# Patient Record
Sex: MALE | Race: BLACK OR AFRICAN AMERICAN | Employment: UNEMPLOYED | URBAN - METROPOLITAN AREA
[De-identification: names, ages, dates, MRNs, and addresses within clinical notes are randomized per-mention and may not be internally consistent; named-entity substitution may affect disease eponyms.]

---

## 2017-03-09 ENCOUNTER — APPOINTMENT (OUTPATIENT)
Dept: CT IMAGING | Age: 47
End: 2017-03-09
Attending: EMERGENCY MEDICINE
Payer: COMMERCIAL

## 2017-03-09 ENCOUNTER — APPOINTMENT (OUTPATIENT)
Dept: GENERAL RADIOLOGY | Age: 47
End: 2017-03-09
Attending: EMERGENCY MEDICINE
Payer: COMMERCIAL

## 2017-03-09 ENCOUNTER — HOSPITAL ENCOUNTER (EMERGENCY)
Age: 47
Discharge: HOME | End: 2017-03-10
Attending: EMERGENCY MEDICINE | Admitting: EMERGENCY MEDICINE
Payer: COMMERCIAL

## 2017-03-09 VITALS
HEART RATE: 101 BPM | BODY MASS INDEX: 25.16 KG/M2 | WEIGHT: 151.01 LBS | SYSTOLIC BLOOD PRESSURE: 113 MMHG | TEMPERATURE: 97.8 F | HEIGHT: 65 IN | OXYGEN SATURATION: 87 % | RESPIRATION RATE: 20 BRPM | DIASTOLIC BLOOD PRESSURE: 72 MMHG

## 2017-03-09 DIAGNOSIS — R07.9 CHEST PAIN, UNSPECIFIED TYPE: Primary | ICD-10-CM

## 2017-03-09 LAB
ALBUMIN SERPL BCP-MCNC: 3.6 G/DL (ref 3.5–5)
ALBUMIN/GLOB SERPL: 0.9 {RATIO} (ref 1.1–2.2)
ALP SERPL-CCNC: 106 U/L (ref 45–117)
ALT SERPL-CCNC: 35 U/L (ref 12–78)
ANION GAP BLD CALC-SCNC: 8 MMOL/L (ref 5–15)
AST SERPL W P-5'-P-CCNC: 13 U/L (ref 15–37)
BASOPHILS # BLD AUTO: 0.1 K/UL (ref 0–0.1)
BASOPHILS # BLD: 1 % (ref 0–1)
BILIRUB SERPL-MCNC: 1 MG/DL (ref 0.2–1)
BNP SERPL-MCNC: 2243 PG/ML (ref 0–125)
BUN SERPL-MCNC: 16 MG/DL (ref 6–20)
BUN/CREAT SERPL: 13 (ref 12–20)
CALCIUM SERPL-MCNC: 9.4 MG/DL (ref 8.5–10.1)
CHLORIDE SERPL-SCNC: 110 MMOL/L (ref 97–108)
CK MB CFR SERPL CALC: 1.1 % (ref 0–2.5)
CK MB CFR SERPL CALC: 1.1 % (ref 0–2.5)
CK MB SERPL-MCNC: 1.2 NG/ML (ref 5–25)
CK MB SERPL-MCNC: 1.5 NG/ML (ref 5–25)
CK SERPL-CCNC: 105 U/L (ref 39–308)
CK SERPL-CCNC: 131 U/L (ref 39–308)
CK SERPL-CCNC: 139 U/L (ref 39–308)
CO2 SERPL-SCNC: 24 MMOL/L (ref 21–32)
CREAT SERPL-MCNC: 1.28 MG/DL (ref 0.7–1.3)
EOSINOPHIL # BLD: 0.2 K/UL (ref 0–0.4)
EOSINOPHIL NFR BLD: 3 % (ref 0–7)
ERYTHROCYTE [DISTWIDTH] IN BLOOD BY AUTOMATED COUNT: 16.2 % (ref 11.5–14.5)
GLOBULIN SER CALC-MCNC: 4.1 G/DL (ref 2–4)
GLUCOSE SERPL-MCNC: 143 MG/DL (ref 65–100)
HCT VFR BLD AUTO: 42.5 % (ref 36.6–50.3)
HGB BLD-MCNC: 14.3 G/DL (ref 12.1–17)
LYMPHOCYTES # BLD AUTO: 20 % (ref 12–49)
LYMPHOCYTES # BLD: 1.3 K/UL (ref 0.8–3.5)
MCH RBC QN AUTO: 30 PG (ref 26–34)
MCHC RBC AUTO-ENTMCNC: 33.6 G/DL (ref 30–36.5)
MCV RBC AUTO: 89.3 FL (ref 80–99)
MONOCYTES # BLD: 0.6 K/UL (ref 0–1)
MONOCYTES NFR BLD AUTO: 9 % (ref 5–13)
NEUTS SEG # BLD: 4.6 K/UL (ref 1.8–8)
NEUTS SEG NFR BLD AUTO: 67 % (ref 32–75)
PLATELET # BLD AUTO: 337 K/UL (ref 150–400)
POTASSIUM SERPL-SCNC: 4.2 MMOL/L (ref 3.5–5.1)
PROT SERPL-MCNC: 7.7 G/DL (ref 6.4–8.2)
RBC # BLD AUTO: 4.76 M/UL (ref 4.1–5.7)
SODIUM SERPL-SCNC: 142 MMOL/L (ref 136–145)
TROPONIN I SERPL-MCNC: 0.23 NG/ML
TROPONIN I SERPL-MCNC: 0.24 NG/ML
TROPONIN I SERPL-MCNC: 0.25 NG/ML
WBC # BLD AUTO: 6.7 K/UL (ref 4.1–11.1)

## 2017-03-09 PROCEDURE — 74011636320 HC RX REV CODE- 636/320: Performed by: EMERGENCY MEDICINE

## 2017-03-09 PROCEDURE — 96374 THER/PROPH/DIAG INJ IV PUSH: CPT

## 2017-03-09 PROCEDURE — 83880 ASSAY OF NATRIURETIC PEPTIDE: CPT | Performed by: EMERGENCY MEDICINE

## 2017-03-09 PROCEDURE — 96375 TX/PRO/DX INJ NEW DRUG ADDON: CPT

## 2017-03-09 PROCEDURE — 93005 ELECTROCARDIOGRAM TRACING: CPT

## 2017-03-09 PROCEDURE — 80053 COMPREHEN METABOLIC PANEL: CPT | Performed by: EMERGENCY MEDICINE

## 2017-03-09 PROCEDURE — 36415 COLL VENOUS BLD VENIPUNCTURE: CPT | Performed by: EMERGENCY MEDICINE

## 2017-03-09 PROCEDURE — 71275 CT ANGIOGRAPHY CHEST: CPT

## 2017-03-09 PROCEDURE — 82553 CREATINE MB FRACTION: CPT | Performed by: EMERGENCY MEDICINE

## 2017-03-09 PROCEDURE — 82550 ASSAY OF CK (CPK): CPT | Performed by: EMERGENCY MEDICINE

## 2017-03-09 PROCEDURE — 99285 EMERGENCY DEPT VISIT HI MDM: CPT

## 2017-03-09 PROCEDURE — 74011250636 HC RX REV CODE- 250/636: Performed by: EMERGENCY MEDICINE

## 2017-03-09 PROCEDURE — 71020 XR CHEST PA LAT: CPT

## 2017-03-09 PROCEDURE — 84484 ASSAY OF TROPONIN QUANT: CPT | Performed by: EMERGENCY MEDICINE

## 2017-03-09 PROCEDURE — 85025 COMPLETE CBC W/AUTO DIFF WBC: CPT | Performed by: EMERGENCY MEDICINE

## 2017-03-09 RX ORDER — QUETIAPINE FUMARATE 50 MG/1
50 TABLET, FILM COATED ORAL 2 TIMES DAILY
COMMUNITY
End: 2020-01-31

## 2017-03-09 RX ORDER — HYDROMORPHONE HYDROCHLORIDE 1 MG/ML
1 INJECTION, SOLUTION INTRAMUSCULAR; INTRAVENOUS; SUBCUTANEOUS
Status: COMPLETED | OUTPATIENT
Start: 2017-03-09 | End: 2017-03-09

## 2017-03-09 RX ORDER — LISINOPRIL 10 MG/1
10 TABLET ORAL DAILY
COMMUNITY
End: 2018-09-11 | Stop reason: SDUPTHER

## 2017-03-09 RX ORDER — SODIUM CHLORIDE 0.9 % (FLUSH) 0.9 %
10 SYRINGE (ML) INJECTION
Status: COMPLETED | OUTPATIENT
Start: 2017-03-09 | End: 2017-03-09

## 2017-03-09 RX ORDER — MORPHINE SULFATE 2 MG/ML
4 INJECTION, SOLUTION INTRAMUSCULAR; INTRAVENOUS
Status: COMPLETED | OUTPATIENT
Start: 2017-03-09 | End: 2017-03-09

## 2017-03-09 RX ORDER — SODIUM CHLORIDE 9 MG/ML
50 INJECTION, SOLUTION INTRAVENOUS
Status: COMPLETED | OUTPATIENT
Start: 2017-03-09 | End: 2017-03-10

## 2017-03-09 RX ADMIN — Medication 4 MG: at 18:38

## 2017-03-09 RX ADMIN — HYDROMORPHONE HYDROCHLORIDE 1 MG: 1 INJECTION, SOLUTION INTRAMUSCULAR; INTRAVENOUS; SUBCUTANEOUS at 22:18

## 2017-03-09 RX ADMIN — SODIUM CHLORIDE 50 ML/HR: 900 INJECTION, SOLUTION INTRAVENOUS at 19:42

## 2017-03-09 RX ADMIN — IOPAMIDOL 100 ML: 755 INJECTION, SOLUTION INTRAVENOUS at 19:42

## 2017-03-09 RX ADMIN — Medication 10 ML: at 19:42

## 2017-03-09 NOTE — ED NOTES
Patient not present in department or waiting area. Called his cell phone and patient said he walked to the store to get something but was coming back to the ER right away.

## 2017-03-09 NOTE — ED NOTES
Patient has returned to ER and was immediately placed in room. Patient in no apparent distress, steady gait. No change to triage assessment.

## 2017-03-09 NOTE — ED TRIAGE NOTES
Patient was walking today when he began with right side chest pain that then migrated to the left. Brought to triage by EMS. Patient was tachycardic en route. + heart failure.

## 2017-03-09 NOTE — ED PROVIDER NOTES
HPI Comments: Sukumar Vasquez is a 42 y.o. male with PMhx significant for HTN, CAD (MI x2, 2 stents), bipolar disorder, schizoaffective disorder, asthma, depression, and anxiety who presents via EMS to the ED c/o an acute onset of intermittent chest pain radiating to the left and right sided chest x2 hours PTA. He reports associated sx of a dry cough, SOB, nausea, mild abdominal pain, constipation, decreased appetite secondary to pain, and congestion. The pt states while walking around he began to feel the onset of sx noting that he had taken a NTG for his sx WNR leading him to the ED. The pt endorses that his last cardiac catheterization was last year. He specifically denies any vomiting, diarrhea, leg swelling, or dysuria at this time. PCP: Nikole Zambrano MD      There are no other complaints, changes or physical findings at this time. Written by Eric Cook, ED Scribe, as dictated by Ramone Flaherty, DO     The history is provided by the patient. No  was used. Past Medical History:   Diagnosis Date    Aggressive outburst     Anxiety disorder     Asthma     Bipolar 1 disorder ([address]     CAD (coronary artery disease)     MI x2 with 2 cardiac stent    Depression     Diabetes ([address]     Hypertension     Mood disorder ([address]     Psychiatric disorder     Psychotic disorder     Schizoaffective disorder, bipolar type ([address]     Sleep disorder     Suicidal thoughts     Trauma        Past Surgical History:   Procedure Laterality Date    HX CORONARY STENT PLACEMENT      HX OTHER SURGICAL           Family History:   Problem Relation Age of Onset    Depression Neg Hx     Suicide Neg Hx     Psychotic Disorder Neg Hx     Dementia Neg Hx     Substance Abuse Neg Hx        Social History     Social History    Marital status: LEGALLY      Spouse name: N/A    Number of children: N/A    Years of education: N/A     Occupational History    Not on file.      Social History Main Topics    Smoking status: Current Every Day Smoker     Packs/day: 1.00    Smokeless tobacco: Never Used    Alcohol use Yes      Comment: occasionally    Drug use: No    Sexual activity: Not Currently     Other Topics Concern    Not on file     Social History Narrative         ALLERGIES: Aspirin; Spinach leaf; and Tylenol [acetaminophen]    Review of Systems   Constitutional: Positive for appetite change (secondary to pain). Negative for chills, fatigue and fever. HENT: Positive for congestion. Negative for rhinorrhea. Eyes: Negative for visual disturbance. Respiratory: Positive for cough (dry) and shortness of breath. Negative for wheezing. Cardiovascular: Positive for chest pain (BL sides). Negative for palpitations and leg swelling. Gastrointestinal: Positive for abdominal pain (mild), constipation and nausea. Negative for abdominal distention, diarrhea and vomiting. Endocrine: Negative. Genitourinary: Negative for difficulty urinating and dysuria. Musculoskeletal: Negative. Skin: Negative for rash. Neurological: Negative for dizziness, weakness and light-headedness. Psychiatric/Behavioral: Negative for suicidal ideas. Patient Vitals for the past 12 hrs:   Temp Pulse Resp BP SpO2   03/09/17 2230 - (!) 101 20 113/72 (!) 87 %   03/09/17 2221 - (!) 104 27 131/89 99 %   03/09/17 2145 - (!) 104 (!) 33 (!) 149/98 -   03/09/17 2130 - (!) 103 (!) 32 145/74 97 %   03/09/17 1916 - (!) 109 21 (!) 153/108 -   03/09/17 1900 - (!) 107 (!) 33 (!) 144/98 -   03/09/17 1837 - (!) 108 (!) 34 (!) 138/105 -   03/09/17 1815 - (!) 106 21 (!) 132/101 -   03/09/17 1755 - (!) 114 22 (!) 139/97 -   03/09/17 1613 97.8 °F (36.6 °C) (!) 107 20 (!) 137/96 100 %        Physical Exam   Constitutional: He is oriented to person, place, and time. He appears well-developed and well-nourished. No distress. HENT:   Head: Normocephalic and atraumatic.    Mouth/Throat: Oropharynx is clear and moist. Eyes: Conjunctivae and EOM are normal.   Neck: Neck supple. No JVD present. No tracheal deviation present. Cardiovascular: Regular rhythm and intact distal pulses. Tachycardia present. Exam reveals no gallop and no friction rub. No murmur heard. Pulmonary/Chest: Effort normal and breath sounds normal. No stridor. No respiratory distress. He has no wheezes. Pacemaker defibrillator in the left lateral chest    Abdominal: Soft. Bowel sounds are normal. He exhibits no distension and no mass. There is no tenderness. There is no guarding. Musculoskeletal: Normal range of motion. He exhibits no edema (peripheral ) or tenderness. No deformity   Neurological: He is alert and oriented to person, place, and time. He has normal strength. No focal deficits   Skin: Skin is warm, dry and intact. No rash noted. Psychiatric: His behavior is normal. Judgment and thought content normal.   Flat affect    Nursing note and vitals reviewed. MDM  Number of Diagnoses or Management Options  Chest pain, unspecified type:   Diagnosis management comments: Pt with a hx of CAD and heart failure, presenting with chest pain associated with SOB and nausea. Pt was seen in December and was transferred to [facility] for further cardiology evaluation at that time. Pt is tachycardic today. DDx includes acs, arrhythmia, atypical chest pain, anxiety, PE, pneumonia, pulmonary edema, CHF exacerbation. Amount and/or Complexity of Data Reviewed  Clinical lab tests: ordered and reviewed  Tests in the radiology section of CPT®: ordered and reviewed  Tests in the medicine section of CPT®: ordered and reviewed  Review and summarize past medical records: yes  Independent visualization of images, tracings, or specimens: yes    Patient Progress  Patient progress: stable    ED Course       Procedures    EKG interpretation: (Preliminary)  1620  Rhythm: sinus tachycardia; and regular . Rate (approx.): 105;  Axis: normal; NJ interval: normal; QRS interval: normal ; ST/T wave: non-specific changes; Other findings: borderline ekg. Written by Padmaja Cook ED Scribe as dictated by Yane Alanis DO     PROGRESS NOTE:  9:58 PM  Pt has been re-evaluated. Pt states that he is still experiencing pain but is asking for food. The pt was updated on all available results and the plan of care at this time. Written by Padmaja Cook ED Scribe, as dictated by Yane Alanis DO. Progress Note:  10:26 PM  [place] [facility] Pt was transferred there on 12/27 after stating his defibrillator fired and had an elevated troponin. Further work up was negative and patient was discharged home with medical therapy. Per [facility] records, patient was admitted in November 2016 for a syncopal episode and had a dobutamine stress test which showed global hypokinesis and an EF of 25-30%. RHC/LHC on 11/29/16 showed moderate CAD with 30% mid LAD lesion, 50% mid RCA lesion with bridging of the distal LAD. Given recent cath and similar symptoms of chest pain in the past, will get 3rd set of troponin. If no significant change in troponin, will dc home with follow up with his cardiologist at [facility]. SIGN OUT:  10:32 PM  Patient's presentation, labs/imaging and plan of care was reviewed with Beatriz Dodson MD as part of sign out. They will wait for the cardiac enzymes to come back, if unremarkable discharge the pt home as part of the plan discussed with the patient. Beatriz Dodson MD's assistance in completion of this plan is greatly appreciated but it should be noted that I will be the provider of record for this patient. Written by Padmaja oCok, DEBBIE Scribe as dictated by Yane Alanis DO          LABORATORY TESTS:  Recent Results (from the past 12 hour(s))   EKG, 12 LEAD, INITIAL    Collection Time: 03/09/17  4:20 PM   Result Value Ref Range    Ventricular Rate 105 BPM    Atrial Rate 105 BPM    P-R Interval 152 ms    QRS Duration 80 ms Q-T Interval 372 ms    QTC Calculation (Bezet) 491 ms    Calculated P Axis 51 degrees    Calculated R Axis 27 degrees    Calculated T Axis 77 degrees    Diagnosis       Sinus tachycardia  Possible Left atrial enlargement  Left ventricular hypertrophy  Nonspecific T wave abnormality  Abnormal ECG  When compared with ECG of 28-DEC-2016 01:38,  No significant change was found     CBC WITH AUTOMATED DIFF    Collection Time: 03/09/17  4:29 PM   Result Value Ref Range    WBC 6.7 4.1 - 11.1 K/uL    RBC 4.76 4.10 - 5.70 M/uL    HGB 14.3 12.1 - 17.0 g/dL    HCT 42.5 36.6 - 50.3 %    MCV 89.3 80.0 - 99.0 FL    MCH 30.0 26.0 - 34.0 PG    MCHC 33.6 30.0 - 36.5 g/dL    RDW 16.2 (H) 11.5 - 14.5 %    PLATELET 321 697 - 071 K/uL    NEUTROPHILS 67 32 - 75 %    LYMPHOCYTES 20 12 - 49 %    MONOCYTES 9 5 - 13 %    EOSINOPHILS 3 0 - 7 %    BASOPHILS 1 0 - 1 %    ABS. NEUTROPHILS 4.6 1.8 - 8.0 K/UL    ABS. LYMPHOCYTES 1.3 0.8 - 3.5 K/UL    ABS. MONOCYTES 0.6 0.0 - 1.0 K/UL    ABS. EOSINOPHILS 0.2 0.0 - 0.4 K/UL    ABS. BASOPHILS 0.1 0.0 - 0.1 K/UL   METABOLIC PANEL, COMPREHENSIVE    Collection Time: 03/09/17  4:29 PM   Result Value Ref Range    Sodium 142 136 - 145 mmol/L    Potassium 4.2 3.5 - 5.1 mmol/L    Chloride 110 (H) 97 - 108 mmol/L    CO2 24 21 - 32 mmol/L    Anion gap 8 5 - 15 mmol/L    Glucose 143 (H) 65 - 100 mg/dL    BUN 16 6 - 20 MG/DL    Creatinine 1.28 0.70 - 1.30 MG/DL    BUN/Creatinine ratio 13 12 - 20      GFR est AA >60 >60 ml/min/1.73m2    GFR est non-AA >60 >60 ml/min/1.73m2    Calcium 9.4 8.5 - 10.1 MG/DL    Bilirubin, total 1.0 0.2 - 1.0 MG/DL    ALT (SGPT) 35 12 - 78 U/L    AST (SGOT) 13 (L) 15 - 37 U/L    Alk.  phosphatase 106 45 - 117 U/L    Protein, total 7.7 6.4 - 8.2 g/dL    Albumin 3.6 3.5 - 5.0 g/dL    Globulin 4.1 (H) 2.0 - 4.0 g/dL    A-G Ratio 0.9 (L) 1.1 - 2.2     CK W/ REFLX CKMB    Collection Time: 03/09/17  4:29 PM   Result Value Ref Range     39 - 308 U/L   TROPONIN I    Collection Time: 03/09/17  4:29 PM   Result Value Ref Range    Troponin-I, Qt. 0.23 (H) <0.05 ng/mL   PRO-BNP    Collection Time: 03/09/17  4:29 PM   Result Value Ref Range    NT pro-BNP 2243 (H) 0 - 125 PG/ML   CK W/ CKMB & INDEX    Collection Time: 03/09/17  7:49 PM   Result Value Ref Range     39 - 308 U/L    CK - MB 1.5 <3.6 NG/ML    CK-MB Index 1.1 0 - 2.5     TROPONIN I    Collection Time: 03/09/17  7:49 PM   Result Value Ref Range    Troponin-I, Qt. 0.25 (H) <0.05 ng/mL   CK W/ CKMB & INDEX    Collection Time: 03/09/17 10:17 PM   Result Value Ref Range     39 - 308 U/L    CK - MB 1.2 <3.6 NG/ML    CK-MB Index 1.1 0 - 2.5     TROPONIN I    Collection Time: 03/09/17 10:17 PM   Result Value Ref Range    Troponin-I, Qt. 0.24 (H) <0.05 ng/mL       IMAGING RESULTS:  CTA CHEST W WO CONT   Final Result   CLINICAL HISTORY: Chest pain, history of CHF     INDICATION: Chest pain and history of CHF     COMPARISON: None     TECHNIQUE: CT of the chest with IV contrast , Isovue-370 is performed. Axial  images from the thoracic inlet to the level of the upper abdomen are obtained. Manual post-processing of the images and coronal reformatting is also performed.     CT dose reduction was achieved through use of a standardized protocol tailored  for this examination and automatic exposure control for dose modulation. Multiplanar reformatted imaging was performed.     Sagittal and coronal reformatting.      3-D Postprocessing of imaging was performed. 3-D MIP reconstructed images were obtained in the coronal plane.      FINDINGS:      There is no pulmonary embolism.     There is no aortic aneurysm or dissection.     .        There is no mediastinal, axillary or hilar lymphadenopathy. There is no pleural  or pericardial effusion. The aorta is normal in course and caliber. The proximal  pulmonary arteries are without evidence of filling defects, the caliber of the  pulmonary arteries is also normal. There is cardiomegaly.  There is mild  increased groundglass opacity and increased interstitial lung markings as well. Hepatic venous congestion. ICD. The remainder of the upper abdomen visualized is  unremarkable. Small hiatal hernia.     IMPRESSION  IMPRESSION:   No pulmonary embolism.     No aortic aneurysm or dissection.     Cardiomegaly and interstitial pulmonary edema which is mild. No effusions. XR CHEST PA LAT   Final Result   CLINICAL HISTORY: S pain, history of CHF   INDICATION: Chest pain, history of CHF     COMPARISON: 12/27/2016     FINDINGS:   PA and lateral views of the chest are obtained. The cardiopericardial silhouette is prominent but stable. ICD in place. . There  is no pleural effusion, pneumothorax or focal consolidation present.     IMPRESSION  IMPRESSION: No acute intrathoracic disease. MEDICATIONS GIVEN:  Medications   morphine injection 4 mg (4 mg IntraVENous Given 3/9/17 1838)   sodium chloride (NS) flush 10 mL (10 mL IntraVENous Given 3/9/17 1942)   iopamidol (ISOVUE-370) 76 % injection 100 mL (100 mL IntraVENous Given 3/9/17 1942)   0.9% sodium chloride infusion (50 mL/hr IntraVENous New Bag 3/9/17 1942)   HYDROmorphone (PF) (DILAUDID) injection 1 mg (1 mg IntraVENous Given 3/9/17 2218)       IMPRESSION:  1. Chest pain, unspecified type        PLAN:  1. Current Discharge Medication List      CONTINUE these medications which have NOT CHANGED    Details   QUEtiapine (SEROQUEL) 50 mg tablet Take 50 mg by mouth two (2) times a day. lisinopril (PRINIVIL, ZESTRIL) 10 mg tablet Take 10 mg by mouth daily. albuterol (PROVENTIL HFA, VENTOLIN HFA) 90 mcg/actuation inhaler Take 1-2 Puffs by inhalation every four (4) hours as needed for Wheezing. Qty: 1 Inhaler, Refills: 1      insulin glargine (LANTUS) 100 unit/mL injection 70 units SQ at bedtime  Qty: 1 Vial, Refills: 0      insulin lispro (HUMALOG) 100 unit/mL injection 4 times daily and at night  Qty: 1 Vial, Refills: 0           2.    Follow-up Information     Follow up With Details Comments Contact Info    Your cardiologist Schedule an appointment as soon as possible for a visit in 1 day      Zeina Alexander MD Schedule an appointment as soon as possible for a visit in 1 day  [address] [facility]  [address]  [phone]          Return to ED if worse       DISCHARGE NOTE:  11:15 PM  The patient has been re-evaluated and is ready for discharge. Reviewed available results with patient. Counseled patient on diagnosis and care plan. Patient has expressed understanding, and all questions have been answered. Patient agrees with plan and agrees to follow up as recommended, or return to the ED if their symptoms worsen. Discharge instructions have been provided and explained to the patient, along with reasons to return to the ED. This note is prepared by Alan Reyna, acting as Scribe for Alicia Trinidad MD.    Alicia Trinidad MD: The scribe's documentation has been prepared under my direction and personally reviewed by me in its entirety. I confirm that the note above accurately reflects all work, treatment, procedures, and medical decision making performed by me.

## 2017-03-09 NOTE — ED NOTES
Case discussed with Denise HYATT; pt has elevated troponin. Pt called from waiting room; did not answer. Triage nurses updated.

## 2017-03-10 LAB
ATRIAL RATE: 105 BPM
CALCULATED P AXIS, ECG09: 51 DEGREES
CALCULATED R AXIS, ECG10: 27 DEGREES
CALCULATED T AXIS, ECG11: 77 DEGREES
DIAGNOSIS, 93000: NORMAL
P-R INTERVAL, ECG05: 152 MS
Q-T INTERVAL, ECG07: 372 MS
QRS DURATION, ECG06: 80 MS
QTC CALCULATION (BEZET), ECG08: 491 MS
VENTRICULAR RATE, ECG03: 105 BPM

## 2017-03-10 PROCEDURE — 74011250637 HC RX REV CODE- 250/637: Performed by: EMERGENCY MEDICINE

## 2017-03-10 RX ORDER — ONDANSETRON 4 MG/1
8 TABLET, ORALLY DISINTEGRATING ORAL
Status: COMPLETED | OUTPATIENT
Start: 2017-03-10 | End: 2017-03-10

## 2017-03-10 RX ADMIN — ONDANSETRON 8 MG: 4 TABLET, ORALLY DISINTEGRATING ORAL at 00:32

## 2017-03-10 NOTE — DISCHARGE INSTRUCTIONS
Chest Pain: Care Instructions  Your Care Instructions  There are many things that can cause chest pain. Some are not serious and will get better on their own in a few days. But some kinds of chest pain need more testing and treatment. Your doctor may have recommended a follow-up visit in the next 8 to 12 hours. If you are not getting better, you may need more tests or treatment. Even though your doctor has released you, you still need to watch for any problems. The doctor carefully checked you, but sometimes problems can develop later. If you have new symptoms or if your symptoms do not get better, get medical care right away. If you have worse or different chest pain or pressure that lasts more than 5 minutes or you passed out (lost consciousness), call 911 or seek other emergency help right away. A medical visit is only one step in your treatment. Even if you feel better, you still need to do what your doctor recommends, such as going to all suggested follow-up appointments and taking medicines exactly as directed. This will help you recover and help prevent future problems. How can you care for yourself at home? · Rest until you feel better. · Take your medicine exactly as prescribed. Call your doctor if you think you are having a problem with your medicine. · Do not drive after taking a prescription pain medicine. When should you call for help? Call 911 if:  · You passed out (lost consciousness). · You have severe difficulty breathing. · You have symptoms of a heart attack. These may include:  ¨ Chest pain or pressure, or a strange feeling in your chest.  ¨ Sweating. ¨ Shortness of breath. ¨ Nausea or vomiting. ¨ Pain, pressure, or a strange feeling in your back, neck, jaw, or upper belly or in one or both shoulders or arms. ¨ Lightheadedness or sudden weakness. ¨ A fast or irregular heartbeat.   After you call 911, the  may tell you to chew 1 adult-strength or 2 to 4 low-dose aspirin. Wait for an ambulance. Do not try to drive yourself. Call your doctor today if:  · You have any trouble breathing. · Your chest pain gets worse. · You are dizzy or lightheaded, or you feel like you may faint. · You are not getting better as expected. · You are having new or different chest pain. Where can you learn more? Go to http://merrick-christofer.info/. Enter A120 in the search box to learn more about \"Chest Pain: Care Instructions. \"  Current as of: May 27, 2016  Content Version: 11.1  © 6508-3700 [employer]. Care instructions adapted under license by [employer] (which disclaims liability or warranty for this information). If you have questions about a medical condition or this instruction, always ask your healthcare professional. [address] any warranty or liability for your use of this information.

## 2017-03-10 NOTE — ED NOTES
Pt resting comfortably on the stretcher in a position of comfort.  Pt in no acute distress at this time.  Call bell within reach.  Side rails x 2.  Cardiac monitor x 3.  Stretcher locked in the lowest position. Pt aware of plan to await for MD/PA-C/NP assessment, and pt/family verbalizes understanding.  Will continue to monitor.

## 2017-03-10 NOTE — ED NOTES
Patient is here with c/o an acute onset of intermittent chest pain radiating to the left and right sided chest x2 hours PTA.

## 2017-03-10 NOTE — ED NOTES
Patient states that he does not have anyone to come get him. He wanted to walk but I informed him that it was dark outside and it was not safe.  Cab ticket provided to patient

## 2017-08-29 ENCOUNTER — IP HISTORICAL/CONVERTED ENCOUNTER (OUTPATIENT)
Dept: OTHER | Age: 47
End: 2017-08-29

## 2017-10-21 ENCOUNTER — OP HISTORICAL/CONVERTED ENCOUNTER (OUTPATIENT)
Dept: OTHER | Age: 47
End: 2017-10-21

## 2018-08-03 ENCOUNTER — IP HISTORICAL/CONVERTED ENCOUNTER (OUTPATIENT)
Dept: OTHER | Age: 48
End: 2018-08-03

## 2018-09-11 RX ORDER — CLOPIDOGREL BISULFATE 75 MG/1
75 TABLET ORAL DAILY
Qty: 30 TAB | Refills: 0 | Status: SHIPPED | OUTPATIENT
Start: 2018-09-11 | End: 2020-01-31 | Stop reason: SDUPTHER

## 2018-09-11 RX ORDER — CARVEDILOL 3.12 MG/1
3.12 TABLET ORAL 2 TIMES DAILY
Qty: 60 TAB | Refills: 0 | Status: SHIPPED | OUTPATIENT
Start: 2018-09-11 | End: 2019-10-23 | Stop reason: SDUPTHER

## 2018-09-11 RX ORDER — LISINOPRIL 10 MG/1
10 TABLET ORAL DAILY
Qty: 90 TAB | Refills: 1 | Status: SHIPPED | OUTPATIENT
Start: 2018-09-11 | End: 2020-01-31

## 2018-09-11 RX ORDER — ATORVASTATIN CALCIUM 40 MG/1
40 TABLET, FILM COATED ORAL DAILY
Qty: 30 TAB | Refills: 0 | Status: SHIPPED | OUTPATIENT
Start: 2018-09-11 | End: 2020-01-31 | Stop reason: SDUPTHER

## 2018-09-23 ENCOUNTER — OP HISTORICAL/CONVERTED ENCOUNTER (OUTPATIENT)
Dept: OTHER | Age: 48
End: 2018-09-23

## 2018-09-25 ENCOUNTER — OP HISTORICAL/CONVERTED ENCOUNTER (OUTPATIENT)
Dept: OTHER | Age: 48
End: 2018-09-25

## 2018-09-29 ENCOUNTER — ED HISTORICAL/CONVERTED ENCOUNTER (OUTPATIENT)
Dept: OTHER | Age: 48
End: 2018-09-29

## 2018-10-25 ENCOUNTER — OP HISTORICAL/CONVERTED ENCOUNTER (OUTPATIENT)
Dept: OTHER | Age: 48
End: 2018-10-25

## 2019-02-06 ENCOUNTER — IP HISTORICAL/CONVERTED ENCOUNTER (OUTPATIENT)
Dept: OTHER | Age: 49
End: 2019-02-06

## 2019-03-14 ENCOUNTER — ED HISTORICAL/CONVERTED ENCOUNTER (OUTPATIENT)
Dept: OTHER | Age: 49
End: 2019-03-14

## 2019-03-25 ENCOUNTER — ED HISTORICAL/CONVERTED ENCOUNTER (OUTPATIENT)
Dept: OTHER | Age: 49
End: 2019-03-25

## 2019-08-14 ENCOUNTER — OFFICE VISIT (OUTPATIENT)
Dept: CARDIOLOGY CLINIC | Age: 49
End: 2019-08-14

## 2019-08-14 VITALS
DIASTOLIC BLOOD PRESSURE: 79 MMHG | SYSTOLIC BLOOD PRESSURE: 115 MMHG | BODY MASS INDEX: 27.12 KG/M2 | WEIGHT: 162.8 LBS | OXYGEN SATURATION: 99 % | HEART RATE: 84 BPM | HEIGHT: 65 IN

## 2019-08-14 DIAGNOSIS — R00.1 BRADYCARDIA: ICD-10-CM

## 2019-08-14 DIAGNOSIS — I10 ESSENTIAL HYPERTENSION: ICD-10-CM

## 2019-08-14 DIAGNOSIS — R07.9 CHEST PAIN AT REST: Primary | ICD-10-CM

## 2019-08-14 DIAGNOSIS — Z95.0 PRESENCE OF PERMANENT CARDIAC PACEMAKER: ICD-10-CM

## 2019-08-14 DIAGNOSIS — Z79.4 CONTROLLED TYPE 2 DIABETES MELLITUS WITH DIABETIC NEPHROPATHY, WITH LONG-TERM CURRENT USE OF INSULIN (HCC): ICD-10-CM

## 2019-08-14 DIAGNOSIS — Z72.0 TOBACCO ABUSE: ICD-10-CM

## 2019-08-14 DIAGNOSIS — E11.21 CONTROLLED TYPE 2 DIABETES MELLITUS WITH DIABETIC NEPHROPATHY, WITH LONG-TERM CURRENT USE OF INSULIN (HCC): ICD-10-CM

## 2019-08-14 RX ORDER — LOSARTAN POTASSIUM 25 MG/1
25 TABLET ORAL DAILY
Qty: 30 TAB | Refills: 6 | Status: SHIPPED | OUTPATIENT
Start: 2019-08-14 | End: 2020-01-31 | Stop reason: SDUPTHER

## 2019-08-14 RX ORDER — INSULIN GLARGINE 100 [IU]/ML
INJECTION, SOLUTION SUBCUTANEOUS
COMMUNITY
End: 2020-01-31 | Stop reason: SDUPTHER

## 2019-08-14 RX ORDER — AMLODIPINE BESYLATE 5 MG/1
5 TABLET ORAL DAILY
Qty: 30 TAB | Refills: 3 | Status: CANCELLED | OUTPATIENT
Start: 2019-08-14

## 2019-08-14 RX ORDER — CARVEDILOL 25 MG/1
25 TABLET ORAL 2 TIMES DAILY WITH MEALS
COMMUNITY
End: 2019-08-14 | Stop reason: DRUGHIGH

## 2019-08-14 RX ORDER — LOSARTAN POTASSIUM AND HYDROCHLOROTHIAZIDE 12.5; 1 MG/1; MG/1
1 TABLET ORAL DAILY
Qty: 30 TAB | Refills: 3 | Status: CANCELLED | OUTPATIENT
Start: 2019-08-14

## 2019-08-14 RX ORDER — AMLODIPINE BESYLATE 5 MG/1
5 TABLET ORAL DAILY
COMMUNITY
End: 2020-01-31 | Stop reason: SDUPTHER

## 2019-08-14 RX ORDER — LOSARTAN POTASSIUM AND HYDROCHLOROTHIAZIDE 12.5; 1 MG/1; MG/1
1 TABLET ORAL DAILY
COMMUNITY
End: 2019-08-14 | Stop reason: DRUGHIGH

## 2019-08-14 RX ORDER — CARVEDILOL 25 MG/1
25 TABLET ORAL 2 TIMES DAILY WITH MEALS
Qty: 60 TAB | Refills: 3 | Status: CANCELLED | OUTPATIENT
Start: 2019-08-14

## 2019-08-14 RX ORDER — CARVEDILOL 6.25 MG/1
6.25 TABLET ORAL 2 TIMES DAILY WITH MEALS
Qty: 30 TAB | Refills: 6 | Status: SHIPPED | OUTPATIENT
Start: 2019-08-14 | End: 2020-01-31 | Stop reason: SDUPTHER

## 2019-08-14 NOTE — PATIENT INSTRUCTIONS
Chest Pain: Care Instructions Your Care Instructions There are many things that can cause chest pain. Some are not serious and will get better on their own in a few days. But some kinds of chest pain need more testing and treatment. Your doctor may have recommended a follow-up visit in the next 8 to 12 hours. If you are not getting better, you may need more tests or treatment. Even though your doctor has released you, you still need to watch for any problems. The doctor carefully checked you, but sometimes problems can develop later. If you have new symptoms or if your symptoms do not get better, get medical care right away. If you have worse or different chest pain or pressure that lasts more than 5 minutes or you passed out (lost consciousness), call 911 or seek other emergency help right away. A medical visit is only one step in your treatment. Even if you feel better, you still need to do what your doctor recommends, such as going to all suggested follow-up appointments and taking medicines exactly as directed. This will help you recover and help prevent future problems. How can you care for yourself at home? · Rest until you feel better. · Take your medicine exactly as prescribed. Call your doctor if you think you are having a problem with your medicine. · Do not drive after taking a prescription pain medicine. When should you call for help? Call 911 if: 
  · You passed out (lost consciousness).  
  · You have severe difficulty breathing.  
  · You have symptoms of a heart attack. These may include: 
? Chest pain or pressure, or a strange feeling in your chest. 
? Sweating. ? Shortness of breath. ? Nausea or vomiting. ? Pain, pressure, or a strange feeling in your back, neck, jaw, or upper belly or in one or both shoulders or arms. ? Lightheadedness or sudden weakness. ? A fast or irregular heartbeat.  
After you call 911, the  may tell you to chew 1 adult-strength or 2 to 4 low-dose aspirin. Wait for an ambulance. Do not try to drive yourself.  
 Call your doctor today if: 
  · You have any trouble breathing.  
  · Your chest pain gets worse.  
  · You are dizzy or lightheaded, or you feel like you may faint.  
  · You are not getting better as expected.  
  · You are having new or different chest pain. Where can you learn more? Go to http://merrick-christofer.info/. Enter A120 in the search box to learn more about \"Chest Pain: Care Instructions. \" Current as of: 2018 Content Version: 12.1 © 6847-1026 [employer]. Care instructions adapted under license by [employer] (which disclaims liability or warranty for this information). If you have questions about a medical condition or this instruction, always ask your healthcare professional. [address] any warranty or liability for your use of this information. Host Committee Activation Thank you for requesting access to Host Committee. Please follow the instructions below to securely access and download your online medical record. Host Committee allows you to send messages to your doctor, view your test results, renew your prescriptions, schedule appointments, and more. How Do I Sign Up? 1. In your internet browser, go to [email or web address]. 2. Click on the First Time User? Click Here link in the Sign In box. You will see the New Member Sign Up page. 3. Enter your Host Committee Access Code exactly as it appears below. You will not need to use this code after youve completed the sign-up process. If you do not sign up before the expiration date, you must request a new code. Host Committee Access Code: [email or web address] Expires: 2019  1:24 PM (This is the date your Host Committee access code will ) 4. Enter the last four digits of your Social Security Number (xxxx) and Date of Birth (mm/dd/yyyy) as indicated and click Submit.  You will be taken to the next sign-up page. 5. Create a Nuzzel ID. This will be your Nuzzel login ID and cannot be changed, so think of one that is secure and easy to remember. 6. Create a Nuzzel password. You can change your password at any time. 7. Enter your Password Reset Question and Answer. This can be used at a later time if you forget your password. 8. Enter your e-mail address. You will receive e-mail notification when new information is available in [address]. 9. Click Sign Up. You can now view and download portions of your medical record. 10. Click the Download Summary menu link to download a portable copy of your medical information. Additional Information If you have questions, please visit the Frequently Asked Questions section of the Nuzzel website at [email or web address] Remember, Nuzzel is NOT to be used for urgent needs. For medical emergencies, dial 911.

## 2019-08-14 NOTE — PROGRESS NOTES
HISTORY OF PRESENT ILLNESS  Alon Richards is a 54 y.o. male. New Patient   The history is provided by the patient and medical records. This is a chronic problem. The current episode started more than 1 week ago. The problem occurs every several days. Associated symptoms include chest pain and shortness of breath. Chest Pain    The history is provided by the patient and medical records. This is a chronic problem. The current episode started more than 1 week ago. The problem has not changed since onset. The problem occurs every several days. Associated symptoms include shortness of breath. Pertinent negatives include no claudication, no cough, no dizziness, no malaise/fatigue, no nausea, no orthopnea, no palpitations, no PND and no vomiting.      Family History   Problem Relation Age of Onset    Heart Attack Mother     Hypertension Mother     Diabetes Mother     Heart Attack Father     Hypertension Father     Diabetes Father     Depression Neg Hx     Suicide Neg Hx     Psychotic Disorder Neg Hx     Dementia Neg Hx     Substance Abuse Neg Hx        Past Medical History:   Diagnosis Date    Aggressive outburst     Anxiety disorder     Asthma     Bipolar 1 disorder ([address]     CAD (coronary artery disease)     MI x2 with 2 cardiac stent    Depression     Diabetes ([address]     Hypertension     Mood disorder ([address]     Psychiatric disorder     Psychotic disorder ([address]     Schizoaffective disorder, bipolar type ([address]     Sleep disorder     Suicidal thoughts     Trauma        Past Surgical History:   Procedure Laterality Date    HX CORONARY STENT PLACEMENT      HX OTHER SURGICAL         Social History     Tobacco Use    Smoking status: Current Every Day Smoker     Packs/day: 1.00     Types: Cigarettes    Smokeless tobacco: Never Used   Substance Use Topics    Alcohol use: Not Currently     Comment: occasionally       Allergies   Allergen Reactions    Aspirin Hives    Aspirin Shortness of Breath    Spinach Leaf Unknown (comments)     Green leaves    Tylenol [Acetaminophen] Unknown (comments)    Tylenol [Acetaminophen] Shortness of Breath       Prior to Admission medications    Medication Sig Start Date End Date Taking? Authorizing Provider   insulin glargine (LANTUS SOLOSTAR U-100 INSULIN) 100 unit/mL (3 mL) inpn by SubCUTAneous route. Yes Provider, Historical   amLODIPine (NORVASC) 5 mg tablet Take 5 mg by mouth daily. Yes Provider, Historical   carvedilol (COREG) 6.25 mg tablet Take 1 Tab by mouth two (2) times daily (with meals). 8/14/19  Yes Rosendo Pina NP   losartan (COZAAR) 25 mg tablet Take 1 Tab by mouth daily. 8/14/19  Yes Rosendo Pina, ROEVRTO   lisinopril (PRINIVIL, ZESTRIL) 10 mg tablet Take 1 Tab by mouth daily. 9/11/18   Jolene Espinosa MD   atorvastatin (LIPITOR) 40 mg tablet Take 1 Tab by mouth daily. 9/11/18   Jolene Espinosa MD   clopidogrel (PLAVIX) 75 mg tab Take 1 Tab by mouth daily. 9/11/18   Jolene Espinosa MD   carvedilol (COREG) 3.125 mg tablet Take 1 Tab by mouth two (2) times a day. 9/11/18   Jolene Espinosa MD   QUEtiapine (SEROQUEL) 50 mg tablet Take 50 mg by mouth two (2) times a day. Other, MD Chilo   albuterol (PROVENTIL HFA, VENTOLIN HFA) 90 mcg/actuation inhaler Take 1-2 Puffs by inhalation every four (4) hours as needed for Wheezing. 6/27/14   Claudia De Guzman MD   insulin glargine (LANTUS) 100 unit/mL injection 70 units SQ at bedtime 5/23/14   Dakota Mazariegos MD   insulin lispro (HUMALOG) 100 unit/mL injection 4 times daily and at night 5/23/14   Dakota Mazariegos MD         Visit Vitals  /79   Pulse 84   Ht 5' 5\" (1.651 m)   Wt 73.8 kg (162 lb 12.8 oz)   SpO2 99%   BMI 27.09 kg/m²       Review of Systems   Constitutional: Negative for malaise/fatigue. Respiratory: Positive for shortness of breath. Negative for cough and wheezing. Cardiovascular: Positive for chest pain.  Negative for palpitations, orthopnea, claudication, leg swelling and PND. Gastrointestinal: Negative for nausea and vomiting. Musculoskeletal: Negative for falls. Neurological: Negative for dizziness. Endo/Heme/Allergies: Does not bruise/bleed easily. Physical Exam   Constitutional: He is oriented to person, place, and time. He appears well-developed and well-nourished. Neck: No JVD present. Cardiovascular: Normal rate, regular rhythm, normal heart sounds and intact distal pulses. Exam reveals no gallop and no friction rub. No murmur heard. Pulmonary/Chest: Effort normal and breath sounds normal. No respiratory distress. He has no wheezes. He has no rales. He exhibits no tenderness. Neurological: He is alert and oriented to person, place, and time. Skin: Skin is warm and dry. Nursing note and vitals reviewed. ASSESSMENT and PLAN    ICD-10-CM ICD-9-CM    1. Chest pain at rest R07.9 786.50 ECHO ADULT COMPLETE      NUCLEAR CARDIAC STRESS TEST   2. Essential hypertension I10 401.9    3. Controlled type 2 diabetes mellitus with diabetic nephropathy, with long-term current use of insulin (HCC) E11.21 250.40     Z79.4 583.81      V58.67    4. Bradycardia R00.1 427.89    5. Presence of permanent cardiac pacemaker Z95.0 V45.01    6. Tobacco abuse Z72.0 305.1       Mr. James has a reminder for a \"due or due soon\" health maintenance. I have asked that he contact his primary care provider for follow-up on this health maintenance. No flowsheet data found. Assessment         ICD-10-CM ICD-9-CM    1. Chest pain at rest R07.9 786.50 ECHO ADULT COMPLETE      NUCLEAR CARDIAC STRESS TEST   2. Essential hypertension I10 401.9    3. Controlled type 2 diabetes mellitus with diabetic nephropathy, with long-term current use of insulin (HCC) E11.21 250.40     Z79.4 583.81      V58.67    4. Bradycardia R00.1 427.89    5. Presence of permanent cardiac pacemaker Z95.0 V45.01    6.  Tobacco abuse Z72.0 305.1        Medications Discontinued During This Encounter   Medication Reason    carvedilol (COREG) 25 mg tablet Dose Adjustment    losartan-hydroCHLOROthiazide (HYZAAR) 100-12.5 mg per tablet Dose Adjustment       Orders Placed This Encounter    carvedilol (COREG) 6.25 mg tablet     Sig: Take 1 Tab by mouth two (2) times daily (with meals). Dispense:  30 Tab     Refill:  6    losartan (COZAAR) 25 mg tablet     Sig: Take 1 Tab by mouth daily. Dispense:  30 Tab     Refill:  6     8/2019 -  Presents to establish care. He is new to the area having relocated from [place]. He reports PMH of DMII, HTN, and ICD placement. He is unsure of name of prior cardiologist.  He c/o left sided chest pain which occurs with rest with associated SOB. He denies palpitations, dizziness or edema. He is unsure of the  of device. But reports has been \"quite some time\" since interrogation. He is also out of all medications and has been for about 1 month. Will obtain NST to r/o ischemia and baseline echocardiogram. Will attempt to locate past medical records. To have ICD interrogated at time of stress test.    Resume low dose coreg and losartan, and monitor. Follow-up and Dispositions    · Return in about 1 month (around 9/11/2019), or if symptoms worsen or fail to improve. I have independently evaluated and examined the patient. All relevant labs and testing data's are reviewed. Care plan discussed and updated after review.     Jacquelin Salazar MD

## 2019-10-10 ENCOUNTER — TELEPHONE (OUTPATIENT)
Dept: CARDIOLOGY CLINIC | Age: 49
End: 2019-10-10

## 2019-10-10 NOTE — TELEPHONE ENCOUNTER
Delisa Whyte NP  [place], [place] November      Please schedule f/u appt    Previous Messages      ----- Message -----   From: Kalen Bowden MD   Sent: 10/4/2019   5:34 PM EDT   To: Nain Smith NP

## 2019-10-23 ENCOUNTER — OFFICE VISIT (OUTPATIENT)
Dept: CARDIOLOGY CLINIC | Age: 49
End: 2019-10-23

## 2019-10-23 VITALS
WEIGHT: 166 LBS | SYSTOLIC BLOOD PRESSURE: 136 MMHG | BODY MASS INDEX: 27.66 KG/M2 | DIASTOLIC BLOOD PRESSURE: 97 MMHG | HEIGHT: 65 IN | HEART RATE: 94 BPM

## 2019-10-23 DIAGNOSIS — I10 ESSENTIAL HYPERTENSION: ICD-10-CM

## 2019-10-23 DIAGNOSIS — I42.0 DILATED CARDIOMYOPATHY (HCC): ICD-10-CM

## 2019-10-23 DIAGNOSIS — Z79.4 CONTROLLED TYPE 2 DIABETES MELLITUS WITH DIABETIC NEPHROPATHY, WITH LONG-TERM CURRENT USE OF INSULIN (HCC): ICD-10-CM

## 2019-10-23 DIAGNOSIS — E11.21 CONTROLLED TYPE 2 DIABETES MELLITUS WITH DIABETIC NEPHROPATHY, WITH LONG-TERM CURRENT USE OF INSULIN (HCC): ICD-10-CM

## 2019-10-23 DIAGNOSIS — I50.22 SYSTOLIC CHF, CHRONIC (HCC): Primary | ICD-10-CM

## 2019-10-23 NOTE — PROGRESS NOTES
HISTORY OF PRESENT ILLNESS  Castillo Robles is a 54 y.o. male. Shortness of Breath   The history is provided by the patient. This is a chronic problem. The problem occurs intermittently. The problem has not changed since onset. Pertinent negatives include no wheezing, no PND, no orthopnea and no leg swelling. Hypertension   The history is provided by the patient. This is a chronic problem. The problem occurs daily. Associated symptoms include shortness of breath. Family History   Problem Relation Age of Onset    Heart Attack Mother     Hypertension Mother     Diabetes Mother     Heart Attack Father     Hypertension Father     Diabetes Father     Depression Neg Hx     Suicide Neg Hx     Psychotic Disorder Neg Hx     Dementia Neg Hx     Substance Abuse Neg Hx        Past Medical History:   Diagnosis Date    Aggressive outburst     Anxiety disorder     Asthma     Bipolar 1 disorder ([address]     CAD (coronary artery disease)     MI x2 with 2 cardiac stent    Depression     Diabetes [facility]     Hypertension     Mood disorder ([address]     Psychiatric disorder     Psychotic disorder ([facility]     Schizoaffective disorder, bipolar type ([facility]     Sleep disorder     Suicidal thoughts     Trauma        Past Surgical History:   Procedure Laterality Date    HX CORONARY STENT PLACEMENT      HX OTHER SURGICAL         Social History     Tobacco Use    Smoking status: Current Every Day Smoker     Packs/day: 1.00     Types: Cigarettes    Smokeless tobacco: Never Used   Substance Use Topics    Alcohol use: Not Currently     Comment: occasionally       Allergies   Allergen Reactions    Aspirin Hives    Aspirin Shortness of Breath    Spinach Leaf Unknown (comments)     Green leaves    Tylenol [Acetaminophen] Unknown (comments)    Tylenol [Acetaminophen] Shortness of Breath       Prior to Admission medications    Medication Sig Start Date End Date Taking?  Authorizing Provider   insulin glargine (LANTUS SOLOSTAR U-100 INSULIN) 100 unit/mL (3 mL) inpn by SubCUTAneous route. Yes Provider, Historical   amLODIPine (NORVASC) 5 mg tablet Take 5 mg by mouth daily. Yes Provider, Historical   carvedilol (COREG) 6.25 mg tablet Take 1 Tab by mouth two (2) times daily (with meals). 8/14/19  Yes Rosendo Pina, NP   losartan (COZAAR) 25 mg tablet Take 1 Tab by mouth daily. 8/14/19  Yes Rosendo Pina, NP   lisinopril (PRINIVIL, ZESTRIL) 10 mg tablet Take 1 Tab by mouth daily. 9/11/18  Yes Rose Marie Mistry MD   atorvastatin (LIPITOR) 40 mg tablet Take 1 Tab by mouth daily. 9/11/18  Yes Rose Marie Mistry MD   clopidogrel (PLAVIX) 75 mg tab Take 1 Tab by mouth daily. 9/11/18  Yes Rose Marie Mistry MD   QUEtiapine (SEROQUEL) 50 mg tablet Take 50 mg by mouth two (2) times a day. Yes Other, MD Chilo   albuterol (PROVENTIL HFA, VENTOLIN HFA) 90 mcg/actuation inhaler Take 1-2 Puffs by inhalation every four (4) hours as needed for Wheezing. 6/27/14  Yes Jacqueline Nash MD   insulin glargine (LANTUS) 100 unit/mL injection 70 units SQ at bedtime 5/23/14  Yes Rachel Sims MD   insulin lispro (HUMALOG) 100 unit/mL injection 4 times daily and at night 5/23/14  Yes Rachel Sims MD         Visit Vitals  BP (!) 136/97   Pulse 94   Ht 5' 5\" (1.651 m)   Wt 75.3 kg (166 lb)   BMI 27.62 kg/m²       Review of Systems   Respiratory: Positive for shortness of breath. Negative for wheezing. Cardiovascular: Negative for orthopnea, leg swelling and PND. Musculoskeletal: Negative for falls. Endo/Heme/Allergies: Does not bruise/bleed easily. Physical Exam   Constitutional: He is oriented to person, place, and time. He appears well-developed and well-nourished. Neck: No JVD present. Cardiovascular: Normal rate, regular rhythm, normal heart sounds and intact distal pulses. Exam reveals no gallop and no friction rub. No murmur heard.   Pulmonary/Chest: Effort normal and breath sounds normal. No respiratory distress. He has no wheezes. He has no rales. He exhibits no tenderness. Neurological: He is alert and oriented to person, place, and time. Skin: Skin is warm and dry. Nursing note and vitals reviewed. 10/03/19   ECHO ADULT COMPLETE 10/04/2019 10/4/2019    Narrative · Left Ventricle: Normal cavity size and wall thickness. Moderate-to-severe systolic dysfunction. Estimated left ventricular   ejection fraction is 26 - 30%. Left ventricular global hypokinesis. Moderate (grade 2) left ventricular diastolic dysfunction. · Right Ventricle: Mildly dilated right ventricle. · Right Ventricle: Mildly dilated right ventricle. · Left Atrium: Severely dilated left atrium. · Mitral Valve: Mild mitral valve regurgitation is present. · Pulmonic Valve: Mild pulmonic valve regurgitation is present. Signed by: Carlton Pardo MD       ASSESSMENT and PLAN    ICD-10-CM ICD-9-CM    1. Systolic CHF, chronic (HCC) I50.22 428.22      428.0    2. Essential hypertension I10 401.9    3. Controlled type 2 diabetes mellitus with diabetic nephropathy, with long-term current use of insulin ([facility]) E11.21 250.40     Z79.4 583.81      V58.67    4. Dilated cardiomyopathy ([facility] I42.0 425.4       Mr. James has a reminder for a \"due or due soon\" health maintenance. I have asked that he contact his primary care provider for follow-up on this health maintenance. No flowsheet data found. Assessment         ICD-10-CM ICD-9-CM    1. Systolic CHF, chronic (HCC) I50.22 428.22      428.0    2. Essential hypertension I10 401.9    3. Controlled type 2 diabetes mellitus with diabetic nephropathy, with long-term current use of insulin (HCC) E11.21 250.40     Z79.4 583.81      V58.67    4.  Dilated cardiomyopathy ([facility] I42.0 425.4        Medications Discontinued During This Encounter   Medication Reason    carvedilol (COREG) 6.616 mg tablet Duplicate Order       No orders of the defined types were placed in this encounter. Patient is a 53-year-old male with likely nonischemic cardiomyopathy, chronic systolic CHF seen for follow-up. His echo revealed an EF of 25%. Patient not sure about medications. He lives in [facility] and we suggested that he see a cardiologist there for follow-up. However he wants to come here. Advised him to bring medications with him in the next visit. Continue salt restriction and follow-up in 3 months.

## 2019-10-23 NOTE — PROGRESS NOTES
1. Have you been to the ER, urgent care clinic since your last visit? Hospitalized since your last visit? No     2. Have you seen or consulted any other health care providers outside of the [address] [place] [address] since your last visit? Include any pap smears or colon screening. No     3. Since your last visit, have you had any of the following symptoms? chest pains, shortness of breath and dizziness. 4.  Have you had any blood work, X-rays or cardiac testing? No        5. Where do you normally have your labs drawn? PCP    6. Do you need any refills today?    Yes

## 2020-01-31 ENCOUNTER — OFFICE VISIT (OUTPATIENT)
Dept: FAMILY MEDICINE CLINIC | Age: 50
End: 2020-01-31

## 2020-01-31 ENCOUNTER — DOCUMENTATION ONLY (OUTPATIENT)
Dept: FAMILY MEDICINE CLINIC | Age: 50
End: 2020-01-31

## 2020-01-31 ENCOUNTER — HOSPITAL ENCOUNTER (OUTPATIENT)
Dept: LAB | Age: 50
Discharge: HOME | End: 2020-01-31

## 2020-01-31 VITALS
TEMPERATURE: 97.7 F | OXYGEN SATURATION: 99 % | RESPIRATION RATE: 18 BRPM | DIASTOLIC BLOOD PRESSURE: 86 MMHG | HEIGHT: 65 IN | SYSTOLIC BLOOD PRESSURE: 121 MMHG | HEART RATE: 85 BPM | BODY MASS INDEX: 29.02 KG/M2 | WEIGHT: 174.2 LBS

## 2020-01-31 DIAGNOSIS — E11.65 TYPE 2 DIABETES MELLITUS WITH HYPERGLYCEMIA, WITH LONG-TERM CURRENT USE OF INSULIN (HCC): ICD-10-CM

## 2020-01-31 DIAGNOSIS — M25.511 CHRONIC RIGHT SHOULDER PAIN: ICD-10-CM

## 2020-01-31 DIAGNOSIS — Z79.4 TYPE 2 DIABETES MELLITUS WITH HYPERGLYCEMIA, WITH LONG-TERM CURRENT USE OF INSULIN (HCC): ICD-10-CM

## 2020-01-31 DIAGNOSIS — E11.65 TYPE 2 DIABETES MELLITUS WITH HYPERGLYCEMIA, WITH LONG-TERM CURRENT USE OF INSULIN (HCC): Primary | ICD-10-CM

## 2020-01-31 DIAGNOSIS — G89.29 CHRONIC RIGHT SHOULDER PAIN: ICD-10-CM

## 2020-01-31 DIAGNOSIS — Z79.4 TYPE 2 DIABETES MELLITUS WITH HYPERGLYCEMIA, WITH LONG-TERM CURRENT USE OF INSULIN (HCC): Primary | ICD-10-CM

## 2020-01-31 DIAGNOSIS — I10 ESSENTIAL HYPERTENSION: ICD-10-CM

## 2020-01-31 DIAGNOSIS — E78.00 HYPERCHOLESTEREMIA: ICD-10-CM

## 2020-01-31 DIAGNOSIS — I25.10 CORONARY ARTERY DISEASE INVOLVING NATIVE CORONARY ARTERY OF NATIVE HEART WITHOUT ANGINA PECTORIS: ICD-10-CM

## 2020-01-31 LAB
ANION GAP SERPL CALC-SCNC: 5 MMOL/L (ref 5–15)
BASOPHILS # BLD: 0.1 K/UL (ref 0–0.1)
BASOPHILS NFR BLD: 2 % (ref 0–1)
BUN SERPL-MCNC: 17 MG/DL (ref 6–20)
BUN/CREAT SERPL: 14 (ref 12–20)
CALCIUM SERPL-MCNC: 9.4 MG/DL (ref 8.5–10.1)
CHLORIDE SERPL-SCNC: 112 MMOL/L (ref 97–108)
CO2 SERPL-SCNC: 22 MMOL/L (ref 21–32)
CREAT SERPL-MCNC: 1.21 MG/DL (ref 0.7–1.3)
CREAT UR-MCNC: 190 MG/DL
DIFFERENTIAL METHOD BLD: ABNORMAL
EOSINOPHIL # BLD: 0.3 K/UL (ref 0–0.4)
EOSINOPHIL NFR BLD: 6 % (ref 0–7)
ERYTHROCYTE [DISTWIDTH] IN BLOOD BY AUTOMATED COUNT: 16.1 % (ref 11.5–14.5)
EST. AVERAGE GLUCOSE BLD GHB EST-MCNC: 160 MG/DL
GLUCOSE SERPL-MCNC: 57 MG/DL (ref 65–100)
HBA1C MFR BLD: 7.2 % (ref 4–5.6)
HCT VFR BLD AUTO: 50.4 % (ref 36.6–50.3)
HGB BLD-MCNC: 15.6 G/DL (ref 12.1–17)
IMM GRANULOCYTES # BLD AUTO: 0 K/UL (ref 0–0.04)
IMM GRANULOCYTES NFR BLD AUTO: 1 % (ref 0–0.5)
LYMPHOCYTES # BLD: 1.2 K/UL (ref 0.8–3.5)
LYMPHOCYTES NFR BLD: 19 % (ref 12–49)
MCH RBC QN AUTO: 29.3 PG (ref 26–34)
MCHC RBC AUTO-ENTMCNC: 31 G/DL (ref 30–36.5)
MCV RBC AUTO: 94.7 FL (ref 80–99)
MICROALBUMIN UR-MCNC: 95.5 MG/DL
MICROALBUMIN/CREAT UR-RTO: 503 MG/G (ref 0–30)
MONOCYTES # BLD: 0.6 K/UL (ref 0–1)
MONOCYTES NFR BLD: 10 % (ref 5–13)
NEUTS SEG # BLD: 3.8 K/UL (ref 1.8–8)
NEUTS SEG NFR BLD: 62 % (ref 32–75)
NRBC # BLD: 0 K/UL (ref 0–0.01)
NRBC BLD-RTO: 0 PER 100 WBC
PLATELET # BLD AUTO: 258 K/UL (ref 150–400)
PMV BLD AUTO: 10.8 FL (ref 8.9–12.9)
POTASSIUM SERPL-SCNC: 5.1 MMOL/L (ref 3.5–5.1)
RBC # BLD AUTO: 5.32 M/UL (ref 4.1–5.7)
SODIUM SERPL-SCNC: 139 MMOL/L (ref 136–145)
WBC # BLD AUTO: 6 K/UL (ref 4.1–11.1)

## 2020-01-31 RX ORDER — LOSARTAN POTASSIUM 25 MG/1
25 TABLET ORAL DAILY
Qty: 30 TAB | Refills: 5 | Status: SHIPPED | OUTPATIENT
Start: 2020-01-31 | End: 2020-08-11 | Stop reason: SDUPTHER

## 2020-01-31 RX ORDER — ATORVASTATIN CALCIUM 40 MG/1
40 TABLET, FILM COATED ORAL DAILY
Qty: 30 TAB | Refills: 5 | Status: SHIPPED | OUTPATIENT
Start: 2020-01-31 | End: 2020-07-27

## 2020-01-31 RX ORDER — CLOPIDOGREL BISULFATE 75 MG/1
75 TABLET ORAL DAILY
Qty: 30 TAB | Refills: 5 | Status: SHIPPED | OUTPATIENT
Start: 2020-01-31 | End: 2020-07-27

## 2020-01-31 RX ORDER — PEN NEEDLE, DIABETIC 30 GX3/16"
NEEDLE, DISPOSABLE MISCELLANEOUS
Qty: 1 PACKAGE | Refills: 5 | Status: ON HOLD | OUTPATIENT
Start: 2020-01-31 | End: 2021-05-06

## 2020-01-31 RX ORDER — FUROSEMIDE 20 MG/1
TABLET ORAL
COMMUNITY
Start: 2020-01-26 | End: 2020-01-31 | Stop reason: SDUPTHER

## 2020-01-31 RX ORDER — AMLODIPINE BESYLATE 5 MG/1
5 TABLET ORAL DAILY
Qty: 30 TAB | Refills: 5 | Status: SHIPPED | OUTPATIENT
Start: 2020-01-31 | End: 2020-07-27

## 2020-01-31 RX ORDER — CARVEDILOL 6.25 MG/1
6.25 TABLET ORAL 2 TIMES DAILY WITH MEALS
Qty: 60 TAB | Refills: 5 | Status: SHIPPED | OUTPATIENT
Start: 2020-01-31 | End: 2020-08-11 | Stop reason: SDUPTHER

## 2020-01-31 RX ORDER — FUROSEMIDE 20 MG/1
20 TABLET ORAL DAILY
Qty: 30 TAB | Refills: 5 | Status: SHIPPED | OUTPATIENT
Start: 2020-01-31 | End: 2020-05-06 | Stop reason: DRUGHIGH

## 2020-01-31 RX ORDER — INSULIN GLARGINE 100 [IU]/ML
50 INJECTION, SOLUTION SUBCUTANEOUS DAILY
Qty: 5 ADJUSTABLE DOSE PRE-FILLED PEN SYRINGE | Refills: 1 | Status: SHIPPED | OUTPATIENT
Start: 2020-01-31 | End: 2020-02-28

## 2020-01-31 NOTE — PATIENT INSTRUCTIONS
Diabetes: 
Blood sugar goals: 
Hemoglobin A1c under 7 Fasting blood sugar  Blood sugar 2 hours after a meal under 180, 4 hours after a meal under 120 No hypoglycemia (sugars under 70 and symptomatic low sugars) Blood sugar control with diet and exercise: 
Exercise 45 minutes per day. This makes your insulin work better. It also allows insulin levels to fall helping with weight loss. Every night, try to fast from your evening meal to breakfast (at least 12 hours) without eating anything. This uses stored energy in your liver and makes insulin work better. Avoid simples sugars such as table sugar in drinks (sodas, lemonade, sweet tea, wine), desserts, candy. Also avoid fruit juices and high fructose corn syrup. Avoid frequent consumption of fruit especially grapes and bananas. A single serving of fruit daily is all you should have. Finally, drink less milk which has milk sugar in it. Control your starch intake. Men should have 3-4 carb portions per meal.  Women should have 2-3 carb portions per meal.  A carb serving is the equivalent of a slice of bread. Control your blood pressure and cholesterol. These problems are common in diabetes. AND, don't smoke. All of these problems contribute to heart disease and stroke risk. Get a yearly eye exam and flu shot. Make sure your vaccines are up to date particularly the pneumonia vaccines. Inspect your feet daily. Wear comfortable protective shoes and clean socks. Seek medical care if there are sore, calluses or numbness and burning of your feet. See your doctor at least every 6 months if you are on oral medicines or more often if the diabetic control is not at goal or if you are on insulin. Take your medicines religiously. The goal blood pressure for most patients is 140/90. The whole point of treating blood pressure is to prevent strokes, heart attacks and kidney damage.   Persistently elevated blood pressure damages blood vessels and can lead to catastrophic heart problems and strokes. Recommendations for Hypertension (elevated blood pressure): · Purchase a blood pressure cuff that goes around your upper arm and check blood pressure at least 3 times per week. Write down your numbers for review. Check blood pressure in the morning and evening. Rest for 5 minutes with feet elevated and arm resting on a table (at mid-chest level) when checking blood pressure · Exercise 30-60 minutes most days of the week, preferably with a mix of cardiovascular and strength training. Exercise can improve blood pressure, even if you do not lose weight! · Limit alcohol intake to less than 3-4 drinks per week. · Avoid tobacco products · Avoid illegal drugs especially amphetamines · DASH diet - includes fruits, vegetables, fiber, and less than 2000 mg sodium (salt) daily. · Try to eat a low sugar diet. Sugar worsens diabetes and activates kidney hormones that raise blood pressure. · Avoid non-steroidal inflammatory medications (NSAIDS) such as ibuprofen, advil, motrin, aleve, and naproxyn as these may increase blood pressure if used long-term · Avoid OTC decongestants such as pseudopherine, phenylephrine, Afrin · Take your blood pressure medicine (and aspirin if prescribed) religiously STOP the SUGAR The first step in dietary efforts at weight loss is removing as much sugar from the diet as possible. Most dietary sugar is in the forms of table sugar (sucrose), fruit sugar (fructose) and milk sugar (lactose). But, as the picture above demonstrates, you need to watch labels to see if processed foods have added sugars under other names. To eliminate sucrose, eliminate sweet drinks entirely including soft drinks, sports drinks, lemonade, sweet tea and wine. Also, eliminate candy and make desserts a \"special occasion only treat\". Don't eat desserts with every meal or every night. Most fructose is found in fruit and this should be markedly limited. Fruit juice should be avoided. One piece of fruit daily is the limit. Berries are a good choice to eat as a garnish for other foods. Bananas and grapes should be avoided. Avoid high fructose corn syrup (an artificial sweetener). Milk sugar, or lactose, should be avoided as well. Milk is a good source of calcium but not for people struggling with weight issues. Put a little milk in your coffee or tea but otherwise avoid milk. How can you avoid sugar? For starters, don't buy it and don't bring it in the house. It won't tempt you that way! For more information: 
 
Try the internet for videos about sugar addiction or try diet doctor.[employer]. Hypoglycemia: Care Instructions Your Care Instructions Hypoglycemia means that your blood sugar is low and your body is not getting enough fuel. Some people get low blood sugar from not eating often enough. Some medicines to treat diabetes can cause low blood sugar. People who have had surgery on their stomachs or intestines may get hypoglycemia. Problems with the pancreas, kidneys, or liver also can cause low blood sugar. A snack or drink with sugar in it will raise your blood sugar and should ease your symptoms right away. Your doctor may recommend that you change or stop your medicines until you can get your blood sugar levels under control. In the long run, you may need to change your diet and eating habits so that you get enough fuel for your body throughout the day. Follow-up care is a key part of your treatment and safety. Be sure to make and go to all appointments, and call your doctor if you are having problems. It's also a good idea to know your test results and keep a list of the medicines you take. How can you care for yourself at home? · Learn to recognize the early signs of low blood sugar. Signs include: 
? Nausea. ? Hunger. ? Feeling nervous, irritable, or shaky. ? Cold, clammy, wet skin. ? Sweating (when you are not exercising). ? A fast heartbeat. 
? Numbness or tingling of the fingertips or lips. · If you feel an episode of low blood sugar coming on, drink fruit juice or sugared (not diet) soda, or eat sugar in the form of candy, cubes, or tablets. [employer] are another [employer]. · Eat small, frequent meals so that you do not get too hungry between meals. · Balance extra exercise with eating more. · Keep a written record of your low blood sugar episodes, including when you last ate and what you ate, so that you can learn what causes your blood sugar to drop. · Make sure your family, friends, and coworkers know the symptoms of low blood sugar and know what to do to get your sugar level up. · Wear medical alert jewelry that lists your condition. You can buy this at most drugstores. When should you call for help? Call 911 anytime you think you may need emergency care. For example, call if: 
  · You passed out (lost consciousness).  
  · You are confused or cannot think clearly.  
  · Your blood sugar is very high or very low.  
 Watch closely for changes in your health, and be sure to contact your doctor if: 
  · Your blood sugar stays outside the level your doctor set for you.  
  · You have any problems. Where can you learn more? Go to http://merrick-christofer.info/. Enter W282 in the search box to learn more about \"Hypoglycemia: Care Instructions. \" Current as of: April 16, 2019 Content Version: 12.2 © 6819-0210 [employer]. Care instructions adapted under license by [employer] (which disclaims liability or warranty for this information). If you have questions about a medical condition or this instruction, always ask your healthcare professional. [address] any warranty or liability for your use of this information.

## 2020-01-31 NOTE — PROGRESS NOTES
Identified pt with two pt identifiers(name and ). Reviewed record in preparation for visit and have obtained necessary documentation. Chief Complaint   Patient presents with    [address] Follow Up     Chest Pain [place]  2020        Health Maintenance Due   Topic    Pneumococcal 0-64 years (1 of 1 - PPSV23)    FOOT EXAM Q1     MICROALBUMIN Q1     EYE EXAM RETINAL OR DILATED     DTaP/Tdap/Td series (1 - Tdap)    Influenza Age 5 to Adult     HEMOGLOBIN A1C Q6M        Coordination of Care Questionnaire:  :   1) Have you been to an emergency room, urgent care, or hospitalized since your last visit? If yes, where when, and reason for visit? Yes Chest Pain 2020       2. Have seen or consulted any other health care provider since your last visit? If yes, where when, and reason for visit?   No
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
## 2020-02-06 ENCOUNTER — TELEPHONE (OUTPATIENT)
Dept: FAMILY MEDICINE CLINIC | Age: 50
End: 2020-02-06

## 2020-02-13 ENCOUNTER — HOSPITAL ENCOUNTER (OUTPATIENT)
Dept: GENERAL RADIOLOGY | Age: 50
Discharge: HOME | End: 2020-02-13
Attending: FAMILY MEDICINE
Payer: COMMERCIAL

## 2020-02-13 DIAGNOSIS — M25.511 CHRONIC RIGHT SHOULDER PAIN: ICD-10-CM

## 2020-02-13 DIAGNOSIS — G89.29 CHRONIC RIGHT SHOULDER PAIN: ICD-10-CM

## 2020-02-13 PROCEDURE — 73030 X-RAY EXAM OF SHOULDER: CPT

## 2020-02-14 NOTE — PROGRESS NOTES
Your xray confirms that you have shoulder arthritis. See me as scheduled and we'll talk about what the next steps for you to take are.   [facility]

## 2020-02-28 ENCOUNTER — OFFICE VISIT (OUTPATIENT)
Dept: FAMILY MEDICINE CLINIC | Age: 50
End: 2020-02-28

## 2020-02-28 VITALS
BODY MASS INDEX: 29.16 KG/M2 | WEIGHT: 175 LBS | HEART RATE: 57 BPM | SYSTOLIC BLOOD PRESSURE: 110 MMHG | DIASTOLIC BLOOD PRESSURE: 84 MMHG | OXYGEN SATURATION: 97 % | HEIGHT: 65 IN | TEMPERATURE: 98.1 F | RESPIRATION RATE: 16 BRPM

## 2020-02-28 DIAGNOSIS — E11.21 TYPE 2 DIABETES WITH NEPHROPATHY (HCC): ICD-10-CM

## 2020-02-28 DIAGNOSIS — G89.29 CHRONIC RIGHT SHOULDER PAIN: ICD-10-CM

## 2020-02-28 DIAGNOSIS — E11.65 TYPE 2 DIABETES MELLITUS WITH HYPERGLYCEMIA, WITH LONG-TERM CURRENT USE OF INSULIN (HCC): Primary | ICD-10-CM

## 2020-02-28 DIAGNOSIS — M25.511 CHRONIC RIGHT SHOULDER PAIN: ICD-10-CM

## 2020-02-28 DIAGNOSIS — Z79.4 TYPE 2 DIABETES MELLITUS WITH HYPERGLYCEMIA, WITH LONG-TERM CURRENT USE OF INSULIN (HCC): Primary | ICD-10-CM

## 2020-02-28 RX ORDER — INSULIN GLARGINE 100 [IU]/ML
50 INJECTION, SOLUTION SUBCUTANEOUS 2 TIMES DAILY
Qty: 10 ADJUSTABLE DOSE PRE-FILLED PEN SYRINGE | Refills: 3 | Status: SHIPPED | OUTPATIENT
Start: 2020-02-28 | End: 2020-06-29

## 2020-02-28 NOTE — PATIENT INSTRUCTIONS
Diabetes: 
Blood sugar goals: 
Hemoglobin A1c under 7 Fasting blood sugar  Blood sugar 2 hours after a meal under 180, 4 hours after a meal under 120 No hypoglycemia (sugars under 70 and symptomatic low sugars) Blood sugar control with diet and exercise: 
Exercise 45 minutes per day. This makes your insulin work better. It also allows insulin levels to fall helping with weight loss. Every night, try to fast from your evening meal to breakfast (at least 12 hours) without eating anything. This uses stored energy in your liver and makes insulin work better. Avoid simples sugars such as table sugar in drinks (sodas, lemonade, sweet tea, wine), desserts, candy. Also avoid fruit juices and high fructose corn syrup. Avoid frequent consumption of fruit especially grapes and bananas. A single serving of fruit daily is all you should have. Finally, drink less milk which has milk sugar in it. Control your starch intake. Men should have 3-4 carb portions per meal.  Women should have 2-3 carb portions per meal.  A carb serving is the equivalent of a slice of bread. Control your blood pressure and cholesterol. These problems are common in diabetes. AND, don't smoke. All of these problems contribute to heart disease and stroke risk. Get a yearly eye exam and flu shot. Make sure your vaccines are up to date particularly the pneumonia vaccines. Inspect your feet daily. Wear comfortable protective shoes and clean socks. Seek medical care if there are sore, calluses or numbness and burning of your feet. See your doctor at least every 6 months if you are on oral medicines or more often if the diabetic control is not at goal or if you are on insulin. Take your medicines religiously. The goal blood pressure for most patients is 140/90. The whole point of treating blood pressure is to prevent strokes, heart attacks and kidney damage.   Persistently elevated blood pressure damages blood vessels and can lead to catastrophic heart problems and strokes. Recommendations for Hypertension (elevated blood pressure): · Purchase a blood pressure cuff that goes around your upper arm and check blood pressure at least 3 times per week. Write down your numbers for review. Check blood pressure in the morning and evening. Rest for 5 minutes with feet elevated and arm resting on a table (at mid-chest level) when checking blood pressure · Exercise 30-60 minutes most days of the week, preferably with a mix of cardiovascular and strength training. Exercise can improve blood pressure, even if you do not lose weight! · Limit alcohol intake to less than 3-4 drinks per week. · Avoid tobacco products · Avoid illegal drugs especially amphetamines · DASH diet - includes fruits, vegetables, fiber, and less than 2000 mg sodium (salt) daily. · Try to eat a low sugar diet. Sugar worsens diabetes and activates kidney hormones that raise blood pressure. · Avoid non-steroidal inflammatory medications (NSAIDS) such as ibuprofen, advil, motrin, aleve, and naproxyn as these may increase blood pressure if used long-term · Avoid OTC decongestants such as pseudopherine, phenylephrine, Afrin · Take your blood pressure medicine (and aspirin if prescribed) religiously Hypoglycemia: Care Instructions Your Care Instructions Hypoglycemia means that your blood sugar is low and your body is not getting enough fuel. Some people get low blood sugar from not eating often enough. Some medicines to treat diabetes can cause low blood sugar. People who have had surgery on their stomachs or intestines may get hypoglycemia. Problems with the pancreas, kidneys, or liver also can cause low blood sugar. A snack or drink with sugar in it will raise your blood sugar and should ease your symptoms right away.  
Your doctor may recommend that you change or stop your medicines until you can get your blood sugar levels under control. In the long run, you may need to change your diet and eating habits so that you get enough fuel for your body throughout the day. Follow-up care is a key part of your treatment and safety. Be sure to make and go to all appointments, and call your doctor if you are having problems. It's also a good idea to know your test results and keep a list of the medicines you take. How can you care for yourself at home? · Learn to recognize the early signs of low blood sugar. Signs include: 
? Nausea. ? Hunger. ? Feeling nervous, irritable, or shaky. ? Cold, clammy, wet skin. ? Sweating (when you are not exercising). ? A fast heartbeat. 
? Numbness or tingling of the fingertips or lips. · If you feel an episode of low blood sugar coming on, drink fruit juice or sugared (not diet) soda, or eat sugar in the form of candy, cubes, or tablets. Te[employer] are another [employer]. · Eat small, frequent meals so that you do not get too hungry between meals. · Balance extra exercise with eating more. · Keep a written record of your low blood sugar episodes, including when you last ate and what you ate, so that you can learn what causes your blood sugar to drop. · Make sure your family, friends, and coworkers know the symptoms of low blood sugar and know what to do to get your sugar level up. · Wear medical alert jewelry that lists your condition. You can buy this at most drugstores. When should you call for help? Call 911 anytime you think you may need emergency care. For example, call if: 
  · You passed out (lost consciousness).  
  · You are confused or cannot think clearly.  
  · Your blood sugar is very high or very low.  
 Watch closely for changes in your health, and be sure to contact your doctor if: 
  · Your blood sugar stays outside the level your doctor set for you.  
  · You have any problems. Where can you learn more? Go to http://merrick-christofer.info/. Enter C720 in the search box to learn more about \"Hypoglycemia: Care Instructions. \" Current as of: April 16, 2019 Content Version: 12.2 © 4733-1147 [employer]. Care instructions adapted under license by [employer] (which disclaims liability or warranty for this information). If you have questions about a medical condition or this instruction, always ask your healthcare professional. [place] [address] any warranty or liability for your use of this information.

## 2020-02-28 NOTE — PROGRESS NOTES
Assessment and Plan    1. Type 2 diabetes mellitus with hyperglycemia, with long-term current use of insulin (HCC)  Labs discussed  Has AC ratio >500  Already on losartan. Avoid hypoglycemia, discussed and handout given  FU 3-4 months. Stick to diet  - insulin glargine (LANTUS SOLOSTAR U-100 INSULIN) 100 unit/mL (3 mL) inpn; 50 Units by SubCUTAneous route two (2) times a day. Dispense: 10 Adjustable Dose Pre-filled Pen Syringe; Refill: 3    2. Chronic right shoulder pain  Decreased ROM and DJD by xray  To Dr. Kamar Hraley. - REFERRAL TO ORTHOPEDICS      Follow-up and Dispositions    · Return in about 3 months (around 5/28/2020) for Diabetes follow up, Blood pressure follow up, Medication follow up. Diagnosis and plan discussed with patient who verbillized understanding. History of present Payton Fitzgerald is a 54 y.o. male presenting for Diabetes; Back Pain; and Weight Management    DM2  Checking sugars BID  Fasting sugars around 200. Later in the day around 150  No hypoglycemia at all  Last night 89  A1c was 7.2 and FBS was 57    Shoulder xray showed DJD    Review of Systems   Respiratory: Negative for shortness of breath. Cardiovascular: Negative for chest pain and palpitations. Gastrointestinal: Negative. Genitourinary: Negative. Musculoskeletal: Positive for joint pain. Negative for falls. Psychiatric/Behavioral: Negative.           Past Medical History:   Diagnosis Date    Aggressive outburst     Anxiety disorder     Asthma     Bipolar 1 disorder ([address]     CAD (coronary artery disease)     MI x2 with 2 cardiac stent    Depression     Diabetes ([address]     Hypertension     Mood disorder ([address]     Psychiatric disorder     Psychotic disorder ([address]     Schizoaffective disorder, bipolar type ([address]     Sleep disorder     Suicidal thoughts     Trauma      Past Surgical History:   Procedure Laterality Date    HX CORONARY STENT PLACEMENT      HX OTHER SURGICAL       Family History   Problem Relation Age of Onset    Heart Attack Mother     Hypertension Mother     Diabetes Mother     Heart Attack Father     Hypertension Father     Diabetes Father     Depression Neg Hx     Suicide Neg Hx     Psychotic Disorder Neg Hx     Dementia Neg Hx     Substance Abuse Neg Hx      Social History     Socioeconomic History    Marital status: LEGALLY      Spouse name: Not on file    Number of children: Not on file    Years of education: Not on file    Highest education level: Not on file   Occupational History    Not on file   Social Needs    Financial resource strain: Not on file    Food insecurity:     Worry: Not on file     Inability: Not on file    Transportation needs:     Medical: Not on file     Non-medical: Not on file   Tobacco Use    Smoking status: Current Every Day Smoker     Packs/day: 1.00     Types: Cigarettes    Smokeless tobacco: Never Used   Substance and Sexual Activity    Alcohol use: Not Currently     Comment: occasionally    Drug use: Never    Sexual activity: Not Currently   Lifestyle    Physical activity:     Days per week: Not on file     Minutes per session: Not on file    Stress: Not on file   Relationships    Social connections:     Talks on phone: Not on file     Gets together: Not on file     Attends Restorationism service: Not on file     Active member of club or organization: Not on file     Attends meetings of clubs or organizations: Not on file     Relationship status: Not on file    Intimate partner violence:     Fear of current or ex partner: Not on file     Emotionally abused: Not on file     Physically abused: Not on file     Forced sexual activity: Not on file   Other Topics Concern    Not on file   Social History Narrative    ** Merged History Encounter **              Prior to Admission medications    Medication Sig Start Date End Date Taking?  Authorizing Provider   insulin glargine (LANTUS SOLOSTAR U-100 INSULIN) 100 unit/mL (3 mL) inpn 50 Units by SubCUTAneous route two (2) times a day. 2/28/20  Yes Darryle Pou, MD   amLODIPine (NORVASC) 5 mg tablet Take 1 Tab by mouth daily. 1/31/20  Yes Darryle Pou, MD   atorvastatin (LIPITOR) 40 mg tablet Take 1 Tab by mouth daily. 1/31/20  Yes Darryle Pou, MD   carvediloL (COREG) 6.25 mg tablet Take 1 Tab by mouth two (2) times daily (with meals). 1/31/20  Yes Darryle Pou, MD   clopidogreL (PLAVIX) 75 mg tab Take 1 Tab by mouth daily. 1/31/20  Yes Darryle Pou, MD   furosemide (LASIX) 20 mg tablet Take 1 Tab by mouth daily. 1/31/20  Yes Darryle Pou, MD   losartan (COZAAR) 25 mg tablet Take 1 Tab by mouth daily. 1/31/20  Yes Darryle Pou, MD   Insulin Needles, Disposable, 31 gauge x 5/16\" ndle Use daily with lantus insulin. E11.9 DM2 on insulin. 1/31/20  Yes Darryle Pou, MD   albuterol (PROVENTIL HFA, VENTOLIN HFA) 90 mcg/actuation inhaler Take 1-2 Puffs by inhalation every four (4) hours as needed for Wheezing. 6/27/14  Yes Ramya Donohue MD   insulin glargine (LANTUS SOLOSTAR U-100 INSULIN) 100 unit/mL (3 mL) inpn 50 Units by SubCUTAneous route daily. 1/31/20 2/28/20  Darryle Pou, MD        Allergies   Allergen Reactions    Acetaminophen Unknown (comments) and Anaphylaxis     Other reaction(s): anaphylaxis/angioedema    Aspirin Hives and Anaphylaxis     Other reaction(s): anaphylaxis/angioedema    Aspirin Shortness of Breath    Spinach Leaf Unknown (comments)     Green leaves    Tylenol [Acetaminophen] Shortness of Breath       Vitals:    02/28/20 1137   BP: 110/84   Pulse: (!) 57   Resp: 16   Temp: 98.1 °F (36.7 °C)   TempSrc: Oral   SpO2: 97%   Weight: 175 lb (79.4 kg)   Height: 5' 5\" (1.651 m)     Body mass index is 29.12 kg/m². Objective  Physical Exam  Vitals signs and nursing note reviewed. Constitutional:       Appearance: Normal appearance. He is not toxic-appearing.    HENT:      Head: Normocephalic and atraumatic. Neck:      Musculoskeletal: No muscular tenderness. Cardiovascular:      Rate and Rhythm: Normal rate and regular rhythm. Heart sounds: Normal heart sounds. No murmur. No gallop. Pulmonary:      Effort: Pulmonary effort is normal. No respiratory distress. Breath sounds: Normal breath sounds. No wheezing, rhonchi or rales. Lymphadenopathy:      Cervical: No cervical adenopathy. Neurological:      Mental Status: He is alert. Psychiatric:         Mood and Affect: Mood normal.         Behavior: Behavior normal.         Thought Content:  Thought content normal.         Judgment: Judgment normal.

## 2020-02-28 NOTE — PROGRESS NOTES
Identified pt with two pt identifiers(name and ). Reviewed record in preparation for visit and have obtained necessary documentation. Chief Complaint   Patient presents with    Diabetes    Back Pain        Health Maintenance Due   Topic    Pneumococcal 0-64 years (1 of 1 - PPSV23)    Foot Exam Q1     Eye Exam Retinal or Dilated     DTaP/Tdap/Td series (1 - Tdap)    Influenza Age 5 to Adult        Coordination of Care Questionnaire:  :   1) Have you been to an emergency room, urgent care, or hospitalized since your last visit? If yes, where when, and reason for visit? No       2. Have seen or consulted any other health care provider since your last visit? If yes, where when, and reason for visit?   No

## 2020-04-29 ENCOUNTER — VIRTUAL VISIT (OUTPATIENT)
Dept: FAMILY MEDICINE CLINIC | Age: 50
End: 2020-04-29

## 2020-04-29 ENCOUNTER — TELEPHONE (OUTPATIENT)
Dept: FAMILY MEDICINE CLINIC | Age: 50
End: 2020-04-29

## 2020-04-29 VITALS — HEIGHT: 65 IN | WEIGHT: 185 LBS | BODY MASS INDEX: 30.82 KG/M2

## 2020-04-29 DIAGNOSIS — R55 SYNCOPE, UNSPECIFIED SYNCOPE TYPE: Primary | ICD-10-CM

## 2020-04-29 DIAGNOSIS — R47.9 DIFFICULTY WITH SPEECH: ICD-10-CM

## 2020-04-29 DIAGNOSIS — Z79.4 TYPE 2 DIABETES MELLITUS WITH HYPERGLYCEMIA, WITH LONG-TERM CURRENT USE OF INSULIN (HCC): ICD-10-CM

## 2020-04-29 DIAGNOSIS — N17.9 AKI (ACUTE KIDNEY INJURY) (HCC): ICD-10-CM

## 2020-04-29 DIAGNOSIS — R00.2 PALPITATIONS: ICD-10-CM

## 2020-04-29 DIAGNOSIS — E11.65 TYPE 2 DIABETES MELLITUS WITH HYPERGLYCEMIA, WITH LONG-TERM CURRENT USE OF INSULIN (HCC): ICD-10-CM

## 2020-04-29 RX ORDER — PANTOPRAZOLE SODIUM 40 MG/1
40 TABLET, DELAYED RELEASE ORAL DAILY
COMMUNITY
End: 2020-08-11 | Stop reason: SDUPTHER

## 2020-04-29 NOTE — PROGRESS NOTES
Ira Danielson is a 54 y.o. male who was seen by synchronous (real-time) audio-video technology on 4/29/2020. Consent: Ira Danielson, who was seen by synchronous (real-time) audio-video technology, and/or his healthcare decision maker, is aware that this patient-initiated, Telehealth encounter on 4/29/2020 is a billable service, with coverage as determined by his insurance carrier. He is aware that he may receive a bill and has provided verbal consent to proceed: Yes. Assessment & Plan:   Diagnoses and all orders for this visit:    1. Syncope, unspecified syncope type  S/p hospitalization at [facility].  No etiology found. Incomplete records. DC summary reviewed but no mention of CV evaluation or of CT of head. Patient states seen by cardiology and had CT head that was normal.  Now having difficulty walking and speaking. Also has history in [facility] discharge summary that indicates seizure disorder in past.  Currently on no seizure meds. Need to clarify that issue and history. 2. MEERA (acute kidney injury) ([facility]  Now on losartan per med list but off of loop diuretic. Patient is not entirely clear on what meds he is taking and this does not match up wellmwith what discharge paperwork shows. Does not have gabapentin or trazodone which are both listed as discharge meds. Also has hx of CHFrEF with EF 20%. Need to make sure remains stable off of loop diuretic. 3. Palpitations  ICD in place    4. Type 2 diabetes mellitus with hyperglycemia, with long-term current use of insulin ([facility])  Had episodes of hypoglycemia as low as 52. Unclear if this was cause of syncope  Lantus reduced to 30 units at bedtime. This reviewed with patient.     5. Difficulty with speech  Patient has difficulty with slurred speech and walking  Unable to do adequate NEURO evaluation over the phone  States he had a head CT that was normal in [facility].    Feel he needs in person evaluation to review medications, evaluation to see if need further neuro eval (is syncope due to seizure and has he had CVA) and to further review tests he had as inpatient. Will see if we can gather more information about inpatient evaluation. Arranged follow up visit at [address] to further evaluate patient. I spent at least 40 minutes on this visit with this established patient. ([id number])    Subjective:   Cam Mendiola is a 54 y.o. male who was seen for Hospital Follow Up ([facility] now still has some [facility]zy ) )    Follow up of inpatient stay at [address] [place] [address]  4/13-4/17  Admitted for syncope. Unclear what that was atributed to but Discharge summary says hypoglycemia and psych meds  Apparently has some history of seizure disorder and ICD in place for low EF  Not clear what cardiac evaluation was done and what the results were    Meds changed in hospital  Patient states had vomiting of blood prior to hospital stay and that he had evaluation. Placed on PPI at discharge  Discharge paperwork shows that he had evaluation for gastroparesis that was negative. Unable to completely reconcile meds. See in assessment and plan. States he still feels dizzy and has trouble walking and speaking. He thinks he had a head CT but is unsure the results. Doubt he had MRI with ICD in place      Prior to Admission medications    Medication Sig Start Date End Date Taking? Authorizing Provider   pantoprazole (PROTONIX) 40 mg tablet Take 40 mg by mouth daily. Yes Provider, Historical   insulin glargine (LANTUS SOLOSTAR U-100 INSULIN) 100 unit/mL (3 mL) inpn 50 Units by SubCUTAneous route two (2) times a day. 2/28/20  Yes Abril Mancera MD   amLODIPine (NORVASC) 5 mg tablet Take 1 Tab by mouth daily. 1/31/20  Yes Abril Mancera MD   atorvastatin (LIPITOR) 40 mg tablet Take 1 Tab by mouth daily. 1/31/20  Yes Abril Mancera MD   carvediloL (COREG) 6.25 mg tablet Take 1 Tab by mouth two (2) times daily (with meals).  1/31/20  Yes Lissett Qureshi MD   clopidogreL (PLAVIX) 75 mg tab Take 1 Tab by mouth daily. 1/31/20  Yes Lissett Qureshi MD   furosemide (LASIX) 20 mg tablet Take 1 Tab by mouth daily. 1/31/20  Yes Lissett Qureshi MD   losartan (COZAAR) 25 mg tablet Take 1 Tab by mouth daily. 1/31/20  Yes Lissett Qureshi MD   Insulin Needles, Disposable, 31 gauge x 5/16\" ndle Use daily with lantus insulin. E11.9 DM2 on insulin. 1/31/20  Yes Lissett Qureshi MD   albuterol (PROVENTIL HFA, VENTOLIN HFA) 90 mcg/actuation inhaler Take 1-2 Puffs by inhalation every four (4) hours as needed for Wheezing. 6/27/14  Yes Sherry Germain MD     Allergies   Allergen Reactions    Acetaminophen Unknown (comments) and Anaphylaxis     Other reaction(s): anaphylaxis/angioedema    Aspirin Hives and Anaphylaxis     Other reaction(s): anaphylaxis/angioedema    Aspirin Shortness of Breath    Spinach Leaf Unknown (comments)     Green leaves    Tylenol [Acetaminophen] Shortness of Breath           Review of Systems   Constitutional: Negative for chills and fever. HENT: Negative for congestion. Respiratory: Negative for cough and shortness of breath. Cardiovascular: Negative for chest pain. Neurological: Positive for dizziness, speech change, loss of consciousness and weakness.        Objective:   Vital Signs: (As obtained by patient/caregiver at home)  Visit Vitals  Ht 5' 5\" (1.651 m)   Wt 185 lb (83.9 kg)   BMI 30.79 kg/m²        [INSTRUCTIONS:  \"[x]\" Indicates a positive item  \"[]\" Indicates a negative item  -- DELETE ALL ITEMS NOT EXAMINED]    Constitutional: [x] Appears well-developed and well-nourished [x] No apparent distress      [] Abnormal -     Mental status: [x] Alert and awake  [x] Oriented to person/place/time [x] Able to follow commands    [] Abnormal -     Eyes:   EOM    [x]  Normal    [] Abnormal -   Sclera  [x]  Normal    [] Abnormal -          Discharge [x]  None visible   [] Abnormal -     HENT: [x] Normocephalic, atraumatic  [] Abnormal -   [x] Mouth/Throat: Mucous membranes are moist    External Ears [x] Normal  [] Abnormal -    Neck: [x] No visualized mass [] Abnormal -     Pulmonary/Chest: [x] Respiratory effort normal   [x] No visualized signs of difficulty breathing or respiratory distress        [] Abnormal -      Musculoskeletal:   [x] Normal gait with no signs of ataxia         [x] Normal range of motion of neck        [] Abnormal -     Neurological:        [x] No Facial Asymmetry (Cranial nerve 7 motor function) (limited exam due to video visit)          [x] No gaze palsy        [] Abnormal -          Skin:        [x] No significant exanthematous lesions or discoloration noted on facial skin         [] Abnormal -            Psychiatric:       [x] Normal Affect [] Abnormal -        [x] No Hallucinations    Other pertinent observable physical exam findings:-        We discussed the expected course, resolution and complications of the diagnosis(es) in detail. Medication risks, benefits, costs, interactions, and alternatives were discussed as indicated. I advised him to contact the office if his condition worsens, changes or fails to improve as anticipated. He expressed understanding with the diagnosis(es) and plan. Ivette Chambers is a 54 y.o. male who was evaluated by a video visit encounter for concerns as above. Patient identification was verified prior to start of the visit. A caregiver was present when appropriate. Due to this being a TeleHealth encounter (During [place] [address] public [facility] emergency), evaluation of the following organ systems was limited: Vitals/Constitutional/EENT/Resp/CV/GI//MS/Neuro/Skin/Heme-Lymph-Imm.   Pursuant to the emergency declaration under the [facility], [facility] waiver authority and the [email or web address] and [employer] Act, this Virtual  Visit was conducted, with patient's (and/or legal guardian's) consent, to reduce the patient's risk of exposure to COVID-19 and provide necessary medical care. Services were provided through a video synchronous discussion virtually to substitute for in-person clinic visit. Patient at home and I was in clinic for this visit.       Nito Prieto MD

## 2020-04-29 NOTE — TELEPHONE ENCOUNTER
Rosa Lombardi called stating that pt needs hospital follow up visit, had possible stroke, has diabetes and needs medications to be corrected, he was seen at [facility] .

## 2020-04-29 NOTE — PROGRESS NOTES
Maria De Jesus Brizuela is a 54 y.o. male      Chief Complaint   Patient presents with   [facility] Follow Up     Last week [facility]- passed out ( now still has some dizzy )          1. Have you been to the ER, urgent care clinic since your last visit? Hospitalized since your last visit? YES      2. Have you seen or consulted any other health care providers outside of the [address] [place] [address] since your last visit? Include any pap smears or colon screening.   NO

## 2020-04-30 ENCOUNTER — OFFICE VISIT (OUTPATIENT)
Dept: FAMILY MEDICINE CLINIC | Age: 50
End: 2020-04-30

## 2020-04-30 ENCOUNTER — HOSPITAL ENCOUNTER (OUTPATIENT)
Dept: LAB | Age: 50
Discharge: HOME | End: 2020-04-30

## 2020-04-30 VITALS
TEMPERATURE: 98.1 F | BODY MASS INDEX: 31.02 KG/M2 | WEIGHT: 186.2 LBS | HEART RATE: 100 BPM | SYSTOLIC BLOOD PRESSURE: 141 MMHG | HEIGHT: 65 IN | RESPIRATION RATE: 16 BRPM | DIASTOLIC BLOOD PRESSURE: 101 MMHG | OXYGEN SATURATION: 97 %

## 2020-04-30 DIAGNOSIS — N17.9 AKI (ACUTE KIDNEY INJURY) (HCC): ICD-10-CM

## 2020-04-30 DIAGNOSIS — R06.09 DOE (DYSPNEA ON EXERTION): ICD-10-CM

## 2020-04-30 DIAGNOSIS — Z09 HOSPITAL DISCHARGE FOLLOW-UP: ICD-10-CM

## 2020-04-30 DIAGNOSIS — K92.0 HEMATEMESIS WITH NAUSEA: Primary | ICD-10-CM

## 2020-04-30 DIAGNOSIS — I50.32 CHRONIC DIASTOLIC CONGESTIVE HEART FAILURE (HCC): ICD-10-CM

## 2020-04-30 DIAGNOSIS — K92.0 HEMATEMESIS WITH NAUSEA: ICD-10-CM

## 2020-04-30 DIAGNOSIS — R42 LIGHTHEADED: ICD-10-CM

## 2020-04-30 LAB
ANION GAP SERPL CALC-SCNC: 6 MMOL/L (ref 5–15)
BUN SERPL-MCNC: 16 MG/DL (ref 6–20)
BUN/CREAT SERPL: 12 (ref 12–20)
CALCIUM SERPL-MCNC: 9.2 MG/DL (ref 8.5–10.1)
CHLORIDE SERPL-SCNC: 107 MMOL/L (ref 97–108)
CO2 SERPL-SCNC: 25 MMOL/L (ref 21–32)
CREAT SERPL-MCNC: 1.34 MG/DL (ref 0.7–1.3)
ERYTHROCYTE [DISTWIDTH] IN BLOOD BY AUTOMATED COUNT: 16.3 % (ref 11.5–14.5)
GLUCOSE SERPL-MCNC: 242 MG/DL (ref 65–100)
HCT VFR BLD AUTO: 42.5 % (ref 36.6–50.3)
HGB BLD-MCNC: 13.6 G/DL (ref 12.1–17)
HGB BLD-MCNC: NORMAL G/DL
MCH RBC QN AUTO: 29 PG (ref 26–34)
MCHC RBC AUTO-ENTMCNC: 32 G/DL (ref 30–36.5)
MCV RBC AUTO: 90.6 FL (ref 80–99)
NRBC # BLD: 0 K/UL (ref 0–0.01)
NRBC BLD-RTO: 0 PER 100 WBC
PLATELET # BLD AUTO: 233 K/UL (ref 150–400)
PMV BLD AUTO: 11.3 FL (ref 8.9–12.9)
POTASSIUM SERPL-SCNC: 4.3 MMOL/L (ref 3.5–5.1)
RBC # BLD AUTO: 4.69 M/UL (ref 4.1–5.7)
SODIUM SERPL-SCNC: 138 MMOL/L (ref 136–145)
WBC # BLD AUTO: 7.7 K/UL (ref 4.1–11.1)

## 2020-04-30 NOTE — PATIENT INSTRUCTIONS
Gastrointestinal Bleeding: Care Instructions Your Care Instructions The digestive or gastrointestinal tract goes from the mouth to the anus. It is often called the GI tract. Bleeding can happen anywhere in the GI tract. It may be caused by an ulcer, an infection, or cancer. It may also be caused by medicines such as aspirin or ibuprofen. Light bleeding may not cause any symptoms at first. But if you continue to bleed for a while, you may feel very weak or tired. Sudden, heavy bleeding means you need to see a doctor right away. This kind of bleeding can be very dangerous. But it can usually be cured or controlled. The doctor may do some tests to find the cause of your bleeding. Follow-up care is a key part of your treatment and safety. Be sure to make and go to all appointments, and call your doctor if you are having problems. It's also a good idea to know your test results and keep a list of the medicines you take. How can you care for yourself at home? · Be safe with medicines. Take your medicines exactly as prescribed. Call your doctor if you think you are having a problem with your medicine. You will get more details on the specific medicines your doctor prescribes. · Do not take aspirin or other anti-inflammatory medicines, such as naproxen (Aleve) or ibuprofen (Advil, Motrin), without talking to your doctor first. Ask your doctor if it is okay to use acetaminophen (Tylenol). · Do not drink alcohol. · The bleeding may make you lose iron. So it's important to eat foods that have a lot of iron. These include red meat, shellfish, poultry, and eggs. They also include beans, raisins, whole-grain breads, and leafy green vegetables. If you want help planning meals, you can make an appointment with a dietitian. When should you call for help? Call 911 anytime you think you may need emergency care. For example, call if: 
  · You have sudden, severe belly pain.   · You vomit blood or what looks like coffee grounds.  
  · You passed out (lost consciousness).  
  · Your stools are maroon or very bloody.  
 Call your doctor now or seek immediate medical care if: 
  · You are dizzy or lightheaded, or you feel like you may faint.  
  · Your stools are black and look like tar, or they have streaks of blood.  
  · You have belly pain.  
  · You vomit or have nausea.  
  · You have trouble swallowing, or it hurts when you swallow.  
 Watch closely for changes in your health, and be sure to contact your doctor if: 
  · You do not get better as expected. Where can you learn more? Go to http://merrick-christofer.info/ Enter K942 in the search box to learn more about \"Gastrointestinal Bleeding: Care Instructions. \" Current as of: June 26, 2019Content Version: 12.4 © 7579-5713 [employer]orporated. Care instructions adapted under license by [employer] (which disclaims liability or warranty for this information). If you have questions about a medical condition or this instruction, always ask your healthcare professional. [address] any warranty or liability for your use of this information.

## 2020-04-30 NOTE — Clinical Note
Hi Dr. Eddie Daniel,  I saw this gentleman today. No neurologic deficits though he is still lightheaded with standing. I am most concerned he may have had a GIB and this is the result of anemia. I set up a GI eval for tomorrow, but despite my best efforts, he wanted to wait until next week. Gave him strict ED precautions and will call him with stat labs. Also set him up for Cardiology follow up since they changed his meds. Thanks!  Krayna Oneal

## 2020-04-30 NOTE — PROGRESS NOTES
Tracy Saenz is a 54 y.o. male    Chief Complaint   Patient presents with    Follow-up     Patient is coming for a follow up from [facility]. Patient went last week because he could barely walk and weakness. They did blood work and other tests and everything came back normal. He also states that he gets sharp pain in both legs fo rweek. 1. Hav you been to the ER, urgent care clinic since your last visit? Hospitalized since your last visit? Yes, Hospitalized for 1 week. 2. Have you seen or consulted any other health care providers outside of the [address] [place] [address] since your last visit? Include any pap smears or colon screening. Yes, he went to [facility]      Visit Vitals  /77 (BP 1 Location: Left arm, BP Patient Position: Sitting)   Pulse 100   Temp 98.1 °F (36.7 °C) (Oral)   Resp 16   Ht 5' 5\" (1.651 m)   Wt 186 lb 3.2 oz (84.5 kg)   SpO2 97%   BMI 30.99 kg/m²           Health Maintenance Due   Topic Date Due    Pneumococcal 0-64 years (1 of 1 - PPSV23) 05/04/1978    Foot Exam Q1  05/04/1982    Eye Exam Retinal or Dilated  05/04/1982    DTaP/Tdap/Td series (1 - Tdap) 05/04/1993     Cardiologist is [facility] [phone]. Medication Reconciliation completed, changes noted.   Please  Update medication list.

## 2020-04-30 NOTE — PROGRESS NOTES
History of Present Illness:     Chief Complaint   Patient presents with    Follow-up     Patient is coming for a follow up from [facility]. Patient went last week because he could barely walk and weakness. They did blood work and other tests and everything came back normal. He also states that he gets sharp pain in both legs for a week. Ofelia Gerard is a 54 y.o. male     Presents with weakness and difficulty walking. Referred to [facility] after a virtual visit by Dr. Leland Santana. Hospitalized at [address] [place] [address] 4/13 - 4/17 for syncope work up. Prior to going to the hospital, he reports he woke up was was falling all day. Normally, he walks fine and walks 1 mile daily each morning. Reports he was feeling lightheaded and dizzy. Also notes that 2 days prior to feeling lightheaded and falling, he was throwing up bright red blood and couldn't eat. Reviewed DC summary; presentation suspected to be a result of uncontrolled DM, MEERA and multiple psych medications. His Lasix and Lisinopril were held upon discharge. His ICD was interrogated and was WNL. Hgb stable, Cr 1.3 at time of discharge. GI was consulted, NM study was negative and he was recommended to start PPI and have outpatient follow up. Today, he reports that he is having a hard time walking and having issues with talking. Reports that his daughter was concerned he was having a heart attack bc his speech was slurred. He still feels lightheaded when he stands quickly. Now, he says he feels a little bit better but slower. Still feeling light headed and dizzy, like he might fall, worse with standing. Denies palpitations, chest pain or SOB. Reports he has MURRAY at baseline, unchanged from his normal.  Some nausea, but denies vomiting, vomiting up blood, blood in stool, dark/tarry stools. Denies any use of NSAIDs. C/o pain in both thighs, worse with standing/walking, no known injury.     PMH (REVIEWED):  Past Medical History:   Diagnosis Date    Anxiety disorder     Asthma     [facility]     CAD (coronary artery disease)     MI x2 with 2 cardiac stent    CH[facility]estive [facility] failure) [facility]     with reduced EF, EF 20%    Depression     Diabetes mellitus, type 2 (HCC)     on insulin. moderate proteinuria    Hypertension     Mood disorder (HCC)     Psychotic disorder ([facility])     Schizoaffective disorder, bipolar type [facility]     Sleep disorder     Suicidal thoughts        Current Medications/Allergies (REVIEWED):     Current Outpatient Medications on File Prior to Visit   Medication Sig Dispense Refill    pantoprazole (PROTONIX) 40 mg tablet Take 40 mg by mouth daily.  insulin glargine (LANTUS SOLOSTAR U-100 INSULIN) 100 unit/mL (3 mL) inpn 50 Units by SubCUTAneous route two (2) times a day. 10 Adjustable Dose Pre-filled Pen Syringe 3    amLODIPine (NORVASC) 5 mg tablet Take 1 Tab by mouth daily. 30 Tab 5    atorvastatin (LIPITOR) 40 mg tablet Take 1 Tab by mouth daily. 30 Tab 5    carvediloL (COREG) 6.25 mg tablet Take 1 Tab by mouth two (2) times daily (with meals). 60 Tab 5    clopidogreL (PLAVIX) 75 mg tab Take 1 Tab by mouth daily. 30 Tab 5    furosemide (LASIX) 20 mg tablet Take 1 Tab by mouth daily. 30 Tab 5    losartan (COZAAR) 25 mg tablet Take 1 Tab by mouth daily. 30 Tab 5    Insulin Needles, Disposable, 31 gauge x 5/16\" ndle Use daily with lantus insulin. E11.9 DM2 on insulin. 1 Package 5    albuterol (PROVENTIL HFA, VENTOLIN HFA) 90 mcg/actuation inhaler Take 1-2 Puffs by inhalation every four (4) hours as needed for Wheezing. 1 Inhaler 1     No current facility-administered medications on file prior to visit.         Allergies   Allergen Reactions    Acetaminophen Unknown (comments) and Anaphylaxis     Other reaction(s): anaphylaxis/angioedema    Aspirin Hives and Anaphylaxis     Other reaction(s): anaphylaxis/angioedema    Aspirin Shortness of Breath    Spinach Leaf Unknown (comments)     Green leaves    Tylenol [Acetaminophen] Shortness of Breath         Review of Systems:     Review of systems:  Items bolded if positive. Constitutional: Fever, chills, night sweats, weight loss, lymphadenopathy, fatigue  HEENT: Vision change, eye pain, rhinorrhea, sinus pain, epistaxis, dysphagia, change in hearing, tinnitus, vertigo. Endocrine: Weight change, heat/ cold intolerance, tremor, insomnia, polyuria, polydipsia, polyphagia, abnl hair growth, nail changes  Cardiovascular: Chest pain, palpitations, syncope, lower extremity edema, orthopnea, paroxysmal nocturnal dyspnea  Pulmonary: Shortness of breath, dyspnea on exertion, cough, hemoptysis, wheezing  GI: Nausea, vomiting, diarrhea, melena, hematochezia, change in appetite, abdominal pain, change in bowel habits or stools  : Dysuria, frequency, urgency, incontinence, hematuria, nocturia  Musculoskeletal: joint swelling or pain, muscle pain, back pain  Skin:  Rash, New/growing/changing skin lesions  Neurologic: Headache, muscle weakness, paresthesias, anesthesia, ataxia, change in speech, change in gait,  dizziness  Psychiatric: depression, anxiety, hallucinations, roseanna, SI/HI        Objective:     Vitals:    04/30/20 1333 04/30/20 1422 04/30/20 1424 04/30/20 1427   BP: 116/77 (!) 135/95 (!) 137/99 (!) 141/101   Pulse: 100 99 100 100   Resp: 16      Temp: 98.1 °F (36.7 °C)      TempSrc: Oral      SpO2: 97%      Weight: 186 lb 3.2 oz (84.5 kg)      Height: 5' 5\" (1.651 m)          Physical Exam:  General appearance - alert, well appearing, and in no distress  Chest - Poor inspiratory effort. No increased WOB. Bilateral basilar crackles. No wheezing or rhonchi noted. Heart - normal rate, regular rhythm, normal S1, S2, no murmurs, rubs, clicks or gallops  Abdomen - Soft, obese. +Epigastric TTP without guarding or rebound tenderness.    Neurological - alert, oriented, normal speech, no focal findings or movement disorder noted, cranial nerves II through XII intact, DTR's normal and symmetric, motor and sensory grossly normal bilaterally, normal muscle tone, no tremors, strength 5/5, Romberg sign negative, normal gait and station  Extremities - no pedal edema noted    Recent Labs:  No results found for this or any previous visit (from the past 12 hour(s)). Assessment and Plan:       ICD-10-CM ICD-9-CM    1. Hematemesis with nausea K92.0 578.0 CBC W/O DIFF     787.02 REFERRAL TO GASTROENTEROLOGY      AMB POC HEMOGLOBIN (HGB)   2. MEERA (acute kidney injury) ([facility] B79.6 426.6 METABOLIC PANEL, BASIC   3. MURRAY (dyspnea on exertion) R06.09 786.09    4. Chronic diastolic congestive heart failure (HCC) I50.32 428.32      428.0    5. Hospital discharge follow-up Z09 V67.59    6. Lightheaded R42 780.4      No s/sx of stroke, no neurologic deficits on exam  More concerned for anemia/ GIB based on history    Hematemesis prior to hospital admission  No additional bleeding since  Hgb stable at hospital discharge, limited study was normal  Will re-check CBC today, POC hgb 12.0 (13.6 at hospital discharge)  Continue PPI    Scheduled GI follow up - tomorrow, 5/1 @ 10AM in office. However, pt declined that evaluation and wants to wait until next week. I strongly advised he keep the appointment tomorrow but pt would like to re-schedule the visit. MEERA  Cr improved at hospital discharge (1.3)  Baseline Cr in our chart is 1.21-1.28  Repeat BMP today  If Cr stable, will resume Lasix  Encouraged PO hydration    MURRAY/ CHF  MURRAY is at baseline per patient  +Crackles on lung exam, no LE edema  Will resume Lasix if Cr stable  Arranging for pt to follow up with Cardiology - scheduled for 5/6 @ 11:15 AM    Given strict ED precautions: chest pain, difficulty breathing, reviewed stroke symptoms, vomiting blood, blood in stool/ dark tarry stools    Follow up: PCP in 2 weeks after specialist follow up    Best callback number for pt: [phone]. Will call back with his labs.  Discussed that he may be referred back to the ED if abnormal.    Ezequiel Chavez MD    We discussed the expected course, resolution and complications of the diagnosis(es) in detail. Medication risks, benefits, costs, interactions, and alternatives were discussed as indicated. I advised him to contact the office if his condition worsens, changes or fails to improve as anticipated. He expressed understanding with the diagnosis(es) and plan.

## 2020-05-01 ENCOUNTER — TELEPHONE (OUTPATIENT)
Dept: FAMILY MEDICINE CLINIC | Age: 50
End: 2020-05-01

## 2020-05-01 NOTE — TELEPHONE ENCOUNTER
Message already addressed. Dr. Maryjane Mercado   Received: Today      Call patient   Message Contents   [place], [address] Office             Env General Message/Vendor Calls     Caller's first and last name:     Reason for call: Returning a missed call from the practice.      Call back required yes/no and why: Yes     Best contact number(s): [phone]     Details to clarify the request:     Micah Greene

## 2020-05-06 ENCOUNTER — VIRTUAL VISIT (OUTPATIENT)
Dept: CARDIOLOGY CLINIC | Age: 50
End: 2020-05-06

## 2020-05-06 VITALS — WEIGHT: 186 LBS | HEIGHT: 65 IN | BODY MASS INDEX: 30.99 KG/M2

## 2020-05-06 DIAGNOSIS — I42.0 DILATED CARDIOMYOPATHY (HCC): ICD-10-CM

## 2020-05-06 DIAGNOSIS — Z79.4 CONTROLLED TYPE 2 DIABETES MELLITUS WITH DIABETIC NEPHROPATHY, WITH LONG-TERM CURRENT USE OF INSULIN (HCC): ICD-10-CM

## 2020-05-06 DIAGNOSIS — I50.22 SYSTOLIC CHF, CHRONIC (HCC): Primary | ICD-10-CM

## 2020-05-06 DIAGNOSIS — E11.21 CONTROLLED TYPE 2 DIABETES MELLITUS WITH DIABETIC NEPHROPATHY, WITH LONG-TERM CURRENT USE OF INSULIN (HCC): ICD-10-CM

## 2020-05-06 DIAGNOSIS — Z95.810 ICD (IMPLANTABLE CARDIOVERTER-DEFIBRILLATOR) IN PLACE: ICD-10-CM

## 2020-05-06 DIAGNOSIS — I10 ESSENTIAL HYPERTENSION: ICD-10-CM

## 2020-05-06 RX ORDER — FUROSEMIDE 40 MG/1
40 TABLET ORAL DAILY
Qty: 30 TAB | Refills: 3 | Status: SHIPPED | OUTPATIENT
Start: 2020-05-06 | End: 2020-08-11 | Stop reason: SDUPTHER

## 2020-05-06 NOTE — PROGRESS NOTES
1. Have you been to the ER, urgent care clinic since your last visit? Hospitalized since your last visit? Yes [facility]   2. Have you seen or consulted any other health care providers outside of the [address] [place] [address] since your last visit? Include any pap smears or colon screening. No     3. Since your last visit, have you had any of the following symptoms? chest pains.

## 2020-05-06 NOTE — LETTER
Mateo Kana 1970 5/6/2020 Dear Kajal Redding MD 
 
I had the pleasure of evaluating  Mr. James in office today. Below are the relevant portions of my assessment and plan of care. ICD-10-CM ICD-9-CM 1. Systolic CHF, chronic (HCC) I50.22 428.22 MAGNESIUM  
  968.4 METABOLIC PANEL, BASIC 2. Essential hypertension I10 401.9 3. Controlled type 2 diabetes mellitus with diabetic nephropathy, with long-term current use of insulin (HCC) E11.21 250.40   
 Z79.4 583.81 V58.67   
4. Dilated cardiomyopathy (HCC) I42.0 425.4 5. ICD (implantable cardioverter-defibrillator) in place Z95.810 V45.02   
 
 
Current Outpatient Medications Medication Sig Dispense Refill  furosemide (LASIX) 40 mg tablet Take 1 Tab by mouth daily. 30 Tab 3  pantoprazole (PROTONIX) 40 mg tablet Take 40 mg by mouth daily.  insulin glargine (LANTUS SOLOSTAR U-100 INSULIN) 100 unit/mL (3 mL) inpn 50 Units by SubCUTAneous route two (2) times a day. 10 Adjustable Dose Pre-filled Pen Syringe 3  
 amLODIPine (NORVASC) 5 mg tablet Take 1 Tab by mouth daily. 30 Tab 5  
 atorvastatin (LIPITOR) 40 mg tablet Take 1 Tab by mouth daily. 30 Tab 5  carvediloL (COREG) 6.25 mg tablet Take 1 Tab by mouth two (2) times daily (with meals). 60 Tab 5  clopidogreL (PLAVIX) 75 mg tab Take 1 Tab by mouth daily. 30 Tab 5  
 losartan (COZAAR) 25 mg tablet Take 1 Tab by mouth daily. 30 Tab 5  
 Insulin Needles, Disposable, 31 gauge x 5/16\" ndle Use daily with lantus insulin. E11.9 DM2 on insulin. 1 Package 5  
 albuterol (PROVENTIL HFA, VENTOLIN HFA) 90 mcg/actuation inhaler Take 1-2 Puffs by inhalation every four (4) hours as needed for Wheezing. 1 Inhaler 1 Orders Placed This Encounter  MAGNESIUM Standing Status:   Future Standing Expiration Date:   5/7/2021  METABOLIC PANEL, BASIC Standing Status:   Future Standing Expiration Date:   5/7/2021  furosemide (LASIX) 40 mg tablet Sig: Take 1 Tab by mouth daily. Dispense:  30 Tab Refill:  3 If you have questions, please do not hesitate to call me. I look forward to following Mr. James along with you.  
 
Sincerely, 
Gregory Falk MD

## 2020-05-06 NOTE — PROGRESS NOTES
Zulay Juárez is a 52 y.o. male who was seen by synchronous (real-time) audio-video technology on 5/6/2020. Consent:  He and/or his healthcare decision maker is aware that this patient-initiated Telehealth encounter is a billable service, with coverage as determined by his insurance carrier. He is aware that he may receive a bill and has provided verbal consent to proceed: Yes    712  Subjective:   Zulay Juárez was seen for CHF (hospital follow up)      Patient is a 52 yr old male with chronic systolic CHF stage C NYHA class III seen for f/u after hospital discharge. Reports mildly worsening sob associated with mild leg edema and orthopnea. Reports 5 lbs weight gain. Family History   Problem Relation Age of Onset    Heart Attack Mother     Hypertension Mother     Diabetes Mother     Heart Attack Father     Hypertension Father     Diabetes Father     Depression Neg Hx     Suicide Neg Hx     Psychotic Disorder Neg Hx     Dementia Neg Hx     Substance Abuse Neg Hx      Past Surgical History:   Procedure Laterality Date    HX CORONARY STENT PLACEMENT      HX IMPLANTABLE CARDIOVERTER DEFIBRILLATOR       Allergies   Allergen Reactions    Acetaminophen Unknown (comments) and Anaphylaxis     Other reaction(s): anaphylaxis/angioedema    Aspirin Hives and Anaphylaxis     Other reaction(s): anaphylaxis/angioedema    Aspirin Shortness of Breath    Spinach Leaf Unknown (comments)     Green leaves    Tylenol [Acetaminophen] Shortness of Breath       Current Outpatient Medications:     furosemide (LASIX) 40 mg tablet, Take 1 Tab by mouth daily. , Disp: 30 Tab, Rfl: 3    pantoprazole (PROTONIX) 40 mg tablet, Take 40 mg by mouth daily. , Disp: , Rfl:     insulin glargine (LANTUS SOLOSTAR U-100 INSULIN) 100 unit/mL (3 mL) inpn, 50 Units by SubCUTAneous route two (2) times a day., Disp: 10 Adjustable Dose Pre-filled Pen Syringe, Rfl: 3    amLODIPine (NORVASC) 5 mg tablet, Take 1 Tab by mouth daily., Disp: 30 Tab, Rfl: 5    atorvastatin (LIPITOR) 40 mg tablet, Take 1 Tab by mouth daily. , Disp: 30 Tab, Rfl: 5    carvediloL (COREG) 6.25 mg tablet, Take 1 Tab by mouth two (2) times daily (with meals). , Disp: 60 Tab, Rfl: 5    clopidogreL (PLAVIX) 75 mg tab, Take 1 Tab by mouth daily. , Disp: 30 Tab, Rfl: 5    losartan (COZAAR) 25 mg tablet, Take 1 Tab by mouth daily. , Disp: 30 Tab, Rfl: 5    Insulin Needles, Disposable, 31 gauge x 5/16\" ndle, Use daily with lantus insulin. E11.9 DM2 on insulin., Disp: 1 Package, Rfl: 5    albuterol (PROVENTIL HFA, VENTOLIN HFA) 90 mcg/actuation inhaler, Take 1-2 Puffs by inhalation every four (4) hours as needed for Wheezing., Disp: 1 Inhaler, Rfl: 1  Allergies   Allergen Reactions    Acetaminophen Unknown (comments) and Anaphylaxis     Other reaction(s): anaphylaxis/angioedema    Aspirin Hives and Anaphylaxis     Other reaction(s): anaphylaxis/angioedema    Aspirin Shortness of Breath    Spinach Leaf Unknown (comments)     Green leaves    Tylenol [Acetaminophen] Shortness of Breath         Review of Systems   Review of Systems   Constitutional: Negative for chills and fever. HENT: Negative for nosebleeds. Eyes: Negative for blurred vision and double vision. Respiratory: Negative for cough, hemoptysis, sputum production, shortness of breath and wheezing. Cardiovascular: Negative for chest pain, palpitations, orthopnea, claudication, leg swelling and PND. Gastrointestinal: Negative for abdominal pain, heartburn, nausea and vomiting. Musculoskeletal: Negative for myalgias. Skin: Negative for rash. Neurological: Negative for dizziness, weakness and headaches. Endo/Heme/Allergies: Does not bruise/bleed easily.    PHYSICAL EXAMINATION:    Vital Signs: (As obtained by patient/caregiver at home)  Visit Vitals  Ht 5' 5\" (1.651 m)   Wt 84.4 kg (186 lb)   BMI 30.95 kg/m²        Constitutional: [x] Appears well-developed and well-nourished [x] No apparent distress      Mental status: [x] Alert and awake  [x] Oriented to person/place/time [x] Able to follow commands        Eyes:   EOM    [x]  Normal    [] Abnormal -   Sclera  [x]  Normal    [] Abnormal -          Discharge [x]  None visible      HENT: [x] Normocephalic, atraumatic  [] Abnormal -  [x] Mouth/Throat: Mucous membranes are moist    External Ears [x] Normal  [] Abnormal -    Neck: [x] No visualized mass,NO JVD [] Abnormal -     Pulmonary/Chest: [x] Respiratory effort normal   [x] No visualized signs of difficulty breathing or respiratory distress        [] Abnormal -     Musculoskeletal:   [x] Normal gait with no signs of ataxia         [x] Normal range of motion of neck        [] Abnormal -    Neurological:        [x] No Facial Asymmetry (Cranial nerve 7 motor function) (limited exam due to video visit)          [x] No gaze palsy        [] Abnormal -        Skin:        [x] No significant exanthematous lesions ,no bruising       [] Abnormal-           Psychiatric:       [x] Normal Affect [] Abnormal -       [x] No Hallucinations  Extremities:           No Edema    Other pertinent observable physical exam findings:- mild pedal edema    Pertinent LAB and reports  I have reviewed available  pertinent notes, labs and reports and included in current evaluation of this patient. Assessment & Plan:   Diagnoses and all orders for this visit:    1. Systolic CHF, chronic ([address]    2. Essential hypertension    3. Controlled type 2 diabetes mellitus with diabetic nephropathy, with long-term current use of insulin ([address]    4. Dilated cardiomyopathy ([facility]    5. ICD (implantable cardioverter-defibrillator) in place    Other orders  -     furosemide (LASIX) 40 mg tablet; Take 1 Tab by mouth daily. Will resume lasix 40mg daily-- michela increase dose and add metolazone as needed. Meanwhile advised salt restriction. Bmp/mg in 2 weeks      No orders of the defined types were placed in this encounter.         We discussed the expected course, resolution and complications of the diagnosis(es) in detail. Medication risks, benefits, costs, interactions, and alternatives were discussed as indicated. I advised him to contact the office if his condition worsens, changes or fails to improve as anticipated. He expressed understanding with the diagnosis(es) and plan. I was at home while conducting this encounter. Pursuant to the emergency declaration under the [address] [place] [address] waiver authority and the [employer] Act, this Virtual  Visit was conducted, with patient's consent, to reduce the patient's risk of exposure to COVID-19 and provide continuity of care for an established patient. Services were provided through a video synchronous discussion virtually to substitute for in-person clinic visit.     Ena Moore MD

## 2020-05-06 NOTE — PATIENT INSTRUCTIONS
Heart Failure: Care Instructions Your Care Instructions Heart failure occurs when your heart does not pump as much blood as the body needs. Failure does not mean that the heart has stopped pumping but rather that it is not pumping as well as it should. Over time, this causes fluid buildup in your lungs and other parts of your body. Fluid buildup can cause shortness of breath, fatigue, swollen ankles, and other problems. By taking medicines regularly, reducing sodium (salt) in your diet, checking your weight every day, and making lifestyle changes, you can feel better and live longer. Follow-up care is a key part of your treatment and safety. Be sure to make and go to all appointments, and call your doctor if you are having problems. It's also a good idea to know your test results and keep a list of the medicines you take. How can you care for yourself at home? Medicines 
  · Be safe with medicines. Take your medicines exactly as prescribed. Call your doctor if you think you are having a problem with your medicine.  
  · Do not take any vitamins, over-the-counter medicine, or herbal products without talking to your doctor first. [place] not take ibuprofen (Advil or Motrin) and naproxen (Aleve) without talking to your doctor first. They could make your heart failure worse.  
  · You may take some of the following medicine. ? Angiotensin-converting enzyme inhibitors (ACEIs) or angiotensin II receptor blockers (ARBs) reduce the heart's workload, lower blood pressure, and reduce swelling. Taking an ACEI or ARB may lower your chance of needing to be hospitalized. ? Beta-blockers can slow heart rate, decrease blood pressure, and improve your condition. Taking a beta-blocker may lower your chance of needing to be hospitalized. ? Diuretics, also called water pills, reduce swelling.  
 You will get more details on the specific medicines your doctor prescribes. Diet   · Your doctor may suggest that you limit sodium. Your doctor can tell you how much sodium is right for you. An example is less than 3,000 mg a day. This includes all the salt you eat in cooking or in packaged foods. People get most of their sodium from processed foods. Fast food and restaurant meals also tend to be very high in sodium.  
  · Ask your doctor how much liquid you can drink each day. You may have to limit liquids.  
 Weight 
  · Weigh yourself without clothing at the same time each day. Record your weight. Call your doctor if you have a sudden weight gain, such as more than 2 to 3 pounds in a day or 5 pounds in a week. (Your doctor may suggest a different range of weight gain.) A sudden weight gain may mean that your heart failure is getting worse.  
 Activity level 
  · Start light exercise (if your doctor says it is okay). Even if you can only do a small amount, exercise will help you get stronger, have more energy, and manage your weight and your stress. Walking is an easy way to get exercise. Start out by walking a little more than you did before. Bit by bit, increase the amount you walk.  
  · When you exercise, watch for signs that your heart is working too hard. You are pushing yourself too hard if you cannot talk while you are exercising. If you become short of breath or dizzy or have chest pain, stop, sit down, and rest.  
  · If you feel \"wiped out\" the day after you exercise, walk slower or for a shorter distance until you can work up to a better pace.  
  · Get enough rest at night. Sleeping with 1 or 2 pillows under your upper body and head may help you breathe easier.  
 Lifestyle changes 
  · Do not smoke. Smoking can make a heart condition worse. If you need help quitting, talk to your doctor about stop-smoking programs and medicines. These can increase your chances of quitting for good. Quitting smoking may be the most important step you can take to protect your heart.   · Limit alcohol to 2 drinks a day for men and 1 drink a day for women. Too much alcohol can cause health problems.  
  · Avoid getting sick from colds and the flu. Get a pneumococcal vaccine shot. If you have had one before, ask your doctor whether you need another dose. Get a flu shot each year. If you must be around people with colds or the flu, wash your hands often. When should you call for help? Call 911 if you have symptoms of sudden heart failure such as: 
  · You have severe trouble breathing.  
  · You cough up pink, foamy mucus.  
  · You have a new irregular or rapid heartbeat.  
 Call your doctor now or seek immediate medical care if: 
  · You have new or increased shortness of breath.  
  · You are dizzy or lightheaded, or you feel like you may faint.  
  · You have sudden weight gain, such as more than 2 to 3 pounds in a day or 5 pounds in a week. (Your doctor may suggest a different range of weight gain.)  
  · You have increased swelling in your legs, ankles, or feet.  
  · You are suddenly so tired or weak that you cannot do your usual activities.  
 Watch closely for changes in your health, and be sure to contact your doctor if you develop new symptoms. Where can you learn more? Go to http://merrick-christofer.info/ Enter D002 in the search box to learn more about \"Heart Failure: Care Instructions. \" Current as of: December 15, 2019Content Version: 12.4 © 5885-6752 Healthwise, Incorporated. Care instructions adapted under license by [employer] (which disclaims liability or warranty for this information). If you have questions about a medical condition or this instruction, always ask your healthcare professional. [place] [address] any warranty or liability for your use of this information.

## 2020-05-20 ENCOUNTER — VIRTUAL VISIT (OUTPATIENT)
Dept: CARDIOLOGY CLINIC | Age: 50
End: 2020-05-20

## 2020-05-20 VITALS — BODY MASS INDEX: 30.99 KG/M2 | HEIGHT: 65 IN | WEIGHT: 186 LBS

## 2020-05-20 DIAGNOSIS — I10 ESSENTIAL HYPERTENSION: ICD-10-CM

## 2020-05-20 DIAGNOSIS — I42.0 DILATED CARDIOMYOPATHY (HCC): ICD-10-CM

## 2020-05-20 DIAGNOSIS — I50.22 SYSTOLIC CHF, CHRONIC (HCC): Primary | ICD-10-CM

## 2020-05-20 DIAGNOSIS — E11.21 CONTROLLED TYPE 2 DIABETES MELLITUS WITH DIABETIC NEPHROPATHY, WITH LONG-TERM CURRENT USE OF INSULIN (HCC): ICD-10-CM

## 2020-05-20 DIAGNOSIS — Z95.810 ICD (IMPLANTABLE CARDIOVERTER-DEFIBRILLATOR) IN PLACE: ICD-10-CM

## 2020-05-20 DIAGNOSIS — Z79.4 CONTROLLED TYPE 2 DIABETES MELLITUS WITH DIABETIC NEPHROPATHY, WITH LONG-TERM CURRENT USE OF INSULIN (HCC): ICD-10-CM

## 2020-05-20 NOTE — LETTER
Luis Alberto Remy 1970 5/20/2020 Dear Yumiko Fonseca MD 
 
I had the pleasure of evaluating  Mr. James in office today. Below are the relevant portions of my assessment and plan of care. ICD-10-CM ICD-9-CM 1. Systolic CHF, chronic (HCC) I50.22 428.22 MAGNESIUM  
  415.3 METABOLIC PANEL, BASIC 2. Essential hypertension I10 401.9 3. Controlled type 2 diabetes mellitus with diabetic nephropathy, with long-term current use of insulin (HCC) E11.21 250.40   
 Z79.4 583.81 V58.67   
4. Dilated cardiomyopathy (HCC) I42.0 425.4 5. ICD (implantable cardioverter-defibrillator) in place Z95.810 V45.02   
 
 
Current Outpatient Medications Medication Sig Dispense Refill  furosemide (LASIX) 40 mg tablet Take 1 Tab by mouth daily. 30 Tab 3  pantoprazole (PROTONIX) 40 mg tablet Take 40 mg by mouth daily.  insulin glargine (LANTUS SOLOSTAR U-100 INSULIN) 100 unit/mL (3 mL) inpn 50 Units by SubCUTAneous route two (2) times a day. 10 Adjustable Dose Pre-filled Pen Syringe 3  
 amLODIPine (NORVASC) 5 mg tablet Take 1 Tab by mouth daily. 30 Tab 5  
 atorvastatin (LIPITOR) 40 mg tablet Take 1 Tab by mouth daily. 30 Tab 5  carvediloL (COREG) 6.25 mg tablet Take 1 Tab by mouth two (2) times daily (with meals). 60 Tab 5  clopidogreL (PLAVIX) 75 mg tab Take 1 Tab by mouth daily. 30 Tab 5  
 losartan (COZAAR) 25 mg tablet Take 1 Tab by mouth daily. 30 Tab 5  
 Insulin Needles, Disposable, 31 gauge x 5/16\" ndle Use daily with lantus insulin. E11.9 DM2 on insulin. 1 Package 5  
 albuterol (PROVENTIL HFA, VENTOLIN HFA) 90 mcg/actuation inhaler Take 1-2 Puffs by inhalation every four (4) hours as needed for Wheezing. 1 Inhaler 1 Orders Placed This Encounter  MAGNESIUM Standing Status:   Future Standing Expiration Date:   5/21/2021  METABOLIC PANEL, BASIC Standing Status:   Future Standing Expiration Date:   5/21/2021 If you have questions, please do not hesitate to call me. I look forward to following Mr. James along with you.  
 
Sincerely, 
Katty Ospina MD

## 2020-05-20 NOTE — PROGRESS NOTES
1. Have you been to the ER, urgent care clinic since your last visit? Hospitalized since your last visit?  no    2. Have you seen or consulted any other health care providers outside of the [address] [place] [address] since your last visit? Include any pap smears or colon screening. No     3. Since your last visit, have you had any of the following symptoms?    no

## 2020-05-20 NOTE — PATIENT INSTRUCTIONS
High Blood Pressure: Care Instructions Overview It's normal for blood pressure to go up and down throughout the day. But if it stays up, you have high blood pressure. Another name for high blood pressure is hypertension. Despite what a lot of people think, high blood pressure usually doesn't cause headaches or make you feel dizzy or lightheaded. It usually has no symptoms. But it does increase your risk of stroke, heart attack, and other problems. You and your doctor will talk about your risks of these problems based on your blood pressure. Your doctor will give you a goal for your blood pressure. Your goal will be based on your health and your age. Lifestyle changes, such as eating healthy and being active, are always important to help lower blood pressure. You might also take medicine to reach your blood pressure goal. 
Follow-up care is a key part of your treatment and safety. Be sure to make and go to all appointments, and call your doctor if you are having problems. It's also a good idea to know your test results and keep a list of the medicines you take. How can you care for yourself at home? Medical treatment · If you stop taking your medicine, your blood pressure will go back up. You may take one or more types of medicine to lower your blood pressure. Be safe with medicines. Take your medicine exactly as prescribed. Call your doctor if you think you are having a problem with your medicine. · Talk to your doctor before you start taking aspirin every day. Aspirin can help certain people lower their risk of a heart attack or stroke. But taking aspirin isn't right for everyone, because it can cause serious bleeding. · See your doctor regularly. You may need to see the doctor more often at first or until your blood pressure comes down. · If you are taking blood pressure medicine, talk to your doctor before you take decongestants or anti-inflammatory medicine, such as ibuprofen. Some of these medicines can raise blood pressure. · Learn how to check your blood pressure at home. Lifestyle changes · Stay at a healthy weight. This is especially important if you put on weight around the waist. Losing even 10 pounds can help you lower your blood pressure. · If your doctor recommends it, get more exercise. Walking is a good choice. Bit by bit, increase the amount you walk every day. Try for at least 30 minutes on most days of the week. You also may want to swim, bike, or do other activities. · Avoid or limit alcohol. Talk to your doctor about whether you can drink any alcohol. · Try to limit how much sodium you eat to less than 2,300 milligrams (mg) a day. Your doctor may ask you to try to eat less than 1,500 mg a day. · Eat plenty of fruits (such as bananas and oranges), vegetables, legumes, whole grains, and low-fat dairy products. · Lower the amount of saturated fat in your diet. Saturated fat is found in animal products such as milk, cheese, and meat. Limiting these foods may help you lose weight and also lower your risk for heart disease. · Do not smoke. Smoking increases your risk for heart attack and stroke. If you need help quitting, talk to your doctor about stop-smoking programs and medicines. These can increase your chances of quitting for good. When should you call for help? Call  911 anytime you think you may need emergency care. This may mean having symptoms that suggest that your blood pressure is causing a serious heart or blood vessel problem. Your blood pressure may be over 180/120. 
 For example, call  911 if: 
  · You have symptoms of a heart attack. These may include: 
? Chest pain or pressure, or a strange feeling in the chest. 
? Sweating. ? Shortness of breath. ? Nausea or vomiting. ? Pain, pressure, or a strange feeling in the back, neck, jaw, or upper belly or in one or both shoulders or arms. ? Lightheadedness or sudden weakness. ? A fast or irregular heartbeat.  
  · You have symptoms of a stroke. These may include: 
? Sudden numbness, tingling, weakness, or loss of movement in your face, arm, or leg, especially on only one side of your body. ? Sudden vision changes. ? Sudden trouble speaking. ? Sudden confusion or trouble understanding simple statements. ? Sudden problems with walking or balance. ? A sudden, severe headache that is different from past headaches.  
  · You have severe back or belly pain.  
 Do not wait until your blood pressure comes down on its own. Get help right away. 
 Call your doctor now or seek immediate care if: 
  · Your blood pressure is much higher than normal (such as 180/120 or higher), but you don't have symptoms.  
  · You think high blood pressure is causing symptoms, such as: 
? Severe headache. 
? Blurry vision.  
 Watch closely for changes in your health, and be sure to contact your doctor if: 
  · Your blood pressure measures higher than your doctor recommends at least 2 times. That means the top number is higher or the bottom number is higher, or both.  
  · You think you may be having side effects from your blood pressure medicine. Where can you learn more? Go to http://merrick-christofer.info/ Enter S958 in the search box to learn more about \"High Blood Pressure: Care Instructions. \" Current as of: December 15, 2019Content Version: 12.4 © 4383-4869 Healthwise, Incorporated. Care instructions adapted under license by [employer] (which disclaims liability or warranty for this information). If you have questions about a medical condition or this instruction, always ask your healthcare professional. [place] [address] any warranty or liability for your use of this information.

## 2020-05-20 NOTE — PROGRESS NOTES
Aundrea Adair is a 52 y.o. male who was seen by synchronous (real-time) audio-video technology on 5/20/2020. Consent:  He and/or his healthcare decision maker is aware that this patient-initiated Telehealth encounter is a billable service, with coverage as determined by his insurance carrier. He is aware that he may receive a bill and has provided verbal consent to proceed: Yes    712  Subjective:   Aundrea Adair was seen for CHF (2 wks post bmp/mag(labs was not done)) and Hypertension      Patient is a 52 yr old male with chronic systolic CHF stage C NYHA class III seen for f/u  Shortness of breath is progressively improving. However has residual lower extremity edema. Family History   Problem Relation Age of Onset    Heart Attack Mother     Hypertension Mother     Diabetes Mother     Heart Attack Father     Hypertension Father     Diabetes Father     Depression Neg Hx     Suicide Neg Hx     Psychotic Disorder Neg Hx     Dementia Neg Hx     Substance Abuse Neg Hx      Past Surgical History:   Procedure Laterality Date    HX CORONARY STENT PLACEMENT      HX IMPLANTABLE CARDIOVERTER DEFIBRILLATOR       Allergies   Allergen Reactions    Acetaminophen Unknown (comments) and Anaphylaxis     Other reaction(s): anaphylaxis/angioedema    Aspirin Hives and Anaphylaxis     Other reaction(s): anaphylaxis/angioedema    Aspirin Shortness of Breath    Spinach Leaf Unknown (comments)     Green leaves    Tylenol [Acetaminophen] Shortness of Breath       Current Outpatient Medications:     furosemide (LASIX) 40 mg tablet, Take 1 Tab by mouth daily. , Disp: 30 Tab, Rfl: 3    pantoprazole (PROTONIX) 40 mg tablet, Take 40 mg by mouth daily. , Disp: , Rfl:     insulin glargine (LANTUS SOLOSTAR U-100 INSULIN) 100 unit/mL (3 mL) inpn, 50 Units by SubCUTAneous route two (2) times a day., Disp: 10 Adjustable Dose Pre-filled Pen Syringe, Rfl: 3    amLODIPine (NORVASC) 5 mg tablet, Take 1 Tab by mouth daily., Disp: 30 Tab, Rfl: 5    atorvastatin (LIPITOR) 40 mg tablet, Take 1 Tab by mouth daily. , Disp: 30 Tab, Rfl: 5    carvediloL (COREG) 6.25 mg tablet, Take 1 Tab by mouth two (2) times daily (with meals). , Disp: 60 Tab, Rfl: 5    clopidogreL (PLAVIX) 75 mg tab, Take 1 Tab by mouth daily. , Disp: 30 Tab, Rfl: 5    losartan (COZAAR) 25 mg tablet, Take 1 Tab by mouth daily. , Disp: 30 Tab, Rfl: 5    Insulin Needles, Disposable, 31 gauge x 5/16\" ndle, Use daily with lantus insulin. E11.9 DM2 on insulin., Disp: 1 Package, Rfl: 5    albuterol (PROVENTIL HFA, VENTOLIN HFA) 90 mcg/actuation inhaler, Take 1-2 Puffs by inhalation every four (4) hours as needed for Wheezing., Disp: 1 Inhaler, Rfl: 1  Allergies   Allergen Reactions    Acetaminophen Unknown (comments) and Anaphylaxis     Other reaction(s): anaphylaxis/angioedema    Aspirin Hives and Anaphylaxis     Other reaction(s): anaphylaxis/angioedema    Aspirin Shortness of Breath    Spinach Leaf Unknown (comments)     Green leaves    Tylenol [Acetaminophen] Shortness of Breath         Review of Systems   Review of Systems   Constitutional: Negative for chills and fever. HENT: Negative for nosebleeds. Eyes: Negative for blurred vision and double vision. Respiratory: Negative for cough, hemoptysis, sputum production, shortness of breath and wheezing. Cardiovascular: Negative for chest pain, palpitations, orthopnea, claudication, leg swelling and PND. Gastrointestinal: Negative for abdominal pain, heartburn, nausea and vomiting. Musculoskeletal: Negative for myalgias. Skin: Negative for rash. Neurological: Negative for dizziness, weakness and headaches. Endo/Heme/Allergies: Does not bruise/bleed easily.    PHYSICAL EXAMINATION:    Vital Signs: (As obtained by patient/caregiver at home)  Visit Vitals  Ht 5' 5\" (1.651 m)   Wt 84.4 kg (186 lb)   BMI 30.95 kg/m²        Constitutional: [x] Appears well-developed and well-nourished [x] No apparent distress      Mental status: [x] Alert and awake  [x] Oriented to person/place/time [x] Able to follow commands        Eyes:   EOM    [x]  Normal    [] Abnormal -   Sclera  [x]  Normal    [] Abnormal -          Discharge [x]  None visible      HENT: [x] Normocephalic, atraumatic  [] Abnormal -  [x] Mouth/Throat: Mucous membranes are moist    External Ears [x] Normal  [] Abnormal -    Neck: [x] No visualized mass,NO JVD [] Abnormal -     Pulmonary/Chest: [x] Respiratory effort normal   [x] No visualized signs of difficulty breathing or respiratory distress        [] Abnormal -     Musculoskeletal:   [x] Normal gait with no signs of ataxia         [x] Normal range of motion of neck        [] Abnormal -    Neurological:        [x] No Facial Asymmetry (Cranial nerve 7 motor function) (limited exam due to video visit)          [x] No gaze palsy        [] Abnormal -        Skin:        [x] No significant exanthematous lesions ,no bruising       [] Abnormal-           Psychiatric:       [x] Normal Affect [] Abnormal -       [x] No Hallucinations  Extremities:           No Edema    Other pertinent observable physical exam findings:- mild pedal edema    Pertinent LAB and reports  I have reviewed available  pertinent notes, labs and reports and included in current evaluation of this patient. Assessment & Plan:   Diagnoses and all orders for this visit:    1. Systolic CHF, chronic ([address]    2. Essential hypertension    3. Controlled type 2 diabetes mellitus with diabetic nephropathy, with long-term current use of insulin ([address]    4. Dilated cardiomyopathy ([facility]    5. ICD (implantable cardioverter-defibrillator) in place        Reports good diuresis with Lasix 40 mg daily. We will continue to optimize CHF medications. Advised to take 40 mg of Lasix in the morning and 20 mg at 3 PM for next 5 days. Starting Monday she was advised to return to 40 mg daily. BMP/mag in 2 weeks.   Follow-up in 1 month    Follow-up and Dispositions    · Return in about 1 month (around 6/20/2020). No orders of the defined types were placed in this encounter. Follow-up and Dispositions    · Return in about 1 month (around 6/20/2020). We discussed the expected course, resolution and complications of the diagnosis(es) in detail. Medication risks, benefits, costs, interactions, and alternatives were discussed as indicated. I advised him to contact the office if his condition worsens, changes or fails to improve as anticipated. He expressed understanding with the diagnosis(es) and plan. I was at home while conducting this encounter. Pursuant to the emergency declaration under the [facility], [facility] waiver authority and the [employer] and [employer] General Act, this Virtual  Visit was conducted, with patient's consent, to reduce the patient's risk of exposure to COVID-19 and provide continuity of care for an established patient. Services were provided through a video synchronous discussion virtually to substitute for in-person clinic visit.     Ena Moore MD

## 2020-05-28 ENCOUNTER — VIRTUAL VISIT (OUTPATIENT)
Dept: FAMILY MEDICINE CLINIC | Age: 50
End: 2020-05-28

## 2020-05-28 DIAGNOSIS — I42.0 DILATED CARDIOMYOPATHY (HCC): ICD-10-CM

## 2020-05-28 DIAGNOSIS — I50.22 CHRONIC SYSTOLIC CONGESTIVE HEART FAILURE (HCC): ICD-10-CM

## 2020-05-28 DIAGNOSIS — E11.65 TYPE 2 DIABETES MELLITUS WITH HYPERGLYCEMIA, WITH LONG-TERM CURRENT USE OF INSULIN (HCC): Primary | ICD-10-CM

## 2020-05-28 DIAGNOSIS — Z79.4 TYPE 2 DIABETES MELLITUS WITH HYPERGLYCEMIA, WITH LONG-TERM CURRENT USE OF INSULIN (HCC): Primary | ICD-10-CM

## 2020-05-28 RX ORDER — BLOOD-GLUCOSE METER
EACH MISCELLANEOUS
COMMUNITY
Start: 2020-04-17 | End: 2021-03-22 | Stop reason: SDUPTHER

## 2020-05-28 RX ORDER — LANCETS 33 GAUGE
EACH MISCELLANEOUS
Status: ON HOLD | COMMUNITY
Start: 2020-04-17 | End: 2021-05-06

## 2020-05-28 RX ORDER — BLOOD SUGAR DIAGNOSTIC
STRIP MISCELLANEOUS
COMMUNITY
Start: 2020-04-17 | End: 2020-10-21 | Stop reason: SDUPTHER

## 2020-05-28 NOTE — PROGRESS NOTES
Patient stated name &   Chief Complaint   Patient presents with    Diabetes     follow up        Health Maintenance Due   Topic    Pneumococcal 0-64 years (1 of 1 - PPSV23)    Foot Exam Q1     Eye Exam Retinal or Dilated     DTaP/Tdap/Td series (1 - Tdap)       Wt Readings from Last 3 Encounters:   20 186 lb (84.4 kg)   20 186 lb (84.4 kg)   20 186 lb 3.2 oz (84.5 kg)     Temp Readings from Last 3 Encounters:   20 98.1 °F (36.7 °C) (Oral)   20 98.1 °F (36.7 °C) (Oral)   20 97.7 °F (36.5 °C) (Oral)     BP Readings from Last 3 Encounters:   20 (!) 141/101   20 110/84   20 121/86     Pulse Readings from Last 3 Encounters:   20 100   20 (!) 57   20 85         Learning Assessment:  :     Learning Assessment 2019   PRIMARY LEARNER Patient   PRIMARY LANGUAGE ENGLISH   LEARNER PREFERENCE PRIMARY LISTENING   ANSWERED BY Patient   RELATIONSHIP SELF       Depression Screening:  :     3 most recent PHQ Screens 2019   Little interest or pleasure in doing things Not at all   Feeling down, depressed, irritable, or hopeless Not at all   Total Score PHQ 2 0       Fall Risk Assessment:  :     No flowsheet data found. Abuse Screening:  :     No flowsheet data found. Coordination of Care Questionnaire:  :     1) Have you been to an emergency room, urgent care clinic since your last visit? No  Hospitalized since your last visit? No             2) Have you seen or consulted any other health care providers outside of [address] [place] [address] since your last visit? No  (Include any pap smears or colon screenings in this section.)    Patient is accompanied by VV I have received verbal consent from Robe Rosado to discuss any/all medical information while they are present in the room.

## 2020-05-28 NOTE — PROGRESS NOTES
Renetta Do is a 52 y.o. male who was seen by synchronous (real-time) audio-video technology on 5/28/2020. Consent: Renetta Do, who was seen by synchronous (real-time) audio-video technology, and/or his healthcare decision maker, is aware that this patient-initiated, Telehealth encounter on 5/28/2020 is a billable service, with coverage as determined by his insurance carrier. He is aware that he may receive a bill and has provided verbal consent to proceed: Yes. Assessment & Plan:   Diagnoses and all orders for this visit:    1. Type 2 diabetes mellitus with hyperglycemia, with long-term current use of insulin ([facility] [address]  FU when clinic reopens for repeat labs 1-2 months. Warned of hypoglycemia. Check sugar if low sugar sx. Rx of low sugars discussed. 2. Chronic systolic congestive heart failure ([facility]  Discussed rx of CHF and need to take meds consistently. Especially lasix    3. Dilated cardiomyopathy ([facility]        Follow-up and Dispositions    · Return in about 2 months (around 7/28/2020) for Medication follow up, Diabetes follow up, Blood pressure follow up. I spent at least 23 minutes on this visit with this established patient. ([id number])    Subjective:   Renetta Do is a 52 y.o. male who was seen for Diabetes (follow up)    See notes from Dr. Giovani Burns and Cardiology  Doing ok  Diabetic control remains ok  Most of his sugars in the low 100's  No hypoglycemia. Lowest sugar is in 90's but he could feel that    CHF  Feels OK  Some orthopnea and wheezing but blames this on his asthma  Doesn't like lasix because he urinates a lot! Discussed this is the way it works and emphasized it is helping his lungs work better. Discussed his need to FU with Cardiology in June as planned. Prior to Admission medications    Medication Sig Start Date End Date Taking? Authorizing Provider   furosemide (LASIX) 40 mg tablet Take 1 Tab by mouth daily.  5/6/20  Yes Belgica Madden MD pantoprazole (PROTONIX) 40 mg tablet Take 40 mg by mouth daily. Yes Provider, Historical   insulin glargine (LANTUS SOLOSTAR U-100 INSULIN) 100 unit/mL (3 mL) inpn 50 Units by SubCUTAneous route two (2) times a day. 2/28/20  Yes Christian Jon MD   amLODIPine (NORVASC) 5 mg tablet Take 1 Tab by mouth daily. 1/31/20  Yes Christian Jon MD   atorvastatin (LIPITOR) 40 mg tablet Take 1 Tab by mouth daily. 1/31/20  Yes Christian Jon MD   carvediloL (COREG) 6.25 mg tablet Take 1 Tab by mouth two (2) times daily (with meals). 1/31/20  Yes Christian Jon MD   clopidogreL (PLAVIX) 75 mg tab Take 1 Tab by mouth daily. 1/31/20  Yes Christian Jon MD   losartan (COZAAR) 25 mg tablet Take 1 Tab by mouth daily. 1/31/20  Yes Christian Jon MD   albuterol (PROVENTIL HFA, VENTOLIN HFA) 90 mcg/actuation inhaler Take 1-2 Puffs by inhalation every four (4) hours as needed for Wheezing. 6/27/14  Yes Janice Oropeza MD   OneTouch Verio test strips strip USE AS DIRECTED 4/17/20   Provider, Historical   OneTouch Verio Meter misc MISC AS DIRECTED 4/17/20   Provider, Historical   One Touch Delica 33 gauge misc USE AS DIRECTED 4/17/20   Provider, Historical   Insulin Needles, Disposable, 31 gauge x 5/16\" ndle Use daily with lantus insulin. E11.9 DM2 on insulin. 1/31/20   Christian Jon MD     Allergies   Allergen Reactions    Acetaminophen Unknown (comments) and Anaphylaxis     Other reaction(s): anaphylaxis/angioedema    Aspirin Hives and Anaphylaxis     Other reaction(s): anaphylaxis/angioedema    Aspirin Shortness of Breath    Spinach Leaf Unknown (comments)     Green leaves    Tylenol [Acetaminophen] Shortness of Breath           Review of Systems   Constitutional: Negative for chills and fever. Respiratory: Positive for cough and wheezing. Negative for shortness of breath. Cardiovascular: Positive for orthopnea. Negative for chest pain and palpitations.    Psychiatric/Behavioral: Negative. Objective:   Vital Signs: (As obtained by patient/caregiver at home)  There were no vitals taken for this visit. [INSTRUCTIONS:  \"[x]\" Indicates a positive item  \"[]\" Indicates a negative item  -- DELETE ALL ITEMS NOT EXAMINED]    Constitutional: [x] Appears well-developed and well-nourished [x] No apparent distress      [] Abnormal -     Mental status: [x] Alert and awake  [x] Oriented to person/place/time [x] Able to follow commands    [] Abnormal -     Eyes:   EOM    [x]  Normal    [] Abnormal -   Sclera  [x]  Normal    [] Abnormal -          Discharge [x]  None visible   [] Abnormal -     HENT: [x] Normocephalic, atraumatic  [] Abnormal -   [x] Mouth/Throat: Mucous membranes are moist    External Ears [x] Normal  [] Abnormal -    Neck: [x] No visualized mass [] Abnormal -     Pulmonary/Chest: [x] Respiratory effort normal   [x] No visualized signs of difficulty breathing or respiratory distress        [] Abnormal -      Musculoskeletal:   [x] Normal gait with no signs of ataxia         [x] Normal range of motion of neck        [] Abnormal -     Neurological:        [x] No Facial Asymmetry (Cranial nerve 7 motor function) (limited exam due to video visit)          [x] No gaze palsy        [] Abnormal -          Skin:        [x] No significant exanthematous lesions or discoloration noted on facial skin         [] Abnormal -            Psychiatric:       [x] Normal Affect [] Abnormal -        [x] No Hallucinations    Other pertinent observable physical exam findings:-        We discussed the expected course, resolution and complications of the diagnosis(es) in detail. Medication risks, benefits, costs, interactions, and alternatives were discussed as indicated. I advised him to contact the office if his condition worsens, changes or fails to improve as anticipated. He expressed understanding with the diagnosis(es) and plan.        Larry Culp is a 52 y.o. male who was evaluated by a video visit encounter for concerns as above. Patient identification was verified prior to start of the visit. A caregiver was present when appropriate. Due to this being a TeleHealth encounter (During JXKRD-47 public health emergency), evaluation of the following organ systems was limited: Vitals/Constitutional/EENT/Resp/CV/GI//MS/Neuro/Skin/Heme-Lymph-Imm. Pursuant to the emergency declaration under the [facility], [facility] waiver authority and the [employer] Act, this Virtual  Visit was conducted, with patient's (and/or legal guardian's) consent, to reduce the patient's risk of exposure to COVID-19 and provide necessary medical care. Services were provided through a video synchronous discussion virtually to substitute for in-person clinic visit. Patient was at home and I was in office for this video visit. Kimble Mcardle.  Debbie Mcleod MD

## 2020-06-17 ENCOUNTER — VIRTUAL VISIT (OUTPATIENT)
Dept: CARDIOLOGY CLINIC | Age: 50
End: 2020-06-17

## 2020-06-17 DIAGNOSIS — I10 ESSENTIAL HYPERTENSION: Primary | ICD-10-CM

## 2020-06-17 DIAGNOSIS — I50.22 SYSTOLIC CHF, CHRONIC (HCC): ICD-10-CM

## 2020-06-17 DIAGNOSIS — I42.0 DILATED CARDIOMYOPATHY (HCC): ICD-10-CM

## 2020-06-17 DIAGNOSIS — Z95.810 ICD (IMPLANTABLE CARDIOVERTER-DEFIBRILLATOR) IN PLACE: ICD-10-CM

## 2020-06-17 NOTE — PROGRESS NOTES
1. Have you been to the ER, urgent care clinic since your last visit? Hospitalized since your last visit?     no  2. Have you seen or consulted any other health care providers outside of the [address] [place] [address] since your last visit? Include any pap smears or colon screening. No     3. Since your last visit, have you had any of the following symptoms?      swelling in legs/arms.

## 2020-06-17 NOTE — PROGRESS NOTES
Clarita Amato is a 52 y.o. male who was seen by synchronous (real-time) audio-video technology on 6/17/2020. Consent:  He and/or his healthcare decision maker is aware that this patient-initiated Telehealth encounter is a billable service, with coverage as determined by his insurance carrier. He is aware that he may receive a bill and has provided verbal consent to proceed: Yes    712  Subjective:   Clarita Amato was seen for CHF (1 month(labs was not done)) and Hypertension      Patient is a 52 yr old male with chronic systolic CHF stage C NYHA class III seen for f/u  Shortness of breath is progressively improving. However has residual lower extremity edema. Family History   Problem Relation Age of Onset    Heart Attack Mother     Hypertension Mother     Diabetes Mother     Heart Attack Father     Hypertension Father     Diabetes Father     Depression Neg Hx     Suicide Neg Hx     Psychotic Disorder Neg Hx     Dementia Neg Hx     Substance Abuse Neg Hx      Past Surgical History:   Procedure Laterality Date    HX CORONARY STENT PLACEMENT      HX IMPLANTABLE CARDIOVERTER DEFIBRILLATOR       Allergies   Allergen Reactions    Acetaminophen Unknown (comments) and Anaphylaxis     Other reaction(s): anaphylaxis/angioedema    Aspirin Hives and Anaphylaxis     Other reaction(s): anaphylaxis/angioedema    Aspirin Shortness of Breath    Spinach Leaf Unknown (comments)     Green leaves    Tylenol [Acetaminophen] Shortness of Breath       Current Outpatient Medications:     OneTouch Verio test strips strip, USE AS DIRECTED, Disp: , Rfl:     OneTouch Verio Meter misc, MISC AS DIRECTED, Disp: , Rfl:     One Touch Delica 33 gauge misc, USE AS DIRECTED, Disp: , Rfl:     furosemide (LASIX) 40 mg tablet, Take 1 Tab by mouth daily. , Disp: 30 Tab, Rfl: 3    pantoprazole (PROTONIX) 40 mg tablet, Take 40 mg by mouth daily. , Disp: , Rfl:     insulin glargine (LANTUS SOLOSTAR U-100 INSULIN) 100 unit/mL (3 mL) inpn, 50 Units by SubCUTAneous route two (2) times a day., Disp: 10 Adjustable Dose Pre-filled Pen Syringe, Rfl: 3    amLODIPine (NORVASC) 5 mg tablet, Take 1 Tab by mouth daily. , Disp: 30 Tab, Rfl: 5    atorvastatin (LIPITOR) 40 mg tablet, Take 1 Tab by mouth daily. , Disp: 30 Tab, Rfl: 5    carvediloL (COREG) 6.25 mg tablet, Take 1 Tab by mouth two (2) times daily (with meals). , Disp: 60 Tab, Rfl: 5    clopidogreL (PLAVIX) 75 mg tab, Take 1 Tab by mouth daily. , Disp: 30 Tab, Rfl: 5    losartan (COZAAR) 25 mg tablet, Take 1 Tab by mouth daily. , Disp: 30 Tab, Rfl: 5    Insulin Needles, Disposable, 31 gauge x 5/16\" ndle, Use daily with lantus insulin. E11.9 DM2 on insulin., Disp: 1 Package, Rfl: 5    albuterol (PROVENTIL HFA, VENTOLIN HFA) 90 mcg/actuation inhaler, Take 1-2 Puffs by inhalation every four (4) hours as needed for Wheezing., Disp: 1 Inhaler, Rfl: 1  Allergies   Allergen Reactions    Acetaminophen Unknown (comments) and Anaphylaxis     Other reaction(s): anaphylaxis/angioedema    Aspirin Hives and Anaphylaxis     Other reaction(s): anaphylaxis/angioedema    Aspirin Shortness of Breath    Spinach Leaf Unknown (comments)     Green leaves    Tylenol [Acetaminophen] Shortness of Breath         Review of Systems   Review of Systems   Constitutional: Negative for chills and fever. HENT: Negative for nosebleeds. Eyes: Negative for blurred vision and double vision. Respiratory: Negative for cough, hemoptysis, sputum production, shortness of breath and wheezing. Cardiovascular: Negative for chest pain, palpitations, orthopnea, claudication, leg swelling and PND. Gastrointestinal: Negative for abdominal pain, heartburn, nausea and vomiting. Musculoskeletal: Negative for myalgias. Skin: Negative for rash. Neurological: Negative for dizziness, weakness and headaches. Endo/Heme/Allergies: Does not bruise/bleed easily.    PHYSICAL EXAMINATION:    Vital Signs: (As obtained by patient/caregiver at home)  There were no vitals taken for this visit. Constitutional: [x] Appears well-developed and well-nourished [x] No apparent distress      Mental status: [x] Alert and awake  [x] Oriented to person/place/time [x] Able to follow commands        Eyes:   EOM    [x]  Normal    [] Abnormal -   Sclera  [x]  Normal    [] Abnormal -          Discharge [x]  None visible      HENT: [x] Normocephalic, atraumatic  [] Abnormal -  [x] Mouth/Throat: Mucous membranes are moist    External Ears [x] Normal  [] Abnormal -    Neck: [x] No visualized mass,NO JVD [] Abnormal -     Pulmonary/Chest: [x] Respiratory effort normal   [x] No visualized signs of difficulty breathing or respiratory distress        [] Abnormal -     Musculoskeletal:   [x] Normal gait with no signs of ataxia         [x] Normal range of motion of neck        [] Abnormal -    Neurological:        [x] No Facial Asymmetry (Cranial nerve 7 motor function) (limited exam due to video visit)          [x] No gaze palsy        [] Abnormal -        Skin:        [x] No significant exanthematous lesions ,no bruising       [] Abnormal-           Psychiatric:       [x] Normal Affect [] Abnormal -       [x] No Hallucinations  Extremities:           No Edema    Other pertinent observable physical exam findings:- mild pedal edema    Pertinent LAB and reports  I have reviewed available  pertinent notes, labs and reports and included in current evaluation of this patient. Assessment & Plan:   Diagnoses and all orders for this visit:    1. Essential hypertension    2. Systolic CHF, chronic ([address]    3. Dilated cardiomyopathy ([address]    4. ICD (implantable cardioverter-defibrillator) in place        Patient currently takes Lasix 40 mg daily. Reports minimal lower extremity edema. Has chronic orthopnea. He was scheduled for lab work however did not show up due to transportation issues. He is unable to come to clinic for further evaluation.   He will follow-up with a prior cardiologist in [place] as he lives in the [facility]. Follow-up and Dispositions    · Return in about 3 months (around 9/17/2020). No orders of the defined types were placed in this encounter. Follow-up and Dispositions    · Return in about 3 months (around 9/17/2020). We discussed the expected course, resolution and complications of the diagnosis(es) in detail. Medication risks, benefits, costs, interactions, and alternatives were discussed as indicated. I advised him to contact the office if his condition worsens, changes or fails to improve as anticipated. He expressed understanding with the diagnosis(es) and plan. I was in the clinic  while conducting this encounter. Pursuant to the emergency declaration under the [facility], [facility] waiver authority and the [employer] General Act, this Virtual  Visit was conducted, with patient's consent, to reduce the patient's risk of exposure to COVID-19 and provide continuity of care for an established patient. Services were provided through a video synchronous discussion virtually to substitute for in-person clinic visit.     Bhaskar Stewart MD

## 2020-08-11 ENCOUNTER — HOSPITAL ENCOUNTER (OUTPATIENT)
Dept: LAB | Age: 50
Discharge: HOME | End: 2020-08-11

## 2020-08-11 ENCOUNTER — OFFICE VISIT (OUTPATIENT)
Dept: FAMILY MEDICINE CLINIC | Age: 50
End: 2020-08-11
Payer: COMMERCIAL

## 2020-08-11 VITALS
RESPIRATION RATE: 16 BRPM | BODY MASS INDEX: 29.72 KG/M2 | HEART RATE: 101 BPM | SYSTOLIC BLOOD PRESSURE: 124 MMHG | DIASTOLIC BLOOD PRESSURE: 85 MMHG | WEIGHT: 178.4 LBS | HEIGHT: 65 IN | OXYGEN SATURATION: 99 % | TEMPERATURE: 97.2 F

## 2020-08-11 DIAGNOSIS — E78.00 HYPERCHOLESTEREMIA: ICD-10-CM

## 2020-08-11 DIAGNOSIS — Z79.4 TYPE 2 DIABETES MELLITUS WITH HYPERGLYCEMIA, WITH LONG-TERM CURRENT USE OF INSULIN (HCC): ICD-10-CM

## 2020-08-11 DIAGNOSIS — K21.9 GASTROESOPHAGEAL REFLUX DISEASE WITHOUT ESOPHAGITIS: Primary | ICD-10-CM

## 2020-08-11 DIAGNOSIS — I10 ESSENTIAL HYPERTENSION: ICD-10-CM

## 2020-08-11 DIAGNOSIS — I25.10 CORONARY ARTERY DISEASE INVOLVING NATIVE CORONARY ARTERY OF NATIVE HEART WITHOUT ANGINA PECTORIS: ICD-10-CM

## 2020-08-11 DIAGNOSIS — E11.65 TYPE 2 DIABETES MELLITUS WITH HYPERGLYCEMIA, WITH LONG-TERM CURRENT USE OF INSULIN (HCC): ICD-10-CM

## 2020-08-11 LAB
ALBUMIN SERPL-MCNC: 3.8 G/DL (ref 3.5–5)
ALBUMIN/GLOB SERPL: 1 {RATIO} (ref 1.1–2.2)
ALP SERPL-CCNC: 111 U/L (ref 45–117)
ALT SERPL-CCNC: 46 U/L (ref 12–78)
ANION GAP SERPL CALC-SCNC: 8 MMOL/L (ref 5–15)
AST SERPL-CCNC: 17 U/L (ref 15–37)
BASOPHILS # BLD: 0.1 K/UL (ref 0–0.1)
BASOPHILS NFR BLD: 1 % (ref 0–1)
BILIRUB DIRECT SERPL-MCNC: 0.2 MG/DL (ref 0–0.2)
BILIRUB SERPL-MCNC: 0.7 MG/DL (ref 0.2–1)
BUN SERPL-MCNC: 13 MG/DL (ref 6–20)
BUN/CREAT SERPL: 10 (ref 12–20)
CALCIUM SERPL-MCNC: 9.4 MG/DL (ref 8.5–10.1)
CHLORIDE SERPL-SCNC: 105 MMOL/L (ref 97–108)
CHOLEST SERPL-MCNC: 172 MG/DL
CO2 SERPL-SCNC: 24 MMOL/L (ref 21–32)
CREAT SERPL-MCNC: 1.34 MG/DL (ref 0.7–1.3)
CREAT UR-MCNC: 131 MG/DL
DIFFERENTIAL METHOD BLD: ABNORMAL
EOSINOPHIL # BLD: 0.2 K/UL (ref 0–0.4)
EOSINOPHIL NFR BLD: 2 % (ref 0–7)
ERYTHROCYTE [DISTWIDTH] IN BLOOD BY AUTOMATED COUNT: 15.3 % (ref 11.5–14.5)
EST. AVERAGE GLUCOSE BLD GHB EST-MCNC: 223 MG/DL
GLOBULIN SER CALC-MCNC: 3.9 G/DL (ref 2–4)
GLUCOSE SERPL-MCNC: 252 MG/DL (ref 65–100)
HBA1C MFR BLD: 9.4 % (ref 4–5.6)
HCT VFR BLD AUTO: 48.2 % (ref 36.6–50.3)
HDLC SERPL-MCNC: 38 MG/DL
HDLC SERPL: 4.5 {RATIO} (ref 0–5)
HGB BLD-MCNC: 15.2 G/DL (ref 12.1–17)
IMM GRANULOCYTES # BLD AUTO: 0 K/UL (ref 0–0.04)
IMM GRANULOCYTES NFR BLD AUTO: 1 % (ref 0–0.5)
LDLC SERPL CALC-MCNC: 86 MG/DL (ref 0–100)
LIPID PROFILE,FLP: ABNORMAL
LYMPHOCYTES # BLD: 0.8 K/UL (ref 0.8–3.5)
LYMPHOCYTES NFR BLD: 11 % (ref 12–49)
MCH RBC QN AUTO: 29.3 PG (ref 26–34)
MCHC RBC AUTO-ENTMCNC: 31.5 G/DL (ref 30–36.5)
MCV RBC AUTO: 93.1 FL (ref 80–99)
MICROALBUMIN UR-MCNC: 38.9 MG/DL
MICROALBUMIN/CREAT UR-RTO: 297 MG/G (ref 0–30)
MONOCYTES # BLD: 0.5 K/UL (ref 0–1)
MONOCYTES NFR BLD: 6 % (ref 5–13)
NEUTS SEG # BLD: 6.2 K/UL (ref 1.8–8)
NEUTS SEG NFR BLD: 79 % (ref 32–75)
NRBC # BLD: 0 K/UL (ref 0–0.01)
NRBC BLD-RTO: 0 PER 100 WBC
PLATELET # BLD AUTO: 221 K/UL (ref 150–400)
PMV BLD AUTO: 11.3 FL (ref 8.9–12.9)
POTASSIUM SERPL-SCNC: 4.3 MMOL/L (ref 3.5–5.1)
PROT SERPL-MCNC: 7.7 G/DL (ref 6.4–8.2)
RBC # BLD AUTO: 5.18 M/UL (ref 4.1–5.7)
SODIUM SERPL-SCNC: 137 MMOL/L (ref 136–145)
TRIGL SERPL-MCNC: 240 MG/DL (ref ?–150)
VLDLC SERPL CALC-MCNC: 48 MG/DL
WBC # BLD AUTO: 7.9 K/UL (ref 4.1–11.1)

## 2020-08-11 PROCEDURE — 99214 OFFICE O/P EST MOD 30 MIN: CPT | Performed by: FAMILY MEDICINE

## 2020-08-11 PROCEDURE — 3051F HG A1C>EQUAL 7.0%<8.0%: CPT | Performed by: FAMILY MEDICINE

## 2020-08-11 RX ORDER — ATORVASTATIN CALCIUM 40 MG/1
40 TABLET, FILM COATED ORAL DAILY
Qty: 30 TAB | Refills: 5 | Status: SHIPPED | OUTPATIENT
Start: 2020-08-11 | End: 2020-12-02 | Stop reason: DRUGHIGH

## 2020-08-11 RX ORDER — AMLODIPINE BESYLATE 5 MG/1
5 TABLET ORAL DAILY
Qty: 30 TAB | Refills: 5 | Status: SHIPPED | OUTPATIENT
Start: 2020-08-11 | End: 2020-11-25

## 2020-08-11 RX ORDER — PANTOPRAZOLE SODIUM 40 MG/1
40 TABLET, DELAYED RELEASE ORAL DAILY
Qty: 30 TAB | Refills: 5 | Status: SHIPPED | OUTPATIENT
Start: 2020-08-11 | End: 2021-03-15

## 2020-08-11 RX ORDER — CLOPIDOGREL BISULFATE 75 MG/1
75 TABLET ORAL DAILY
Qty: 30 TAB | Refills: 5 | Status: SHIPPED | OUTPATIENT
Start: 2020-08-11 | End: 2021-04-08 | Stop reason: SDUPTHER

## 2020-08-11 RX ORDER — FUROSEMIDE 40 MG/1
40 TABLET ORAL DAILY
Qty: 30 TAB | Refills: 5 | Status: SHIPPED | OUTPATIENT
Start: 2020-08-11 | End: 2021-04-08 | Stop reason: SDUPTHER

## 2020-08-11 RX ORDER — LOSARTAN POTASSIUM 25 MG/1
25 TABLET ORAL DAILY
Qty: 30 TAB | Refills: 5 | Status: SHIPPED | OUTPATIENT
Start: 2020-08-11 | End: 2021-03-15

## 2020-08-11 RX ORDER — CARVEDILOL 6.25 MG/1
6.25 TABLET ORAL 2 TIMES DAILY WITH MEALS
Qty: 60 TAB | Refills: 5 | Status: SHIPPED | OUTPATIENT
Start: 2020-08-11 | End: 2020-11-25

## 2020-08-11 NOTE — PATIENT INSTRUCTIONS
Diabetes: 
Blood sugar goals: 
Hemoglobin A1c under 7 Fasting blood sugar  Blood sugar 2 hours after a meal under 180, 4 hours after a meal under 120 No hypoglycemia (sugars under 70 and symptomatic low sugars) Blood sugar control with diet and exercise: 
Exercise 45 minutes per day. This makes your insulin work better. It also allows insulin levels to fall helping with weight loss. Every night, try to fast from your evening meal to breakfast (at least 12 hours) without eating anything. This uses stored energy in your liver and makes insulin work better. Avoid simples sugars such as table sugar in drinks (sodas, lemonade, sweet tea, wine), desserts, candy. Also avoid fruit juices and high fructose corn syrup. Avoid frequent consumption of fruit especially grapes and bananas. A single serving of fruit daily is all you should have. Finally, drink less milk which has milk sugar in it. Control your starch intake. Men should have 3-4 carb portions per meal.  Women should have 2-3 carb portions per meal.  A carb serving is the equivalent of a slice of bread. Control your blood pressure and cholesterol. These problems are common in diabetes. AND, don't smoke. All of these problems contribute to heart disease and stroke risk. Get a yearly eye exam and flu shot. Make sure your vaccines are up to date particularly the pneumonia vaccines. Inspect your feet daily. Wear comfortable protective shoes and clean socks. Seek medical care if there are sore, calluses or numbness and burning of your feet. See your doctor at least every 6 months if you are on oral medicines or more often if the diabetic control is not at goal or if you are on insulin. Take your medicines religiously. STOP the SUGAR The first step in dietary efforts at weight loss is removing as much sugar from the diet as possible.   Most dietary sugar is in the forms of table sugar (sucrose), fruit sugar (fructose) and milk sugar (lactose). But, as the picture above demonstrates, you need to watch labels to see if processed foods have added sugars under other names. To eliminate sucrose, eliminate sweet drinks entirely including soft drinks, sports drinks, lemonade, sweet tea and wine. Also, eliminate candy and make desserts a \"special occasion only treat\". Don't eat desserts with every meal or every night. Most fructose is found in fruit and this should be markedly limited. Fruit juice should be avoided. One piece of fruit daily is the limit. Berries are a good choice to eat as a garnish for other foods. Bananas and grapes should be avoided. Avoid high fructose corn syrup (an artificial sweetener). Milk sugar, or lactose, should be avoided as well. Milk is a good source of calcium but not for people struggling with weight issues. Put a little milk in your coffee or tea but otherwise avoid milk. How can you avoid sugar? For starters, don't buy it and don't bring it in the house. It won't tempt you that way! For more information: 
 
Try the internet for videos about sugar addiction or try diet doctor.[employer]. Hypoglycemia: Care Instructions Your Care Instructions Hypoglycemia means that your blood sugar is low and your body is not getting enough fuel. Some people get low blood sugar from not eating often enough. Some medicines to treat diabetes can cause low blood sugar. People who have had surgery on their stomachs or intestines may get hypoglycemia. Problems with the pancreas, kidneys, or liver also can cause low blood sugar. A snack or drink with sugar in it will raise your blood sugar and should ease your symptoms right away. Your doctor may recommend that you change or stop your medicines until you can get your blood sugar levels under control.  In the long run, you may need to change your diet and eating habits so that you get enough fuel for your body throughout the day. Follow-up care is a key part of your treatment and safety. Be sure to make and go to all appointments, and call your doctor if you are having problems. It's also a good idea to know your test results and keep a list of the medicines you take. How can you care for yourself at home? · Learn to recognize the early signs of low blood sugar. Signs include: 
? Nausea. ? Hunger. ? Feeling nervous, irritable, or shaky. ? Cold, clammy, wet skin. ? Sweating (when you are not exercising). ? A fast heartbeat. 
? Numbness or tingling of the fingertips or lips. · If you feel an episode of low blood sugar coming on, eat or drink a quick-sugar food. Some examples of quick-sugar foods are glucose tablets, table sugar, hard candy (such as Life Savers), fruit juice, and regular (not diet) soda. · Eat small, frequent meals so that you do not get too hungry between meals. · Balance extra exercise with eating more. · Keep a written record of your low blood sugar episodes, including when you last ate and what you ate, so that you can learn what causes your blood sugar to drop. · Make sure your family, friends, and coworkers know the symptoms of low blood sugar and know what to do to get your sugar level up. · Wear medical alert jewelry that lists your condition. You can buy this at most drugstores. When should you call for help? [id number] anytime you think you may need emergency care. For example, call if: 
· You passed out (lost consciousness). · You are confused or cannot think clearly. · Your blood sugar is very high or very low. Watch closely for changes in your health, and be sure to contact your doctor if: 
· Your blood sugar stays outside the level your doctor set for you. · You have any problems. Where can you learn more? Go to http://merrick-christofer.info/ Enter P440 in the search box to learn more about \"Hypoglycemia: Care Instructions. \" Current as of: December 20, 2019               Content Version: 12.5 © 1260-3194 [employer]. Care instructions adapted under license by [employer] (which disclaims liability or warranty for this information). If you have questions about a medical condition or this instruction, always ask your healthcare professional. [address] any warranty or liability for your use of this information.

## 2020-08-11 NOTE — PROGRESS NOTES
Identified pt with two pt identifiers(name and ). Reviewed record in preparation for visit and have obtained necessary documentation. Chief Complaint   Patient presents with    Diabetes        Health Maintenance Due   Topic    Pneumococcal 0-64 years (1 of 1 - PPSV23)    Foot Exam Q1     Eye Exam Retinal or Dilated     DTaP/Tdap/Td series (1 - Tdap)    Influenza Age 5 to Adult     Lipid Screen        Coordination of Care Questionnaire:  :   1) Have you been to an emergency room, urgent care, or hospitalized since your last visit? If yes, where when, and reason for visit? No       2. Have seen or consulted any other health care provider since your last visit? If yes, where when, and reason for visit?    No

## 2020-08-11 NOTE — PROGRESS NOTES
Assessment and Plan    1. Coronary artery disease involving native coronary artery of native heart without angina pectoris  Pain free, Congratulated on smoking cessation.  - furosemide (LASIX) 40 mg tablet; Take 1 Tab by mouth daily. Dispense: 30 Tab; Refill: 5  - clopidogreL (PLAVIX) 75 mg tab; Take 1 Tab by mouth daily. Dispense: 30 Tab; Refill: 5    2. Essential hypertension  At goal  - carvediloL (COREG) 6.25 mg tablet; Take 1 Tab by mouth two (2) times daily (with meals). Dispense: 60 Tab; Refill: 5  - amLODIPine (NORVASC) 5 mg tablet; Take 1 Tab by mouth daily. Dispense: 30 Tab; Refill: 5  - losartan (COZAAR) 25 mg tablet; Take 1 Tab by mouth daily. Dispense: 30 Tab; Refill: 5  - METABOLIC PANEL, BASIC; Future  - MICROALBUMIN, UR, RAND W/ MICROALB/CREAT RATIO; Future    3. Hypercholesteremia  Continue statin  - atorvastatin (LIPITOR) 40 mg tablet; Take 1 Tab by mouth daily. Dispense: 30 Tab; Refill: 5  - HEPATIC FUNCTION PANEL; Future  - LIPID PANEL; Future    4. Gastroesophageal reflux disease without esophagitis  Refill PPI  - pantoprazole (PROTONIX) 40 mg tablet; Take 1 Tab by mouth daily. Dispense: 30 Tab; Refill: 5    5. Type 2 diabetes mellitus with hyperglycemia, with long-term current use of insulin (HCC)  Last A1c good. No hypoglycemia (discussed)  Diet discussed again, handouts given  - CBC WITH AUTOMATED DIFF; Future  - HEMOGLOBIN A1C WITH EAG; Future  -  DIABETES FOOT EXAM      Follow-up and Dispositions    · Return in about 6 months (around 2/11/2021) for Blood pressure follow up, Diabetes follow up, Medication follow up. Diagnosis and plan discussed with patient who verbillized understanding. History of present Tereso Marvin is a 52 y.o. male presenting for Diabetes    Pain is big complaint in both right shoulder and right hip  Ariana Huber in May, passed out. Told that he had a small heart attack. Went to [facility].  Already has a defibrillator.   No current chest pain    Hypertension  Takes meds consistently  At goal   Quit smoking in May when hospitalized. Diabetes  Remains on BID Lantus 50 units  No low sugars. Review of Systems   Constitutional: Negative for chills and fever. HENT: Negative for congestion, sinus pain and sore throat. Respiratory: Negative for cough, shortness of breath and wheezing. Cardiovascular: Negative for chest pain and palpitations. Gastrointestinal: Negative. Genitourinary: Negative. Musculoskeletal: Positive for falls and joint pain. Psychiatric/Behavioral: Negative. Past Medical History:   Diagnosis Date    Anxiety disorder     Asthma     Bipolar 1 disorder [facility]     CAD (coronary artery disease)     MI x2 with 2 cardiac stent    [facility]     with reduced EF, EF 20%    Depression     Diabetes mellitus, type 2 ([facility])     on insulin.  moderate proteinuria    Hypertension     Mood disorder ([facility] [facility]     Psychotic disorder ([facility])     [facility]     Sleep disorder     Suicidal thoughts      Past Surgical History:   Procedure Laterality Date    HX CORONARY STENT PLACEMENT      HX IMPLANTABLE CARDIOVERTER DEFIBRILLATOR       Family History   Problem Relation Age of Onset    Heart Attack Mother     Hypertension Mother     Diabetes Mother     Heart Attack Father     Hypertension Father     Diabetes Father     Depression Neg Hx     Suicide Neg Hx     Psychotic Disorder Neg Hx     Dementia Neg Hx     Substance Abuse Neg Hx      Social History     Socioeconomic History    Marital status: UNKNOWN     Spouse name: Not on file    Number of children: Not on file    Years of education: Not on file    Highest education level: Not on file   Occupational History    Not on file   Social Needs    Financial resource strain: Not on file    Food insecurity     Worry: Not on file     Inability: Not on file    Transportation needs     Medical: Not on file     Non-medical: Not on file   Tobacco Use    Smoking status: Current Every Day Smoker     Packs/day: 1.00     Types: Cigarettes    Smokeless tobacco: Never Used   Substance and Sexual Activity    Alcohol use: Not Currently     Comment: occasionally    Drug use: Never    Sexual activity: Not Currently   Lifestyle    Physical activity     Days per week: Not on file     Minutes per session: Not on file    Stress: Not on file   Relationships    Social connections     Talks on phone: Not on file     Gets together: Not on file     Attends [employer] service: Not on file     Active member of club or organization: Not on file     Attends meetings of clubs or organizations: Not on file     Relationship status: Not on file    Intimate partner violence     Fear of current or ex partner: Not on file     Emotionally abused: Not on file     Physically abused: Not on file     Forced sexual activity: Not on file   Other Topics Concern    Not on file   Social History Narrative    ** Merged History Encounter **              Prior to Admission medications    Medication Sig Start Date End Date Taking? Authorizing Provider   furosemide (LASIX) 40 mg tablet Take 1 Tab by mouth daily. 8/11/20  Yes Lloyd Davis MD   clopidogreL (PLAVIX) 75 mg tab Take 1 Tab by mouth daily. 8/11/20  Yes Lloyd Davis MD   carvediloL (COREG) 6.25 mg tablet Take 1 Tab by mouth two (2) times daily (with meals). 8/11/20  Yes Lloyd Davis MD   atorvastatin (LIPITOR) 40 mg tablet Take 1 Tab by mouth daily. 8/11/20  Yes Lloyd Davis MD   amLODIPine (NORVASC) 5 mg tablet Take 1 Tab by mouth daily. 8/11/20  Yes Lloyd Davis MD   losartan (COZAAR) 25 mg tablet Take 1 Tab by mouth daily. 8/11/20  Yes Lloyd Davis MD   pantoprazole (PROTONIX) 40 mg tablet Take 1 Tab by mouth daily.  8/11/20  Yes MD Dane Kumari U-100 Insulin 100 unit/mL (3 mL) inpn INJECT 50 UNITS BY SUBCUTANEOUS ROUTE TWO (2) TIMES A DAY. 6/29/20  Yes Stacie Leong MD   OneTouch Verio test strips strip USE AS DIRECTED 4/17/20  Yes Provider, Historical   OneTouch Verio Meter misc MISC AS DIRECTED 4/17/20  Yes Provider, Historical   One Touch Delica 33 gauge misc USE AS DIRECTED 4/17/20  Yes Provider, Historical   Insulin Needles, Disposable, 31 gauge x 5/16\" ndle Use daily with lantus insulin. E11.9 DM2 on insulin. 1/31/20  Yes Stacie Leong MD   albuterol (PROVENTIL HFA, VENTOLIN HFA) 90 mcg/actuation inhaler Take 1-2 Puffs by inhalation every four (4) hours as needed for Wheezing. 6/27/14  Yes Mili Cisneros MD   amLODIPine (NORVASC) 5 mg tablet TAKE 1 TABLET BY MOUTH EVERY DAY 7/27/20 8/11/20  Stacie Leong MD   atorvastatin (LIPITOR) 40 mg tablet TAKE 1 TABLET BY MOUTH EVERY DAY 7/27/20 8/11/20  Stacie Leong MD   clopidogreL (PLAVIX) 75 mg tab TAKE 1 TABLET BY MOUTH EVERY DAY 7/27/20 8/11/20  Stacie Leong MD   furosemide (LASIX) 40 mg tablet Take 1 Tab by mouth daily. 5/6/20 8/11/20  Pino Lucero MD   pantoprazole (PROTONIX) 40 mg tablet Take 40 mg by mouth daily. 8/11/20  Provider, Historical   carvediloL (COREG) 6.25 mg tablet Take 1 Tab by mouth two (2) times daily (with meals). 1/31/20 8/11/20  Stacie Leong MD   losartan (COZAAR) 25 mg tablet Take 1 Tab by mouth daily. 1/31/20 8/11/20  Stacie Leong MD        Allergies   Allergen Reactions    Acetaminophen Unknown (comments), Anaphylaxis and Shortness of Breath     Other reaction(s): anaphylaxis/angioedema  Other reaction(s):  Other (see comments)  Other reaction(s): anaphylaxis/angioedema    Aspirin Hives, Anaphylaxis and Shortness of Breath     Other reaction(s): anaphylaxis/angioedema  Other reaction(s): anaphylaxis/angioedema    Aspirin Shortness of Breath    Spinach Leaf Unknown (comments)     Green leaves    Tylenol [Acetaminophen] Shortness of Breath       Vitals:    08/11/20 0919 08/11/20 0923   BP: (!) 144/92 124/85   Pulse: (!) 101    Resp: 16    Temp: 97.2 °F (36.2 °C)    TempSrc: Oral    SpO2: 99%    Weight: 178 lb 6.4 oz (80.9 kg)    Height: 5' 5\" (1.651 m)      Body mass index is 29.69 kg/m². Objective  Physical Exam  Vitals signs and nursing note reviewed. Constitutional:       Appearance: Normal appearance. He is not toxic-appearing. HENT:      Head: Normocephalic and atraumatic. Neck:      Musculoskeletal: No muscular tenderness. Cardiovascular:      Rate and Rhythm: Normal rate and regular rhythm. Heart sounds: Normal heart sounds. No murmur. No gallop. Comments: Defibrillator left lower axilla, anterior. Pulmonary:      Effort: Pulmonary effort is normal. No respiratory distress. Breath sounds: Normal breath sounds. No wheezing, rhonchi or rales. Musculoskeletal:      Right lower le+ Pitting Edema present. Left lower le+ Pitting Edema present. Lymphadenopathy:      Cervical: No cervical adenopathy. Neurological:      Mental Status: He is alert. Psychiatric:         Mood and Affect: Mood normal.         Behavior: Behavior normal.         Thought Content: Thought content normal.         Judgment: Judgment normal.        Diabetic Foot Exam:  Protective sensation is intact bilaterally. Onychomycosis. L foot skin inspection:  normal skin and soft tissue with no gross edema or evidence of acute injury or foot ulcer  R foot skin inspection:  skin and soft tissue appear normal with no significant edema or evidence of acute injury or foot ulcer   Pain right hip with FADIR  To see ortho about ongoing hip and shoulder pain.

## 2020-08-12 NOTE — PROGRESS NOTES
Your blood work shows that the blood sugar control is not nearly as good as it was last time we checked it 6 months ago. Both the blood sugar (glucose) and long term sugar (Hemoglobin A1c) are up. Please get an appointment to see me when available and we'll go over these results and what we can do to improve your blood sugar control. If possible, bring a list of sugars you have checked on your own that we can review. Or, if your meter has a memory function, bring the meter.   [facility]

## 2020-10-21 ENCOUNTER — TELEPHONE (OUTPATIENT)
Dept: FAMILY MEDICINE CLINIC | Age: 50
End: 2020-10-21

## 2020-10-21 DIAGNOSIS — E11.65 TYPE 2 DIABETES MELLITUS WITH HYPERGLYCEMIA, WITH LONG-TERM CURRENT USE OF INSULIN (HCC): Primary | ICD-10-CM

## 2020-10-21 DIAGNOSIS — Z79.4 TYPE 2 DIABETES MELLITUS WITH HYPERGLYCEMIA, WITH LONG-TERM CURRENT USE OF INSULIN (HCC): Primary | ICD-10-CM

## 2020-10-21 RX ORDER — INSULIN GLARGINE 100 [IU]/ML
INJECTION, SOLUTION SUBCUTANEOUS
Qty: 39 ADJUSTABLE DOSE PRE-FILLED PEN SYRINGE | Refills: 1 | Status: SHIPPED | OUTPATIENT
Start: 2020-10-21 | End: 2021-03-15

## 2020-10-21 RX ORDER — BLOOD SUGAR DIAGNOSTIC
STRIP MISCELLANEOUS
Qty: 300 STRIP | Refills: 4 | Status: SHIPPED | OUTPATIENT
Start: 2020-10-21 | End: 2021-03-22 | Stop reason: SDUPTHER

## 2020-10-21 NOTE — TELEPHONE ENCOUNTER
----- Message from [employer] Page sent at 10/19/2020  5:25 PM EDT -----  Regarding: Dr. Michael Spears (if not patient):      Relationship of caller (if not patient):      Best contact number(s):  [phone]    Name of medication and dosage if known:   One touch test strips    Is patient out of this medication (yes/no):  Yes    Pharmacy name:[facility]    Pharmacy listed in chart? (yes/no):yes  Pharmacy phone number:  [phone]    Details to clarify the request:      [employer] Page

## 2020-11-25 ENCOUNTER — OFFICE VISIT (OUTPATIENT)
Dept: CARDIOLOGY CLINIC | Age: 50
End: 2020-11-25
Payer: COMMERCIAL

## 2020-11-25 VITALS — TEMPERATURE: 98.2 F | WEIGHT: 188 LBS | HEIGHT: 65 IN | BODY MASS INDEX: 31.32 KG/M2

## 2020-11-25 DIAGNOSIS — I10 ESSENTIAL HYPERTENSION: ICD-10-CM

## 2020-11-25 DIAGNOSIS — I50.22 SYSTOLIC CHF, CHRONIC (HCC): Primary | ICD-10-CM

## 2020-11-25 DIAGNOSIS — I42.0 DILATED CARDIOMYOPATHY (HCC): ICD-10-CM

## 2020-11-25 DIAGNOSIS — Z95.810 ICD (IMPLANTABLE CARDIOVERTER-DEFIBRILLATOR) IN PLACE: ICD-10-CM

## 2020-11-25 PROCEDURE — 93000 ELECTROCARDIOGRAM COMPLETE: CPT | Performed by: INTERNAL MEDICINE

## 2020-11-25 PROCEDURE — 99214 OFFICE O/P EST MOD 30 MIN: CPT | Performed by: INTERNAL MEDICINE

## 2020-11-25 RX ORDER — CARVEDILOL 6.25 MG/1
12.5 TABLET ORAL 2 TIMES DAILY WITH MEALS
Qty: 60 TAB | Refills: 5 | Status: SHIPPED | OUTPATIENT
Start: 2020-11-25 | End: 2020-12-02 | Stop reason: DRUGHIGH

## 2020-11-25 RX ORDER — AMLODIPINE BESYLATE 5 MG/1
10 TABLET ORAL DAILY
Qty: 30 TAB | Refills: 5 | Status: SHIPPED | OUTPATIENT
Start: 2020-11-25 | End: 2021-03-15

## 2020-11-25 NOTE — PROGRESS NOTES
1. Have you been to the ER, urgent care clinic since your last visit? Hospitalized since your last visit? Yes [facility]  2. Have you seen or consulted any other health care providers outside of the [address] [place] [address] since your last visit? Include any pap smears or colon screening. No     3. Since your last visit, have you had any of the following symptoms? chest pains and shortness of breath.

## 2020-11-25 NOTE — PROGRESS NOTES
HISTORY OF PRESENT ILLNESS  Brooke Juarez is a 52 y.o. male. Shortness of Breath   The history is provided by the patient. This is a chronic problem. The problem occurs intermittently. The problem has not changed since onset. Pertinent negatives include no wheezing and no leg swelling. Hypertension   The history is provided by the patient. This is a chronic problem. The problem occurs daily. Family History   Problem Relation Age of Onset    Heart Attack Mother     Hypertension Mother     Diabetes Mother     Heart Attack Father     Hypertension Father     Diabetes Father     Depression Neg Hx     Suicide Neg Hx     Psychotic Disorder Neg Hx     Dementia Neg Hx     Substance Abuse Neg Hx        Past Medical History:   Diagnosis Date    Anxiety disorder     Asthma     [facility]     CAD (coronary artery disease)     MI x2 with 2 cardiac stent    CHF [facility]congestive heart failure) ([facility]     with reduced EF, EF 20%    Depression     Diabetes mellitus, type 2 ([facility])     on insulin. moderate proteinuria    Hypertension     Mood disorder ([facility]     Psychotic disorder ([facility]     Schizoaffective disorder, bipolar type ([facility]     Sleep disorder     Suicidal thoughts        Past Surgical History:   Procedure Laterality Date    HX CORONARY STENT PLACEMENT      HX IMPLANTABLE CARDIOVERTER DEFIBRILLATOR         Social History     Tobacco Use    Smoking status: Current Every Day Smoker     Packs/day: 1.00     Types: Cigarettes    Smokeless tobacco: Never Used   Substance Use Topics    Alcohol use: Not Currently     Comment: occasionally       Allergies   Allergen Reactions    Acetaminophen Unknown (comments), Anaphylaxis and Shortness of Breath     Other reaction(s): anaphylaxis/angioedema  Other reaction(s):  Other (see comments)  Other reaction(s): anaphylaxis/angioedema    Aspirin Hives, Anaphylaxis and Shortness of Breath     Other reaction(s): anaphylaxis/angioedema  Other reaction(s): anaphylaxis/angioedema    Aspirin Shortness of Breath    Spinach Leaf Unknown (comments)     Green leaves    Tylenol [Acetaminophen] Shortness of Breath       Prior to Admission medications    Medication Sig Start Date End Date Taking? Authorizing Provider   carvediloL (COREG) 6.25 mg tablet Take 2 Tabs by mouth two (2) times daily (with meals). 11/25/20  Yes Adalid Lopez MD   amLODIPine (NORVASC) 5 mg tablet Take 2 Tabs by mouth daily. 11/25/20  Yes Adalid Lopez MD   insulin glargine (Lantus Solostar U-100 Insulin) 100 unit/mL (3 mL) inpn INJECT 50 UNITS BY SUBCUTANEOUS ROUTE TWO (2) TIMES A DAY. 10/21/20  Yes MD Zeenat AlmonteTouch Verio test strips strip USE AS DIRECTED TID/QID and if low sugar symptoms 10/21/20  Yes Elba Campbell MD   furosemide (LASIX) 40 mg tablet Take 1 Tab by mouth daily. 8/11/20  Yes Elba Campbell MD   clopidogreL (PLAVIX) 75 mg tab Take 1 Tab by mouth daily. 8/11/20  Yes Elba Campbell MD   atorvastatin (LIPITOR) 40 mg tablet Take 1 Tab by mouth daily. 8/11/20  Yes Elba Campbell MD   losartan (COZAAR) 25 mg tablet Take 1 Tab by mouth daily. 8/11/20  Yes Elba Campbell MD   pantoprazole (PROTONIX) 40 mg tablet Take 1 Tab by mouth daily. 8/11/20  Yes MD Zeenat AlmonteTouch Verio Meter misc MISC AS DIRECTED 4/17/20  Yes Provider, Historical   One Touch Delica 33 gauge misc USE AS DIRECTED 4/17/20  Yes Provider, Historical   Insulin Needles, Disposable, 31 gauge x 5/16\" ndle Use daily with lantus insulin. E11.9 DM2 on insulin.  1/31/20  Yes Elba Campbell MD   albuterol (PROVENTIL HFA, VENTOLIN HFA) 90 mcg/actuation inhaler Take 1-2 Puffs by inhalation every four (4) hours as needed for Wheezing. 6/27/14  Yes Latesha Hall MD         Visit Vitals  BP (!) (P) 166/107   Pulse (P) 98   Temp 98.2 °F (36.8 °C) (Temporal)   Ht 5' 5\" (1.651 m)   Wt 85.3 kg (188 lb)   BMI 31.28 kg/m²       Review of Systems   Respiratory: Negative for wheezing. Cardiovascular: Negative for leg swelling. Musculoskeletal: Negative for falls. Endo/Heme/Allergies: Does not bruise/bleed easily. Physical Exam   Constitutional: He is oriented to person, place, and time. He appears well-developed and well-nourished. Neck: No JVD present. Cardiovascular: Normal rate, regular rhythm, normal heart sounds and intact distal pulses. Exam reveals no gallop and no friction rub. No murmur heard. Pulmonary/Chest: Effort normal and breath sounds normal. No respiratory distress. He has no wheezes. He has no rales. He exhibits no tenderness. Neurological: He is alert and oriented to person, place, and time. Skin: Skin is warm and dry. Nursing note and vitals reviewed. 10/03/19   ECHO ADULT COMPLETE 10/04/2019 10/4/2019    Narrative · Left Ventricle: Normal cavity size and wall thickness. Moderate-to-severe systolic dysfunction. Estimated left ventricular   ejection fraction is 26 - 30%. Left ventricular global hypokinesis. Moderate (grade 2) left ventricular diastolic dysfunction. · Right Ventricle: Mildly dilated right ventricle. · Right Ventricle: Mildly dilated right ventricle. · Left Atrium: Severely dilated left atrium. · Mitral Valve: Mild mitral valve regurgitation is present. · Pulmonic Valve: Mild pulmonic valve regurgitation is present. Signed by: Stuart Buenrostro MD       ASSESSMENT and PLAN    ICD-10-CM ICD-9-CM    1. Systolic CHF, chronic (HCC)  I50.22 428.22 AMB POC EKG ROUTINE W/ 12 LEADS, INTER & REP     428.0    2. Essential hypertension  I10 401.9 carvediloL (COREG) 6.25 mg tablet      amLODIPine (NORVASC) 5 mg tablet   3. Dilated cardiomyopathy (HCC)  I42.0 425.4    4. ICD (implantable cardioverter-defibrillator) in place  Z95.810 V45.02       Mr. James has a reminder for a \"due or due soon\" health maintenance.  I have asked that he contact his primary care provider for follow-up on this health maintenance. No flowsheet data found. Assessment         ICD-10-CM ICD-9-CM    1. Systolic CHF, chronic (HCC)  I50.22 428.22 AMB POC EKG ROUTINE W/ 12 LEADS, INTER & REP     428.0    2. Essential hypertension  I10 401.9 carvediloL (COREG) 6.25 mg tablet      amLODIPine (NORVASC) 5 mg tablet   3. Dilated cardiomyopathy (HCC)  I42.0 425.4    4. ICD (implantable cardioverter-defibrillator) in place  Z95.810 V45.02        Medications Discontinued During This Encounter   Medication Reason    carvediloL (COREG) 6.25 mg tablet     amLODIPine (NORVASC) 5 mg tablet        Orders Placed This Encounter    AMB POC EKG ROUTINE W/ 12 LEADS, INTER & REP     Order Specific Question:   Reason for Exam:     Answer:   chest pain    carvediloL (COREG) 6.25 mg tablet     Sig: Take 2 Tabs by mouth two (2) times daily (with meals). Dispense:  60 Tab     Refill:  5    amLODIPine (NORVASC) 5 mg tablet     Sig: Take 2 Tabs by mouth daily. Dispense:  30 Tab     Refill:  5     Patient is a 16-year-old male with likely nonischemic cardiomyopathy, chronic systolic CHF seen for follow-up. His last echo revealed an EF of 25%. Reports recent admission to [facility] -- unclear why he is not followed there as he lives in [place]. Will obtain records from [facility] continue to optimize his HF meds  Will increase coreg to 12.5 mg bid and amlodipine to 10 mg -- as his BP remains high.

## 2020-12-02 ENCOUNTER — VIRTUAL VISIT (OUTPATIENT)
Dept: FAMILY MEDICINE CLINIC | Age: 50
End: 2020-12-02
Payer: COMMERCIAL

## 2020-12-02 DIAGNOSIS — Z86.73 HISTORY OF CVA (CEREBROVASCULAR ACCIDENT): ICD-10-CM

## 2020-12-02 DIAGNOSIS — E11.65 TYPE 2 DIABETES MELLITUS WITH HYPERGLYCEMIA, WITH LONG-TERM CURRENT USE OF INSULIN (HCC): Primary | ICD-10-CM

## 2020-12-02 DIAGNOSIS — I50.22 CHRONIC SYSTOLIC CONGESTIVE HEART FAILURE (HCC): ICD-10-CM

## 2020-12-02 DIAGNOSIS — I10 ESSENTIAL HYPERTENSION: ICD-10-CM

## 2020-12-02 DIAGNOSIS — Z79.4 TYPE 2 DIABETES MELLITUS WITH HYPERGLYCEMIA, WITH LONG-TERM CURRENT USE OF INSULIN (HCC): Primary | ICD-10-CM

## 2020-12-02 PROCEDURE — 99443 PR PHYS/QHP TELEPHONE EVALUATION 21-30 MIN: CPT | Performed by: FAMILY MEDICINE

## 2020-12-02 RX ORDER — CARVEDILOL 12.5 MG/1
12.5 TABLET ORAL 2 TIMES DAILY
COMMUNITY
Start: 2020-10-29 | End: 2021-03-15

## 2020-12-02 RX ORDER — SYRING-NEEDL,DISP,INSUL,0.3 ML 31 G X1/4"
1 SYRINGE, EMPTY DISPOSABLE MISCELLANEOUS
Qty: 200 SYRINGE | Refills: 1 | Status: SHIPPED | OUTPATIENT
Start: 2020-12-02 | End: 2021-04-08 | Stop reason: SDUPTHER

## 2020-12-02 RX ORDER — ATORVASTATIN CALCIUM 80 MG/1
80 TABLET, FILM COATED ORAL DAILY
COMMUNITY
Start: 2020-10-29 | End: 2021-04-08 | Stop reason: SDUPTHER

## 2020-12-02 NOTE — PROGRESS NOTES
Williams Soares is a 52 y.o. male      Chief Complaint   Patient presents with    Weight Parkring 76 Follow Up      ( Possible stroke )  Would like to discuss insulin, dosage was changed in the hospital         1. Have you been to the ER, urgent care clinic since your last visit? Hospitalized since your last visit? No       2. Have you seen or consulted any other health care providers outside of the [address] [place] [address] since your last visit? Include any pap smears or colon screening.   No

## 2020-12-02 NOTE — PROGRESS NOTES
Dennie Border is a 52 y.o. male evaluated via telephone on 12/2/2020. Consent:  He and/or health care decision maker is aware that he may receive a bill for this telephone service, depending on his insurance coverage, and has provided verbal consent to proceed: Yes    Unable to reach by computer for scheduled video visit so entire visit done on phone    Documentation:  I communicated with the patient and/or health care decision maker about recent hospitalization at [facility]. Details of this discussion including any medical advice provided:     Hospitalized at [facility], this was to be SEN visit. Told he had small stroke  Insulin doses were changed  Now on Glargine 50 U BID  Humalog 25 U with each meal.  Cannot tolerate Humalog, makes him vomit  Wants an alternative  Checking blood sugars BID ranging from 118-432  Also was given ASA daily which he has stopped saying he is allergic (we have on his allergy list)  Now on plavix only  No recurrences of symptoms  Extensive list of medications is reconciled. No chest pain or dyspnea  Has lost weight, urinates a lot from taking lasix      Assessment and Plan:    1. Type 2 diabetes mellitus with hyperglycemia, with long-term current use of insulin (HCC)  To start regular insulin instead of Humalog (DC)  Keep track of sugars and see me in one month  - insulin regular (NOVOLIN R, HUMULIN R) 100 unit/mL injection; 20 units subcutaneously 10 minutes prior to each meal. (replaces humalog)  Dispense: 2 Vial; Refill: 5  - insulin syringe-needle U-100 0.3 mL 31 gauge x 1/4\" syrg; 1 Syringe by Does Not Apply route Before breakfast, lunch, and dinner. Dispense: 200 Syringe; Refill: 1    2. History of CVA (cerebrovascular accident)  Continue plavix    3. Essential hypertension  Continue meds    4. Chronic systolic congestive heart failure (HCC)  Continue lasix  Discussed how this prevents pulmonary edema with patient.       Follow-up and Dispositions    · Return in about 1 month (around 1/2/2021) for Diabetes follow up, Medication follow up. I affirm this is a Patient Initiated Episode with a Patient who has not had a related appointment within my department in the past 7 days or scheduled within the next 24 hours. Total Time: minutes: 21-30 minutes    Note: not billable if this call serves to triage the patient into an appointment for the relevant concern    The patient was at home and I was in office for this phone visit.     Miguel Wyatt MD

## 2021-03-07 ENCOUNTER — HOSPITAL ENCOUNTER (INPATIENT)
Age: 51
LOS: 7 days | Discharge: HOME | DRG: 750 | End: 2021-03-15
Attending: EMERGENCY MEDICINE | Admitting: PSYCHIATRY & NEUROLOGY
Payer: COMMERCIAL

## 2021-03-07 DIAGNOSIS — R45.851 DEPRESSION WITH SUICIDAL IDEATION: Primary | ICD-10-CM

## 2021-03-07 DIAGNOSIS — E11.65 TYPE 2 DIABETES MELLITUS WITH HYPERGLYCEMIA, WITH LONG-TERM CURRENT USE OF INSULIN (HCC): ICD-10-CM

## 2021-03-07 DIAGNOSIS — E11.9 TYPE 2 DIABETES MELLITUS WITHOUT COMPLICATION, UNSPECIFIED WHETHER LONG TERM INSULIN USE (HCC): ICD-10-CM

## 2021-03-07 DIAGNOSIS — I25.10 CORONARY ARTERY DISEASE INVOLVING NATIVE CORONARY ARTERY OF NATIVE HEART WITHOUT ANGINA PECTORIS: ICD-10-CM

## 2021-03-07 DIAGNOSIS — Z79.4 TYPE 2 DIABETES MELLITUS WITH HYPERGLYCEMIA, WITH LONG-TERM CURRENT USE OF INSULIN (HCC): ICD-10-CM

## 2021-03-07 DIAGNOSIS — Z00.8 MEDICAL CLEARANCE FOR PSYCHIATRIC ADMISSION: ICD-10-CM

## 2021-03-07 DIAGNOSIS — F32.A DEPRESSION WITH SUICIDAL IDEATION: Primary | ICD-10-CM

## 2021-03-07 LAB
ALBUMIN SERPL-MCNC: 4.2 G/DL (ref 3.5–5)
ALBUMIN/GLOB SERPL: 0.9 {RATIO} (ref 1.1–2.2)
ALP SERPL-CCNC: 114 U/L (ref 45–117)
ALT SERPL-CCNC: 25 U/L (ref 12–78)
AMPHET UR QL SCN: NEGATIVE
ANION GAP SERPL CALC-SCNC: 8 MMOL/L (ref 5–15)
APPEARANCE UR: CLEAR
AST SERPL W P-5'-P-CCNC: 11 U/L (ref 15–37)
BACTERIA URNS QL MICRO: NEGATIVE /HPF
BARBITURATES UR QL SCN: NEGATIVE
BASOPHILS # BLD: 0.1 K/UL (ref 0–0.1)
BASOPHILS NFR BLD: 1 % (ref 0–1)
BENZODIAZ UR QL: NEGATIVE
BILIRUB SERPL-MCNC: 0.5 MG/DL (ref 0.2–1)
BILIRUB UR QL: NEGATIVE
BUN SERPL-MCNC: 13 MG/DL (ref 6–20)
BUN/CREAT SERPL: 10 (ref 12–20)
CA-I BLD-MCNC: 9.7 MG/DL (ref 8.5–10.1)
CANNABINOIDS UR QL SCN: POSITIVE
CHLORIDE SERPL-SCNC: 104 MMOL/L (ref 97–108)
CO2 SERPL-SCNC: 27 MMOL/L (ref 21–32)
COCAINE UR QL SCN: NEGATIVE
COLOR UR: ABNORMAL
CREAT SERPL-MCNC: 1.29 MG/DL (ref 0.7–1.3)
DIFFERENTIAL METHOD BLD: ABNORMAL
DRUG SCRN COMMENT,DRGCM: ABNORMAL
EOSINOPHIL # BLD: 0.1 K/UL (ref 0–0.4)
EOSINOPHIL NFR BLD: 1 % (ref 0–7)
ERYTHROCYTE [DISTWIDTH] IN BLOOD BY AUTOMATED COUNT: 15.1 % (ref 11.5–14.5)
ETHANOL SERPL-MCNC: <4 MG/DL
GLOBULIN SER CALC-MCNC: 4.8 G/DL (ref 2–4)
GLUCOSE SERPL-MCNC: 223 MG/DL (ref 65–100)
GLUCOSE UR STRIP.AUTO-MCNC: >300 MG/DL
HCT VFR BLD AUTO: 45.4 % (ref 36.6–50.3)
HGB BLD-MCNC: 14.9 G/DL (ref 12.1–17)
HGB UR QL STRIP: NEGATIVE
IMM GRANULOCYTES # BLD AUTO: 0.1 K/UL (ref 0–0.04)
IMM GRANULOCYTES NFR BLD AUTO: 1 % (ref 0–0.5)
KETONES UR QL STRIP.AUTO: 20 MG/DL
LEUKOCYTE ESTERASE UR QL STRIP.AUTO: NEGATIVE
LYMPHOCYTES # BLD: 1.1 K/UL (ref 0.8–3.5)
LYMPHOCYTES NFR BLD: 13 % (ref 12–49)
MCH RBC QN AUTO: 30.1 PG (ref 26–34)
MCHC RBC AUTO-ENTMCNC: 32.8 G/DL (ref 30–36.5)
MCV RBC AUTO: 91.7 FL (ref 80–99)
METHADONE UR QL: NEGATIVE
MONOCYTES # BLD: 0.5 K/UL (ref 0–1)
MONOCYTES NFR BLD: 6 % (ref 5–13)
MUCOUS THREADS URNS QL MICRO: ABNORMAL /LPF
NEUTS SEG # BLD: 6.8 K/UL (ref 1.8–8)
NEUTS SEG NFR BLD: 78 % (ref 32–75)
NITRITE UR QL STRIP.AUTO: NEGATIVE
OPIATES UR QL: NEGATIVE
PCP UR QL: NEGATIVE
PH UR STRIP: 5 [PH] (ref 5–8)
PLATELET # BLD AUTO: 312 K/UL (ref 150–400)
PMV BLD AUTO: 11.3 FL (ref 8.9–12.9)
POTASSIUM SERPL-SCNC: 3.7 MMOL/L (ref 3.5–5.1)
PROT SERPL-MCNC: 9 G/DL (ref 6.4–8.2)
PROT UR STRIP-MCNC: >300 MG/DL
RBC # BLD AUTO: 4.95 M/UL (ref 4.1–5.7)
RBC #/AREA URNS HPF: ABNORMAL /HPF (ref 0–5)
SARS-COV-2, COV2: NORMAL
SARS-COV-2, COV2: NOT DETECTED
SODIUM SERPL-SCNC: 139 MMOL/L (ref 136–145)
SP GR UR REFRACTOMETRY: 1.03 (ref 1–1.03)
UA: UC IF INDICATED,UAUC: ABNORMAL
UROBILINOGEN UR QL STRIP.AUTO: 2 EU/DL (ref 0.1–1)
WBC # BLD AUTO: 8.6 K/UL (ref 4.1–11.1)
WBC URNS QL MICRO: ABNORMAL /HPF (ref 0–4)

## 2021-03-07 PROCEDURE — 80053 COMPREHEN METABOLIC PANEL: CPT

## 2021-03-07 PROCEDURE — 99283 EMERGENCY DEPT VISIT LOW MDM: CPT

## 2021-03-07 PROCEDURE — 85025 COMPLETE CBC W/AUTO DIFF WBC: CPT

## 2021-03-07 PROCEDURE — 82077 ASSAY SPEC XCP UR&BREATH IA: CPT

## 2021-03-07 PROCEDURE — 80307 DRUG TEST PRSMV CHEM ANLYZR: CPT

## 2021-03-07 PROCEDURE — 81001 URINALYSIS AUTO W/SCOPE: CPT

## 2021-03-07 PROCEDURE — 87635 SARS-COV-2 COVID-19 AMP PRB: CPT

## 2021-03-07 PROCEDURE — 36415 COLL VENOUS BLD VENIPUNCTURE: CPT
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
## 2021-03-08 PROBLEM — F32.9 MAJOR DEPRESSION: Status: ACTIVE | Noted: 2021-03-08

## 2021-03-08 LAB
GLUCOSE BLD STRIP.AUTO-MCNC: 161 MG/DL (ref 65–100)
GLUCOSE BLD STRIP.AUTO-MCNC: 201 MG/DL (ref 65–100)
GLUCOSE BLD STRIP.AUTO-MCNC: 264 MG/DL (ref 65–100)
GLUCOSE BLD STRIP.AUTO-MCNC: 289 MG/DL (ref 65–100)
PERFORMED BY, TECHID: ABNORMAL

## 2021-03-08 PROCEDURE — 74011250637 HC RX REV CODE- 250/637: Performed by: PSYCHIATRY & NEUROLOGY

## 2021-03-08 PROCEDURE — 65220000003 HC RM SEMIPRIVATE PSYCH

## 2021-03-08 PROCEDURE — 82962 GLUCOSE BLOOD TEST: CPT

## 2021-03-08 PROCEDURE — 74011636637 HC RX REV CODE- 636/637: Performed by: PSYCHIATRY & NEUROLOGY

## 2021-03-08 RX ORDER — ADHESIVE BANDAGE
30 BANDAGE TOPICAL DAILY PRN
Status: DISCONTINUED | OUTPATIENT
Start: 2021-03-08 | End: 2021-03-15 | Stop reason: HOSPADM

## 2021-03-08 RX ORDER — ALBUTEROL SULFATE 90 UG/1
2 AEROSOL, METERED RESPIRATORY (INHALATION)
Status: DISCONTINUED | OUTPATIENT
Start: 2021-03-08 | End: 2021-03-15 | Stop reason: HOSPADM

## 2021-03-08 RX ORDER — TRAZODONE HYDROCHLORIDE 50 MG/1
50 TABLET ORAL
Status: DISCONTINUED | OUTPATIENT
Start: 2021-03-08 | End: 2021-03-08 | Stop reason: SDUPTHER

## 2021-03-08 RX ORDER — LOSARTAN POTASSIUM 25 MG/1
25 TABLET ORAL DAILY
Status: DISCONTINUED | OUTPATIENT
Start: 2021-03-08 | End: 2021-03-15 | Stop reason: HOSPADM

## 2021-03-08 RX ORDER — ACETAMINOPHEN 325 MG/1
650 TABLET ORAL
Status: DISCONTINUED | OUTPATIENT
Start: 2021-03-08 | End: 2021-03-15 | Stop reason: HOSPADM

## 2021-03-08 RX ORDER — FUROSEMIDE 40 MG/1
40 TABLET ORAL DAILY
Status: DISCONTINUED | OUTPATIENT
Start: 2021-03-08 | End: 2021-03-15 | Stop reason: HOSPADM

## 2021-03-08 RX ORDER — PANTOPRAZOLE SODIUM 40 MG/1
40 TABLET, DELAYED RELEASE ORAL
Status: DISCONTINUED | OUTPATIENT
Start: 2021-03-08 | End: 2021-03-15 | Stop reason: HOSPADM

## 2021-03-08 RX ORDER — HYDROXYZINE 50 MG/1
50 TABLET, FILM COATED ORAL
Status: DISCONTINUED | OUTPATIENT
Start: 2021-03-08 | End: 2021-03-08 | Stop reason: SDUPTHER

## 2021-03-08 RX ORDER — HYDROXYZINE 50 MG/1
50 TABLET, FILM COATED ORAL
Status: DISCONTINUED | OUTPATIENT
Start: 2021-03-08 | End: 2021-03-15 | Stop reason: HOSPADM

## 2021-03-08 RX ORDER — CARVEDILOL 12.5 MG/1
12.5 TABLET ORAL 2 TIMES DAILY WITH MEALS
Status: DISCONTINUED | OUTPATIENT
Start: 2021-03-08 | End: 2021-03-15 | Stop reason: HOSPADM

## 2021-03-08 RX ORDER — OLANZAPINE 5 MG/1
5 TABLET ORAL
Status: DISCONTINUED | OUTPATIENT
Start: 2021-03-08 | End: 2021-03-15 | Stop reason: HOSPADM

## 2021-03-08 RX ORDER — BENZTROPINE MESYLATE 1 MG/1
1 TABLET ORAL
Status: DISCONTINUED | OUTPATIENT
Start: 2021-03-08 | End: 2021-03-15 | Stop reason: HOSPADM

## 2021-03-08 RX ORDER — DIPHENHYDRAMINE HYDROCHLORIDE 50 MG/ML
50 INJECTION, SOLUTION INTRAMUSCULAR; INTRAVENOUS
Status: DISCONTINUED | OUTPATIENT
Start: 2021-03-08 | End: 2021-03-15 | Stop reason: HOSPADM

## 2021-03-08 RX ORDER — TRAZODONE HYDROCHLORIDE 50 MG/1
50 TABLET ORAL
Status: DISCONTINUED | OUTPATIENT
Start: 2021-03-08 | End: 2021-03-15 | Stop reason: HOSPADM

## 2021-03-08 RX ORDER — INSULIN GLARGINE 100 [IU]/ML
50 INJECTION, SOLUTION SUBCUTANEOUS 2 TIMES DAILY
Status: DISCONTINUED | OUTPATIENT
Start: 2021-03-08 | End: 2021-03-15 | Stop reason: HOSPADM

## 2021-03-08 RX ORDER — HALOPERIDOL 5 MG/ML
5 INJECTION INTRAMUSCULAR
Status: DISCONTINUED | OUTPATIENT
Start: 2021-03-08 | End: 2021-03-15 | Stop reason: HOSPADM

## 2021-03-08 RX ORDER — CLOPIDOGREL BISULFATE 75 MG/1
75 TABLET ORAL DAILY
Status: DISCONTINUED | OUTPATIENT
Start: 2021-03-08 | End: 2021-03-15 | Stop reason: HOSPADM

## 2021-03-08 RX ORDER — ATORVASTATIN CALCIUM 40 MG/1
80 TABLET, FILM COATED ORAL DAILY
Status: DISCONTINUED | OUTPATIENT
Start: 2021-03-08 | End: 2021-03-15 | Stop reason: HOSPADM

## 2021-03-08 RX ORDER — AMLODIPINE BESYLATE 5 MG/1
10 TABLET ORAL DAILY
Status: DISCONTINUED | OUTPATIENT
Start: 2021-03-08 | End: 2021-03-15 | Stop reason: HOSPADM

## 2021-03-08 RX ORDER — LORAZEPAM 2 MG/ML
1 INJECTION INTRAMUSCULAR
Status: DISCONTINUED | OUTPATIENT
Start: 2021-03-08 | End: 2021-03-15 | Stop reason: HOSPADM

## 2021-03-08 RX ADMIN — INSULIN HUMAN 20 UNITS: 100 INJECTION, SOLUTION PARENTERAL at 12:30

## 2021-03-08 RX ADMIN — CARVEDILOL 12.5 MG: 12.5 TABLET, FILM COATED ORAL at 17:00

## 2021-03-08 RX ADMIN — LOSARTAN POTASSIUM 25 MG: 25 TABLET ORAL at 08:28

## 2021-03-08 RX ADMIN — ATORVASTATIN CALCIUM 80 MG: 40 TABLET, FILM COATED ORAL at 21:37

## 2021-03-08 RX ADMIN — FUROSEMIDE 40 MG: 40 TABLET ORAL at 08:28

## 2021-03-08 RX ADMIN — PANTOPRAZOLE SODIUM 40 MG: 40 TABLET, DELAYED RELEASE ORAL at 08:28

## 2021-03-08 RX ADMIN — TRAZODONE HYDROCHLORIDE 50 MG: 50 TABLET ORAL at 21:37

## 2021-03-08 RX ADMIN — CARVEDILOL 12.5 MG: 12.5 TABLET, FILM COATED ORAL at 08:28

## 2021-03-08 RX ADMIN — INSULIN HUMAN 20 UNITS: 100 INJECTION, SOLUTION PARENTERAL at 08:27

## 2021-03-08 RX ADMIN — INSULIN HUMAN 20 UNITS: 100 INJECTION, SOLUTION PARENTERAL at 17:50

## 2021-03-08 RX ADMIN — AMLODIPINE BESYLATE 10 MG: 5 TABLET ORAL at 08:28

## 2021-03-08 RX ADMIN — INSULIN GLARGINE 50 UNITS: 100 INJECTION, SOLUTION SUBCUTANEOUS at 21:37

## 2021-03-08 RX ADMIN — CLOPIDOGREL BISULFATE 75 MG: 75 TABLET ORAL at 08:28

## 2021-03-08 RX ADMIN — INSULIN GLARGINE 50 UNITS: 100 INJECTION, SOLUTION SUBCUTANEOUS at 08:27
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
## 2021-03-08 NOTE — BH NOTES
SAM NOTE     Pt reported that he wanted to move in with his sister in [place] upon his discharge. He provided his sister's name and number and also signed an SAM for her. (Kimi Ards [phone])     Pt signed an SAM for his PCP Manisha Quintanilla at [facility] at [address] - [phone].
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
## 2021-03-08 NOTE — PROGRESS NOTES
Spiritual Care Assessment/Progress Note  [facility]      NAME: Monty Montalvo      MRN: [id number]  AGE: 52 y.o.  SEX: male  Adventism Affiliation: No preference   Language: English     3/8/2021     Total Time (in minutes): 19     Spiritual Assessment begun in [facility] through conversation with:         []Patient        [] Family    [] Friend(s)        Reason for Consult: Initial/Spiritual assessment, patient floor     Spiritual beliefs: (Please include comment if needed)     [x] Identifies with a jacinta tradition:         [] Supported by a jacinta community:            [] Claims no spiritual orientation:           [] Seeking spiritual identity:                [] Adheres to an individual form of spirituality:           [] Not able to assess:                           Identified resources for coping:      [x] Prayer                               [] Music                  [] Guided Imagery     [] Family/friends                 [] Pet visits     [x] Devotional reading                         [] Unknown     [] Other:                                               Interventions offered during this visit: (See comments for more details)    Patient Interventions: Normalization of emotional/spiritual concerns, Prayer (actual), Initial/Spiritual assessment, patient floor, Affirmation of emotions/emotional suffering, Affirmation of jacinta, Coping skills reviewed/reinforced, Iconic (affirming the presence of God/Higher Power), Catharsis/review of pertinent events in supportive environment           Plan of Care:     [] Support spiritual and/or cultural needs    [] Support AMD and/or advance care planning process      [] Support grieving process   [] Coordinate Rites and/or Rituals    [] Coordination with community clergy   [] No spiritual needs identified at this time   [] Detailed Plan of Care below (See Comments)  [] Make referral to Music Therapy  [] Make referral to Pet Therapy     [] Make referral to Addiction services  [] Make referral to [facility]  [] Make referral to Spiritual Care Partner  [] No future visits requested        [x] Follow up upon further referrals     Comments:   responded to staff request for spiritual care, through [employer], with pt Mr. Mejia Peterson on 2 Acute Care unit.  consulted with staff and visited pt in his room. He was in bed and welcomed  warmly. He stated he had several questions and concerns which he discussed with . He indicated that he felt abandoned by God, though he's been following his directives. Melisa is however very important for coping.  facilitated theological reflection, pt reflecting on themes in scripture that resonates and provides strength for coping. Pt identified some themes and also indicated that having a son who is doing quiet well with his life gives him great janee.  remained a supportive listening presence, affirming thoughts and feelings, and also offered requested prayer at the end of the visit. Pt stated that talking with  was very helpful, and he thanked  for the visit and prayer. Spiritual care is still available for support upon further referrals. Visited by: Natalio Blanca.    can be reached by calling the  at [facility]  [phone]

## 2021-03-08 NOTE — ED NOTES
TRANSFER - OUT REPORT:    Verbal report given to Nagi Nicole RN(name) on Lashaun Forde  being transferred to [facility](unit) for routine progression of care       Report consisted of patients Situation, Background, Assessment and   Recommendations(SBAR) & care transferred. Information from the following report(s) SBAR was reviewed with the receiving nurse. Lines:       Opportunity for questions and clarification was provided. Patient transported to floor by RN. All belongings accompanied pt to floor in labeled belongings bag & handed over to accepting RN to secure.

## 2021-03-08 NOTE — PROGRESS NOTES
Progress Note    Patient: Paul Arroyo MRN: [id number]  SSN: [id number]    YOB: 1970  Age: 52 y.o. Sex: male      Admit Date: 3/7/2021    LOS: 0 days     Subjective:      no shortness of breath no cough no chills    Objective:     Vitals:    03/07/21 2041 03/08/21 0136 03/08/21 0827 03/08/21 0835   BP: 136/79 (!) 143/103 (!) 138/94 (!) 138/94   Pulse: 92 98 95 95   Resp: 16 18  18   Temp: 98 °F (36.7 °C) 97.8 °F (36.6 °C)  97.7 °F (36.5 °C)   SpO2: 99% 98%  100%   Weight:  72.1 kg (158 lb 15.2 oz)     Height:  5' 5\" (1.651 m)          Intake and Output:  Current Shift: No intake/output data recorded. Last three shifts: No intake/output data recorded. Physical Exam:   General:  Alert, cooperative, no distress, appears stated age. Eyes:  Conjunctivae/corneas clear. PERRL, EOMs intact. Fundi benign   Ears:  Normal TMs and external ear canals both ears. Nose: Nares normal. Septum midline. Mucosa normal. No drainage or sinus tenderness. Mouth/Throat: Lips, mucosa, and tongue normal. Teeth and gums normal.   Neck: Supple, symmetrical, trachea midline, no adenopathy, thyroid: no enlargment/tenderness/nodules, no carotid bruit and no JVD. Back:   Symmetric, no curvature. ROM normal. No CVA tenderness. Lungs:   Clear to auscultation bilaterally. Heart:  Regular rate and rhythm, S1, S2 normal, no murmur, click, rub or gallop. Abdomen:   Soft, non-tender. Bowel sounds normal. No masses,  No organomegaly. Extremities: Extremities normal, atraumatic, no cyanosis or edema. Pulses: 2+ and symmetric all extremities. Skin: Skin color, texture, turgor normal. No rashes or lesions   Lymph nodes: Cervical, supraclavicular, and axillary nodes normal.   Neurologic: CNII-XII intact. Normal strength, sensation and reflexes throughout. Lab/Data Review: All lab results for the last 24 hours reviewed.      Recent Results (from the past 24 hour(s))   URINALYSIS W/ REFLEX CULTURE Collection Time: 03/07/21  6:25 PM    Specimen: Urine   Result Value Ref Range    Color Yellow/Straw      Appearance Clear Clear      Specific gravity 1.027 1.003 - 1.030      pH (UA) 5.0 5.0 - 8.0      Protein >300 (A) Negative mg/dL    Glucose >300 (A) Negative mg/dL    Ketone 20 (A) Negative mg/dL    Bilirubin Negative Negative      Blood Negative Negative      Urobilinogen 2.0 (H) 0.1 - 1.0 EU/dL    Nitrites Negative Negative      Leukocyte Esterase Negative Negative      UA:UC IF INDICATED Culture not indicated by UA result Culture not indicated by UA result      WBC 0-4 0 - 4 /hpf    RBC 0-5 0 - 5 /hpf    Bacteria Negative Negative /hpf    Mucus Trace /lpf   DRUG SCREEN, URINE    Collection Time: 03/07/21  6:25 PM   Result Value Ref Range    AMPHETAMINES Negative Negative      BARBITURATES Negative Negative      BENZODIAZEPINES Negative Negative      COCAINE Negative Negative      METHADONE Negative Negative      OPIATES Negative Negative      PCP(PHENCYCLIDINE) Negative Negative      THC (TH-CANNABINOL) Positive (A) Negative      Drug screen comment        This test is a screen for drugs of abuse in a medical setting only (i.e., they are unconfirmed results and as such must not be used for non-medical purposes, e.g.,employment testing, legal testing). Due to its inherent nature, false positive (FP) and false negative (FN) results may be obtained. Therefore, if necessary for medical care, recommend confirmation of positive findings by GC/MS.    SARS-COV-2    Collection Time: 03/07/21  6:25 PM   Result Value Ref Range    SARS-CoV-2 Not Detected Not Detected     SARS-COV-2    Collection Time: 03/07/21  6:25 PM   Result Value Ref Range    SARS-CoV-2 Please find results under separate order     CBC WITH AUTOMATED DIFF    Collection Time: 03/07/21  7:13 PM   Result Value Ref Range    WBC 8.6 4.1 - 11.1 K/uL    RBC 4.95 4.10 - 5.70 M/uL    HGB 14.9 12.1 - 17.0 g/dL    HCT 45.4 36.6 - 50.3 %    MCV 91.7 80.0 - 99.0 FL MCH 30.1 26.0 - 34.0 PG    MCHC 32.8 30.0 - 36.5 g/dL    RDW 15.1 (H) 11.5 - 14.5 %    PLATELET 418 728 - 487 K/uL    MPV 11.3 8.9 - 12.9 FL    NEUTROPHILS 78 (H) 32 - 75 %    LYMPHOCYTES 13 12 - 49 %    MONOCYTES 6 5 - 13 %    EOSINOPHILS 1 0 - 7 %    BASOPHILS 1 0 - 1 %    IMMATURE GRANULOCYTES 1 (H) 0.0 - 0.5 %    ABS. NEUTROPHILS 6.8 1.8 - 8.0 K/UL    ABS. LYMPHOCYTES 1.1 0.8 - 3.5 K/UL    ABS. MONOCYTES 0.5 0.0 - 1.0 K/UL    ABS. EOSINOPHILS 0.1 0.0 - 0.4 K/UL    ABS. BASOPHILS 0.1 0.0 - 0.1 K/UL    ABS. IMM. GRANS. 0.1 (H) 0.00 - 0.04 K/UL    DF AUTOMATED     METABOLIC PANEL, COMPREHENSIVE    Collection Time: 03/07/21  7:13 PM   Result Value Ref Range    Sodium 139 136 - 145 mmol/L    Potassium 3.7 3.5 - 5.1 mmol/L    Chloride 104 97 - 108 mmol/L    CO2 27 21 - 32 mmol/L    Anion gap 8 5 - 15 mmol/L    Glucose 223 (H) 65 - 100 mg/dL    BUN 13 6 - 20 mg/dL    Creatinine 1.29 0.70 - 1.30 mg/dL    BUN/Creatinine ratio 10 (L) 12 - 20      GFR est AA >60 >60 ml/min/1.73m2    GFR est non-AA 59 (L) >60 ml/min/1.73m2    Calcium 9.7 8.5 - 10.1 mg/dL    Bilirubin, total 0.5 0.2 - 1.0 mg/dL    AST (SGOT) 11 (L) 15 - 37 U/L    ALT (SGPT) 25 12 - 78 U/L    Alk.  phosphatase 114 45 - 117 U/L    Protein, total 9.0 (H) 6.4 - 8.2 g/dL    Albumin 4.2 3.5 - 5.0 g/dL    Globulin 4.8 (H) 2.0 - 4.0 g/dL    A-G Ratio 0.9 (L) 1.1 - 2.2     ETHYL ALCOHOL    Collection Time: 03/07/21  7:13 PM   Result Value Ref Range    ALCOHOL(ETHYL),SERUM <4 <10 mg/dL   GLUCOSE, POC    Collection Time: 03/08/21  7:49 AM   Result Value Ref Range    Glucose (POC) 289 (H) 65 - 100 mg/dL    Performed by Daisy Roberson, POC    Collection Time: 03/08/21 12:00 PM   Result Value Ref Range    Glucose (POC) 264 (H) 65 - 100 mg/dL    Performed by Brinton Sacks          Assessment:     Active Problems:    Major depression (3/8/2021)      Suicide ([facility] [address] (3/8/2021)    Alcohol abuse    Plan:   Continue present treatment      Signed By: Nahomy Hernadez MD March 8, 2021
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
## 2021-03-08 NOTE — GROUP NOTE
[facility] GROUP DOCUMENTATION INDIVIDUAL Group Therapy Note Date: 3/8/2021 Group Start Time: 1100 Group End Time: 8239 Group Topic: Process Group - Inpatient [facility] Group Lacie Akhtar [facility] GROUP DOCUMENTATION GROUP Group Therapy Note Attendees: 8/14 Process: Pts were asked how they are feeling as a check in question. Pts were then walked through a goal exercise where they listed goals that they need to work on, goals they have made progress on and goals that they have accomplished. Pts were encouraged to provide feedback to one another and reflect on their goals. Pts were then encouraged to reflect on group Attendance: Did not attend Additional Notes:  Pt was encouraged to attend but chose not to do so [employer]

## 2021-03-08 NOTE — GROUP NOTE
IP  GROUP DOCUMENTATION INDIVIDUAL Group Therapy Note Date: 3/8/2021 Group Start Time: 1400 Group End Time: 1500 Group Topic: Special Track - Drug Topic [facility] Leafy Massy, RT 
 
IP  GROUP DOCUMENTATION GROUP Group Therapy Note The disease of Addiction Attendees: 7 Attendance: Did not attend Additional Notes:  Pt was invited to attend group but did not.  
 
Argelia Rodriguez, RT
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
## 2021-03-08 NOTE — CONSULTS
Consult Date: 3/8/2021    Consults    Subjective   Patient is being evaluated for psychiatric admission. Denies any chest pain no shortness shortness of breath no cough no chills has a past history of CHF CAD, diabetes hypertension  Past Medical History:   Diagnosis Date    Anxiety disorder     Asthma     [facility]     CAD (coronary artery disease)     MI x2 with 2 cardiac stent    CHF (congestive heart failure) ([facility]     with reduced EF, EF 20%    Chronic systolic congestive heart failure ([facility] 12/2/2020    Depression     Diabetes mellitus, type 2 (HCC)     on insulin.  moderate proteinuria    Hypertension     Mood disorder [facility]     Psychotic disorder ([facility])     Schizoaffective disorder, bipolar type ([facility]     Sleep disorder     Suicidal thoughts       Past Surgical History:   Procedure Laterality Date    HX CORONARY STENT PLACEMENT      HX IMPLANTABLE CARDIOVERTER DEFIBRILLATOR       Family History   Problem Relation Age of Onset    Heart Attack Mother     Hypertension Mother     Diabetes Mother     Heart Attack Father     Hypertension Father     Diabetes Father     Depression Neg Hx     Suicide Neg Hx     Psychotic Disorder Neg Hx     Dementia Neg Hx     Substance Abuse Neg Hx       Social History     Tobacco Use    Smoking status: Current Every Day Smoker     Packs/day: 1.00     Types: Cigarettes    Smokeless tobacco: Never Used   Substance Use Topics    Alcohol use: Not Currently     Comment: occasionally       Current Facility-Administered Medications   Medication Dose Route Frequency Provider Last Rate Last Admin    hydrOXYzine HCL (ATARAX) tablet 50 mg  50 mg Oral TID PRN Kira Storm MD        traZODone (DESYREL) tablet 50 mg  50 mg Oral QHS PRN Jerry Wagner MD        atorvastatin (LIPITOR) tablet 80 mg  80 mg Oral DAILY Suleman Wagner MD        carvediloL (COREG) tablet 12.5 mg  12.5 mg Oral BID WITH MEALS Suleman Wagner MD   12.5 mg at 03/08/21 0828    insulin regular (NOVOLIN R, HUMULIN R) injection 20 Units  20 Units SubCUTAneous TIDAC Vanda HOFFMANN MD   20 Units at 03/08/21 1230    amLODIPine (NORVASC) tablet 10 mg  10 mg Oral DAILY Vanda HOFFMANN MD   10 mg at 03/08/21 0828    insulin glargine (LANTUS) injection 50 Units  50 Units SubCUTAneous BID eLonard Pardo MD   50 Units at 03/08/21 0827    furosemide (LASIX) tablet 40 mg  40 mg Oral DAILY Leonard Pardo MD   40 mg at 03/08/21 0828    clopidogreL (PLAVIX) tablet 75 mg  75 mg Oral DAILY Leonard Pardo MD   75 mg at 03/08/21 3279    losartan (COZAAR) tablet 25 mg  25 mg Oral DAILY Leonard Pardo MD   25 mg at 03/08/21 0828    pantoprazole (PROTONIX) tablet 40 mg  40 mg Oral ACB Leonard Pardo MD   40 mg at 03/08/21 0828    albuterol (PROVENTIL HFA, VENTOLIN HFA, PROAIR HFA) inhaler 2 Puff  2 Puff Inhalation Q4H PRN Rosaura Wagner MD        OLANZapine (ZyPREXA) tablet 5 mg  5 mg Oral Q6H PRN Rosaura Wagner MD        haloperidol lactate (HALDOL) injection 5 mg  5 mg IntraMUSCular Q6H PRN Rosaura Wagner MD        benztropine (COGENTIN) tablet 1 mg  1 mg Oral BID PRN Leonard Pardo MD        diphenhydrAMINE (BENADRYL) injection 50 mg  50 mg IntraMUSCular BID PRN Rosaura Wagner MD        LORazepam (ATIVAN) injection 1 mg  1 mg IntraMUSCular Q4H PRN Leonard Pardo MD        acetaminophen (TYLENOL) tablet 650 mg  650 mg Oral Q4H PRN Rosaura Wagner MD        magnesium hydroxide (MILK OF MAGNESIA) 400 mg/5 mL oral suspension 30 mL  30 mL Oral DAILY PRN Leonard Pardo MD            Review of Systems   Constitutional: Negative. HENT: Negative. Eyes: Negative. Respiratory: Negative. Cardiovascular: Negative. Gastrointestinal: Negative. Endocrine: Negative. Genitourinary: Negative. Musculoskeletal: Negative. Skin: Negative. Allergic/Immunologic: Negative. Neurological: Negative. Hematological: Negative. Psychiatric/Behavioral: Negative. Objective     Vital signs for last 24 hours:  Visit Vitals  BP (!) 138/94 (BP 1 Location: Left upper arm, BP Patient Position: At rest;Sitting)   Pulse 95   Temp 97.7 °F (36.5 °C)   Resp 18   Ht 5' 5\" (1.651 m)   Wt 72.1 kg (158 lb 15.2 oz)   SpO2 100%   BMI 26.45 kg/m²       Intake/Output this shift:  Current Shift: No intake/output data recorded. Last 3 Shifts: No intake/output data recorded. Data Review:   Recent Results (from the past 24 hour(s))   URINALYSIS W/ REFLEX CULTURE    Collection Time: 03/07/21  6:25 PM    Specimen: Urine   Result Value Ref Range    Color Yellow/Straw      Appearance Clear Clear      Specific gravity 1.027 1.003 - 1.030      pH (UA) 5.0 5.0 - 8.0      Protein >300 (A) Negative mg/dL    Glucose >300 (A) Negative mg/dL    Ketone 20 (A) Negative mg/dL    Bilirubin Negative Negative      Blood Negative Negative      Urobilinogen 2.0 (H) 0.1 - 1.0 EU/dL    Nitrites Negative Negative      Leukocyte Esterase Negative Negative      UA:UC IF INDICATED Culture not indicated by UA result Culture not indicated by UA result      WBC 0-4 0 - 4 /hpf    RBC 0-5 0 - 5 /hpf    Bacteria Negative Negative /hpf    Mucus Trace /lpf   DRUG SCREEN, URINE    Collection Time: 03/07/21  6:25 PM   Result Value Ref Range    AMPHETAMINES Negative Negative      BARBITURATES Negative Negative      BENZODIAZEPINES Negative Negative      COCAINE Negative Negative      METHADONE Negative Negative      OPIATES Negative Negative      PCP(PHENCYCLIDINE) Negative Negative      THC (TH-CANNABINOL) Positive (A) Negative      Drug screen comment        This test is a screen for drugs of abuse in a medical setting only (i.e., they are unconfirmed results and as such must not be used for non-medical purposes, e.g.,employment testing, legal testing). Due to its inherent nature, false positive (FP) and false negative (FN) results may be obtained.  Therefore, if necessary for medical care, recommend confirmation of positive findings by GC/MS. SARS-COV-2    Collection Time: 03/07/21  6:25 PM   Result Value Ref Range    SARS-CoV-2 Not Detected Not Detected     SARS-COV-2    Collection Time: 03/07/21  6:25 PM   Result Value Ref Range    SARS-CoV-2 Please find results under separate order     CBC WITH AUTOMATED DIFF    Collection Time: 03/07/21  7:13 PM   Result Value Ref Range    WBC 8.6 4.1 - 11.1 K/uL    RBC 4.95 4.10 - 5.70 M/uL    HGB 14.9 12.1 - 17.0 g/dL    HCT 45.4 36.6 - 50.3 %    MCV 91.7 80.0 - 99.0 FL    MCH 30.1 26.0 - 34.0 PG    MCHC 32.8 30.0 - 36.5 g/dL    RDW 15.1 (H) 11.5 - 14.5 %    PLATELET 946 992 - 003 K/uL    MPV 11.3 8.9 - 12.9 FL    NEUTROPHILS 78 (H) 32 - 75 %    LYMPHOCYTES 13 12 - 49 %    MONOCYTES 6 5 - 13 %    EOSINOPHILS 1 0 - 7 %    BASOPHILS 1 0 - 1 %    IMMATURE GRANULOCYTES 1 (H) 0.0 - 0.5 %    ABS. NEUTROPHILS 6.8 1.8 - 8.0 K/UL    ABS. LYMPHOCYTES 1.1 0.8 - 3.5 K/UL    ABS. MONOCYTES 0.5 0.0 - 1.0 K/UL    ABS. EOSINOPHILS 0.1 0.0 - 0.4 K/UL    ABS. BASOPHILS 0.1 0.0 - 0.1 K/UL    ABS. IMM. GRANS. 0.1 (H) 0.00 - 0.04 K/UL    DF AUTOMATED     METABOLIC PANEL, COMPREHENSIVE    Collection Time: 03/07/21  7:13 PM   Result Value Ref Range    Sodium 139 136 - 145 mmol/L    Potassium 3.7 3.5 - 5.1 mmol/L    Chloride 104 97 - 108 mmol/L    CO2 27 21 - 32 mmol/L    Anion gap 8 5 - 15 mmol/L    Glucose 223 (H) 65 - 100 mg/dL    BUN 13 6 - 20 mg/dL    Creatinine 1.29 0.70 - 1.30 mg/dL    BUN/Creatinine ratio 10 (L) 12 - 20      GFR est AA >60 >60 ml/min/1.73m2    GFR est non-AA 59 (L) >60 ml/min/1.73m2    Calcium 9.7 8.5 - 10.1 mg/dL    Bilirubin, total 0.5 0.2 - 1.0 mg/dL    AST (SGOT) 11 (L) 15 - 37 U/L    ALT (SGPT) 25 12 - 78 U/L    Alk.  phosphatase 114 45 - 117 U/L    Protein, total 9.0 (H) 6.4 - 8.2 g/dL    Albumin 4.2 3.5 - 5.0 g/dL    Globulin 4.8 (H) 2.0 - 4.0 g/dL    A-G Ratio 0.9 (L) 1.1 - 2.2     ETHYL ALCOHOL    Collection Time: 03/07/21  7:13 PM   Result Value Ref Range    ALCOHOL(ETHYL),SERUM <4 <10 mg/dL   GLUCOSE, POC    Collection Time: 03/08/21  7:49 AM   Result Value Ref Range    Glucose (POC) 289 (H) 65 - 100 mg/dL    Performed by [address] [employer] [place], POC    Collection Time: 03/08/21 12:00 PM   Result Value Ref Range    Glucose (POC) 264 (H) 65 - 100 mg/dL    Performed by [address] [place], POC    Collection Time: 03/08/21  5:05 PM   Result Value Ref Range    Glucose (POC) 161 (H) 65 - 100 mg/dL    Performed by Marcia Jones        Physical Exam  Constitutional:       Appearance: Normal appearance. HENT:      Head: Normocephalic and atraumatic. Nose: Nose normal.      Mouth/Throat:      Mouth: Mucous membranes are moist.   Eyes:      Extraocular Movements: Extraocular movements intact. Pupils: Pupils are equal, round, and reactive to light. Neck:      Musculoskeletal: Normal range of motion and neck supple. Cardiovascular:      Rate and Rhythm: Normal rate and regular rhythm. Pulmonary:      Effort: Pulmonary effort is normal.      Breath sounds: Normal breath sounds. Abdominal:      General: Abdomen is flat. Palpations: Abdomen is soft. Musculoskeletal: Normal range of motion. Skin:     General: Skin is warm and dry. Neurological:      General: No focal deficit present. Mental Status: He is alert and oriented to person, place, and time. Mental status is at baseline.        Assessment and plan  Hypertension  CAD  Diabetes mellitus type 2 obtain hemoglobin A1c

## 2021-03-08 NOTE — PROGRESS NOTES
Problem: Falls - Risk of  Goal: *Absence of Falls  Description: Document Sandie Albert Fall Risk and appropriate interventions in the flowsheet. Outcome: Progressing Towards Goal  Note: Fall Risk Interventions:     Problem: Depressed Mood (Adult/Pediatric)  Goal: *STG: Remains safe in hospital  Outcome: Progressing Towards Goal     Patient has not experienced all falls so far this shift. Patient has been safe so far this shift.

## 2021-03-08 NOTE — BH NOTES
Pt up and out of room after awakened by staff for meals. Pt eating 100% of meals offered. Pt medication compliant and no negative effects from medications observed or reported. Pt affect irritable. Pt waiting to see MD and  wanting to discuss leaving, signing self out of Hospital.  Affect angry, demanding of staff at times regarding wanting to see people immediately. Pt focused on wanting to discharge today. Continue 15 minute checks to maintain pt safety.

## 2021-03-08 NOTE — BH NOTES
SUBSTANCE USE NOTE    Pt reported that he smokes marijuana about 1 to 2 times a month. Pt currently smokes about a pack of cigarettes a day.

## 2021-03-08 NOTE — GROUP NOTE
IP  GROUP DOCUMENTATION INDIVIDUAL Group Therapy Note Date: 3/8/2021 Group Start Time: 6288 Group End Time: 0404 Group Topic: Special Track - Drug Topic SRM 2 [facility] ACUTE Lary Pat RT 
 
[facility] GROUP DOCUMENTATION GROUP Group Therapy Note Stages of addiction Attendees:8 Attendance: Did not attend Additional Notes: Pt was invited to attend group but did not Ira Larios RT
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
## 2021-03-09 LAB
ALBUMIN SERPL-MCNC: 3.3 G/DL (ref 3.5–5)
ALBUMIN/GLOB SERPL: 0.8 {RATIO} (ref 1.1–2.2)
ALP SERPL-CCNC: 108 U/L (ref 45–117)
ALT SERPL-CCNC: 26 U/L (ref 12–78)
ANION GAP SERPL CALC-SCNC: 10 MMOL/L (ref 5–15)
AST SERPL W P-5'-P-CCNC: 13 U/L (ref 15–37)
BILIRUB SERPL-MCNC: 0.3 MG/DL (ref 0.2–1)
BUN SERPL-MCNC: 21 MG/DL (ref 6–20)
BUN/CREAT SERPL: 13 (ref 12–20)
CA-I BLD-MCNC: 9.1 MG/DL (ref 8.5–10.1)
CHLORIDE SERPL-SCNC: 107 MMOL/L (ref 97–108)
CO2 SERPL-SCNC: 24 MMOL/L (ref 21–32)
CREAT SERPL-MCNC: 1.65 MG/DL (ref 0.7–1.3)
ERYTHROCYTE [DISTWIDTH] IN BLOOD BY AUTOMATED COUNT: 15.4 % (ref 11.5–14.5)
GLOBULIN SER CALC-MCNC: 4.2 G/DL (ref 2–4)
GLUCOSE BLD STRIP.AUTO-MCNC: 257 MG/DL (ref 65–100)
GLUCOSE BLD STRIP.AUTO-MCNC: 263 MG/DL (ref 65–100)
GLUCOSE BLD STRIP.AUTO-MCNC: 266 MG/DL (ref 65–100)
GLUCOSE BLD STRIP.AUTO-MCNC: 324 MG/DL (ref 65–100)
GLUCOSE SERPL-MCNC: 123 MG/DL (ref 65–100)
HCT VFR BLD AUTO: 42.9 % (ref 36.6–50.3)
HGB BLD-MCNC: 14.4 G/DL (ref 12.1–17)
MCH RBC QN AUTO: 30.9 PG (ref 26–34)
MCHC RBC AUTO-ENTMCNC: 33.6 G/DL (ref 30–36.5)
MCV RBC AUTO: 92.1 FL (ref 80–99)
PERFORMED BY, TECHID: ABNORMAL
PLATELET # BLD AUTO: 264 K/UL (ref 150–400)
PMV BLD AUTO: 11.2 FL (ref 8.9–12.9)
POTASSIUM SERPL-SCNC: 3.8 MMOL/L (ref 3.5–5.1)
PROT SERPL-MCNC: 7.5 G/DL (ref 6.4–8.2)
RBC # BLD AUTO: 4.66 M/UL (ref 4.1–5.7)
SODIUM SERPL-SCNC: 141 MMOL/L (ref 136–145)
TSH SERPL DL<=0.05 MIU/L-ACNC: 0.67 UIU/ML (ref 0.36–3.74)
WBC # BLD AUTO: 6.9 K/UL (ref 4.1–11.1)

## 2021-03-09 PROCEDURE — 80053 COMPREHEN METABOLIC PANEL: CPT

## 2021-03-09 PROCEDURE — 74011636637 HC RX REV CODE- 636/637: Performed by: PSYCHIATRY & NEUROLOGY

## 2021-03-09 PROCEDURE — 80061 LIPID PANEL: CPT

## 2021-03-09 PROCEDURE — 36415 COLL VENOUS BLD VENIPUNCTURE: CPT

## 2021-03-09 PROCEDURE — 74011250637 HC RX REV CODE- 250/637: Performed by: PSYCHIATRY & NEUROLOGY

## 2021-03-09 PROCEDURE — 84443 ASSAY THYROID STIM HORMONE: CPT

## 2021-03-09 PROCEDURE — 65220000003 HC RM SEMIPRIVATE PSYCH

## 2021-03-09 PROCEDURE — 85027 COMPLETE CBC AUTOMATED: CPT

## 2021-03-09 PROCEDURE — 82962 GLUCOSE BLOOD TEST: CPT

## 2021-03-09 RX ADMIN — PANTOPRAZOLE SODIUM 40 MG: 40 TABLET, DELAYED RELEASE ORAL at 08:00

## 2021-03-09 RX ADMIN — LOSARTAN POTASSIUM 25 MG: 25 TABLET ORAL at 09:03

## 2021-03-09 RX ADMIN — INSULIN GLARGINE 50 UNITS: 100 INJECTION, SOLUTION SUBCUTANEOUS at 09:02

## 2021-03-09 RX ADMIN — INSULIN HUMAN 20 UNITS: 100 INJECTION, SOLUTION PARENTERAL at 17:35

## 2021-03-09 RX ADMIN — AMLODIPINE BESYLATE 10 MG: 5 TABLET ORAL at 09:03

## 2021-03-09 RX ADMIN — TRAZODONE HYDROCHLORIDE 50 MG: 50 TABLET ORAL at 20:24

## 2021-03-09 RX ADMIN — INSULIN GLARGINE 50 UNITS: 100 INJECTION, SOLUTION SUBCUTANEOUS at 20:24

## 2021-03-09 RX ADMIN — INSULIN HUMAN 20 UNITS: 100 INJECTION, SOLUTION PARENTERAL at 12:03

## 2021-03-09 RX ADMIN — CARVEDILOL 12.5 MG: 12.5 TABLET, FILM COATED ORAL at 17:35

## 2021-03-09 RX ADMIN — INSULIN HUMAN 20 UNITS: 100 INJECTION, SOLUTION PARENTERAL at 08:18

## 2021-03-09 RX ADMIN — FUROSEMIDE 40 MG: 40 TABLET ORAL at 09:03

## 2021-03-09 RX ADMIN — CLOPIDOGREL BISULFATE 75 MG: 75 TABLET ORAL at 09:03

## 2021-03-09 RX ADMIN — ATORVASTATIN CALCIUM 80 MG: 40 TABLET, FILM COATED ORAL at 20:24

## 2021-03-09 RX ADMIN — CARVEDILOL 12.5 MG: 12.5 TABLET, FILM COATED ORAL at 08:18

## 2021-03-09 NOTE — BH NOTES
Pt Initial Assessment    Writer met with pt on this date for peer assessment. Pt currently expressed substance use of marijuana and alcohol. Pt does not seem to recall his last use or how much he consumes. Pt reports feeling angry and feeling jumpy and nervous, rapid emotional changes an fatigue. Pt reports never been to a treatment program before and never attending a twelve step program. Pt appears to be in the pre contemplation stage of change.

## 2021-03-09 NOTE — GROUP NOTE
IP  GROUP DOCUMENTATION INDIVIDUAL Group Therapy Note Date: 3/9/2021 Group Start Time: 5472 Group End Time: 1409 Group Topic: Recreational/Music Therapy SRM 2  NON ACUTE Boris Needle 
 
IP  GROUP DOCUMENTATION GROUP Group Therapy Note Facilitated leisure skills group to reinforce positive coping through music, group activities, social interaction and art tasks Attendees: 9 out of 12 Attendance: Attended Patient's Goal:  *STG: Attends activities and groups Interventions/techniques: Art integration and Supported Follows Directions: Followed directions Interactions: Interacted appropriately Mental Status: Calm Behavior/appearance: Cooperative Goals Achieved: Able to engage in interactions and Able to listen to others Additional Notes:  Receptive to listening to music and a song he selected. Declined to work on leisure task. Left group early and did not return Kourtney Mines, CTRS

## 2021-03-09 NOTE — GROUP NOTE
[facility] GROUP DOCUMENTATION INDIVIDUAL Group Therapy Note Date: 3/9/2021 Group Start Time: 1100 Group End Time: 3354 Group Topic: Education Group - Inpatient SRM 2  NON ACUTE Niesha Ahumada [facility] GROUP DOCUMENTATION GROUP Group Therapy Note Attendees: 6 out of 11 
 
11am Psycho-Education Group Topic 6B: Developing Boundaries Patients were educated on the topic of developing healthy boundaries for themselves. Patients were asked to think about the differences between healthy vs unhealthy boundaries. Patients were then asked to think about their personal relationships and to think about what type of boundaries they feel that they have. Patients were also asked to think about themselves in relation to their social circles and to discuss how they demonstrate boundaries with those that they consider to be both close to them and further away from them. Patients were encouraged to be respectful on one another as they each took turns sharing in group, and they were also asked to be supportive of one another. Attendance: Attended Patient's Goal:  Discuss and review the concept of healthy versus unhealthy boundaries. Interventions/techniques: Informed, Validated, Promoted peer support, Provide feedback, Reinforced and Supported Follows Directions: Followed directions Interactions: Interacted appropriately Mental Status: Calm Behavior/appearance: Cooperative Goals Achieved: Able to engage in interactions, Able to listen to others, Able to reflect/comment on own behavior, Able to manage/cope with feelings, Able to receive feedback, Able to self-disclose, Identified feelings and Identified triggers Additional Notes:  When initially invited the pt stated that he would not be coming to the group, however when the group began he came and participated. Pt stated that his main boundary is trust and that without his trusting someone, he will not even spend time around them. Pt came in and out of group several times, but was appropriate while in the room and participated in discussion. [place]

## 2021-03-09 NOTE — BH NOTES
This worker contacted Mrs. Garcia who that patient identified as his sister prior to this worker making the phone call. When Mrs. Garcia answered the phone she and this worker were confused because this worker asked Mrs. Campos Pay if she is the patient's sister rani whom the patient reports he will live upon discharge. Mrs. Garcia informed this worker that she is the patient ex-partner and that he does not live with her currently that the patient lives in a boarding home by himself. Mrs. Zachary Zabala reported that the patient has some medical issues including high blood pressure and was recently given a pace maker and that the patient has a tendency to yell. Mrs. Garcia informed this worker that the patient and she speak daily. Following this conversation this worker confirmed that the patient plans to live with his ex-girlfriend so this worker will continue attempts to obtain collateral information from patient and Mrs. Garcia.

## 2021-03-09 NOTE — GROUP NOTE
IP  GROUP DOCUMENTATION INDIVIDUAL Group Therapy Note Date: 3/9/2021 Group Start Time: 1400 Group End Time: 1500 Group Topic: Special Track - Drug Topic SRM 2 [facility] ACUTE Bharatyanni Lynne, RT 
 
[facility]  GROUP DOCUMENTATION GROUP Group Therapy Note Recovery and Relapse Attendees: 5 Attendance: Did not attend Additional Notes: Pt was invited to attend group but did not.  
 
Tea Wilson, RT
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
## 2021-03-09 NOTE — PROGRESS NOTES
Progress Note    Patient: Sobia Chowdhury MRN: [id number]  SSN: [id number]    YOB: 1970  Age: 52 y.o. Sex: male      Admit Date: 3/7/2021    LOS: 1 day     Subjective:     No chest pain no shortness of breath no cough    Objective:     Vitals:    03/08/21 2045 03/09/21 0808 03/09/21 0818 03/09/21 0902   BP: 114/78 124/75 124/75 124/75   Pulse: 91 (!) 59 (!) 59 (!) 59   Resp: 16 18     Temp: 98.1 °F (36.7 °C) 98.1 °F (36.7 °C)     SpO2: 99% 98%     Weight:       Height:            Intake and Output:  Current Shift: No intake/output data recorded. Last three shifts: No intake/output data recorded. Physical Exam:   General:  Alert, cooperative, no distress, appears stated age. Eyes:  Conjunctivae/corneas clear. PERRL, EOMs intact. Fundi benign   Ears:  Normal TMs and external ear canals both ears. Nose: Nares normal. Septum midline. Mucosa normal. No drainage or sinus tenderness. Mouth/Throat: Lips, mucosa, and tongue normal. Teeth and gums normal.   Neck: Supple, symmetrical, trachea midline, no adenopathy, thyroid: no enlargment/tenderness/nodules, no carotid bruit and no JVD. Back:   Symmetric, no curvature. ROM normal. No CVA tenderness. Lungs:   Clear to auscultation bilaterally. Heart:  Regular rate and rhythm, S1, S2 normal, no murmur, click, rub or gallop. Abdomen:   Soft, non-tender. Bowel sounds normal. No masses,  No organomegaly. Extremities: Extremities normal, atraumatic, no cyanosis or edema. Pulses: 2+ and symmetric all extremities. Skin: Skin color, texture, turgor normal. No rashes or lesions   Lymph nodes: Cervical, supraclavicular, and axillary nodes normal.   Neurologic: CNII-XII intact. Normal strength, sensation and reflexes throughout. Lab/Data Review: All lab results for the last 24 hours reviewed.      Recent Results (from the past 24 hour(s))   GLUCOSE, POC    Collection Time: 03/08/21  5:05 PM   Result Value Ref Range    Glucose (POC) 161 (H) 65 - 100 mg/dL    Performed by [address], POC    Collection Time: 03/08/21  7:52 PM   Result Value Ref Range    Glucose (POC) 201 (H) 65 - 100 mg/dL    Performed by Brandy Galdamez, POC    Collection Time: 03/09/21  8:12 AM   Result Value Ref Range    Glucose (POC) 257 (H) 65 - 100 mg/dL    Performed by [address], POC    Collection Time: 03/09/21 12:00 PM   Result Value Ref Range    Glucose (POC) 266 (H) 65 - 100 mg/dL    Performed by Juliet Riley          Assessment:     Active Problems:    Major depression (3/8/2021)      Suicide ([address] (3/8/2021)        Plan:     Continue with management    Signed By: Rashaad Frazier MD     March 9, 2021
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
## 2021-03-09 NOTE — PROGRESS NOTES
Problem: Falls - Risk of  Goal: *Absence of Falls  Description: Document Ridge Roque Fall Risk and appropriate interventions in the flowsheet. Outcome: Progressing Towards Goal  Note: Fall Risk Interventions:     Problem: Depressed Mood (Adult/Pediatric)  Goal: *STG: Remains safe in hospital  Outcome: Progressing Towards Goal     Problem: Depressed Mood (Adult/Pediatric)  Goal: *STG: Complies with medication therapy  Outcome: Progressing Towards Goal     Patient has not fallen so far this shift. Patient has been  safe so far this shift. Patient has been medication compliant. Patient remains on close observation. Patient was initially in the dayroom watching TV. He had an irritable/angry expression. He appears slightly suspicious. Patient had his feet propped up on another seat. Patient said he is \"doing a little bit better. \" He said his mood is stable. He rated his depression as 6/10 and his anxiety as 6/10. He denied SI or HI. Patient complained of back pain but says he cannot take aspirin, tylenol or motrin. Patient went to bed early and did not further complain of pain. Patient was medication compliant. Patient was given a snack with hs Lantus. Continue  To assess. Since going to bed, patient has been laying down quietly with his eyes closed.

## 2021-03-09 NOTE — GROUP NOTE
[facility]  GROUP DOCUMENTATION INDIVIDUAL Group Therapy Note Date: 3/9/2021 Group Start Time: 5109 Group End Time: 1100 Group Topic: Nursing SRM 2 BEHA TH ACUTE Ophelia Ruff LPN IP  GROUP DOCUMENTATION GROUP Group Therapy Note Attendees: 6/11 Attendance: Attended Patient's Goal:  ID benefits of medications Interventions/techniques: Informed Follows Directions: Followed directions Interactions: Interacted appropriately Mental Status: Calm Behavior/appearance: Cooperative Goals Achieved: Able to listen to others Additional Notes:  Patient was receptive to the information discussed Nathalie Coffey LPN

## 2021-03-09 NOTE — GROUP NOTE
[facility] GROUP DOCUMENTATION INDIVIDUAL Group Therapy Note Date: 3/8/2021 Group Start Time: 2015 Group End Time: 2100 Group Topic: Recreational/Music Therapy SRM 2  NON ACUTE Jordinoscar [facility] GROUP DOCUMENTATION GROUP Group Therapy Note Attendees: 8 Introduced structured leisure task skill as positive way to cope and manage mood. Attendance: Attended Patient's Goal: STG: Attends activities and groups Interventions/techniques: Art integration and Supported Follows Directions: Followed directions Interactions: Interacted appropriately Mental Status: Preoccupied Behavior/appearance: Resistive/oppositional 
 
Goals Achieved: Able to reflect/comment on own behavior Additional Notes:  Initially attended group. After sitting in group for 5-10, pt left group and did not return.   
 
Vivienne Humphries, CTRS

## 2021-03-09 NOTE — GROUP NOTE
[facility] GROUP DOCUMENTATION INDIVIDUAL Group Therapy Note Date: 3/9/2021 Group Start Time: 1300 Group End Time: 1400 Group Topic: Process Group - Inpatient [facility] BE[facility]AVIOR[facility] OP Ayesha Alvarez DIMITRIS 
 
[facility] GROUP DOCUMENTATION GROUP Group Therapy Note Attendees: Patients encouraged to discuss what brought them to the hospital, the things that have been on their mind, the things that have been bothering them and the things that they have been worrying about. Patients encouraged to listen supportively, share openly, and support their peers. Themes surrounding feeling better and wanting to go home emerged and were discussed. Attendance: Attended Late Patient's Goal:  Attends activities and groups Interventions/techniques: Validated, Promoted peer support, Reinforced and Supported Follows Directions: Followed directions Interactions: Disorganized interaction Mental Status: Blunted, Elevated and Labile Behavior/appearance: Agitated, Disheveled, Negative and Pressured speech Goals Achieved: Able to engage in interactions, Able to listen to others, Able to manage/cope with feelings, Able to self-disclose and Identified feelings Additional Notes:  Pt attended group and was engaged. Pt shared that he sleeps with 5 guns at night due to paranoia that people are out to get him. Pt shared that he has desire to kill someone. Pt reported that he plans on leaving the  Hospital and driving straight to their residence to kill them when discharged. Usha Marsha

## 2021-03-09 NOTE — BH NOTES
Behavioral Health Treatment Team Note     Patient goal(s) for today: Discharge  Treatment team focus/goals: Discharge coordination, treatment participation. Collateral contact    Progress note: Patient presented agitated and wanting to know about his discharge date. This worker discussed with the patient reviewing his progress on Wednesday the 10 with his treatment team. The patient reported that he is making efforts to attend group therapy and attempted to participate in 3 of the offered groups today. The patient was focused on discharge but mentioned the recent passing of his mother and sister and continued grief associated with their passing. Patient has some child like presentation in demeanor and conversation with this worker      Patient would benefit from continued inpatient treatment to address disorientation to situation, coordinate outpatient treatment.     LOS:  1  Expected LOS: 5-7 days    Insurance info/prescription coverage:  viseto/[address]  Date of last family contact:  3/9/2021  Family requesting physician contact today:  no  Discharge plan:  Return to sister's home  Guns in the home:  no   Outpatient provider(s):  No current psych or therapist. This worker will work with patient to identify and connect patient with resources    Participating treatment team members: Tiffany Harmon, * (assigned SW), Dow Riedel LMSW

## 2021-03-09 NOTE — BH NOTES
Patient has been up on the unit for meals. He has rated his depression & anxiety 5/10 He has denied any feeling of SI or HI. He has c/o some back pain. He states he is tired after each meal & he returns to bed. He has attended some groups, He has been medication compliant, He has remain safe & absence of falls. He remains on close observation.

## 2021-03-09 NOTE — GROUP NOTE
IP  GROUP DOCUMENTATION INDIVIDUAL Group Therapy Note Date: 3/9/2021 Group Start Time: 8500 Group End Time: 1339 Group Topic: Special Track - Drug Topic SRM 2 BEHA HLTH ACUTE Srini Galarza, RT 
 
[facility] GROUP DOCUMENTATION GROUP Group Therapy Note Stages of Change Attendees: 6 Attendance: Attended Patient's Goal:  To attend groups Interventions/techniques: Informed Follows Directions: Followed directions Interactions: Interacted appropriately Mental Status: Calm Behavior/appearance: Attentive and Cooperative Goals Achieved: Able to engage in interactions, Able to listen to others, Able to reflect/comment on own behavior and Able to receive feedback Additional Notes:  Pt attended group reluctantly and participated in group discussion.   
 
Uriel Laguerre, RT

## 2021-03-10 LAB
GLUCOSE BLD STRIP.AUTO-MCNC: 187 MG/DL (ref 65–100)
GLUCOSE BLD STRIP.AUTO-MCNC: 242 MG/DL (ref 65–100)
GLUCOSE BLD STRIP.AUTO-MCNC: 312 MG/DL (ref 65–100)
PERFORMED BY, TECHID: ABNORMAL

## 2021-03-10 PROCEDURE — 74011636637 HC RX REV CODE- 636/637: Performed by: PSYCHIATRY & NEUROLOGY

## 2021-03-10 PROCEDURE — 65220000003 HC RM SEMIPRIVATE PSYCH

## 2021-03-10 PROCEDURE — 74011250637 HC RX REV CODE- 250/637: Performed by: PSYCHIATRY & NEUROLOGY

## 2021-03-10 PROCEDURE — 82962 GLUCOSE BLOOD TEST: CPT

## 2021-03-10 RX ADMIN — AMLODIPINE BESYLATE 10 MG: 5 TABLET ORAL at 08:32

## 2021-03-10 RX ADMIN — INSULIN HUMAN 20 UNITS: 100 INJECTION, SOLUTION PARENTERAL at 17:35

## 2021-03-10 RX ADMIN — FUROSEMIDE 40 MG: 40 TABLET ORAL at 08:32

## 2021-03-10 RX ADMIN — PANTOPRAZOLE SODIUM 40 MG: 40 TABLET, DELAYED RELEASE ORAL at 08:32

## 2021-03-10 RX ADMIN — CARVEDILOL 12.5 MG: 12.5 TABLET, FILM COATED ORAL at 17:36

## 2021-03-10 RX ADMIN — INSULIN HUMAN 20 UNITS: 100 INJECTION, SOLUTION PARENTERAL at 08:33

## 2021-03-10 RX ADMIN — INSULIN HUMAN 20 UNITS: 100 INJECTION, SOLUTION PARENTERAL at 12:50

## 2021-03-10 RX ADMIN — CARVEDILOL 12.5 MG: 12.5 TABLET, FILM COATED ORAL at 08:32

## 2021-03-10 RX ADMIN — INSULIN GLARGINE 50 UNITS: 100 INJECTION, SOLUTION SUBCUTANEOUS at 21:49

## 2021-03-10 RX ADMIN — CLOPIDOGREL BISULFATE 75 MG: 75 TABLET ORAL at 08:32

## 2021-03-10 RX ADMIN — INSULIN GLARGINE 50 UNITS: 100 INJECTION, SOLUTION SUBCUTANEOUS at 08:33

## 2021-03-10 RX ADMIN — LOSARTAN POTASSIUM 25 MG: 25 TABLET ORAL at 08:32

## 2021-03-10 RX ADMIN — ATORVASTATIN CALCIUM 80 MG: 40 TABLET, FILM COATED ORAL at 21:48

## 2021-03-10 NOTE — GROUP NOTE
[facility] GROUP DOCUMENTATION INDIVIDUAL Group Therapy Note Date: 3/10/2021 Group Start Time: 1300 Group End Time: 3884 Group Topic: Education Group - Inpatient [facility] Group Raliegh [employer] [facility] GROUP DOCUMENTATION GROUP Group Therapy Note Attendees: All pts were encouraged to attend however only 6 out of 11 attended. The topic today was safety planning. Pts were asked to stated their mood for the day and then went over their safety plans as a group. Pts were instructed to personalize their plans if they were not experiencing SI. They were also instructed to fill it out in detail and to return to the group facilitator. Attendance: Attended Patient's Goal:  To attend groups and activities Interventions/techniques: Informed, Validated, Promoted peer support, Provide feedback and Supported Follows Directions: Followed directions Interactions: Interacted appropriately Mental Status: Calm and Elevated Behavior/appearance: Agitated, Attentive and Cooperative Goals Achieved: Able to self-disclose and Identified triggers Additional Notes:  Pt came in late and presented bizarre and slightly delusional. He was able to fill out most of his safety plan. He was complaining about his CM wanting the number to the place he lives and about having to stay another day. Mayme Shadow

## 2021-03-10 NOTE — GROUP NOTE
IP  GROUP DOCUMENTATION INDIVIDUAL Group Therapy Note Date: 3/10/2021 Group Start Time: 4572 Group End Time: 4174 Group Topic: Special Track - Drug Topic SRM 2 BEHA HLTH ACUTE Skeet Otis, RT 
 
[facility] GROUP DOCUMENTATION GROUP Group Therapy Note [employer] Attendees: 7 Attendance: Did not attend Additional Notes:  Pt was invited to attend group but did not attend.  
 
Priscila Clancy, RT
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
## 2021-03-10 NOTE — BH NOTES
Patient case discussed in the treatment team remains shallow superficial poor insight poor judgment yesterday was thinking he was going to be here till upon wake today he want to be discharged today or Monday poor insight poor judgment he says he does have a place to go to live with his sister. Poor personal hygiene grooming and appropriate flat affect no new questions.

## 2021-03-10 NOTE — BH NOTES
Mrs. Garcia Antis  Patient's friend this worker left a message for Georgina Radha to return call to discuss discharge plan for patient to return to her home.

## 2021-03-10 NOTE — GROUP NOTE
IP  GROUP DOCUMENTATION INDIVIDUAL Group Therapy Note Date: 3/10/2021 Group Start Time: 1400 Group End Time: 1500 Group Topic: Special Track - Drug Topic SRM 2 BEHA HLTH ACUTE Joby Marie, RT 
 
[facility] GROUP DOCUMENTATION GROUP Group Therapy Note Protracted Withdrawal 
 
Attendees: 7 Attendance: Attended Patient's Goal:  To attend groups Interventions/techniques: Informed Follows Directions: Followed directions Interactions: Interacted appropriately Mental Status: Calm Behavior/appearance: Attentive, Cooperative and Motivated Goals Achieved: Able to engage in interactions, Able to listen to others, Able to reflect/comment on own behavior and Able to receive feedback Additional Notes:  Pt attended group and participated in group discussion.  
 
Dariusz Radford, RT
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
## 2021-03-10 NOTE — BH NOTES
Pt was med compliant last night and went to bed just after taking scheduled night time meds. He gives his depression a 1/10 and anxiety 2/10. He denies SI, HI, and hallucinations. His blood glucose is staying very elevated at 324 at bedtime and received 50 units of lantus. Pt appears to have rested well during the night with regular rate and rhythm respirations. No distress noted. Will continue to monitor patient as per unit protocol q15 mins.

## 2021-03-10 NOTE — BH NOTES
Patient denies any SI/HI/AVH. Patient is attending some groups throughout the day. No issues or concerns to report at this time.

## 2021-03-10 NOTE — PROGRESS NOTES
Problem: Depressed Mood (Adult/Pediatric)  Goal: *STG: Remains safe in hospital  Outcome: Progressing Towards Goal  Goal: *STG: Complies with medication therapy  Outcome: Progressing Towards Goal     Problem: Diabetes Self-Management  Goal: *Using medications safely  Description: State effect of diabetes medications on diabetes; name diabetes medication taking, action and side effects. Outcome: Progressing Towards Goal  Goal: *Monitoring blood glucose, interpreting and using results  Description: Identify recommended blood glucose targets  and personal targets.   Outcome: Progressing Towards Goal

## 2021-03-10 NOTE — PROGRESS NOTES
MrGeorgina Montanez actively participated in Spirituality Group about change and hope on 2 Acute Behavioral Health unit. Chaplains will follow up if further referrals are requested. Chaplain Naomi David M.Div.    can be reached by calling the  at [facility]  [phone]

## 2021-03-11 LAB
CHOLEST SERPL-MCNC: 217 MG/DL
GLUCOSE BLD STRIP.AUTO-MCNC: 163 MG/DL (ref 65–100)
GLUCOSE BLD STRIP.AUTO-MCNC: 169 MG/DL (ref 65–100)
GLUCOSE BLD STRIP.AUTO-MCNC: 274 MG/DL (ref 65–100)
GLUCOSE BLD STRIP.AUTO-MCNC: 72 MG/DL (ref 65–100)
HDLC SERPL-MCNC: 40 MG/DL
HDLC SERPL: 5.4 {RATIO} (ref 0–5)
LDLC SERPL CALC-MCNC: 101.2 MG/DL (ref 0–100)
LIPID PROFILE,FLP: ABNORMAL
PERFORMED BY, TECHID: ABNORMAL
PERFORMED BY, TECHID: NORMAL
TRIGL SERPL-MCNC: 379 MG/DL (ref ?–150)
VLDLC SERPL CALC-MCNC: 75.8 MG/DL

## 2021-03-11 PROCEDURE — 74011250637 HC RX REV CODE- 250/637: Performed by: PSYCHIATRY & NEUROLOGY

## 2021-03-11 PROCEDURE — 82962 GLUCOSE BLOOD TEST: CPT

## 2021-03-11 PROCEDURE — 74011636637 HC RX REV CODE- 636/637: Performed by: PSYCHIATRY & NEUROLOGY

## 2021-03-11 PROCEDURE — 65220000003 HC RM SEMIPRIVATE PSYCH

## 2021-03-11 RX ADMIN — INSULIN GLARGINE 50 UNITS: 100 INJECTION, SOLUTION SUBCUTANEOUS at 21:55

## 2021-03-11 RX ADMIN — CARVEDILOL 12.5 MG: 12.5 TABLET, FILM COATED ORAL at 17:25

## 2021-03-11 RX ADMIN — INSULIN HUMAN 20 UNITS: 100 INJECTION, SOLUTION PARENTERAL at 12:43

## 2021-03-11 RX ADMIN — PANTOPRAZOLE SODIUM 40 MG: 40 TABLET, DELAYED RELEASE ORAL at 07:47

## 2021-03-11 RX ADMIN — AMLODIPINE BESYLATE 10 MG: 5 TABLET ORAL at 09:03

## 2021-03-11 RX ADMIN — ATORVASTATIN CALCIUM 80 MG: 40 TABLET, FILM COATED ORAL at 21:56

## 2021-03-11 RX ADMIN — LOSARTAN POTASSIUM 25 MG: 25 TABLET ORAL at 09:03

## 2021-03-11 RX ADMIN — INSULIN GLARGINE 50 UNITS: 100 INJECTION, SOLUTION SUBCUTANEOUS at 08:54

## 2021-03-11 RX ADMIN — FUROSEMIDE 40 MG: 40 TABLET ORAL at 09:04

## 2021-03-11 RX ADMIN — CLOPIDOGREL BISULFATE 75 MG: 75 TABLET ORAL at 09:03

## 2021-03-11 RX ADMIN — CARVEDILOL 12.5 MG: 12.5 TABLET, FILM COATED ORAL at 09:03

## 2021-03-11 RX ADMIN — INSULIN HUMAN 20 UNITS: 100 INJECTION, SOLUTION PARENTERAL at 17:26

## 2021-03-11 RX ADMIN — HYDROXYZINE HYDROCHLORIDE 50 MG: 50 TABLET, FILM COATED ORAL at 11:54

## 2021-03-11 RX ADMIN — INSULIN HUMAN 20 UNITS: 100 INJECTION, SOLUTION PARENTERAL at 08:55

## 2021-03-11 NOTE — PROGRESS NOTES
Progress Note    Patient: Pietro Estrella MRN: [id number]  SSN: [id number]    YOB: 1970  Age: 52 y.o. Sex: male      Admit Date: 3/7/2021    LOS: 2 days     Subjective:     No chest pain no shortness of breath no cough    Objective:     Vitals:    03/09/21 1735 03/09/21 2001 03/10/21 0929 03/10/21 1607   BP: 116/86 109/76 123/89 123/89   Pulse: 92 84 80 80   Resp:  18 18 18   Temp:  98.2 °F (36.8 °C) 97.3 °F (36.3 °C) 97.3 °F (36.3 °C)   SpO2:  100%     Weight:       Height:            Intake and Output:  Current Shift: No intake/output data recorded. Last three shifts: No intake/output data recorded. Physical Exam:   General:  Alert, cooperative, no distress, appears stated age. Eyes:  Conjunctivae/corneas clear. PERRL, EOMs intact. Fundi benign   Ears:  Normal TMs and external ear canals both ears. Nose: Nares normal. Septum midline. Mucosa normal. No drainage or sinus tenderness. Mouth/Throat: Lips, mucosa, and tongue normal. Teeth and gums normal.   Neck: Supple, symmetrical, trachea midline, no adenopathy, thyroid: no enlargment/tenderness/nodules, no carotid bruit and no JVD. Back:   Symmetric, no curvature. ROM normal. No CVA tenderness. Lungs:   Clear to auscultation bilaterally. Heart:  Regular rate and rhythm, S1, S2 normal, no murmur, click, rub or gallop. Abdomen:   Soft, non-tender. Bowel sounds normal. No masses,  No organomegaly. Extremities: Extremities normal, atraumatic, no cyanosis or edema. Pulses: 2+ and symmetric all extremities. Skin: Skin color, texture, turgor normal. No rashes or lesions   Lymph nodes: Cervical, supraclavicular, and axillary nodes normal.   Neurologic: CNII-XII intact. Normal strength, sensation and reflexes throughout. Lab/Data Review: All lab results for the last 24 hours reviewed.      Recent Results (from the past 24 hour(s))   GLUCOSE, POC    Collection Time: 03/09/21  7:56 PM   Result Value Ref Range    Glucose (POC) 324 (H) 65 - 100 mg/dL    Performed by Anayeli MORRIS, POC    Collection Time: 03/10/21  7:51 AM   Result Value Ref Range    Glucose (POC) 187 (H) 65 - 100 mg/dL    Performed by Sourav Regalado, POC    Collection Time: 03/10/21 12:49 PM   Result Value Ref Range    Glucose (POC) 242 (H) 65 - 100 mg/dL    Performed by Ezequiel Dallas          Assessment:     Active Problems:    Major depression (3/8/2021)      Suicide ([address] (3/8/2021)        Plan:     Continue with management    Signed By: Braulio Valdez MD     March 10, 2021

## 2021-03-11 NOTE — GROUP NOTE
[facility]  GROUP DOCUMENTATION INDIVIDUAL Group Therapy Note Date: 3/11/2021 Group Start Time: 3124 Group End Time: 1200 Group Topic: Education Group - Inpatient [facility] Group Alison ALEXANDER  GROUP DOCUMENTATION GROUP Group Therapy Note Attendees: 3/10 PsychEd: Pts were asked what brought them to the hospital as a check in question. Pts were then encouraged to participate in a group about trauma and learning how the mind handles trauma. Pts processed through this information and then participated in a breathing and stretching exercise before being asked to reflect on group. Attendance: Did not attend Additional Notes:  Pt was encouraged to attend but chose not to do so [employer]
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
## 2021-03-11 NOTE — GROUP NOTE
IP  GROUP DOCUMENTATION INDIVIDUAL Group Therapy Note Date: 3/11/2021 Group Start Time: 1400 Group End Time: 1500 Group Topic: Special Track - Drug Topic SRM 2 BEHA TH ACUTE Siri Gomez, RT 
 
[facility] GROUP DOCUMENTATION GROUP Group Therapy Note Triggers Attendees: 7 Attendance: Did not attend Additional Notes:  Pt was invited to attend group but did not attend.  
 
Lizzie Cortez, RT

## 2021-03-11 NOTE — BH NOTES
Patient has been up on the unit. States he is ready to go home. He is concern about his dog & how   he will get him out of the pound. He has denied any feeling of SI,HI, depression or anxiety. He has been upset & angry with his case manger. He was given prn medication, He has been cursing & fussing. He was allowed to get the phone # from his phone of persons he does not want to deal with when discharged. He has attended some groups. He has been medications compliant. He blood sugars has been better today. He remains on close observation.

## 2021-03-11 NOTE — GROUP NOTE
IP  GROUP DOCUMENTATION INDIVIDUAL Group Therapy Note Date: 3/11/2021 Group Start Time: 7799 Group End Time: 2314 Group Topic: Recreational/Music Therapy SRM 2  NON ACUTE Debbi Mcguire IP  GROUP DOCUMENTATION GROUP Group Therapy Note Facilitated leisure skills group focusing on re-enforcing positive coping skills through social interactions, group activities, music and art tasks Attendees: 6/13 Attendance: Attended Patient's Goal:  *STG: Attends activities and groups Interventions/techniques: Art integration and Supported Follows Directions: Followed directions Interactions: Interacted appropriately Mental Status: Calm Behavior/appearance: Cooperative Goals Achieved: Able to engage in interactions and Able to listen to others Additional Notes:  Pt was receptive to music and song he selected while in group. Pt declined to work on leisure packet and left group early and did not return. Eduardo Salinas, RT Student

## 2021-03-11 NOTE — PROGRESS NOTES
Progress Note    Patient: Perlita Escobedo MRN: [id number]  SSN: [id number]    YOB: 1970  Age: 52 y.o. Sex: male      Admit Date: 3/7/2021    LOS: 3 days     Subjective:     No chest pain no shortness of breath no cough    Objective:     Vitals:    03/10/21 1607 03/10/21 1945 03/11/21 0846 03/11/21 0903   BP: 123/89 125/81 (!) 131/91 (!) 131/91   Pulse: 80 92 83 83   Resp: 18 18 18    Temp: 97.3 °F (36.3 °C) 97.8 °F (36.6 °C) 98 °F (36.7 °C)    SpO2:   100%    Weight:       Height:            Intake and Output:  Current Shift: No intake/output data recorded. Last three shifts: No intake/output data recorded. Physical Exam:   General:  Alert, cooperative, no distress, appears stated age. Eyes:  Conjunctivae/corneas clear. PERRL, EOMs intact. Fundi benign   Ears:  Normal TMs and external ear canals both ears. Nose: Nares normal. Septum midline. Mucosa normal. No drainage or sinus tenderness. Mouth/Throat: Lips, mucosa, and tongue normal. Teeth and gums normal.   Neck: Supple, symmetrical, trachea midline, no adenopathy, thyroid: no enlargment/tenderness/nodules, no carotid bruit and no JVD. Back:   Symmetric, no curvature. ROM normal. No CVA tenderness. Lungs:   Clear to auscultation bilaterally. Heart:  Regular rate and rhythm, S1, S2 normal, no murmur, click, rub or gallop. Abdomen:   Soft, non-tender. Bowel sounds normal. No masses,  No organomegaly. Extremities: Extremities normal, atraumatic, no cyanosis or edema. Pulses: 2+ and symmetric all extremities. Skin: Skin color, texture, turgor normal. No rashes or lesions   Lymph nodes: Cervical, supraclavicular, and axillary nodes normal.   Neurologic: CNII-XII intact. Normal strength, sensation and reflexes throughout. Lab/Data Review: All lab results for the last 24 hours reviewed.      Recent Results (from the past 24 hour(s))   GLUCOSE, POC    Collection Time: 03/10/21 12:49 PM   Result Value Ref Range    Glucose (POC) 242 (H) 65 - 100 mg/dL    Performed by Dom Vazquez, POC    Collection Time: 03/10/21  9:47 PM   Result Value Ref Range    Glucose (POC) 312 (H) 65 - 100 mg/dL    Performed by Aguilar Iraheta    GLUCOSE, POC    Collection Time: 03/11/21  7:40 AM   Result Value Ref Range    Glucose (POC) 72 65 - 100 mg/dL    Performed by Silvester Runner          Assessment:     Active Problems:    Major depression (3/8/2021)      Suicide ([facility] [address] (3/8/2021)        Plan:     Continue with management    Signed By: Modesta Chun MD     March 11, 2021

## 2021-03-11 NOTE — GROUP NOTE
IP  GROUP DOCUMENTATION INDIVIDUAL Group Therapy Note Date: 3/11/2021 Group Start Time: 7782 Group End Time: 5727 Group Topic: Special Track - Drug Topic SRM 2 BEHA HLTH ACUTE Goran Flower, RT 
 
[facility] GROUP DOCUMENTATION GROUP Group Therapy Note Addictive agents Attendees: 5 Attendance: Did not attend Additional Notes:  Pt was invited to attend group but did not.  
 
Pro Lopez, RT

## 2021-03-11 NOTE — BH NOTES
Behavioral Health Treatment Team Note     Patient goal(s) for today: Discharge  Treatment team focus/goals: Discharge coordination, treatment participation. Collateral contact    Progress note: Patient presented as agitated but denied SI/HI or AH/VH as of this meeting. The patient was oriented x4. Patient presented as attention seeking and and agitated with this worker. The patient has requested discharge and has in the past worked with this therapist on milestones to complete prior to discharge and how making threats requires this worker to inform those he has threatened including his landlord and the aunt with whom he was living. The patient's voice was elevated for most of meeting and making demands of this worker. This worker informed the patient that because of the recent threats, his presentation upon admission, and his attention seeking behavior his treatment team will contact d-19 to provide an assessment prior to his discharge. Patient reported that he is familiar with the prescreen process and does not want d-19 involved with his treatment and does not want anything tying him to [place] upon his discharge. Patient provided this worker with permission to speak with aunt Ade Pruitt. This is not the aunt with whom he was living in [place] [employer] [facility]. Patient continues to report that his brother is an undercover  and that his brother is unable to speak due to current assignment. The patient reports that his brother has his firearms. Inpatient treatment recommend to address patient agitation to encouraged patient to process threats against his landlord Mrs. Sierra Medeiros that were made in group. Allow for appropriate discharge coordination to take place including contact with patient's brother and landlord.     LOS:  3  Expected LOS: 5-7 days    Insurance info/prescription coverage:  [employer] [address]  Date of last family contact:  3/9/2021  Family requesting physician contact today:  no  Discharge plan:  Return to sister's home  Guns in the home:  no   Outpatient provider(s):  No current psych or therapist. This worker will work with patient to identify and connect patient with resources    Participating treatment team members: Lu Flores, * (assigned SW), Gracie Roberts LMSW

## 2021-03-11 NOTE — GROUP NOTE
IP  GROUP DOCUMENTATION INDIVIDUAL Group Therapy Note Date: 3/11/2021 Group Start Time: 46 Group End Time: 7144 Group Topic: [employer] SRM 2 BH NON ACUTE Candance Hint IP  GROUP DOCUMENTATION GROUP Group Therapy Note Attendees:5 Attendance: Attended Patient's Goal:  To be discharge and go get his dog. Interventions/techniques: Supported Follows Directions: Followed directions Interactions: Interacted appropriately Mental Status: Other Excited and Peaceful Behavior/appearance: Cooperative Goals Achieved: Able to engage in interactions, Able to listen to others and Identified feelings Additional Notes:  
 
Mihai Maxwell

## 2021-03-11 NOTE — GROUP NOTE
IP  GROUP DOCUMENTATION INDIVIDUAL Group Therapy Note Date: 3/11/2021 Group Start Time: 1300 Group End Time: 1400 Group Topic: Process Group - Inpatient SRM CARE MANAGEMENT Leti Ramirez [facility] GROUP DOCUMENTATION GROUP Group Therapy Note Attendees: Participants engaged in processing group to reflect on feelings and emotions. Patients provided feedback to each other on how to manage and cope with feelings as well as identify triggers. Attendance: Attended Patient's Goal:  Attend groups Interventions/techniques: Provide feedback Follows Directions: Followed directions Interactions: Interacted appropriately Mental Status: Calm Behavior/appearance: Attentive Goals Achieved: Able to engage in interactions, Able to listen to others, Able to give feedback to another, Able to reflect/comment on own behavior, Able to manage/cope with feelings and Able to receive feedback Additional Notes: Patient indicated that he was frustrated during group because he was concerned about his discharge date. Patient also appeared to have incongruent moods and thoughts. Patient discussed his brother who was an , unknown people that wanted to kill him and he was going to harm them to protect himself. Patient was not able to discuss coping skills however indicated that he felt it was best to leave [place] and start his life all over. Leatha Marquis

## 2021-03-12 LAB
GLUCOSE BLD STRIP.AUTO-MCNC: 121 MG/DL (ref 65–100)
GLUCOSE BLD STRIP.AUTO-MCNC: 144 MG/DL (ref 65–100)
GLUCOSE BLD STRIP.AUTO-MCNC: 174 MG/DL (ref 65–100)
GLUCOSE BLD STRIP.AUTO-MCNC: 226 MG/DL (ref 65–100)
PERFORMED BY, TECHID: ABNORMAL

## 2021-03-12 PROCEDURE — 74011636637 HC RX REV CODE- 636/637: Performed by: PSYCHIATRY & NEUROLOGY

## 2021-03-12 PROCEDURE — 65220000003 HC RM SEMIPRIVATE PSYCH

## 2021-03-12 PROCEDURE — 74011250637 HC RX REV CODE- 250/637: Performed by: PSYCHIATRY & NEUROLOGY

## 2021-03-12 PROCEDURE — 82962 GLUCOSE BLOOD TEST: CPT

## 2021-03-12 RX ORDER — CARVEDILOL 12.5 MG/1
12.5 TABLET ORAL 2 TIMES DAILY WITH MEALS
Qty: 60 TAB | Refills: 0 | Status: SHIPPED | OUTPATIENT
Start: 2021-03-12 | End: 2021-04-08 | Stop reason: SDUPTHER

## 2021-03-12 RX ORDER — LOSARTAN POTASSIUM 25 MG/1
25 TABLET ORAL DAILY
Qty: 30 TAB | Refills: 0 | Status: SHIPPED | OUTPATIENT
Start: 2021-03-13 | End: 2021-04-08 | Stop reason: SDUPTHER

## 2021-03-12 RX ORDER — PANTOPRAZOLE SODIUM 40 MG/1
40 TABLET, DELAYED RELEASE ORAL
Qty: 30 TAB | Refills: 0 | Status: SHIPPED | OUTPATIENT
Start: 2021-03-13 | End: 2021-04-08 | Stop reason: SDUPTHER

## 2021-03-12 RX ORDER — INSULIN GLARGINE 100 [IU]/ML
INJECTION, SOLUTION SUBCUTANEOUS
Qty: 1 VIAL | Refills: 0 | Status: SHIPPED | OUTPATIENT
Start: 2021-03-12 | End: 2021-04-08 | Stop reason: SDUPTHER

## 2021-03-12 RX ORDER — TRAZODONE HYDROCHLORIDE 50 MG/1
50 TABLET ORAL
Qty: 30 TAB | Refills: 0 | Status: SHIPPED | OUTPATIENT
Start: 2021-03-12 | End: 2021-04-08 | Stop reason: SDUPTHER

## 2021-03-12 RX ORDER — ALBUTEROL SULFATE 90 UG/1
2 AEROSOL, METERED RESPIRATORY (INHALATION)
Qty: 1 INHALER | Refills: 0 | Status: SHIPPED | OUTPATIENT
Start: 2021-03-12 | End: 2021-04-08 | Stop reason: SDUPTHER

## 2021-03-12 RX ORDER — AMLODIPINE BESYLATE 10 MG/1
10 TABLET ORAL DAILY
Qty: 30 TAB | Refills: 0 | Status: SHIPPED | OUTPATIENT
Start: 2021-03-13 | End: 2021-04-08 | Stop reason: SDUPTHER

## 2021-03-12 RX ADMIN — CARVEDILOL 12.5 MG: 12.5 TABLET, FILM COATED ORAL at 16:17

## 2021-03-12 RX ADMIN — INSULIN GLARGINE 50 UNITS: 100 INJECTION, SOLUTION SUBCUTANEOUS at 09:08

## 2021-03-12 RX ADMIN — INSULIN HUMAN 20 UNITS: 100 INJECTION, SOLUTION PARENTERAL at 16:30

## 2021-03-12 RX ADMIN — LOSARTAN POTASSIUM 25 MG: 25 TABLET ORAL at 09:07

## 2021-03-12 RX ADMIN — FUROSEMIDE 40 MG: 40 TABLET ORAL at 09:07

## 2021-03-12 RX ADMIN — ATORVASTATIN CALCIUM 80 MG: 40 TABLET, FILM COATED ORAL at 20:49

## 2021-03-12 RX ADMIN — CLOPIDOGREL BISULFATE 75 MG: 75 TABLET ORAL at 09:07

## 2021-03-12 RX ADMIN — PANTOPRAZOLE SODIUM 40 MG: 40 TABLET, DELAYED RELEASE ORAL at 09:07

## 2021-03-12 RX ADMIN — AMLODIPINE BESYLATE 10 MG: 5 TABLET ORAL at 09:07

## 2021-03-12 RX ADMIN — CARVEDILOL 12.5 MG: 12.5 TABLET, FILM COATED ORAL at 09:07

## 2021-03-12 RX ADMIN — INSULIN GLARGINE 50 UNITS: 100 INJECTION, SOLUTION SUBCUTANEOUS at 20:49

## 2021-03-12 NOTE — GROUP NOTE
CATHERINE  GROUP DOCUMENTATION INDIVIDUAL Group Therapy Note Date: 3/12/2021 Group Start Time: 1300 Group End Time: 3724 Group Topic: Process Group - Inpatient SRM CARE MANAGEMENT Andrzej Carranza LCSW [facility] GROUP DOCUMENTATION GROUP Group Therapy Note Pts participated in process group therapy. Pts were asked a check in question about how they are feeling today. Pts then processed and discussed their thoughts and feelings and events that have transpired this week. Pts were encouraged to share and give feedback to peers. Attendees: 6/13 Attendance: Did not attend Maeve Jamison LCSW

## 2021-03-12 NOTE — BH NOTES
Patient case discussed with the treatment team this morning patient seen he remains rather superficial says he is ready to go he says he came voluntarily he is ready to go he says he has a ex-girlfriend in [place] home he called his sister and stated up until now he was not given the telephone number of the landlord home he threatened to kill we will try to alert him notified him about the patient's threats he was reluctant to give telephone numbers he said he will cooperate. Make sure he is stable make sure we will notify the his landlord.   And also confirm he got a place to go patient getting upset easily encouraged to work on his frustration

## 2021-03-12 NOTE — BH NOTES
Writer reached out to [facility] to set up [employer]. Mobile crisis has a two hour time frame that the pt has to get in touch with [place] so they recommended reaching out to them Monday morning.

## 2021-03-12 NOTE — GROUP NOTE
CATHERINE  GROUP DOCUMENTATION INDIVIDUAL Group Therapy Note Date: 3/11/2021 Group Start Time: 9744 Group End Time: 7956 Group Topic: Nursing SRM 2 BEHA [facility] Ophelia Ruff LPN IP  GROUP DOCUMENTATION GROUP Group Therapy Note Attendees: 6/13 Attendance: Did not attend Patient's Goal: Interventions/techniques: Follows Directions:  
 
Interactions:  
 
Mental Status:  
 
Behavior/appearance:  
 
Goals Achieved: Additional Notes:  Pt. Invited did not attend Lillian Somers LPN

## 2021-03-12 NOTE — GROUP NOTE
[facility] GROUP DOCUMENTATION INDIVIDUAL Group Therapy Note Date: 3/12/2021 Group Start Time: 1100 Group End Time: 0400 Group Topic: Education Group - Inpatient [facility] Group Reyes Campbell [facility] GROUP DOCUMENTATION GROUP Group Therapy Note Attendees: 6/14 Psych-Ed: Pts were walked through a meditation mindfulness psych-educational group. Pts discussed what mindfulness and meditation were before participating in a 15 minute guided meditation. Pts were then encouraged to reflect on what they experienced during the guided meditation. To finish group, pts participated in a stretching exercise and asked to reflect on group Attendance: Attended Patient's Goal: Pt said that he is leaving Sunday and that 'two days here' is 'too long' Interventions/techniques: Informed, Validated, Promoted peer support and Supported Follows Directions: Followed directions Interactions: Interacted appropriately Mental Status: Calm and Flat Behavior/appearance: Agitated, Neatly groomed and Withdrawn/quiet Goals Achieved: Able to engage in interactions and Able to listen to others Additional Notes:  Pt spoke about how he does not need to be at the hospital. Pt was negative at beginning of group but participated appropriately during the guided meditation. Pt said the meditation made him 'feel sleepy' and he left group after the meditation. [employer]

## 2021-03-12 NOTE — GROUP NOTE
IP  GROUP DOCUMENTATION INDIVIDUAL Group Therapy Note Date: 3/12/2021 Group Start Time: 7860 Group End Time: 8533 Group Topic: Recreational/Music Therapy SRM 2  NON ACUTE Anita Levi [facility] GROUP DOCUMENTATION GROUP Group Therapy Note Facilitated leisure skills group focused on re-enforcing positive coping skills through social interactions, group activities, music and art tasks Attendees: 10/14 Attendance: Attended Patient's Goal:  *STG: Attends activities and groups Interventions/techniques: Art integration and Supported Follows Directions: Followed directions Interactions: Interacted appropriately Mental Status: Calm Behavior/appearance: Cooperative Goals Achieved: Able to engage in interactions and Able to listen to others Additional Notes:  Pt was receptive to music and song he selected while in group. Pt declined to work on leisure tasks. Justine Silva, RT Student

## 2021-03-12 NOTE — BH NOTES
Behavioral Health Treatment Team Note     Patient goal(s) for today: Discharge  Treatment team focus/goals: Discharge coordination, treatment participation. Collateral contact    Progress note: Patient presented as agitated but denied SI/HI or AH/VH as of this meeting. The patient was oriented x4. This worker spoke to patient about continued inpatient treatment, encouraged group participation, medication compliance, and to work with this worker on discharge planning. The patient and this worker discussed treatment recommendation to discharge on Sunday. The patient was resistant, but agreeable to discharge on Sunday and requested to contact his aunt and ex-girlfriend to begin discharge coordination. Patient is open to mobile crisis stabilization at his girlfriends home in [facility] which is where he is requesting to discharge. Patient is easily agitated but is able to regulate with prompting and reminders to use copiing skills such as walking the unit and interacting with peers with whom he has a good rapport. Patient continues to report that his brother is an undercover  and that his brother is unable to speak due to current assignment. The patient reports that his brother has his firearms. Inpatient treatment recommend to address patient agitation to encouraged patient to process threats against his landlord Mrs. James Matamoros that were made in group. Allow for appropriate discharge coordination to take place including contact with patient's brother and landlord. LOS:  4  Expected LOS: 5-7 days    Insurance info/prescription coverage:  [employer]  Date of last family contact:  3/9/2021  Family requesting physician contact today:  no  Discharge plan:  Return to sister's home  Guns in the home:  no   Outpatient provider(s): Will coordinate outpatient treatment with D-19 and [facility].     Participating treatment team members: Natalie Kline, * (assigned SW), Rolanda Ramos [employer]

## 2021-03-12 NOTE — BH NOTES
Nursing Note    Patient is alert and oriented x 4. He denies any SI/HI/AH/VH. Patient denies any anxiety or depression. Restricted affect and isolated mood. FSBS 274 mg/dl. Lantus 50 units given in the right arm. Staff will continue to monitor patient for safety.

## 2021-03-12 NOTE — GROUP NOTE
IP  GROUP DOCUMENTATION INDIVIDUAL Group Therapy Note Date: 3/12/2021 Group Start Time: 1400 Group End Time: 1500 Group Topic: Special Track - Drug Topic SRM 2 BEHA HLTH ACUTE Lilliam Fam, RT 
 
[facility] GROUP DOCUMENTATION GROUP Group Therapy Note Relapse Attendees: 8 Attendance: Did not attend Additional Notes:  Pt was invited to attend group but did not Madelyn Vaughan, RT

## 2021-03-12 NOTE — BH NOTES
Client is pleasant and cooperative. Alert and oriented x 3. Appearance is semi-tidy. Takes meds as prescribed and denies any side effects. Client reported that he was disappointed that he did not go home. Interact well with peers and staff. Denies feeling suicidal or homicidal..Remains on close observation for safety

## 2021-03-12 NOTE — GROUP NOTE
IP  GROUP DOCUMENTATION INDIVIDUAL 
 
 
                                                                    Group Therapy Note 
 
Date: 3/12/2021 
 
Group Start Time: 1600 
Group End Time: 1700 
Group Topic: Special Track - Drug Topic 
 
SRM 2 BEHA TH ACUTE 
 
Francis Russo RT 
 
[facility] GROUP DOCUMENTATION GROUP 
 
Group Therapy Note 
 
Virtual NA Meeting 
 
Attendees: 10 
 
  
 
Attendance: Did not attend 
 
 
Additional Notes:  Pt was invited to attend group but did not 
 
Francis Russo RT

## 2021-03-12 NOTE — PROGRESS NOTES
Progress Note    Patient: Wojciech Peres MRN: [id number]  SSN: [id number]    YOB: 1970  Age: 52 y.o. Sex: male      Admit Date: 3/7/2021    LOS: 4 days     Subjective:     No chest pain no shortness of breath no cough    Objective:     Vitals:    03/11/21 1725 03/11/21 2033 03/12/21 0640 03/12/21 0958   BP:  120/73 129/74 129/74   Pulse: 88 72 83 83   Resp:  18 18 18   Temp:  97.9 °F (36.6 °C)  97.9 °F (36.6 °C)   SpO2:       Weight:       Height:            Intake and Output:  Current Shift: No intake/output data recorded. Last three shifts: No intake/output data recorded. Physical Exam:   General:  Alert, cooperative, no distress, appears stated age. Eyes:  Conjunctivae/corneas clear. PERRL, EOMs intact. Fundi benign   Ears:  Normal TMs and external ear canals both ears. Nose: Nares normal. Septum midline. Mucosa normal. No drainage or sinus tenderness. Mouth/Throat: Lips, mucosa, and tongue normal. Teeth and gums normal.   Neck: Supple, symmetrical, trachea midline, no adenopathy, thyroid: no enlargment/tenderness/nodules, no carotid bruit and no JVD. Back:   Symmetric, no curvature. ROM normal. No CVA tenderness. Lungs:   Clear to auscultation bilaterally. Heart:  Regular rate and rhythm, S1, S2 normal, no murmur, click, rub or gallop. Abdomen:   Soft, non-tender. Bowel sounds normal. No masses,  No organomegaly. Extremities: Extremities normal, atraumatic, no cyanosis or edema. Pulses: 2+ and symmetric all extremities. Skin: Skin color, texture, turgor normal. No rashes or lesions   Lymph nodes: Cervical, supraclavicular, and axillary nodes normal.   Neurologic: CNII-XII intact. Normal strength, sensation and reflexes throughout. Lab/Data Review: All lab results for the last 24 hours reviewed.      Recent Results (from the past 24 hour(s))   GLUCOSE, POC    Collection Time: 03/11/21 11:58 AM   Result Value Ref Range    Glucose (POC) 169 (H) 65 - 100 mg/dL Performed by Edith Manning    GLUCOSE, POC    Collection Time: 03/11/21  5:08 PM   Result Value Ref Range    Glucose (POC) 163 (H) 65 - 100 mg/dL    Performed by Joshua Krishna Dr, POC    Collection Time: 03/11/21  9:09 PM   Result Value Ref Range    Glucose (POC) 274 (H) 65 - 100 mg/dL    Performed by Indira Springer    GLUCOSE, POC    Collection Time: 03/12/21  8:09 AM   Result Value Ref Range    Glucose (POC) 121 (H) 65 - 100 mg/dL    Performed by Jihan Salmeron          Assessment:     Active Problems:    Major depression (3/8/2021)      Suicide ([address] (3/8/2021)        Plan:     Continue with management    Signed By: Alden Louise MD     March 12, 2021

## 2021-03-13 LAB
GLUCOSE BLD STRIP.AUTO-MCNC: 113 MG/DL (ref 65–100)
GLUCOSE BLD STRIP.AUTO-MCNC: 117 MG/DL (ref 65–100)
GLUCOSE BLD STRIP.AUTO-MCNC: 151 MG/DL (ref 65–100)
GLUCOSE BLD STRIP.AUTO-MCNC: 152 MG/DL (ref 65–100)
PERFORMED BY, TECHID: ABNORMAL

## 2021-03-13 PROCEDURE — 74011636637 HC RX REV CODE- 636/637: Performed by: PSYCHIATRY & NEUROLOGY

## 2021-03-13 PROCEDURE — 82962 GLUCOSE BLOOD TEST: CPT

## 2021-03-13 PROCEDURE — 74011250637 HC RX REV CODE- 250/637: Performed by: PSYCHIATRY & NEUROLOGY

## 2021-03-13 PROCEDURE — 65220000003 HC RM SEMIPRIVATE PSYCH

## 2021-03-13 RX ADMIN — FUROSEMIDE 40 MG: 40 TABLET ORAL at 09:42

## 2021-03-13 RX ADMIN — CLOPIDOGREL BISULFATE 75 MG: 75 TABLET ORAL at 09:42

## 2021-03-13 RX ADMIN — ATORVASTATIN CALCIUM 80 MG: 40 TABLET, FILM COATED ORAL at 21:54

## 2021-03-13 RX ADMIN — LOSARTAN POTASSIUM 25 MG: 25 TABLET ORAL at 09:42

## 2021-03-13 RX ADMIN — INSULIN GLARGINE 50 UNITS: 100 INJECTION, SOLUTION SUBCUTANEOUS at 21:54

## 2021-03-13 RX ADMIN — CARVEDILOL 12.5 MG: 12.5 TABLET, FILM COATED ORAL at 16:53

## 2021-03-13 RX ADMIN — AMLODIPINE BESYLATE 10 MG: 5 TABLET ORAL at 09:42

## 2021-03-13 RX ADMIN — INSULIN HUMAN 20 UNITS: 100 INJECTION, SOLUTION PARENTERAL at 08:00

## 2021-03-13 RX ADMIN — PANTOPRAZOLE SODIUM 40 MG: 40 TABLET, DELAYED RELEASE ORAL at 08:02

## 2021-03-13 RX ADMIN — CARVEDILOL 12.5 MG: 12.5 TABLET, FILM COATED ORAL at 09:42

## 2021-03-13 RX ADMIN — INSULIN HUMAN 20 UNITS: 100 INJECTION, SOLUTION PARENTERAL at 16:53

## 2021-03-13 RX ADMIN — INSULIN GLARGINE 50 UNITS: 100 INJECTION, SOLUTION SUBCUTANEOUS at 09:43

## 2021-03-13 RX ADMIN — OLANZAPINE 5 MG: 5 TABLET, FILM COATED ORAL at 19:51

## 2021-03-13 NOTE — GROUP NOTE
IP  GROUP DOCUMENTATION INDIVIDUAL Group Therapy Note Date: 3/13/2021 Group Start Time: 2362 Group End Time: 1400 Group Topic: Education Group - Inpatient SRM 2 BH NON ACUTE Jesusita Laser 
 
[facility] GROUP DOCUMENTATION GROUP Group Therapy Note Facilitated discussion focused on identifying feelings and their triggers and the changes that need to be made to experience more comfortable feelings and less uncomfortable feelings Attendees: 4 out 13 Attendance: Attended Patient's Goal:  *STG: Attends activities and groups Interventions/techniques: Informed and Supported Follows Directions: Followed directions Interactions: Interacted appropriately Mental Status: Calm Behavior/appearance: Cooperative Goals Achieved: Able to engage in interactions, Able to listen to others, Able to self-disclose, Discussed coping, Identified feelings and Identified triggers Additional Notes:  Receptive to information discussed and reported prior to admission he was feeling angry,hurt, and depressed because \"his stuff was stolen\" Pt reported \"he has to control his anger and taking walks and calling his brother usually help\" Mildred Mcpherson

## 2021-03-13 NOTE — BH NOTES
Patient has been up on the unit. He has been interaction with select peers. He reports he hopes to be discharge tomorrow. He has denied any feelings of depression, anxiety, SI or HI. He stated his sister had gotten his dog from the pound. He was happy about that. He has attend groups. He has remains safe. He has been absence of falls. He has been medication compliant. He remains on close observation.

## 2021-03-13 NOTE — BH NOTES
Patient case discussed in the treatment team  is trying to reach with the patient's mother to get the baseline. Status and also feedback not able to receive C patient seen she is pacing less not doing bizarre behavior such as taking the blouse of but anxious some perplexity but says feeling better some sinus difficulties.   We will give some Claritin poor insight but complying with the medications no side effects agitation attention seeking restlessness is not as intense as when she first came in making some progress no suicidal not homicidal no auditory visual hallucinations no side effects continued inpatient level of care indicated making slow steady progress needing a further stabilization also feedback from mother towards the baseline work, structured supervision she needs staff try to get national counseling service support thank you

## 2021-03-13 NOTE — BH NOTES
Cody Fenton 31-year-old -American-year-old who was anxious about leaving on Sunday. I am following here on this weekend. Discussed that we will continue to follow with his  and review of the electronic dependence of his psychiatrist discussion. Patient wanted to know where he can get access to his belongings. Mental status examination-patient is alert oriented x3 anxious very focused on his belongings and wanting to leave on Sunday.   Denies any active plan of suicide or homicide denies any command hallucinations no persecutory delusions noted at this time    Assessment plan  Continue group therapy no medication changes today Case management to address his concerns of his belongings  Case management to continue to keep me posted regarding any discharge updates from his primary psychiatrist      Current Facility-Administered Medications:     hydrOXYzine HCL (ATARAX) tablet 50 mg, 50 mg, Oral, TID PRN, Tiny Pena MD, 50 mg at 03/11/21 1154    traZODone (DESYREL) tablet 50 mg, 50 mg, Oral, QHS PRN, Tiny Pena MD, 50 mg at 03/09/21 2024    atorvastatin (LIPITOR) tablet 80 mg, 80 mg, Oral, DAILY, Tiny Pena MD, 80 mg at 03/12/21 2049    carvediloL (COREG) tablet 12.5 mg, 12.5 mg, Oral, BID WITH MEALS, Tiny Pena MD, 12.5 mg at 03/13/21 0942    insulin regular (NOVOLIN R, HUMULIN R) injection 20 Units, 20 Units, SubCUTAneous, TIDAC, Suleman West MD, 20 Units at 03/13/21 0800    amLODIPine (NORVASC) tablet 10 mg, 10 mg, Oral, DAILY, Tiny Pena MD, 10 mg at 03/13/21 0942    insulin glargine (LANTUS) injection 50 Units, 50 Units, SubCUTAneous, BID, Tiny Pena MD, 50 Units at 03/13/21 0943    furosemide (LASIX) tablet 40 mg, 40 mg, Oral, DAILY, Suleman Wagner MD, 40 mg at 03/13/21 0942    clopidogreL (PLAVIX) tablet 75 mg, 75 mg, Oral, DAILY, Tiny Pena MD, 75 mg at 03/13/21 0942    losartan (COZAAR) tablet 25 mg, 25 mg, Oral, DAILY, Michael Abad MD, 25 mg at 03/13/21 0942    pantoprazole (PROTONIX) tablet 40 mg, 40 mg, Oral, ACB, Michael Abad MD, 40 mg at 03/13/21 0802    albuterol (PROVENTIL HFA, VENTOLIN HFA, PROAIR HFA) inhaler 2 Puff, 2 Puff, Inhalation, Q4H PRN, Suleman Wagner MD    OLANZapine (ZyPREXA) tablet 5 mg, 5 mg, Oral, Q6H PRN, Estrellita Wagner MD    haloperidol lactate (HALDOL) injection 5 mg, 5 mg, IntraMUSCular, Q6H PRN, Michael Abad MD    benztropine (COGENTIN) tablet 1 mg, 1 mg, Oral, BID PRN, Michael Abad MD    diphenhydrAMINE (BENADRYL) injection 50 mg, 50 mg, IntraMUSCular, BID PRN, Suleman Wagner MD    LORazepam (ATIVAN) injection 1 mg, 1 mg, IntraMUSCular, Q4H PRN, Michael Abad MD    acetaminophen (TYLENOL) tablet 650 mg, 650 mg, Oral, Q4H PRN, Suleman Wagner MD    magnesium hydroxide (MILK OF MAGNESIA) 400 mg/5 mL oral suspension 30 mL, 30 mL, Oral, DAILY PRN, Michael Abad MD

## 2021-03-13 NOTE — PROGRESS NOTES
The progress note dictated at 2237 on March 12, 2021 is better that progress note does not belong to [address] is seen and cleared discussed in the treatment team he is pushing for discharge asking go only going for his selective groups. Up until today he was refusing to give permission to talk to the people he directed. .  Patient is sometimes oppositional does only limited limited. Stays in the bed he started going to some groups.   Encouraged to go to more groups cooperate and also make sure he gives permission to the  to contact the people he threatened but also where he intent to stay in follow-up for a follow-up he reluctantly agreed that if he attends all the groups most of the groups and cooperates and all this accomplished he can be discharged on Sunday the 14th I have sent the prescription to the pharmacy

## 2021-03-13 NOTE — GROUP NOTE
[facility] GROUP DOCUMENTATION INDIVIDUAL Group Therapy Note Date: 3/13/2021 Group Start Time: 1100 Group End Time: 1200 Group Topic: Process Group - Inpatient [facility] Tameka Gallardo R 
 
[facility] GROUP DOCUMENTATION GROUP Group Therapy Note Attendees: Patients asked to share about how they came to the hospital, how they are feeling, about their current situations, and about the things that have been on their minds and bothering them,  patients asked to share openly and honestly. Patients asked to be respectful and supportive of their peers. Themes surrounding trust and communication emerged and were discussed. Attendance: Attended Patient's Goal:  Verbalizes anger, guilt, and other feelings in a constructive manor Interventions/techniques: Validated, Promoted peer support, Reinforced and Supported Follows Directions: Followed directions Interactions: Interacted appropriately Mental Status: Blunted, Calm and Suspicious Behavior/appearance: Attentive, Disheveled, Grooming impaired and Negative Goals Achieved: Able to engage in interactions, Able to listen to others, Able to give feedback to another, Able to reflect/comment on own behavior, Able to manage/cope with feelings, Able to receive feedback, Able to experience relief/decrease in symptoms, Able to self-disclose, Discussed coping, Discussed discharge plans, Identified feelings and Identified triggers Additional Notes:  Pt attended group and was engaged. Pt shared about being anxious to leave tomorrow. Pt shared that he is having a hard time trusting that his  was telling him the truth when he told pt that he would be leaving on Sunday. [place] Speak

## 2021-03-13 NOTE — PROGRESS NOTES
Progress Note  Date:3/12/2021       Room:[facility]  Patient Name:Alvaro James     YOB: 1970     Age:50 y.o. Subjective    Subjective   Review of Systems  Objective         Vitals Last 24 Hours:  TEMPERATURE:  Temp  Av.1 °F (36.7 °C)  Min: 97.9 °F (36.6 °C)  Max: 98.3 °F (36.8 °C)  RESPIRATIONS RANGE: Resp  Av  Min: 18  Max: 18  PULSE OXIMETRY RANGE: No data recorded  PULSE RANGE: Pulse  Av.3  Min: 83  Max: 90  BLOOD PRESSURE RANGE: Systolic (12REU), KTA:932 , Min:126 , ROBIN:059   ; Diastolic (50LGS), VSN:65, Min:64, Max:74    I/O (24Hr): No intake or output data in the 24 hours ending 21  Objective  Labs/Imaging/Diagnostics    Labs:  CBC:No results for input(s): WBC, RBC, HGB, HCT, MCV, RDW, PLT, HGBEXT, HCTEXT, PLTEXT in the last 72 hours. CHEMISTRIES:No results for input(s): NA, K, CL, CO2, BUN, CA, PHOS, MG in the last 72 hours. No lab exists for component: CREATININE, GLUCOSEPT/INR:No results for input(s): INR, INREXT in the last 72 hours. No lab exists for component: PROTIME  APTT:No results for input(s): APTT in the last 72 hours. LIVER PROFILE:No results for input(s): AST, ALT in the last 72 hours. No lab exists for component: Benoit Yun ALKPHOS  Lab Results   Component Value Date/Time    ALT (SGPT) 26 2021 01:31 PM    AST (SGOT) 13 (L) 2021 01:31 PM    Alk. phosphatase 108 2021 01:31 PM    Bilirubin, direct 0.2 2020 10:29 AM    Bilirubin, total 0.3 2021 01:31 PM       Imaging Last 24 Hours:  No results found.   Assessment//Plan   Active Problems:    Major depression (3/8/2021)      Suicide ([address] (3/8/2021)      Assessment & Plan    Electronically signed by Marcie Isbell MD on 3/12/2021 at 10:37 PM

## 2021-03-13 NOTE — PROGRESS NOTES
Problem: Depressed Mood (Adult/Pediatric)  Goal: *STG: Participates in treatment plan  Outcome: Progressing Towards Goal  Goal: *STG: Attends activities and groups  Outcome: Progressing Towards Goal  Goal: *STG: Remains safe in hospital  Outcome: Progressing Towards Goal  Goal: *STG: Complies with medication therapy  Outcome: Progressing Towards Goal     Problem: Diabetes Self-Management  Goal: *Using medications safely  Description: State effect of diabetes medications on diabetes; name diabetes medication taking, action and side effects. Outcome: Progressing Towards Goal  Goal: *Monitoring blood glucose, interpreting and using results  Description: Identify recommended blood glucose targets  and personal targets.   Outcome: Progressing Towards Goal

## 2021-03-14 LAB
GLUCOSE BLD STRIP.AUTO-MCNC: 121 MG/DL (ref 65–100)
GLUCOSE BLD STRIP.AUTO-MCNC: 202 MG/DL (ref 65–100)
GLUCOSE BLD STRIP.AUTO-MCNC: 212 MG/DL (ref 65–100)
GLUCOSE BLD STRIP.AUTO-MCNC: 240 MG/DL (ref 65–100)
PERFORMED BY, TECHID: ABNORMAL

## 2021-03-14 PROCEDURE — 65220000003 HC RM SEMIPRIVATE PSYCH

## 2021-03-14 PROCEDURE — 74011250637 HC RX REV CODE- 250/637: Performed by: PSYCHIATRY & NEUROLOGY

## 2021-03-14 PROCEDURE — 74011636637 HC RX REV CODE- 636/637: Performed by: PSYCHIATRY & NEUROLOGY

## 2021-03-14 PROCEDURE — 82962 GLUCOSE BLOOD TEST: CPT

## 2021-03-14 RX ADMIN — LOSARTAN POTASSIUM 25 MG: 25 TABLET ORAL at 09:06

## 2021-03-14 RX ADMIN — CARVEDILOL 12.5 MG: 12.5 TABLET, FILM COATED ORAL at 17:19

## 2021-03-14 RX ADMIN — CARVEDILOL 12.5 MG: 12.5 TABLET, FILM COATED ORAL at 09:15

## 2021-03-14 RX ADMIN — INSULIN GLARGINE 50 UNITS: 100 INJECTION, SOLUTION SUBCUTANEOUS at 21:20

## 2021-03-14 RX ADMIN — FUROSEMIDE 40 MG: 40 TABLET ORAL at 09:06

## 2021-03-14 RX ADMIN — TRAZODONE HYDROCHLORIDE 50 MG: 50 TABLET ORAL at 21:20

## 2021-03-14 RX ADMIN — PANTOPRAZOLE SODIUM 40 MG: 40 TABLET, DELAYED RELEASE ORAL at 08:28

## 2021-03-14 RX ADMIN — INSULIN HUMAN 20 UNITS: 100 INJECTION, SOLUTION PARENTERAL at 08:26

## 2021-03-14 RX ADMIN — AMLODIPINE BESYLATE 10 MG: 5 TABLET ORAL at 09:06

## 2021-03-14 RX ADMIN — INSULIN HUMAN 20 UNITS: 100 INJECTION, SOLUTION PARENTERAL at 17:20

## 2021-03-14 RX ADMIN — HYDROXYZINE HYDROCHLORIDE 50 MG: 50 TABLET, FILM COATED ORAL at 21:21

## 2021-03-14 RX ADMIN — INSULIN HUMAN 20 UNITS: 100 INJECTION, SOLUTION PARENTERAL at 12:26

## 2021-03-14 RX ADMIN — CLOPIDOGREL BISULFATE 75 MG: 75 TABLET ORAL at 09:06

## 2021-03-14 RX ADMIN — INSULIN GLARGINE 50 UNITS: 100 INJECTION, SOLUTION SUBCUTANEOUS at 09:05
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
## 2021-03-14 NOTE — BH NOTES
Mr. Molly Hilario reports he feels he is ready to be discharged today. He denies any active plan of suicide or homicide. He states he is planning discharge time was at 1 PM today. It is a note from Dr. Jillian Cheney note discharge planning was scheduled for Sunday.       Current Facility-Administered Medications:     hydrOXYzine HCL (ATARAX) tablet 50 mg, 50 mg, Oral, TID PRN, Dory Jordan MD, 50 mg at 03/11/21 1154    traZODone (DESYREL) tablet 50 mg, 50 mg, Oral, QHS PRN, Dory Jordan MD, 50 mg at 03/09/21 2024    atorvastatin (LIPITOR) tablet 80 mg, 80 mg, Oral, DAILY, Dory Jordan MD, 80 mg at 03/13/21 2154    carvediloL (COREG) tablet 12.5 mg, 12.5 mg, Oral, BID WITH MEALS, Dory Jordan MD, 12.5 mg at 03/14/21 0915    insulin regular (NOVOLIN R, HUMULIN R) injection 20 Units, 20 Units, SubCUTAneous, TIDAC, Suleman Lyons MD, 20 Units at 03/14/21 1226    amLODIPine (NORVASC) tablet 10 mg, 10 mg, Oral, DAILY, Dory Jordan MD, 10 mg at 03/14/21 0906    insulin glargine (LANTUS) injection 50 Units, 50 Units, SubCUTAneous, BID, oDry Jordan MD, 50 Units at 03/14/21 0905    furosemide (LASIX) tablet 40 mg, 40 mg, Oral, DAILY, Dory Jrodan MD, 40 mg at 03/14/21 0906    clopidogreL (PLAVIX) tablet 75 mg, 75 mg, Oral, DAILY, Dory Jordan MD, 75 mg at 03/14/21 0906    losartan (COZAAR) tablet 25 mg, 25 mg, Oral, DAILY, Dory Jordan MD, 25 mg at 03/14/21 0906    pantoprazole (PROTONIX) tablet 40 mg, 40 mg, Oral, ACB, Dory Jordan MD, 40 mg at 03/14/21 0828    albuterol (PROVENTIL HFA, VENTOLIN HFA, PROAIR HFA) inhaler 2 Puff, 2 Puff, Inhalation, Q4H PRN, Suleman Wagner MD    OLANZapine (ZyPREXA) tablet 5 mg, 5 mg, Oral, Q6H PRN, Dory Jordan MD, 5 mg at 03/13/21 1951    haloperidol lactate (HALDOL) injection 5 mg, 5 mg, IntraMUSCular, Q6H PRN, Dory Jordan MD    benztropine (COGENTIN) tablet 1 mg, 1 mg, Oral, BID PRN, Dory Jordan MD  [address] [place] [address] diphenhydrAMINE (BENADRYL) injection 50 mg, 50 mg, IntraMUSCular, BID PRN, Suleman Wagner MD    LORazepam (ATIVAN) injection 1 mg, 1 mg, IntraMUSCular, Q4H PRN, Diogo Santizo MD    acetaminophen (TYLENOL) tablet 650 mg, 650 mg, Oral, Q4H PRN, Suleman Wagner MD    magnesium hydroxide (MILK OF MAGNESIA) 400 mg/5 mL oral suspension 30 mL, 30 mL, Oral, DAILY PRN, Diogo Santizo MD

## 2021-03-14 NOTE — PROGRESS NOTES
Problem: Depressed Mood (Adult/Pediatric)  Goal: *STG: Verbalizes anger, guilt, and other feelings in a constructive manor  Outcome: Progressing Towards Goal     Problem: Depressed Mood (Adult/Pediatric)  Goal: *STG: Remains safe in hospital  Outcome: Progressing Towards Goal     Problem: Depressed Mood (Adult/Pediatric)  Goal: *STG: Complies with medication therapy  Outcome: Progressing Towards Goal     Patient has been able to express his emotions. Patient has remained safe this shift. Patient was medication compliant. Patient remains on close observation. Patient has been alert, oriented, cooperative and pleasant. Patient was upset by a peer early in the shift and he went to bed early after receiving a PRN Zyprexa. Patient was medication compliant. His accuchek was in the 150s   He said he was \"ready to get the hell out of here. \"   He said his mood is [place]. \"  He denied any depression, anxiety, SI or HI. He said he was \"happy. \"  Continue to assess. Since going to bed, patient has been laying down quietly with his eyes closed.
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
## 2021-03-14 NOTE — BH NOTES
Patient has been up for meals,=. He has to be reminded that a accurek is needed. ,He reported being happy about being discharge today. He continued to deny any feeling of depression, anxiety, SI or HI when asked. He has been talking on the phone @ intervals. Pt. talked with the case manger  after lunch regarding discharge . He became up set & starting yelling. He has been queitly hostile @ intervals after that. He remains medication compliant. He has remains safe. He remains on close observation.

## 2021-03-14 NOTE — GROUP NOTE
CATHERINE  GROUP DOCUMENTATION INDIVIDUAL Group Therapy Note Date: 3/14/2021 Group Start Time: 0854 Group End Time: 1200 Group Topic: Process Group - Inpatient SRM 2  NON ACUTE Author Leni ALEXANDER  GROUP DOCUMENTATION GROUP Group Therapy Note Patient's were encouraged to participate in check in activity to discuss the most difficult part of the last 7 days and a way that they have grown or something they have learned in the last 7 days. The patient were encouraged to share events leading to admission, experience at [facility], and any information they feel like sharing with peers. Patient were encouraged to provide each other with feedback, support, and problem solving. Patient were encouraged to reflect on group participation. Attendees: 10/15 Attendance: Attended Patient's Goal:  Patient reported that his goals is to get out of the hospital 
 
Interventions/techniques: Informed, Promoted peer support and Supported Follows Directions: Followed directions Interactions: Interacted appropriately Mental Status: Elevated Behavior/appearance: Attentive and Motivated Goals Achieved: Able to engage in interactions, Able to listen to others and Identified feelings Additional Notes:  Patient shared that he is working on trust, trusting this worker and trusting his doctor because he was informed that he would be discharging today and is focused on that happening. The patient provided feedback to peers in group and presented appropriately to group. Patient was encouraged to continue to discuss discharge plan with this worker and physicianGeorgina Garg

## 2021-03-14 NOTE — SUICIDE SAFETY PLAN
SAFETY PLAN    A suicide Safety Plan is a document that supports someone when they are having thoughts of suicide. Warning Signs that indicate a suicidal crisis may be developing: What (situations, thoughts, feelings, body sensations, behaviors, etc.) do you experience that lets you know you are beginning to think about suicide? 1. Crying  2. Angry with landlord  3. Suspicious of others and feeling threatened    Internal Coping Strategies:  What things can I do (relaxation techniques, hobbies, physical activities, etc.) to take my mind off my problems without contacting another person? 1. Sleep  2. Playing with dogs  3. Walking    People and social settings that provide distraction: Who can I call or where can I go to distract me? 1. Name: YOVANNY Duncan  Phone: does not have #  2. Name: Mrs. Garcia  Phone: [phone]   3. Place: [place] the house            4. Place: Walking the neighborhood    People whom I can ask for help: Who can I call when I need help - for example, friends, family, clergy, someone else? 1. Name: Mrs. Garcia                Phone: [phone]  2. Name:Aunt Mrs. Hemphill  Phone: in phone  3. Name: John Arroyo. (Brother)  Phone: reports no number and that he contact brother at aunts home. Professionals or [address] [place] [address] I can contact during a crisis: Who can I call for help - for example, my doctor, my psychiatrist, my psychologist, a mental health provider, a suicide hotline? 1. Clinician Name: [facility]   Phone: [phone]      Clinician Pager or Emergency Contact #: [phone]    2. Clinician Name: D-19   Phone: [phone]      Clinician Pager or Emergency Contact #: [phone]    3. Suicide Prevention Lifeline: 4-584-834-TALK (5323)    4. [address] [place] [address] Emergency Services -  for example, [facility], [facility] suicide hotline, [facility] Hotline: D-19      Emergency Services Address: [address] [place] [address] Emergency Services Phone: [phone] the environment safe: How can I make my environment (house/apartment/living space) safer? For example, can I remove guns, medications, and other items? 1. Move in with Mrs. Garcia  2. Take medications as prescribed (a requirement of moving in with Mrs. Garcia Antis

## 2021-03-15 VITALS
WEIGHT: 158.95 LBS | DIASTOLIC BLOOD PRESSURE: 78 MMHG | TEMPERATURE: 98.1 F | HEIGHT: 65 IN | RESPIRATION RATE: 18 BRPM | SYSTOLIC BLOOD PRESSURE: 122 MMHG | HEART RATE: 93 BPM | BODY MASS INDEX: 26.48 KG/M2 | OXYGEN SATURATION: 96 %

## 2021-03-15 LAB
GLUCOSE BLD STRIP.AUTO-MCNC: 169 MG/DL (ref 65–100)
GLUCOSE BLD STRIP.AUTO-MCNC: 173 MG/DL (ref 65–100)
PERFORMED BY, TECHID: ABNORMAL
PERFORMED BY, TECHID: ABNORMAL

## 2021-03-15 PROCEDURE — 74011636637 HC RX REV CODE- 636/637: Performed by: PSYCHIATRY & NEUROLOGY

## 2021-03-15 PROCEDURE — 74011250637 HC RX REV CODE- 250/637: Performed by: PSYCHIATRY & NEUROLOGY

## 2021-03-15 PROCEDURE — 82962 GLUCOSE BLOOD TEST: CPT

## 2021-03-15 RX ADMIN — CARVEDILOL 12.5 MG: 12.5 TABLET, FILM COATED ORAL at 08:33

## 2021-03-15 RX ADMIN — INSULIN HUMAN 20 UNITS: 100 INJECTION, SOLUTION PARENTERAL at 11:20

## 2021-03-15 RX ADMIN — AMLODIPINE BESYLATE 10 MG: 5 TABLET ORAL at 08:33

## 2021-03-15 RX ADMIN — FUROSEMIDE 40 MG: 40 TABLET ORAL at 08:33

## 2021-03-15 RX ADMIN — LOSARTAN POTASSIUM 25 MG: 25 TABLET ORAL at 08:33

## 2021-03-15 RX ADMIN — CLOPIDOGREL BISULFATE 75 MG: 75 TABLET ORAL at 08:33

## 2021-03-15 RX ADMIN — INSULIN GLARGINE 50 UNITS: 100 INJECTION, SOLUTION SUBCUTANEOUS at 08:29

## 2021-03-15 RX ADMIN — PANTOPRAZOLE SODIUM 40 MG: 40 TABLET, DELAYED RELEASE ORAL at 08:33

## 2021-03-15 RX ADMIN — INSULIN HUMAN 20 UNITS: 100 INJECTION, SOLUTION PARENTERAL at 08:30

## 2021-03-15 NOTE — BH NOTES
Patient alert and oriented. Up to dining room for meals today. Attended group. Denies SI/HI/AH/VH. Took medications and insulin coverage as ordered. Ambulating in hallway and in room. Patient signed and verified belongings from safe and storage all acknowledged. Reviewed discharge instructions and medications with patient, patient stated understanding. Escorted to [facility] transportation [facility] with all belongings, discharged at 1.

## 2021-03-15 NOTE — PROGRESS NOTES
Problem: Patient Education: Go to Patient Education Activity  Goal: Patient/Family Education  Outcome: Progressing Towards Goal     Problem: Depressed Mood (Adult/Pediatric)  Goal: *STG: Participates in treatment plan  Outcome: Progressing Towards Goal
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
## 2021-03-15 NOTE — GROUP NOTE
CATHERINE  GROUP DOCUMENTATION INDIVIDUAL Group Therapy Note Date: 3/15/2021 Group Start Time: 1100 Group End Time: 7016 Group Topic: Process Group - Inpatient [facility] CARE MANAGEMENT iFona Alva LCSW [facility] GROUP DOCUMENTATION GROUP Group Therapy Note Pts participated in process group therapy. Pts were asked to discuss how they were feeling and how their weekend was. Pts then discussed their feelings, thoughts and emotions in regards to treatment. Pts were encouraged to share and to give feedback to peers. Attendees: 9/15 Attendance: Did not attend Lien Lepe LCSW
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
## 2021-03-15 NOTE — BH NOTES
Behavioral Health Transition Record to Provider Patient Name: Raymond Kim YOB: 1970 Medical Record Number: [id number] Date of Admission: 3/7/2021 Date of Discharge: 3/15/2021 Attending Provider: Shanta Arias MD 
Discharging Provider: Dr. Alan Closs To contact this individual call *** and ask the  to page. If unavailable, ask to be transferred to [address] [place] [address] Provider on call. [facility] Provider will be available on call 24/7 and during holidays. Primary Care Provider: Joana Frey MD 
 
Allergies Allergen Reactions  Acetaminophen Unknown (comments), Anaphylaxis and Shortness of Breath Other reaction(s): anaphylaxis/angioedema Other reaction(s): Other (see comments) Other reaction(s): anaphylaxis/angioedema Throat swelling  Aspirin Hives, Anaphylaxis and Shortness of Breath Other reaction(s): anaphylaxis/angioedema Other reaction(s): anaphylaxis/angioedema  Unable To Obtain Unable to Obtain Patient states his allergic to greens  Aspirin Shortness of Breath  Spinach Leaf Unknown (comments) Evie Dice  Tylenol [Acetaminophen] Shortness of Breath Reason for Admission: *** Admission Diagnosis: Major depression [F32.9] Suicide ([facility] [address] Adriana Rodrigez * No surgery found * Results for orders placed or performed during the hospital encounter of 03/07/21 SARS-COV-2 Result Value Ref Range SARS-CoV-2 Not Detected Not Detected CBC WITH AUTOMATED DIFF Result Value Ref Range WBC 8.6 4.1 - 11.1 K/uL  
 RBC 4.95 4.10 - 5.70 M/uL  
 HGB 14.9 12.1 - 17.0 g/dL HCT 45.4 36.6 - 50.3 % MCV 91.7 80.0 - 99.0 FL  
 MCH 30.1 26.0 - 34.0 PG  
 MCHC 32.8 30.0 - 36.5 g/dL  
 RDW 15.1 (H) 11.5 - 14.5 % PLATELET 580 637 - 008 K/uL MPV 11.3 8.9 - 12.9 FL  
 NEUTROPHILS 78 (H) 32 - 75 % LYMPHOCYTES 13 12 - 49 % MONOCYTES 6 5 - 13 % EOSINOPHILS 1 0 - 7 % BASOPHILS 1 0 - 1 %  IMMATURE GRANULOCYTES 1 (H) 0.0 - 0.5 % ABS. NEUTROPHILS 6.8 1.8 - 8.0 K/UL  
 ABS. LYMPHOCYTES 1.1 0.8 - 3.5 K/UL  
 ABS. MONOCYTES 0.5 0.0 - 1.0 K/UL  
 ABS. EOSINOPHILS 0.1 0.0 - 0.4 K/UL  
 ABS. BASOPHILS 0.1 0.0 - 0.1 K/UL  
 ABS. IMM. GRANS. 0.1 (H) 0.00 - 0.04 K/UL  
 DF AUTOMATED METABOLIC PANEL, COMPREHENSIVE Result Value Ref Range Sodium 139 136 - 145 mmol/L Potassium 3.7 3.5 - 5.1 mmol/L Chloride 104 97 - 108 mmol/L  
 CO2 27 21 - 32 mmol/L Anion gap 8 5 - 15 mmol/L Glucose 223 (H) 65 - 100 mg/dL BUN 13 6 - 20 mg/dL Creatinine 1.29 0.70 - 1.30 mg/dL BUN/Creatinine ratio 10 (L) 12 - 20 GFR est AA >60 >60 ml/min/1.73m2 GFR est non-AA 59 (L) >60 ml/min/1.73m2 Calcium 9.7 8.5 - 10.1 mg/dL Bilirubin, total 0.5 0.2 - 1.0 mg/dL AST (SGOT) 11 (L) 15 - 37 U/L  
 ALT (SGPT) 25 12 - 78 U/L Alk. phosphatase 114 45 - 117 U/L Protein, total 9.0 (H) 6.4 - 8.2 g/dL Albumin 4.2 3.5 - 5.0 g/dL Globulin 4.8 (H) 2.0 - 4.0 g/dL A-G Ratio 0.9 (L) 1.1 - 2.2 URINALYSIS W/ REFLEX CULTURE Specimen: Urine Result Value Ref Range Color Yellow/Straw Appearance Clear Clear Specific gravity 1.027 1.003 - 1.030    
 pH (UA) 5.0 5.0 - 8.0 Protein >300 (A) Negative mg/dL Glucose >300 (A) Negative mg/dL Ketone 20 (A) Negative mg/dL Bilirubin Negative Negative Blood Negative Negative Urobilinogen 2.0 (H) 0.1 - 1.0 EU/dL Nitrites Negative Negative Leukocyte Esterase Negative Negative UA:UC IF INDICATED Culture not indicated by UA result Culture not indicated by UA result WBC 0-4 0 - 4 /hpf  
 RBC 0-5 0 - 5 /hpf Bacteria Negative Negative /hpf Mucus Trace /lpf ETHYL ALCOHOL Result Value Ref Range ALCOHOL(ETHYL),SERUM <4 <10 mg/dL DRUG SCREEN, URINE Result Value Ref Range AMPHETAMINES Negative Negative BARBITURATES Negative Negative BENZODIAZEPINES Negative Negative COCAINE Negative Negative  METHADONE Negative Negative OPIATES Negative Negative PCP(PHENCYCLIDINE) Negative Negative THC (TH-CANNABINOL) Positive (A) Negative Drug screen comment This test is a screen for drugs of abuse in a medical setting only (i.e., they are unconfirmed results and as such must not be used for non-medical purposes, e.g.,employment testing, legal testing). Due to its inherent nature, false positive (FP) and false negative (FN) results may be obtained. Therefore, if necessary for medical care, recommend confirmation of positive findings by GC/MS. SARS-COV-2 Result Value Ref Range SARS-CoV-2 Please find results under separate order METABOLIC PANEL, COMPREHENSIVE Result Value Ref Range Sodium 141 136 - 145 mmol/L Potassium 3.8 3.5 - 5.1 mmol/L Chloride 107 97 - 108 mmol/L  
 CO2 24 21 - 32 mmol/L Anion gap 10 5 - 15 mmol/L Glucose 123 (H) 65 - 100 mg/dL BUN 21 (H) 6 - 20 mg/dL Creatinine 1.65 (H) 0.70 - 1.30 mg/dL BUN/Creatinine ratio 13 12 - 20 GFR est AA 54 (L) >60 ml/min/1.73m2 GFR est non-AA 45 (L) >60 ml/min/1.73m2 Calcium 9.1 8.5 - 10.1 mg/dL Bilirubin, total 0.3 0.2 - 1.0 mg/dL AST (SGOT) 13 (L) 15 - 37 U/L  
 ALT (SGPT) 26 12 - 78 U/L Alk. phosphatase 108 45 - 117 U/L Protein, total 7.5 6.4 - 8.2 g/dL Albumin 3.3 (L) 3.5 - 5.0 g/dL Globulin 4.2 (H) 2.0 - 4.0 g/dL A-G Ratio 0.8 (L) 1.1 - 2.2    
CBC W/O DIFF Result Value Ref Range WBC 6.9 4.1 - 11.1 K/uL  
 RBC 4.66 4.10 - 5.70 M/uL  
 HGB 14.4 12.1 - 17.0 g/dL HCT 42.9 36.6 - 50.3 % MCV 92.1 80.0 - 99.0 FL  
 MCH 30.9 26.0 - 34.0 PG  
 MCHC 33.6 30.0 - 36.5 g/dL  
 RDW 15.4 (H) 11.5 - 14.5 % PLATELET 769 071 - 185 K/uL MPV 11.2 8.9 - 12.9 FL  
TSH 3RD GENERATION Result Value Ref Range TSH 0.67 0.36 - 3.74 uIU/mL  
LIPID PANEL Result Value Ref Range LIPID PROFILE Cholesterol, total 217 (H) <200 mg/dL Triglyceride 379 (H) <150 mg/dL  HDL Cholesterol 40 mg/dL LDL, calculated 101.2 (H) 0 - 100 mg/dL VLDL, calculated 75.8 mg/dL CHOL/HDL Ratio 5.4 (H) 0.0 - 5.0 GLUCOSE, POC Result Value Ref Range Glucose (POC) 289 (H) 65 - 100 mg/dL Performed by Rolley [place] GLUCOSE, POC Result Value Ref Range Glucose (POC) 264 (H) 65 - 100 mg/dL Performed by [employer] [place] GLUCOSE, POC Result Value Ref Range Glucose (POC) 161 (H) 65 - 100 mg/dL Performed by [employer] [place] GLUCOSE, POC Result Value Ref Range Glucose (POC) 201 (H) 65 - 100 mg/dL Performed by Reginald Norwood GLUCOSE, POC Result Value Ref Range Glucose (POC) 257 (H) 65 - 100 mg/dL Performed by Caralee Peto GLUCOSE, POC Result Value Ref Range Glucose (POC) 266 (H) 65 - 100 mg/dL Performed by Caralee Peto GLUCOSE, POC Result Value Ref Range Glucose (POC) 263 (H) 65 - 100 mg/dL Performed by Caralee Peto GLUCOSE, POC Result Value Ref Range Glucose (POC) 324 (H) 65 - 100 mg/dL Performed by Jayme Ady GLUCOSE, POC Result Value Ref Range Glucose (POC) 187 (H) 65 - 100 mg/dL Performed by Leafy Nanas GLUCOSE, POC Result Value Ref Range Glucose (POC) 242 (H) 65 - 100 mg/dL Performed by Leafy Nanas GLUCOSE, POC Result Value Ref Range Glucose (POC) 312 (H) 65 - 100 mg/dL Performed by Jayme Ady GLUCOSE, POC Result Value Ref Range Glucose (POC) 72 65 - 100 mg/dL Performed by Caralee Peto GLUCOSE, POC Result Value Ref Range Glucose (POC) 169 (H) 65 - 100 mg/dL Performed by Caralee Peto GLUCOSE, POC Result Value Ref Range Glucose (POC) 163 (H) 65 - 100 mg/dL Performed by Caralee Peto GLUCOSE, POC Result Value Ref Range Glucose (POC) 274 (H) 65 - 100 mg/dL Performed by Leticia Das GLUCOSE, POC Result Value Ref Range Glucose (POC) 121 (H) 65 - 100 mg/dL Performed by Denise Vasquez GLUCOSE, POC Result Value Ref Range Glucose (POC) 144 (H) 65 - 100 mg/dL Performed by Jihan Cheek GLUCOSE, POC Result Value Ref Range Glucose (POC) 226 (H) 65 - 100 mg/dL Performed by Jihan Cheek GLUCOSE, POC Result Value Ref Range Glucose (POC) 174 (H) 65 - 100 mg/dL Performed by Aline Shannon GLUCOSE, POC Result Value Ref Range Glucose (POC) 151 (H) 65 - 100 mg/dL Performed by [facility] GLUCOSE, POC Result Value Ref Range Glucose (POC) 117 (H) 65 - 100 mg/dL Performed by [facility] GLUCOSE, POC Result Value Ref Range Glucose (POC) 113 (H) 65 - 100 mg/dL Performed by [facility] GLUCOSE, POC Result Value Ref Range Glucose (POC) 152 (H) 65 - 100 mg/dL Performed by Sheila Pacheco GLUCOSE, POC Result Value Ref Range Glucose (POC) 121 (H) 65 - 100 mg/dL Performed by [facility] GLUCOSE, POC Result Value Ref Range Glucose (POC) 202 (H) 65 - 100 mg/dL Performed by Sabrina Real GLUCOSE, POC Result Value Ref Range Glucose (POC) 212 (H) 65 - 100 mg/dL Performed by [facility] GLUCOSE, POC Result Value Ref Range Glucose (POC) 240 (H) 65 - 100 mg/dL Performed by [facility] GLUCOSE, POC Result Value Ref Range Glucose (POC) 173 (H) 65 - 100 mg/dL Performed by Ruth Neumann GLUCOSE, POC Result Value Ref Range Glucose (POC) 169 (H) 65 - 100 mg/dL Performed by Radha Birch Immunizations administered during this encounter: There is no immunization history on file for this patient. Screening for Metabolic Disorders for Patients on Antipsychotic Medications 
(Data obtained from the EMR) Estimated Body Mass Index Estimated body mass index is 26.45 kg/m² as calculated from the following: 
  Height as of this encounter: 5' 5\" (1.651 m). Weight as of this encounter: 72.1 kg (158 lb 15.2 oz). Vital Signs/Blood Pressure Visit Vitals /78 (BP 1 Location: Left upper arm, BP Patient Position: Supine) Pulse 93 Temp 98.1 °F (36.7 °C) Resp 18 Ht 5' 5\" (1.651 m) Wt 72.1 kg (158 lb 15.2 oz) SpO2 96% BMI 26.45 kg/m² Blood Glucose/Hemoglobin A1c Lab Results Component Value Date/Time Glucose 123 (H) 03/09/2021 01:31 PM  
 Glucose (POC) 169 (H) 03/15/2021 11:14 AM  
 
 
Lab Results Component Value Date/Time Hemoglobin A1c 9.4 (H) 08/11/2020 10:29 AM  
 
  
Lipid Panel Lab Results Component Value Date/Time Cholesterol, total 217 (H) 03/09/2021 01:31 PM  
 HDL Cholesterol 40 03/09/2021 01:31 PM  
 LDL, calculated 101.2 (H) 03/09/2021 01:31 PM  
 Triglyceride 379 (H) 03/09/2021 01:31 PM  
 CHOL/HDL Ratio 5.4 (H) 03/09/2021 01:31 PM  
 
  
Discharge Diagnosis: *** Discharge Plan: *** Discharge Medication List and Instructions:  
Current Discharge Medication List  
  
START taking these medications Details  
insulin glargine (LANTUS) 100 unit/mL injection Caution: Long Acting Insulin. DO NOT HOLD without physician order. DO NOT MIX or dilute with any other insulin. Qty: 1 Vial, Refills: 0  
  
traZODone (DESYREL) 50 mg tablet Take 1 Tab by mouth nightly as needed for Sleep (For insomnia). Qty: 30 Tab, Refills: 0 CONTINUE these medications which have CHANGED Details  
albuterol (PROVENTIL HFA, VENTOLIN HFA, PROAIR HFA) 90 mcg/actuation inhaler Take 2 Puffs by inhalation every four (4) hours as needed for Wheezing or Shortness of Breath. Qty: 1 Inhaler, Refills: 0  
  
amLODIPine (NORVASC) 10 mg tablet Take 1 Tab by mouth daily. Qty: 30 Tab, Refills: 0  
  
carvediloL (COREG) 12.5 mg tablet Take 1 Tab by mouth two (2) times daily (with meals). Qty: 60 Tab, Refills: 0  
  
insulin regular (NOVOLIN R, HUMULIN R) 100 unit/mL injection Ten minutes prior to each meal. 
Qty: 1 Vial, Refills: 0  
  
losartan (COZAAR) 25 mg tablet Take 1 Tab by mouth daily.  
Qty: 30 Tab, Refills: 0  
  
pantoprazole (PROTONIX) 40 mg tablet Take 1 Tab by mouth Daily (before breakfast). Qty: 30 Tab, Refills: 0 STOP taking these medications  
  
 insulin glargine (Lantus Solostar U-100 Insulin) 100 unit/mL (3 mL) inpn Comments:  
Reason for Stopping:   
   
  
 
 
Unresulted Labs (24h ago, onward) None To obtain results of studies pending at discharge, please contact *** Follow-up Information Follow up With Specialties Details Why Contact Info Mal Bowie MD Family Medicine   [address] [place] [address] [address] [facility] 
[phone] [place] [facility] [place]  Call Call to schedule appointmen. joie Cornell [address] [facility], [facility] 
[phone] [facility]   will o  
[phone] Advanced Directive:  
Does the patient have an appointed surrogate decision maker? {Yes/No Caps:41695} Does the patient have a Medical Advance Directive? {YES (DEF)/NO:84394} Does the patient have a Psychiatric Advance Directive? {YES (DEF)/NO:54737} If the patient does not have a surrogate or Medical Advance Directive AND Psychiatric Advance Directive, the patient was offered information on these advance directives {Yes/Declined:62093} Patient Instructions: Please continue all medications until otherwise directed by physician.  *** Tobacco Cessation Discharge Plan:  
Is the patient a smoker and needs referral for smoking cessation? {YES/NO:26325} Patient referred to the following for smoking cessation with an appointment? {YES/NO:26352} Patient was offered medication to assist with smoking cessation at discharge? {YES/NO:24817} Was education for smoking cessation added to the discharge instructions? {YES/NO:50859} Alcohol/Substance Abuse Discharge Plan:  
Does the patient have a history of substance/alcohol abuse and requires a referral for treatment? {YES/NO:35918} Patient referred to the following for substance/alcohol abuse treatment with an appointment? {YES/NO:86257} Patient was offered medication to assist with alcohol cessation at discharge? {YES/NO:23423} Was education for substance/alcohol abuse added to discharge instructions? {YES/NO:88478} Patient discharged to [facility] Dischage disposition:58422}

## 2021-03-15 NOTE — GROUP NOTE
[facility] GROUP DOCUMENTATION INDIVIDUAL Group Therapy Note Date: 3/15/2021 Group Start Time: 7263 Group End Time: 1400 Group Topic: Education Group - Inpatient SRM 2  NON ACUTE Mitali Mchugh [facility] GROUP DOCUMENTATION GROUP Group Therapy Note Facilitated discussion focused on Strength exploration and understanding how they are used and learning new ways to apply them Attendees: 12/15 Attendance: Attended Patient's Goal:  Attend groups daily Interventions/techniques: Validated, Promoted peer support, Provide feedback and Supported Follows Directions: Followed directions Interactions: Interacted appropriately Mental Status: Calm Behavior/appearance: Attentive and Cooperative Goals Achieved: Able to engage in interactions, Able to listen to others, Able to give feedback to another, Able to reflect/comment on own behavior, Able to self-disclose and Discussed coping Additional Notes:  Pt was receptive to intervention and identified wisdom and leadership as current strengths. Pt wants to finish school and have his own business. Pt reported listening has been successful for him in the past. Pt stated listening more and learning to trust people is something he looks forwards to in his future. Emily Quesada, RT Student
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
## 2021-03-17 ENCOUNTER — TELEPHONE (OUTPATIENT)
Dept: FAMILY MEDICINE CLINIC | Age: 51
End: 2021-03-17

## 2021-03-17 NOTE — TELEPHONE ENCOUNTER
----- Message from [employer] sent at 3/16/2021  4:27 PM EDT -----  Regarding: Dr. Celina Argueta Message/Vendor Calls    Caller's first and last name: n/a      Reason for call: Needs to know of a cardiologist he can see.       Callback required yes/no and why: yes      Best contact number(s):[phone]      Details to clarify the request: [address]

## 2021-03-18 NOTE — TELEPHONE ENCOUNTER
Call him. If he wants to stay in the [facility] system, he can see Dr. Corbin Soto [phone]. If he wants to go elsewhere, let me know. I will send referral in if he needs one too.   [facility]

## 2021-03-22 ENCOUNTER — TELEPHONE (OUTPATIENT)
Dept: FAMILY MEDICINE CLINIC | Age: 51
End: 2021-03-22

## 2021-03-22 DIAGNOSIS — Z79.4 TYPE 2 DIABETES MELLITUS WITH HYPERGLYCEMIA, WITH LONG-TERM CURRENT USE OF INSULIN (HCC): Primary | ICD-10-CM

## 2021-03-22 DIAGNOSIS — E11.65 TYPE 2 DIABETES MELLITUS WITH HYPERGLYCEMIA, WITH LONG-TERM CURRENT USE OF INSULIN (HCC): Primary | ICD-10-CM

## 2021-03-22 RX ORDER — BLOOD SUGAR DIAGNOSTIC
STRIP MISCELLANEOUS
Qty: 300 STRIP | Refills: 4 | Status: SHIPPED | OUTPATIENT
Start: 2021-03-22 | End: 2021-04-08 | Stop reason: SDUPTHER

## 2021-03-22 RX ORDER — BLOOD-GLUCOSE METER
EACH MISCELLANEOUS
Qty: 1 EACH | Refills: 0 | Status: SHIPPED | OUTPATIENT
Start: 2021-03-22 | End: 2021-04-08 | Stop reason: SDUPTHER

## 2021-03-22 NOTE — TELEPHONE ENCOUNTER
----- Message from Alison De La Fuente sent at 3/22/2021  8:40 AM EDT -----  Regarding: Dr. Jose G Stafford (if not patient):      Relationship of caller (if not patient):      Best contact number(s):[phone]      Name of medication and dosage if known: Insulin meter and test strip      Is patient out of this medication (yes/no):Yes      Pharmacy name:[facility]    Pharmacy listed in chart? (yes/no):yes, should be.     Pharmacy phone number: [phone]      Details to clarify the request:      Alison De La Fuente

## 2021-03-22 NOTE — TELEPHONE ENCOUNTER
03/22/21  12:49 PM    1. Type 2 diabetes mellitus with hyperglycemia, with long-term current use of insulin (HCC)    - OneTouch Verio Meter misc; MISC AS DIRECTED  Dispense: 1 Each; Refill: 0  - OneTouch Verio test strips strip; USE AS DIRECTED TID/QID and if low sugar symptoms  Dispense: 300 Strip;  Refill: 4      Emmie Duncan MD

## 2021-03-30 ENCOUNTER — TELEPHONE (OUTPATIENT)
Dept: FAMILY MEDICINE CLINIC | Age: 51
End: 2021-03-30

## 2021-03-30 NOTE — TELEPHONE ENCOUNTER
Sent pt message through My Chart       ----- Message from Zev Pro sent at 3/30/2021  9:13 AM EDT -----  Regarding: Dr Roxi Lao (if not patient): Pt      Relationship of caller (if not patient):      Best contact number(s):[phone]      Name of medication and dosage if known: \"insulin\" \"all other medication\"      Is patient out of this medication (yes/no): yes      Pharmacy name: [facility]    Pharmacy listed in chart? (yes/no): yes  Pharmacy phone number: [phone]      Details to clarify the request: pt stated that he was told it would be 48 hours to get filled.  It has been 2 weeks and he is out of his medication      Zev Inocencia

## 2021-04-08 ENCOUNTER — VIRTUAL VISIT (OUTPATIENT)
Dept: FAMILY MEDICINE CLINIC | Age: 51
End: 2021-04-08
Payer: COMMERCIAL

## 2021-04-08 DIAGNOSIS — E11.65 TYPE 2 DIABETES MELLITUS WITH HYPERGLYCEMIA, WITH LONG-TERM CURRENT USE OF INSULIN (HCC): Primary | ICD-10-CM

## 2021-04-08 DIAGNOSIS — Z79.4 TYPE 2 DIABETES MELLITUS WITH HYPERGLYCEMIA, WITH LONG-TERM CURRENT USE OF INSULIN (HCC): Primary | ICD-10-CM

## 2021-04-08 DIAGNOSIS — I50.22 CHRONIC SYSTOLIC CONGESTIVE HEART FAILURE (HCC): ICD-10-CM

## 2021-04-08 DIAGNOSIS — I10 ESSENTIAL HYPERTENSION: ICD-10-CM

## 2021-04-08 DIAGNOSIS — F32.9 REACTIVE DEPRESSION: ICD-10-CM

## 2021-04-08 DIAGNOSIS — J98.01 BRONCHOSPASM: ICD-10-CM

## 2021-04-08 DIAGNOSIS — E78.00 HYPERCHOLESTEREMIA: ICD-10-CM

## 2021-04-08 DIAGNOSIS — K21.9 GASTROESOPHAGEAL REFLUX DISEASE WITHOUT ESOPHAGITIS: ICD-10-CM

## 2021-04-08 DIAGNOSIS — I42.0 DILATED CARDIOMYOPATHY (HCC): ICD-10-CM

## 2021-04-08 DIAGNOSIS — F51.02 ADJUSTMENT INSOMNIA: ICD-10-CM

## 2021-04-08 DIAGNOSIS — I25.10 CORONARY ARTERY DISEASE INVOLVING NATIVE CORONARY ARTERY OF NATIVE HEART WITHOUT ANGINA PECTORIS: ICD-10-CM

## 2021-04-08 PROCEDURE — 99214 OFFICE O/P EST MOD 30 MIN: CPT | Performed by: FAMILY MEDICINE

## 2021-04-08 RX ORDER — BLOOD SUGAR DIAGNOSTIC
STRIP MISCELLANEOUS
Qty: 300 STRIP | Refills: 4 | Status: ON HOLD | OUTPATIENT
Start: 2021-04-08 | End: 2021-05-06

## 2021-04-08 RX ORDER — PANTOPRAZOLE SODIUM 40 MG/1
40 TABLET, DELAYED RELEASE ORAL
Qty: 30 TAB | Refills: 5 | Status: SHIPPED | OUTPATIENT
Start: 2021-04-08 | End: 2022-06-02

## 2021-04-08 RX ORDER — BLOOD-GLUCOSE METER
EACH MISCELLANEOUS
Qty: 1 EACH | Refills: 0 | Status: ON HOLD | OUTPATIENT
Start: 2021-04-08 | End: 2021-05-06

## 2021-04-08 RX ORDER — FUROSEMIDE 40 MG/1
40 TABLET ORAL DAILY
Qty: 30 TAB | Refills: 5 | Status: SHIPPED | OUTPATIENT
Start: 2021-04-08 | End: 2021-08-05 | Stop reason: SDUPTHER

## 2021-04-08 RX ORDER — LOSARTAN POTASSIUM 25 MG/1
25 TABLET ORAL DAILY
Qty: 30 TAB | Refills: 5 | Status: SHIPPED | OUTPATIENT
Start: 2021-04-08 | End: 2021-05-10

## 2021-04-08 RX ORDER — TRAZODONE HYDROCHLORIDE 50 MG/1
100 TABLET ORAL
Qty: 60 TAB | Refills: 5 | Status: ON HOLD | OUTPATIENT
Start: 2021-04-08 | End: 2021-05-06

## 2021-04-08 RX ORDER — ATORVASTATIN CALCIUM 80 MG/1
80 TABLET, FILM COATED ORAL DAILY
Qty: 30 TAB | Refills: 5 | Status: SHIPPED | OUTPATIENT
Start: 2021-04-08

## 2021-04-08 RX ORDER — ALBUTEROL SULFATE 90 UG/1
2 AEROSOL, METERED RESPIRATORY (INHALATION)
Qty: 1 INHALER | Refills: 5 | Status: SHIPPED | OUTPATIENT
Start: 2021-04-08 | End: 2021-09-16 | Stop reason: SDUPTHER

## 2021-04-08 RX ORDER — AMLODIPINE BESYLATE 10 MG/1
10 TABLET ORAL DAILY
Qty: 30 TAB | Refills: 5 | Status: SHIPPED | OUTPATIENT
Start: 2021-04-08 | End: 2021-05-10

## 2021-04-08 RX ORDER — CLOPIDOGREL BISULFATE 75 MG/1
75 TABLET ORAL DAILY
Qty: 30 TAB | Refills: 5 | Status: SHIPPED | OUTPATIENT
Start: 2021-04-08 | End: 2021-10-27 | Stop reason: SDUPTHER

## 2021-04-08 RX ORDER — INSULIN GLARGINE 100 [IU]/ML
INJECTION, SOLUTION SUBCUTANEOUS
Qty: 3 VIAL | Refills: 5 | Status: ON HOLD | OUTPATIENT
Start: 2021-04-08 | End: 2021-05-06

## 2021-04-08 RX ORDER — CARVEDILOL 12.5 MG/1
12.5 TABLET ORAL 2 TIMES DAILY WITH MEALS
Qty: 60 TAB | Refills: 5 | Status: SHIPPED | OUTPATIENT
Start: 2021-04-08 | End: 2021-05-10

## 2021-04-08 RX ORDER — SYRING-NEEDL,DISP,INSUL,0.3 ML 31 G X1/4"
1 SYRINGE, EMPTY DISPOSABLE MISCELLANEOUS
Qty: 200 SYRINGE | Refills: 1 | Status: ON HOLD | OUTPATIENT
Start: 2021-04-08 | End: 2021-05-06

## 2021-04-08 NOTE — PROGRESS NOTES
Emma Kendrick is a 52 y.o. male      Chief Complaint   Patient presents with    Diabetes    Medication Refill         1. Have you been to the ER, urgent care clinic since your last visit? Hospitalized since your last visit? no      2. Have you seen or consulted any other health care providers outside of the [address] [place] [address] since your last visit? Include any pap smears or colon screening.   Yes ( psych doctor)

## 2021-04-08 NOTE — PROGRESS NOTES
Camille Young is a 52 y.o. male who was seen by synchronous (real-time) audio-video technology on 4/8/2021. Consent: Camille Young, who was seen by synchronous (real-time) audio-video technology, and/or his healthcare decision maker, is aware that this patient-initiated, Telehealth encounter on 4/8/2021 is a billable service, with coverage as determined by his insurance carrier. He is aware that he may receive a bill and has provided verbal consent to proceed: Yes. Assessment & Plan:   Diagnoses and all orders for this visit:    1. Type 2 diabetes mellitus with hyperglycemia, with long-term current use of insulin (HCC)  Uncertain control. Completely out of meds for three weeks. Does not have a working meter. I have refilled in the doses we previously gave him in December and told him we need to see him to reassess his diabetic control and how he is doing overall. No recent AC ratio or A1c  I have chosen to refill all of his meds rather than force him to come in. Hopefully this will help him avoid ER visit or admission that is avoidable. -     OneTouch Verio test strips strip; USE AS DIRECTED TID/QID and if low sugar symptoms  -     OneTouch Verio Meter misc; MISC AS DIRECTED  -     insulin syringe-needle U-100 0.3 mL 31 gauge x 1/4\" syrg; 1 Syringe by Does Not Apply route Before breakfast, lunch, and dinner.  -     insulin glargine (LANTUS) 100 unit/mL injection; 50 units subq Twice daily. E11.9 Type 2 DM on insulin  -     insulin regular (NOVOLIN R, HUMULIN R) 100 unit/mL injection; 25 units Sub Q Ten minutes prior to each meal.  Type 2 DM on insulin. E11.9    2. Coronary artery disease involving native coronary artery of native heart without angina pectoris  Out of meds entirely, refilled them. Unclear if he has been seeing Cardiology. -     furosemide (LASIX) 40 mg tablet; Take 1 Tab by mouth daily. -     clopidogreL (PLAVIX) 75 mg tab; Take 1 Tab by mouth daily.     3. Essential hypertension  Refilled  -     amLODIPine (NORVASC) 10 mg tablet; Take 1 Tab by mouth daily. -     losartan (COZAAR) 25 mg tablet; Take 1 Tab by mouth daily. 4. Chronic systolic congestive heart failure (HCC)  -     carvediloL (COREG) 12.5 mg tablet; Take 1 Tab by mouth two (2) times daily (with meals). -     furosemide (LASIX) 40 mg tablet; Take 1 Tab by mouth daily. -     losartan (COZAAR) 25 mg tablet; Take 1 Tab by mouth daily. 5. Hypercholesteremia  Refilled  -     atorvastatin (LIPITOR) 80 mg tablet; Take 1 Tab by mouth daily. 6. Gastroesophageal reflux disease without esophagitis  Refilled  -     pantoprazole (PROTONIX) 40 mg tablet; Take 1 Tab by mouth Daily (before breakfast). 7. Dilated cardiomyopathy (HCC)  -     carvediloL (COREG) 12.5 mg tablet; Take 1 Tab by mouth two (2) times daily (with meals). 8. Reactive depression  Recent loss of mother and sister. Recently hospitalized for depression and significant PSYCH history. Asks for antidepressant. We already know he tolerated trazodone so will increase dose  FU sooner if worsening sx-     traZODone (DESYREL) 50 mg tablet; Take 2 Tabs by mouth nightly. 9. Adjustment insomnia  -     traZODone (DESYREL) 50 mg tablet; Take 2 Tabs by mouth nightly. 10. Bronchospasm  -     albuterol (PROVENTIL HFA, VENTOLIN HFA, PROAIR HFA) 90 mcg/actuation inhaler; Take 2 Puffs by inhalation every four (4) hours as needed for Wheezing or Shortness of Breath. Follow-up and Dispositions    · Return in about 1 month (around 5/8/2021) for Medication follow up, Diabetes follow up, Blood pressure follow up. I spent at least 40 minutes on this visit with this established patient. ([id number])    Subjective:   Lexie Ugarte is a 52 y.o. male who was seen for Diabetes and Medication Refill    Upset  Demanding refill  Out of all meds for about 3 weeks. Video visit in December.   Did not follow up as asked to  Recent hospitalization of depression and other psych issues in March    Regarding diabetes. Says he is taking glargine TID  Unclear if he can distinguish between short and long acting insulin  States no insulin for weeks    Out of all BP and CHF meds  Denies current sx  Unclear if seeing Cardiology at all    Psych  Lost his mother and sister  Now living with another sister  Upset and depressed  Asks if I have antidepressant for him  Denies suicidality. Apparently a lot of anger and threats when hospitalized for depression in March. Not sent home on any new Psych meds. Prior to Admission medications    Medication Sig Start Date End Date Taking? Authorizing Provider   carvediloL (COREG) 12.5 mg tablet Take 1 Tab by mouth two (2) times daily (with meals). 4/8/21  Yes Taylor Nicholson MD   atorvastatin (LIPITOR) 80 mg tablet Take 1 Tab by mouth daily. 4/8/21  Yes Taylor Nicholson MD   amLODIPine (NORVASC) 10 mg tablet Take 1 Tab by mouth daily. 4/8/21  Yes Taylor Nicholson MD   furosemide (LASIX) 40 mg tablet Take 1 Tab by mouth daily. 4/8/21  Yes Taylor Nicholson MD   clopidogreL (PLAVIX) 75 mg tab Take 1 Tab by mouth daily. 4/8/21  Yes Taylor Nicholson MD   losartan (COZAAR) 25 mg tablet Take 1 Tab by mouth daily. 4/8/21  Yes Taylor Nicholson MD   pantoprazole (PROTONIX) 40 mg tablet Take 1 Tab by mouth Daily (before breakfast). 4/8/21  Yes Taylor Nicholson MD   traZODone (DESYREL) 50 mg tablet Take 2 Tabs by mouth nightly. 4/8/21  Yes Taylor Nicholson MD   OneTouch Verio test strips strip USE AS DIRECTED TID/QID and if low sugar symptoms 4/8/21  Yes Taylor Nicholson MD   OneTouch Verio Meter misc MISC AS DIRECTED 4/8/21  Yes Taylor Nicholson MD   insulin syringe-needle U-100 0.3 mL 31 gauge x 1/4\" syrg 1 Syringe by Does Not Apply route Before breakfast, lunch, and dinner. 4/8/21  Yes Taylor Nicholson MD   insulin glargine (LANTUS) 100 unit/mL injection 50 units subq Twice daily.   E11.9 Type 2 DM on insulin 4/8/21 Yes Glenys Humphries MD   insulin regular (NOVOLIN R, HUMULIN R) 100 unit/mL injection 25 units Sub Q Ten minutes prior to each meal.  Type 2 DM on insulin. E11.9 4/8/21  Yes Glenys Humphries MD   albuterol (PROVENTIL HFA, VENTOLIN HFA, PROAIR HFA) 90 mcg/actuation inhaler Take 2 Puffs by inhalation every four (4) hours as needed for Wheezing or Shortness of Breath. 4/8/21  Yes Glenys Humphries MD   OneTouch Verio Meter misc MISC AS DIRECTED 3/22/21 4/8/21  Glenys Humphries MD   OneTouch Verio test strips strip USE AS DIRECTED TID/QID and if low sugar symptoms 3/22/21 4/8/21  Glenys Humphries MD   albuterol (PROVENTIL HFA, VENTOLIN HFA, PROAIR HFA) 90 mcg/actuation inhaler Take 2 Puffs by inhalation every four (4) hours as needed for Wheezing or Shortness of Breath. 3/12/21 4/8/21  Jose Rafael Schmid MD   amLODIPine (NORVASC) 10 mg tablet Take 1 Tab by mouth daily. 3/13/21 4/8/21  Jose Rafael Schmid MD   carvediloL (COREG) 12.5 mg tablet Take 1 Tab by mouth two (2) times daily (with meals). 3/12/21 4/8/21  Jose Rafael Schmid MD   insulin glargine (LANTUS) 100 unit/mL injection Caution: Long Acting Insulin. DO NOT HOLD without physician order. DO NOT MIX or dilute with any other insulin. 3/12/21 4/8/21  Jose Rafael Schmid MD   insulin regular (NOVOLIN R, HUMULIN R) 100 unit/mL injection Ten minutes prior to each meal. 3/12/21 4/8/21  Jose Rafael Schmid MD   losartan (COZAAR) 25 mg tablet Take 1 Tab by mouth daily. 3/13/21 4/8/21  Jose Rafael Schmid MD   pantoprazole (PROTONIX) 40 mg tablet Take 1 Tab by mouth Daily (before breakfast). 3/13/21 4/8/21  Jose Rafael Schmid MD   traZODone (DESYREL) 50 mg tablet Take 1 Tab by mouth nightly as needed for Sleep (For insomnia). 3/12/21 4/8/21  Jose Rafael Schmid MD   atorvastatin (LIPITOR) 80 mg tablet Take 80 mg by mouth daily.  10/29/20 4/8/21  Provider, Historical   insulin syringe-needle U-100 0.3 mL 31 gauge x 1/4\" syrg 1 Syringe by Does Not Apply route Before breakfast, lunch, and dinner. 12/2/20 4/8/21  Taylor Nicholson MD   furosemide (LASIX) 40 mg tablet Take 1 Tab by mouth daily. 8/11/20 4/8/21  Taylor Nicholson MD   clopidogreL (PLAVIX) 75 mg tab Take 1 Tab by mouth daily. 8/11/20 4/8/21  Taylor Nicholson MD   One Touch Delica 33 gauge misc USE AS DIRECTED 4/17/20   Provider, Historical   Insulin Needles, Disposable, 31 gauge x 5/16\" ndle Use daily with lantus insulin. E11.9 DM2 on insulin. 1/31/20   Taylor Nicholson MD     Allergies   Allergen Reactions    Acetaminophen Unknown (comments), Anaphylaxis and Shortness of Breath     Other reaction(s): anaphylaxis/angioedema  Other reaction(s): Other (see comments)  Other reaction(s): anaphylaxis/angioedema  Throat swelling    Aspirin Hives, Anaphylaxis and Shortness of Breath     Other reaction(s): anaphylaxis/angioedema  Other reaction(s): anaphylaxis/angioedema    Unable To Obtain Unable to Obtain     Patient states his allergic to greens    Aspirin Shortness of Breath    Spinach Leaf Unknown (comments)     Green leaves    Tylenol [Acetaminophen] Shortness of Breath           Review of Systems   Respiratory: Negative for cough and shortness of breath. Cardiovascular: Negative for chest pain. Psychiatric/Behavioral: Positive for depression. Negative for suicidal ideas. The patient is nervous/anxious and has insomnia. Objective:   Vital Signs: (As obtained by patient/caregiver at home)  There were no vitals taken for this visit.      [INSTRUCTIONS:  \"[x]\" Indicates a positive item  \"[]\" Indicates a negative item  -- DELETE ALL ITEMS NOT EXAMINED]    Constitutional: [x] Appears well-developed and well-nourished [x] No apparent distress      [] Abnormal -     Mental status: [x] Alert and awake  [x] Oriented to person/place/time [x] Able to follow commands    [] Abnormal -     Eyes:   EOM    [x]  Normal    [] Abnormal -   Sclera  [x]  Normal    [] Abnormal -          Discharge [x]  None visible   [] Abnormal -     HENT: [x] Normocephalic, atraumatic  [] Abnormal -   [x] Mouth/Throat: Mucous membranes are moist    External Ears [x] Normal  [] Abnormal -    Neck: [x] No visualized mass [] Abnormal -     Pulmonary/Chest: [x] Respiratory effort normal   [x] No visualized signs of difficulty breathing or respiratory distress        [] Abnormal -      Musculoskeletal:   [x] Normal gait with no signs of ataxia         [x] Normal range of motion of neck        [] Abnormal -     Neurological:        [x] No Facial Asymmetry (Cranial nerve 7 motor function) (limited exam due to video visit)          [x] No gaze palsy        [] Abnormal -          Skin:        [x] No significant exanthematous lesions or discoloration noted on facial skin         [] Abnormal -            Psychiatric:       [x] Normal Affect [] Abnormal -        [x] No Hallucinations    Other pertinent observable physical exam findings:-        We discussed the expected course, resolution and complications of the diagnosis(es) in detail. Medication risks, benefits, costs, interactions, and alternatives were discussed as indicated. I advised him to contact the office if his condition worsens, changes or fails to improve as anticipated. He expressed understanding with the diagnosis(es) and plan. Burton Díaz is a 52 y.o. male who was evaluated by a video visit encounter for concerns as above. Patient identification was verified prior to start of the visit. A caregiver was present when appropriate. Due to this being a TeleHealth encounter (During HALYI-30 public health emergency), evaluation of the following organ systems was limited: Vitals/Constitutional/EENT/Resp/CV/GI//MS/Neuro/Skin/Heme-Lymph-Imm.   Pursuant to the emergency declaration under the [facility] waiver authority and the [employer] Act, this Virtual  Visit was conducted, with patient's (and/or legal guardian's) consent, to reduce the patient's risk of exposure to COVID-19 and provide necessary medical care. Services were provided through a video synchronous discussion virtually to substitute for in-person clinic visit. Patient was at home and I was in office for this video visit. Steve Zaragoza.  Author Radha PEREZ

## 2021-04-08 NOTE — Clinical Note
Call and offer him an appointment for next month or so. Document if he does not agree to come in. I have had difficulty getting him to come in to be seen.   [facility]

## 2021-04-12 ENCOUNTER — TELEPHONE (OUTPATIENT)
Dept: FAMILY MEDICINE CLINIC | Age: 51
End: 2021-04-12

## 2021-04-12 NOTE — TELEPHONE ENCOUNTER
----- Message from Maryjo Greenberg sent at 4/12/2021 11:33 AM EDT -----  Regarding: Dr. Marlene Amanda first and last name: N/A      Reason for call: Transportation for today's appt. Callback required yes/no and why: yes      Best contact number(s): [phone]      Details to clarify the request: Patient is scheduled for an appt today 4/12/21 at 3:20pm with Dr. Venus Cleveland. Patient is calling to confirm that his transportation was called for this appt. Today.        Maryjo Greenberg

## 2021-04-12 NOTE — TELEPHONE ENCOUNTER
----- Message from Cholo Parrish sent at 4/12/2021 10:52 AM EDT -----  Regarding: Dr. Alaina Cohen Message/Vendor Calls    Caller's first and last name: Perlita Escobedo      Reason for call: Needs someone from office to call his transportation company [employer] to schedule his ride to appt. Callback required yes/no and why: yes to advise to pt this has been arranged.       Best contact number(s): [phone]      Details to clarify the request:  Please call [phone]  to schedule ride pick service for pt today, pt's appt is today at 3:20pm. Ride service is [employer]

## 2021-04-21 ENCOUNTER — OFFICE VISIT (OUTPATIENT)
Dept: FAMILY MEDICINE CLINIC | Age: 51
End: 2021-04-21
Payer: COMMERCIAL

## 2021-04-21 VITALS
RESPIRATION RATE: 18 BRPM | TEMPERATURE: 98.2 F | HEIGHT: 65 IN | BODY MASS INDEX: 29.02 KG/M2 | OXYGEN SATURATION: 100 % | HEART RATE: 93 BPM | DIASTOLIC BLOOD PRESSURE: 86 MMHG | WEIGHT: 174.2 LBS | SYSTOLIC BLOOD PRESSURE: 122 MMHG

## 2021-04-21 DIAGNOSIS — I42.0 DILATED CARDIOMYOPATHY (HCC): ICD-10-CM

## 2021-04-21 DIAGNOSIS — Z79.4 TYPE 2 DIABETES MELLITUS WITH HYPERGLYCEMIA, WITH LONG-TERM CURRENT USE OF INSULIN (HCC): Primary | ICD-10-CM

## 2021-04-21 DIAGNOSIS — I10 ESSENTIAL HYPERTENSION: ICD-10-CM

## 2021-04-21 DIAGNOSIS — Z11.59 ENCOUNTER FOR HEPATITIS C SCREENING TEST FOR LOW RISK PATIENT: ICD-10-CM

## 2021-04-21 DIAGNOSIS — E11.65 TYPE 2 DIABETES MELLITUS WITH HYPERGLYCEMIA, WITH LONG-TERM CURRENT USE OF INSULIN (HCC): Primary | ICD-10-CM

## 2021-04-21 PROCEDURE — 99214 OFFICE O/P EST MOD 30 MIN: CPT | Performed by: FAMILY MEDICINE

## 2021-04-21 NOTE — PROGRESS NOTES
Identified pt with two pt identifiers(name and ). Reviewed record in preparation for visit and have obtained necessary documentation. Chief Complaint   Patient presents with    Generalized Body Aches     Pain seems to be a little better today         Health Maintenance Due   Topic    Hepatitis C Screening     Pneumococcal 0-64 years (1 of 1 - PPSV23)    COVID-19 Vaccine (1)    DTaP/Tdap/Td series (1 - Tdap)    A1C test (Diabetic or Prediabetic)        Coordination of Care Questionnaire:  :   1) Have you been to an emergency room, urgent care, or hospitalized since your last visit? If yes, where when, and reason for visit? Yes 3/2021 Depression        2. Have seen or consulted any other health care provider since your last visit? If yes, where when, and reason for visit?  No

## 2021-04-21 NOTE — PROGRESS NOTES
Abdulkadir Hill  52 y.o. male  1970  [id number]  [facility]  Progress Note     Encounter Date: 4/21/2021    Assessment and Plan:     Encounter Diagnoses     ICD-10-CM ICD-9-CM   1. Type 2 diabetes mellitus with hyperglycemia, with long-term current use of insulin (HCC)  E11.65 250.00    Z79.4 790.29     V58.67   2. Encounter for hepatitis C screening test for low risk patient  Z11.59 V73.89   3. Essential hypertension  I10 401.9   4. Dilated cardiomyopathy (HCC)  I42.0 425.4       1. Type 2 diabetes mellitus with hyperglycemia, with long-term current use of insulin (HCC)  Check status  Resume Victoza which he tolerated well before  Warned of hypoglycemia, he will watch for it. - CBC WITH AUTOMATED DIFF; Future  - HEPATIC FUNCTION PANEL; Future  - HEMOGLOBIN A1C WITH EAG; Future  -  DIABETES FOOT EXAM; Future  - liraglutide (VICTOZA) 0.6 mg/0.1 mL (18 mg/3 mL) pnij; 1.8 mg by SubCUTAneous route daily. Dispense: 10 Adjustable Dose Pre-filled Pen Syringe; Refill: 1  - Insulin Needles, Disposable, 30 gauge x 1/3\"; Use as directed with Victoza and insulin. Dispense: 1 Package; Refill: 11      2. Encounter for hepatitis C screening test for low risk patient  screening  - HEPATITIS C AB; Future      3. Essential hypertension  At goal  - METABOLIC PANEL, BASIC; Future  - MICROALBUMIN, UR, RAND W/ MICROALB/CREAT RATIO; Future    4. Dilated cardiomyopathy ([facility] [address]  Instructed to follow up with Cardiology. He cannot remember cardiologists name but may have it at home. I cannot find it in our records. I have discussed the diagnosis with the patient and the intended plan as seen in the above orders. he has expressed understanding. The patient has received an after-visit summary and questions were answered concerning future plans. I have discussed medication side effects and warnings with the patient as well.     Electronically Signed: Kiel Barrett MD    Current Medications after this visit     Current Outpatient Medications   Medication Sig    liraglutide (VICTOZA) 0.6 mg/0.1 mL (18 mg/3 mL) pnij 1.8 mg by SubCUTAneous route daily.  Insulin Needles, Disposable, 30 gauge x 1/3\" Use as directed with Victoza and insulin.  carvediloL (COREG) 12.5 mg tablet Take 1 Tab by mouth two (2) times daily (with meals).  atorvastatin (LIPITOR) 80 mg tablet Take 1 Tab by mouth daily.  amLODIPine (NORVASC) 10 mg tablet Take 1 Tab by mouth daily.  furosemide (LASIX) 40 mg tablet Take 1 Tab by mouth daily.  clopidogreL (PLAVIX) 75 mg tab Take 1 Tab by mouth daily.  losartan (COZAAR) 25 mg tablet Take 1 Tab by mouth daily.  pantoprazole (PROTONIX) 40 mg tablet Take 1 Tab by mouth Daily (before breakfast).  traZODone (DESYREL) 50 mg tablet Take 2 Tabs by mouth nightly.  OneTouch Verio test strips strip USE AS DIRECTED TID/QID and if low sugar symptoms    OneTouch Verio Meter misc MISC AS DIRECTED    insulin syringe-needle U-100 0.3 mL 31 gauge x 1/4\" syrg 1 Syringe by Does Not Apply route Before breakfast, lunch, and dinner.  insulin glargine (LANTUS) 100 unit/mL injection 50 units subq Twice daily. E11.9 Type 2 DM on insulin    insulin regular (NOVOLIN R, HUMULIN R) 100 unit/mL injection 25 units Sub Q Ten minutes prior to each meal.  Type 2 DM on insulin. E11.9    albuterol (PROVENTIL HFA, VENTOLIN HFA, PROAIR HFA) 90 mcg/actuation inhaler Take 2 Puffs by inhalation every four (4) hours as needed for Wheezing or Shortness of Breath.  One Touch Delica 33 gauge misc USE AS DIRECTED    Insulin Needles, Disposable, 31 gauge x 5/16\" ndle Use daily with lantus insulin. E11.9 DM2 on insulin. No current facility-administered medications for this visit. There are no discontinued medications.   ~~~~~~~~~~~~~~~~~~~~~~~~~~~~~~~~~~~~~~~~~~~~~~~~~~~~~~~~~~~    Chief Complaint   Patient presents with    Generalized Body Aches     Pain seems to be a little better today        History provided by patient  History of Present Illness   Raul Diaz is a 52 y.o. male who presents to clinic today for:  Generalized Body Aches (Pain seems to be a little better today )    DM2  Home sugars 159-200  Last week had low sugar once but did not check sugar  Due to see Eye doctor  Has sores on feet some tingling  Previously on Victoza    Hypertension  At goal    CAD  Recent episode of pain in legs and into chest  Severe  Now gone  Lasted an hour. Health Maintenance  Completed HM gaps at today's visit  Health Maintenance Due   Topic Date Due    Hepatitis C Screening  Never done    Pneumococcal 0-64 years (1 of 1 - PPSV23) Never done    COVID-19 Vaccine (1) Never done    DTaP/Tdap/Td series (1 - Tdap) Never done    A1C test (Diabetic or Prediabetic)  11/11/2020     Review of Systems   Review of Systems   Constitutional: Negative for chills, fever and weight loss. HENT: Negative for congestion and sore throat. Respiratory: Negative for shortness of breath. Cardiovascular: Negative for chest pain, palpitations and leg swelling. Musculoskeletal: Positive for joint pain. Psychiatric/Behavioral: Negative. Vitals/Objective:     Vitals:    04/21/21 1422   BP: 122/86   Pulse: 93   Resp: 18   Temp: 98.2 °F (36.8 °C)   TempSrc: Temporal   SpO2: 100%   Weight: 174 lb 3.2 oz (79 kg)   Height: 5' 5\" (1.651 m)     Body mass index is 28.99 kg/m². Wt Readings from Last 3 Encounters:   04/21/21 174 lb 3.2 oz (79 kg)   03/08/21 158 lb 15.2 oz (72.1 kg)   11/25/20 188 lb (85.3 kg)         Objective  Physical Exam  Vitals signs and nursing note reviewed. Constitutional:       Appearance: Normal appearance. He is obese. He is not toxic-appearing. HENT:      Head: Normocephalic and atraumatic. Neck:      Musculoskeletal: No muscular tenderness. Cardiovascular:      Rate and Rhythm: Normal rate and regular rhythm. Heart sounds: Normal heart sounds. No murmur. No gallop. Pulmonary:      Effort: Pulmonary effort is normal. No respiratory distress. Breath sounds: Normal breath sounds. No wheezing, rhonchi or rales. Lymphadenopathy:      Cervical: No cervical adenopathy. Neurological:      Mental Status: He is alert. Psychiatric:         Mood and Affect: Mood normal.         Behavior: Behavior normal.         Thought Content: Thought content normal.         Judgment: Judgment normal.          Diabetic Foot Exam: Extensive T. Pedis  Protective sensation is intact bilaterally. Pedal pulses are 2+ and normal bilaterally. L foot skin inspection:  normal skin and soft tissue with no gross edema or evidence of acute injury or foot ulcer  R foot skin inspection:  skin and soft tissue appear normal with no significant edema or evidence of acute injury or foot ulcer   No results found for this or any previous visit (from the past 24 hour(s)). Disposition     Follow-up and Dispositions  ·   Return in about 6 weeks (around 6/2/2021) for Diabetes follow up, Blood pressure follow up, Medication follow up. Future Appointments   Date Time Provider Elder Anneliese   6/2/2021  8:20 AM Marlon Tony MD [facility]       History   Patient's past medical, surgical and family histories were reviewed and updated. Past Medical History:   Diagnosis Date    Anxiety disorder     Asthma     Bipolar 1 disorder ([address]     CAD (coronary artery disease)     MI x2 with 2 cardiac stent    CHF (congestive heart failure) ([address]     with reduced EF, EF 20%    Chronic systolic congestive heart failure ([address] 12/2/2020    Depression     Diabetes mellitus, type 2 ([address]     on insulin.  moderate proteinuria    Hypertension     Mood disorder (HCC)     Psychotic disorder (HCC)     Schizoaffective disorder, bipolar type ([address]     Sleep disorder     Suicidal thoughts      Past Surgical History:   Procedure Laterality Date    HX CORONARY STENT PLACEMENT      HX IMPLANTABLE CARDIOVERTER DEFIBRILLATOR       Family History   Problem Relation Age of Onset    Heart Attack Mother     Hypertension Mother     Diabetes Mother     Heart Attack Father     Hypertension Father     Diabetes Father     Depression Neg Hx     Suicide Neg Hx     Psychotic Disorder Neg Hx     Dementia Neg Hx     Substance Abuse Neg Hx      Social History     Tobacco Use    Smoking status: Current Every Day Smoker     Packs/day: 1.00     Types: Cigarettes    Smokeless tobacco: Never Used   Substance Use Topics    Alcohol use: Not Currently     Comment: occasionally    Drug use: Yes     Types: Marijuana     Comment: rare       Allergies     Allergies   Allergen Reactions    Acetaminophen Unknown (comments), Anaphylaxis and Shortness of Breath     Other reaction(s): anaphylaxis/angioedema  Other reaction(s):  Other (see comments)  Other reaction(s): anaphylaxis/angioedema  Throat swelling    Aspirin Hives, Anaphylaxis and Shortness of Breath     Other reaction(s): anaphylaxis/angioedema  Other reaction(s): anaphylaxis/angioedema    Unable To Obtain Unable to Obtain     Patient states his allergic to greens    Aspirin Shortness of Breath    Spinach Leaf Unknown (comments)     Green leaves    Tylenol [Acetaminophen] Shortness of Breath

## 2021-04-21 NOTE — PATIENT INSTRUCTIONS
Diabetes: 
Blood sugar goals: 
Hemoglobin A1c under 7 Fasting blood sugar  Blood sugar 2 hours after a meal under 180, 4 hours after a meal under 120 No hypoglycemia (sugars under 70 and symptomatic low sugars) Blood sugar control with diet and exercise: 
Exercise 45 minutes per day. This makes your insulin work better. It also allows insulin levels to fall helping with weight loss. Every night, try to fast from your evening meal to breakfast (at least 12 hours) without eating anything. This uses stored energy in your liver and makes insulin work better. Avoid simples sugars such as table sugar in drinks (sodas, lemonade, sweet tea, wine), desserts, candy. Also avoid fruit juices and high fructose corn syrup. Avoid frequent consumption of fruit especially grapes and bananas. A single serving of fruit daily is all you should have. Finally, drink less milk which has milk sugar in it. Control your starch intake. Men should have 3-4 carb portions per meal.  Women should have 2-3 carb portions per meal.  A carb serving is the equivalent of a slice of bread. Control your blood pressure and cholesterol. These problems are common in diabetes. AND, don't smoke. All of these problems contribute to heart disease and stroke risk. Get a yearly eye exam and flu shot. Make sure your vaccines are up to date particularly the pneumonia vaccines. Inspect your feet daily. Wear comfortable protective shoes and clean socks. Seek medical care if there are sore, calluses or numbness and burning of your feet. See your doctor at least every 6 months if you are on oral medicines or more often if the diabetic control is not at goal or if you are on insulin. Take your medicines religiously. STOP the SUGAR The first step in dietary efforts at weight loss is removing as much sugar from the diet as possible.   Most dietary sugar is in the forms of table sugar (sucrose), fruit sugar (fructose) and milk sugar (lactose). But, you need to watch labels to see if processed foods have added sugars under other names. To eliminate sucrose, eliminate sweet drinks entirely including soft drinks, sports drinks, lemonade, sweet tea and wine. Also, eliminate candy and make desserts a \"special occasion only treat\". Don't eat desserts with every meal or every night. Most fructose is found in fruit and this should be markedly limited. Fruit juice should be avoided. If you do eat fruit, eat fruits that are lower in sugar such as: strawberries, blackberries, raspberries, lemon, grapefruit and watermelon. Eat small portions and think of the fruit as a garnish or small treat. Bananas, mangos, cherries and grapes are higher in sugar and should be avoided. Avoid high fructose corn syrup (an artificial sweetener). Milk sugar, or lactose, should be avoided as well. Milk is a good source of calcium but not for people struggling with weight issues. Put a little cream in your coffee or tea but otherwise avoid milk. How can you avoid sugar? For starters, don't buy it and don't bring it in the house. It won't tempt you that way! For more information: 
 
Try the internet for videos about sugar addiction or try diet [employer] Hypoglycemia: Care Instructions Your Care Instructions Hypoglycemia means that your blood sugar is low and your body is not getting enough fuel. Some people get low blood sugar from not eating often enough. Some medicines to treat diabetes can cause low blood sugar. People who have had surgery on their stomachs or intestines may get hypoglycemia. Problems with the pancreas, kidneys, or liver also can cause low blood sugar. A snack or drink with sugar in it will raise your blood sugar and should ease your symptoms right away. Your doctor may recommend that you change or stop your medicines until you can get your blood sugar levels under control.  In the long run, you may need to change your diet and eating habits so that you get enough fuel for your body throughout the day. Follow-up care is a key part of your treatment and safety. Be sure to make and go to all appointments, and call your doctor if you are having problems. It's also a good idea to know your test results and keep a list of the medicines you take. How can you care for yourself at home? · Know the early signs of low blood sugar. Signs include: 
? Nausea. ? Hunger. ? Feeling nervous, irritable, or shaky. ? Cold, clammy skin. ? Sweating (when you're not exercising). ? A fast heartbeat. 
? Numbness or tingling in fingertips or lips. · If you have early signs of low blood sugar, eat or drink a quick-sugar food. Examples are glucose tablets, table sugar, hard candy (such as Life Savers), fruit juice, and regular (not diet) soda. · Eat small, frequent meals so you don't get too hungry between meals. · Balance extra exercise with eating more. · Keep a written record of your low blood sugar episodes, including what and when you last ate. This helps you know what causes your blood sugar to drop. · Make sure family, friends, and coworkers know the symptoms of low blood sugar and know how to get your sugar level up. · Wear medical alert jewelry that lists your condition. You can buy this at most drugstores. When should you call for help? Call 911 anytime you think you may need emergency care. For example, call if: 
  · You passed out (lost consciousness).  
  · You are confused or cannot think clearly.  
  · Your blood sugar is very high or very low. Watch closely for changes in your health, and be sure to contact your doctor if: 
  · Your blood sugar stays outside the level your doctor set for you.  
  · You have any problems. Where can you learn more? Go to http://www.gray.com/ Enter N220 in the search box to learn more about \"Hypoglycemia: Care Instructions. \" Current as of: August 31, 2020               Content Version: 12.8 © 6487-7867 Healthwise, [facility]. Care instructions adapted under license by [employer] (which disclaims liability or warranty for this information). If you have questions about a medical condition or this instruction, always ask your healthcare professional. [place] [address] any warranty or liability for your use of this information.

## 2021-05-06 ENCOUNTER — APPOINTMENT (OUTPATIENT)
Dept: GENERAL RADIOLOGY | Age: 51
DRG: 174 | End: 2021-05-06
Attending: PHYSICIAN ASSISTANT
Payer: COMMERCIAL

## 2021-05-06 ENCOUNTER — APPOINTMENT (OUTPATIENT)
Dept: ULTRASOUND IMAGING | Age: 51
DRG: 174 | End: 2021-05-06
Attending: PHYSICIAN ASSISTANT
Payer: COMMERCIAL

## 2021-05-06 ENCOUNTER — APPOINTMENT (OUTPATIENT)
Dept: CT IMAGING | Age: 51
DRG: 174 | End: 2021-05-06
Attending: EMERGENCY MEDICINE
Payer: COMMERCIAL

## 2021-05-06 ENCOUNTER — HOSPITAL ENCOUNTER (INPATIENT)
Age: 51
LOS: 4 days | Discharge: HOME HEALTH | DRG: 174 | End: 2021-05-10
Attending: EMERGENCY MEDICINE | Admitting: FAMILY MEDICINE
Payer: COMMERCIAL

## 2021-05-06 DIAGNOSIS — F31.9 BIPOLAR 1 DISORDER (HCC): ICD-10-CM

## 2021-05-06 DIAGNOSIS — I24.9 ACS (ACUTE CORONARY SYNDROME) (HCC): ICD-10-CM

## 2021-05-06 DIAGNOSIS — F32.9 REACTIVE DEPRESSION: ICD-10-CM

## 2021-05-06 DIAGNOSIS — R77.8 ELEVATED TROPONIN: ICD-10-CM

## 2021-05-06 DIAGNOSIS — E11.65 TYPE 2 DIABETES MELLITUS WITH HYPERGLYCEMIA, WITH LONG-TERM CURRENT USE OF INSULIN (HCC): ICD-10-CM

## 2021-05-06 DIAGNOSIS — F51.02 ADJUSTMENT INSOMNIA: ICD-10-CM

## 2021-05-06 DIAGNOSIS — Z79.4 TYPE 2 DIABETES MELLITUS WITH HYPERGLYCEMIA, WITH LONG-TERM CURRENT USE OF INSULIN (HCC): ICD-10-CM

## 2021-05-06 DIAGNOSIS — R07.9 CHEST PAIN, UNSPECIFIED TYPE: Primary | ICD-10-CM

## 2021-05-06 DIAGNOSIS — I50.22 CHRONIC SYSTOLIC CONGESTIVE HEART FAILURE (HCC): ICD-10-CM

## 2021-05-06 DIAGNOSIS — R94.39 POSITIVE CARDIAC STRESS TEST: ICD-10-CM

## 2021-05-06 DIAGNOSIS — E11.21 TYPE 2 DIABETES WITH NEPHROPATHY (HCC): ICD-10-CM

## 2021-05-06 DIAGNOSIS — F60.3 BORDERLINE PERSONALITY DISORDER (HCC): ICD-10-CM

## 2021-05-06 LAB
ALBUMIN SERPL-MCNC: 3.6 G/DL (ref 3.5–5)
ALBUMIN/GLOB SERPL: 0.8 {RATIO} (ref 1.1–2.2)
ALP SERPL-CCNC: 107 U/L (ref 45–117)
ALT SERPL-CCNC: 19 U/L (ref 12–78)
ANION GAP SERPL CALC-SCNC: 7 MMOL/L (ref 5–15)
APPEARANCE UR: CLEAR
AST SERPL-CCNC: 9 U/L (ref 15–37)
BACTERIA URNS QL MICRO: NEGATIVE /HPF
BASOPHILS # BLD: 0.1 K/UL (ref 0–0.1)
BASOPHILS NFR BLD: 1 % (ref 0–1)
BILIRUB SERPL-MCNC: 0.6 MG/DL (ref 0.2–1)
BILIRUB UR QL: NEGATIVE
BNP SERPL-MCNC: 544 PG/ML
BUN SERPL-MCNC: 15 MG/DL (ref 6–20)
BUN/CREAT SERPL: 12 (ref 12–20)
CALCIUM SERPL-MCNC: 9.3 MG/DL (ref 8.5–10.1)
CHLORIDE SERPL-SCNC: 102 MMOL/L (ref 97–108)
CO2 SERPL-SCNC: 24 MMOL/L (ref 21–32)
COLOR UR: ABNORMAL
CREAT SERPL-MCNC: 1.28 MG/DL (ref 0.7–1.3)
DIFFERENTIAL METHOD BLD: ABNORMAL
EOSINOPHIL # BLD: 0.2 K/UL (ref 0–0.4)
EOSINOPHIL NFR BLD: 3 % (ref 0–7)
EPITH CASTS URNS QL MICRO: ABNORMAL /LPF
ERYTHROCYTE [DISTWIDTH] IN BLOOD BY AUTOMATED COUNT: 14.9 % (ref 11.5–14.5)
GLOBULIN SER CALC-MCNC: 4.4 G/DL (ref 2–4)
GLUCOSE BLD STRIP.AUTO-MCNC: 188 MG/DL (ref 65–100)
GLUCOSE BLD STRIP.AUTO-MCNC: 225 MG/DL (ref 65–100)
GLUCOSE BLD STRIP.AUTO-MCNC: 350 MG/DL (ref 65–100)
GLUCOSE BLD STRIP.AUTO-MCNC: 477 MG/DL (ref 65–100)
GLUCOSE SERPL-MCNC: 264 MG/DL (ref 65–100)
GLUCOSE UR STRIP.AUTO-MCNC: >1000 MG/DL
HCT VFR BLD AUTO: 48 % (ref 36.6–50.3)
HGB BLD-MCNC: 15.8 G/DL (ref 12.1–17)
HGB UR QL STRIP: ABNORMAL
IMM GRANULOCYTES # BLD AUTO: 0 K/UL (ref 0–0.04)
IMM GRANULOCYTES NFR BLD AUTO: 0 % (ref 0–0.5)
KETONES UR QL STRIP.AUTO: 15 MG/DL
LEUKOCYTE ESTERASE UR QL STRIP.AUTO: NEGATIVE
LIPASE SERPL-CCNC: 74 U/L (ref 73–393)
LYMPHOCYTES # BLD: 1.4 K/UL (ref 0.8–3.5)
LYMPHOCYTES NFR BLD: 18 % (ref 12–49)
MCH RBC QN AUTO: 29.2 PG (ref 26–34)
MCHC RBC AUTO-ENTMCNC: 32.9 G/DL (ref 30–36.5)
MCV RBC AUTO: 88.6 FL (ref 80–99)
MONOCYTES # BLD: 0.5 K/UL (ref 0–1)
MONOCYTES NFR BLD: 7 % (ref 5–13)
NEUTS SEG # BLD: 5.5 K/UL (ref 1.8–8)
NEUTS SEG NFR BLD: 71 % (ref 32–75)
NITRITE UR QL STRIP.AUTO: NEGATIVE
NRBC # BLD: 0 K/UL (ref 0–0.01)
NRBC BLD-RTO: 0 PER 100 WBC
PH UR STRIP: 5.5 [PH] (ref 5–8)
PLATELET # BLD AUTO: 274 K/UL (ref 150–400)
PMV BLD AUTO: 10.4 FL (ref 8.9–12.9)
POTASSIUM SERPL-SCNC: 4.3 MMOL/L (ref 3.5–5.1)
PROT SERPL-MCNC: 8 G/DL (ref 6.4–8.2)
PROT UR STRIP-MCNC: 30 MG/DL
RBC # BLD AUTO: 5.42 M/UL (ref 4.1–5.7)
RBC #/AREA URNS HPF: ABNORMAL /HPF (ref 0–5)
SERVICE CMNT-IMP: ABNORMAL
SODIUM SERPL-SCNC: 133 MMOL/L (ref 136–145)
SP GR UR REFRACTOMETRY: 1.02 (ref 1–1.03)
TROPONIN I SERPL-MCNC: 0.15 NG/ML
TROPONIN I SERPL-MCNC: 0.16 NG/ML
UR CULT HOLD, URHOLD: NORMAL
UROBILINOGEN UR QL STRIP.AUTO: 0.2 EU/DL (ref 0.2–1)
WBC # BLD AUTO: 7.7 K/UL (ref 4.1–11.1)
WBC URNS QL MICRO: ABNORMAL /HPF (ref 0–4)

## 2021-05-06 PROCEDURE — C1887 CATHETER, GUIDING: HCPCS | Performed by: SPECIALIST

## 2021-05-06 PROCEDURE — 74177 CT ABD & PELVIS W/CONTRAST: CPT

## 2021-05-06 PROCEDURE — 83690 ASSAY OF LIPASE: CPT

## 2021-05-06 PROCEDURE — 93458 L HRT ARTERY/VENTRICLE ANGIO: CPT | Performed by: SPECIALIST

## 2021-05-06 PROCEDURE — 77030004532 HC CATH ANGI DX IMP BSC -A: Performed by: SPECIALIST

## 2021-05-06 PROCEDURE — 027135Z DILATION OF CORONARY ARTERY, TWO ARTERIES WITH TWO DRUG-ELUTING INTRALUMINAL DEVICES, PERCUTANEOUS APPROACH: ICD-10-PCS | Performed by: SPECIALIST

## 2021-05-06 PROCEDURE — 74011000258 HC RX REV CODE- 258: Performed by: SPECIALIST

## 2021-05-06 PROCEDURE — 77030003390 HC NDL ANGI MRTM -A: Performed by: SPECIALIST

## 2021-05-06 PROCEDURE — C1876 STENT, NON-COA/NON-COV W/DEL: HCPCS | Performed by: SPECIALIST

## 2021-05-06 PROCEDURE — 74011000636 HC RX REV CODE- 636: Performed by: SPECIALIST

## 2021-05-06 PROCEDURE — 93005 ELECTROCARDIOGRAM TRACING: CPT

## 2021-05-06 PROCEDURE — B2111ZZ FLUOROSCOPY OF MULTIPLE CORONARY ARTERIES USING LOW OSMOLAR CONTRAST: ICD-10-PCS | Performed by: SPECIALIST

## 2021-05-06 PROCEDURE — 82962 GLUCOSE BLOOD TEST: CPT

## 2021-05-06 PROCEDURE — 71275 CT ANGIOGRAPHY CHEST: CPT

## 2021-05-06 PROCEDURE — 36415 COLL VENOUS BLD VENIPUNCTURE: CPT

## 2021-05-06 PROCEDURE — 74011000250 HC RX REV CODE- 250: Performed by: STUDENT IN AN ORGANIZED HEALTH CARE EDUCATION/TRAINING PROGRAM

## 2021-05-06 PROCEDURE — 74011250636 HC RX REV CODE- 250/636: Performed by: SPECIALIST

## 2021-05-06 PROCEDURE — 74011250636 HC RX REV CODE- 250/636: Performed by: STUDENT IN AN ORGANIZED HEALTH CARE EDUCATION/TRAINING PROGRAM

## 2021-05-06 PROCEDURE — 81001 URINALYSIS AUTO W/SCOPE: CPT

## 2021-05-06 PROCEDURE — 99285 EMERGENCY DEPT VISIT HI MDM: CPT

## 2021-05-06 PROCEDURE — C1894 INTRO/SHEATH, NON-LASER: HCPCS | Performed by: SPECIALIST

## 2021-05-06 PROCEDURE — 74011250637 HC RX REV CODE- 250/637: Performed by: SPECIALIST

## 2021-05-06 PROCEDURE — 99153 MOD SED SAME PHYS/QHP EA: CPT | Performed by: SPECIALIST

## 2021-05-06 PROCEDURE — 84484 ASSAY OF TROPONIN QUANT: CPT

## 2021-05-06 PROCEDURE — 4A023N7 MEASUREMENT OF CARDIAC SAMPLING AND PRESSURE, LEFT HEART, PERCUTANEOUS APPROACH: ICD-10-PCS | Performed by: SPECIALIST

## 2021-05-06 PROCEDURE — 77030013715 HC INFL SYS MRTM -B: Performed by: SPECIALIST

## 2021-05-06 PROCEDURE — C1760 CLOSURE DEV, VASC: HCPCS | Performed by: SPECIALIST

## 2021-05-06 PROCEDURE — 83036 HEMOGLOBIN GLYCOSYLATED A1C: CPT

## 2021-05-06 PROCEDURE — 76705 ECHO EXAM OF ABDOMEN: CPT

## 2021-05-06 PROCEDURE — C1725 CATH, TRANSLUMIN NON-LASER: HCPCS | Performed by: SPECIALIST

## 2021-05-06 PROCEDURE — 65660000000 HC RM CCU STEPDOWN

## 2021-05-06 PROCEDURE — 71046 X-RAY EXAM CHEST 2 VIEWS: CPT

## 2021-05-06 PROCEDURE — 74011000250 HC RX REV CODE- 250: Performed by: SPECIALIST

## 2021-05-06 PROCEDURE — 74011000636 HC RX REV CODE- 636: Performed by: RADIOLOGY

## 2021-05-06 PROCEDURE — 92929: CPT | Performed by: SPECIALIST

## 2021-05-06 PROCEDURE — 80053 COMPREHEN METABOLIC PANEL: CPT

## 2021-05-06 PROCEDURE — 92928 PRQ TCAT PLMT NTRAC ST 1 LES: CPT | Performed by: SPECIALIST

## 2021-05-06 PROCEDURE — 74011636637 HC RX REV CODE- 636/637: Performed by: STUDENT IN AN ORGANIZED HEALTH CARE EDUCATION/TRAINING PROGRAM

## 2021-05-06 PROCEDURE — 83880 ASSAY OF NATRIURETIC PEPTIDE: CPT

## 2021-05-06 PROCEDURE — 74011250637 HC RX REV CODE- 250/637: Performed by: STUDENT IN AN ORGANIZED HEALTH CARE EDUCATION/TRAINING PROGRAM

## 2021-05-06 PROCEDURE — 85025 COMPLETE CBC W/AUTO DIFF WBC: CPT

## 2021-05-06 PROCEDURE — 99152 MOD SED SAME PHYS/QHP 5/>YRS: CPT | Performed by: SPECIALIST

## 2021-05-06 PROCEDURE — 77030008543 HC TBNG MON PRSS MRTM -A: Performed by: SPECIALIST

## 2021-05-06 DEVICE — STENT INT22526UX INTEGRITY 2.25X26UX
Type: IMPLANTABLE DEVICE | Status: FUNCTIONAL
Brand: INTEGRITY

## 2021-05-06 DEVICE — STENT INT22514UX INTEGRITY 2.25X14UX
Type: IMPLANTABLE DEVICE | Status: FUNCTIONAL
Brand: INTEGRITY

## 2021-05-06 RX ORDER — ATORVASTATIN CALCIUM 20 MG/1
80 TABLET, FILM COATED ORAL DAILY
Status: DISCONTINUED | OUTPATIENT
Start: 2021-05-07 | End: 2021-05-10 | Stop reason: HOSPADM

## 2021-05-06 RX ORDER — SODIUM CHLORIDE 0.9 % (FLUSH) 0.9 %
5-40 SYRINGE (ML) INJECTION EVERY 8 HOURS
Status: DISCONTINUED | OUTPATIENT
Start: 2021-05-06 | End: 2021-05-10 | Stop reason: HOSPADM

## 2021-05-06 RX ORDER — TRAZODONE HYDROCHLORIDE 50 MG/1
50 TABLET ORAL
COMMUNITY
End: 2021-08-16

## 2021-05-06 RX ORDER — LIDOCAINE HYDROCHLORIDE 10 MG/ML
INJECTION INFILTRATION; PERINEURAL AS NEEDED
Status: DISCONTINUED | OUTPATIENT
Start: 2021-05-06 | End: 2021-05-06 | Stop reason: HOSPADM

## 2021-05-06 RX ORDER — FENTANYL CITRATE 50 UG/ML
INJECTION, SOLUTION INTRAMUSCULAR; INTRAVENOUS AS NEEDED
Status: DISCONTINUED | OUTPATIENT
Start: 2021-05-06 | End: 2021-05-06 | Stop reason: HOSPADM

## 2021-05-06 RX ORDER — SODIUM CHLORIDE 9 MG/ML
75 INJECTION, SOLUTION INTRAVENOUS CONTINUOUS
Status: DISPENSED | OUTPATIENT
Start: 2021-05-06 | End: 2021-05-07

## 2021-05-06 RX ORDER — ONDANSETRON 2 MG/ML
4 INJECTION INTRAMUSCULAR; INTRAVENOUS
Status: DISCONTINUED | OUTPATIENT
Start: 2021-05-06 | End: 2021-05-10 | Stop reason: HOSPADM

## 2021-05-06 RX ORDER — SODIUM CHLORIDE 0.9 % (FLUSH) 0.9 %
5-40 SYRINGE (ML) INJECTION AS NEEDED
Status: DISCONTINUED | OUTPATIENT
Start: 2021-05-06 | End: 2021-05-10 | Stop reason: HOSPADM

## 2021-05-06 RX ORDER — CLOPIDOGREL BISULFATE 75 MG/1
75 TABLET ORAL DAILY
Status: DISCONTINUED | OUTPATIENT
Start: 2021-05-07 | End: 2021-05-06

## 2021-05-06 RX ORDER — LIDOCAINE 4 G/100G
1 PATCH TOPICAL EVERY 24 HOURS
Status: DISCONTINUED | OUTPATIENT
Start: 2021-05-06 | End: 2021-05-10 | Stop reason: HOSPADM

## 2021-05-06 RX ORDER — FUROSEMIDE 10 MG/ML
20 INJECTION INTRAMUSCULAR; INTRAVENOUS DAILY
Status: DISCONTINUED | OUTPATIENT
Start: 2021-05-07 | End: 2021-05-06

## 2021-05-06 RX ORDER — ENOXAPARIN SODIUM 100 MG/ML
40 INJECTION SUBCUTANEOUS EVERY 24 HOURS
Status: DISCONTINUED | OUTPATIENT
Start: 2021-05-07 | End: 2021-05-10 | Stop reason: HOSPADM

## 2021-05-06 RX ORDER — FUROSEMIDE 40 MG/1
40 TABLET ORAL DAILY
Status: DISCONTINUED | OUTPATIENT
Start: 2021-05-07 | End: 2021-05-10 | Stop reason: HOSPADM

## 2021-05-06 RX ORDER — CLOPIDOGREL 300 MG/1
TABLET, FILM COATED ORAL AS NEEDED
Status: DISCONTINUED | OUTPATIENT
Start: 2021-05-06 | End: 2021-05-06 | Stop reason: HOSPADM

## 2021-05-06 RX ORDER — PROMETHAZINE HYDROCHLORIDE 25 MG/1
12.5 TABLET ORAL
Status: DISCONTINUED | OUTPATIENT
Start: 2021-05-06 | End: 2021-05-10 | Stop reason: HOSPADM

## 2021-05-06 RX ORDER — INSULIN GLARGINE 100 [IU]/ML
25 INJECTION, SOLUTION SUBCUTANEOUS 2 TIMES DAILY
COMMUNITY
End: 2021-05-10

## 2021-05-06 RX ORDER — HEPARIN SODIUM 200 [USP'U]/100ML
INJECTION, SOLUTION INTRAVENOUS
Status: COMPLETED | OUTPATIENT
Start: 2021-05-06 | End: 2021-05-06

## 2021-05-06 RX ORDER — INSULIN GLARGINE 100 [IU]/ML
40 INJECTION, SOLUTION SUBCUTANEOUS 2 TIMES DAILY
Status: DISCONTINUED | OUTPATIENT
Start: 2021-05-07 | End: 2021-05-07

## 2021-05-06 RX ORDER — DEXTROSE 50 % IN WATER (D50W) INTRAVENOUS SYRINGE
25-50 AS NEEDED
Status: DISCONTINUED | OUTPATIENT
Start: 2021-05-06 | End: 2021-05-07

## 2021-05-06 RX ORDER — AMLODIPINE BESYLATE 5 MG/1
10 TABLET ORAL DAILY
Status: DISCONTINUED | OUTPATIENT
Start: 2021-05-07 | End: 2021-05-07

## 2021-05-06 RX ORDER — MAGNESIUM SULFATE 100 %
4 CRYSTALS MISCELLANEOUS AS NEEDED
Status: DISCONTINUED | OUTPATIENT
Start: 2021-05-06 | End: 2021-05-07

## 2021-05-06 RX ORDER — LOSARTAN POTASSIUM 25 MG/1
25 TABLET ORAL DAILY
Status: DISCONTINUED | OUTPATIENT
Start: 2021-05-07 | End: 2021-05-08

## 2021-05-06 RX ORDER — CLOPIDOGREL BISULFATE 75 MG/1
75 TABLET ORAL DAILY
Status: DISCONTINUED | OUTPATIENT
Start: 2021-05-07 | End: 2021-05-10 | Stop reason: HOSPADM

## 2021-05-06 RX ORDER — HYDROCORTISONE SODIUM SUCCINATE 100 MG/2ML
100 INJECTION, POWDER, FOR SOLUTION INTRAMUSCULAR; INTRAVENOUS
Status: ACTIVE | OUTPATIENT
Start: 2021-05-06 | End: 2021-05-07

## 2021-05-06 RX ORDER — ZOLPIDEM TARTRATE 5 MG/1
5 TABLET ORAL
Status: DISCONTINUED | OUTPATIENT
Start: 2021-05-06 | End: 2021-05-10 | Stop reason: HOSPADM

## 2021-05-06 RX ORDER — MIDAZOLAM HYDROCHLORIDE 1 MG/ML
INJECTION, SOLUTION INTRAMUSCULAR; INTRAVENOUS AS NEEDED
Status: DISCONTINUED | OUTPATIENT
Start: 2021-05-06 | End: 2021-05-06 | Stop reason: HOSPADM

## 2021-05-06 RX ORDER — TRAZODONE HYDROCHLORIDE 50 MG/1
50 TABLET ORAL
Status: DISCONTINUED | OUTPATIENT
Start: 2021-05-06 | End: 2021-05-10 | Stop reason: HOSPADM

## 2021-05-06 RX ORDER — CARVEDILOL 12.5 MG/1
12.5 TABLET ORAL 2 TIMES DAILY WITH MEALS
Status: DISCONTINUED | OUTPATIENT
Start: 2021-05-06 | End: 2021-05-08

## 2021-05-06 RX ORDER — INSULIN LISPRO 100 [IU]/ML
INJECTION, SOLUTION INTRAVENOUS; SUBCUTANEOUS
Status: DISCONTINUED | OUTPATIENT
Start: 2021-05-06 | End: 2021-05-07

## 2021-05-06 RX ORDER — CARVEDILOL 12.5 MG/1
12.5 TABLET ORAL 2 TIMES DAILY WITH MEALS
Status: DISCONTINUED | OUTPATIENT
Start: 2021-05-07 | End: 2021-05-06

## 2021-05-06 RX ORDER — NITROGLYCERIN 0.4 MG/1
0.4 TABLET SUBLINGUAL
Status: DISCONTINUED | OUTPATIENT
Start: 2021-05-06 | End: 2021-05-10 | Stop reason: HOSPADM

## 2021-05-06 RX ADMIN — Medication 10 ML: at 21:51

## 2021-05-06 RX ADMIN — SODIUM CHLORIDE 75 ML/HR: 9 INJECTION, SOLUTION INTRAVENOUS at 19:41

## 2021-05-06 RX ADMIN — CARVEDILOL 12.5 MG: 12.5 TABLET, FILM COATED ORAL at 21:51

## 2021-05-06 RX ADMIN — ENOXAPARIN SODIUM 40 MG: 40 INJECTION SUBCUTANEOUS at 23:34

## 2021-05-06 RX ADMIN — IOPAMIDOL 100 ML: 755 INJECTION, SOLUTION INTRAVENOUS at 15:48

## 2021-05-06 RX ADMIN — Medication 10 ML: at 21:52

## 2021-05-06 RX ADMIN — INSULIN GLARGINE 40 UNITS: 100 INJECTION, SOLUTION SUBCUTANEOUS at 23:33

## 2021-05-06 RX ADMIN — INSULIN HUMAN 10 UNITS: 100 INJECTION, SOLUTION PARENTERAL at 23:45

## 2021-05-06 RX ADMIN — INSULIN LISPRO 4 UNITS: 100 INJECTION, SOLUTION INTRAVENOUS; SUBCUTANEOUS at 21:51
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
## 2021-05-06 NOTE — Clinical Note
Balloon inflated using multiple inflation technique. Lesion #1: Pressure = 8 colin; Duration = 60 sec. Inflation 2: Pressure = 8 colin; Duration = 60 sec.    OM4

## 2021-05-06 NOTE — PROGRESS NOTES
TRANSFER - IN REPORT:    Verbal report received from Jose Jean-Baptiste RN (name) on Devendra Tabares  being received from post cath (unit) for routine progression of care      Report consisted of patients Situation, Background, Assessment and   Recommendations(SBAR). Information from the following report(s) SBAR, Kardex and Procedure Summary was reviewed with the receiving nurse. Opportunity for questions and clarification was provided. Assessment completed upon patients arrival to unit and care assumed. 2015: pt arrived on unit. Groin site assessed. Clean dry and intact. No s/s hematoma. No c/o pain at this time    Bedside and Verbal shift change report given to Michelle Manriquez (oncoming nurse) by Amelia Ventura RN  (offgoing nurse). Report included the following information SBAR, Kardex, Intake/Output, Accordion and Recent Results.
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
## 2021-05-06 NOTE — Clinical Note
Single view of the left main, left coronary artery, LAD, circumflex artery and right coronary artery obtained using hand injection.

## 2021-05-06 NOTE — Clinical Note
TRANSFER - OUT REPORT:     Verbal report given to: (at bedside). Report consisted of patient's Situation, Background, Assessment and   Recommendations(SBAR). Opportunity for questions and clarification was provided. Patient transported with a Registered Nurse. Patient transported to: recovery.

## 2021-05-06 NOTE — PROGRESS NOTES
[facility]: MED RECONCILIATION      Medications adjusted:  Lantus  Trazodone    Information obtained from: Patient (alert, oriented, reliable), RxQuery (data available), recent medical records      Allergies: Acetaminophen, Aspirin, Unable to obtain, Aspirin, Spinach leaf, and Tylenol [acetaminophen]    Prior to Admission Medications:     Medication Documentation Review Audit       Reviewed by Jeanna Suarez., PHARMD (Pharmacist) on 05/06/21 at [address] [place] [address] Provider Last Dose Status Taking? albuterol (PROVENTIL HFA, VENTOLIN HFA, PROAIR HFA) 90 mcg/actuation inhaler Take 2 Puffs by inhalation every four (4) hours as needed for Wheezing or Shortness of Breath. Eran Banuelos MD  Active Yes   amLODIPine (NORVASC) 10 mg tablet Take 1 Tab by mouth daily. Eran Banuelos MD  Active Yes   atorvastatin (LIPITOR) 80 mg tablet Take 1 Tab by mouth daily. Eran Banuelos MD  Active Yes   carvediloL (COREG) 12.5 mg tablet Take 1 Tab by mouth two (2) times daily (with meals). Eran Banuelos MD  Active Yes   clopidogreL (PLAVIX) 75 mg tab Take 1 Tab by mouth daily. Eran Banuelos MD  Active Yes   furosemide (LASIX) 40 mg tablet Take 1 Tab by mouth daily. Eran Banuelos MD  Active Yes   insulin glargine (Lantus Solostar U-100 Insulin) 100 unit/mL (3 mL) inpn 25 Units by SubCUTAneous route two (2) times a day. Provider, Historical  Active Yes   insulin regular (NOVOLIN R, HUMULIN R) 100 unit/mL injection 25 units Sub Q Ten minutes prior to each meal.  Type 2 DM on insulin. E11.9 Eran Banuelos MD  Active Yes   liraglutide (VICTOZA) 0.6 mg/0.1 mL (18 mg/3 mL) pnij 1.8 mg by SubCUTAneous route daily. Eran Banuelos MD  Active            Med Note (Eddie Decker. Thu May 6, 2021  7:23 PM) Recently prescribed again but the pt has not started taking yet (he will not be able to provide it either)   losartan (COZAAR) 25 mg tablet Take 1 Tab by mouth daily.  Sunny Chaidez MD OCTAVIO  Active Yes   pantoprazole (PROTONIX) 40 mg tablet Take 1 Tab by mouth Daily (before breakfast). Marielos Herron MD  Active Yes   traZODone (DESYREL) 50 mg tablet Take 50 mg by mouth nightly as needed for Sleep. Provider, Historical  Active Yes                      Jessie Turner.  Jil Blankenship
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
## 2021-05-06 NOTE — PROGRESS NOTES
TRANSFER - IN REPORT:    Verbal report received from Morro Nu[facility] RN ([facility]unit[facility] for routine progression of care      Report consisted of patients Situation, Background, Assessment and   Recommendations(SBAR). Information from the following report(s) Procedure Summary was reviewed with the receiving nurse. Opportunity for questions and clarification was provided. Assessment completed upon patients arrival to unit and care assumed. 1908: Patient received from cath lab. Patient with angioseal to right groin. Site clean, dry and intact. No hematoma. Pulses palpable. VS stable. Patient with complaint of 5/10 pain to chest. Reports he feels much better. HOB flat. 1915: EKG complete. 2011: TRANSFER - OUT REPORT:    Verbal report given to CesarDarren Longo RN(name) on [facility]  being transferred to [facility](unit) for routine progression of care       Report consisted of patients Situation, Background, Assessment and   Recommendations(SBAR). Information from the following report(s) SBAR, Kardex, Intake/Output, MAR, Recent Results and Cardiac Rhythm NSR was reviewed with the receiving nurse. Lines:   Peripheral IV 05/06/21 Right Antecubital (Active)   Site Assessment Clean, dry, & intact 05/06/21 1916   Phlebitis Assessment 0 05/06/21 1916   Infiltration Assessment 0 05/06/21 1916   Dressing Status Clean, dry, & intact 05/06/21 1916   Dressing Type Transparent 05/06/21 1916   Hub Color/Line Status Pink; Infusing 05/06/21 1916        Opportunity for questions and clarification was provided. Patient transported with:   Monitor  Registered Nurse     2015: . Patient transported upstairs. Patient with angioseal to right groin. Site clean, dry and intact. No hematoma. Pulses palpable. VS stable. Patient reports chest pain now 3/10. Reports he feels much better.

## 2021-05-06 NOTE — Clinical Note
TRANSFER - IN REPORT:     Verbal report received from: Snehal Hale. Report consisted of patient's Situation, Background, Assessment and   Recommendations(SBAR). Opportunity for questions and clarification was provided. Assessment completed upon patient's arrival to unit and care assumed. Patient transported with a Registered Nurse, [address] [place] [address] / Patient Care Tech and Monitor.

## 2021-05-06 NOTE — PROCEDURES
Cath:  Obstructive 2VD:     LAD patent mid stent, ap40     OM2 95, OM4 95 (left dom)     RCA p40, m70 (non-dom)  Severe LV systolic dysfunction     EF 20% with severe GHK. No AVG, MR 1+. Successful BMS OM2: 2.25x15 Integrity  Successful BMS OM4: 2.25x26 Integrity. RFA angioseal.    Likely stable for discharge in AM from cardiac standpoint. F/U with cardiologist at [facility]. Plavix 75 every day alone. No ASA due to severe allergy. Resume HF meds.

## 2021-05-07 ENCOUNTER — APPOINTMENT (OUTPATIENT)
Dept: NON INVASIVE DIAGNOSTICS | Age: 51
DRG: 174 | End: 2021-05-07
Attending: STUDENT IN AN ORGANIZED HEALTH CARE EDUCATION/TRAINING PROGRAM
Payer: COMMERCIAL

## 2021-05-07 PROBLEM — I24.9 ACS (ACUTE CORONARY SYNDROME) (HCC): Status: ACTIVE | Noted: 2021-05-07

## 2021-05-07 LAB
ALBUMIN SERPL-MCNC: 3.3 G/DL (ref 3.5–5)
ALBUMIN/GLOB SERPL: 0.8 {RATIO} (ref 1.1–2.2)
ALP SERPL-CCNC: 94 U/L (ref 45–117)
ALT SERPL-CCNC: 19 U/L (ref 12–78)
ANION GAP SERPL CALC-SCNC: 6 MMOL/L (ref 5–15)
AST SERPL-CCNC: 6 U/L (ref 15–37)
ATRIAL RATE: 73 BPM
ATRIAL RATE: 94 BPM
BASOPHILS # BLD: 0.1 K/UL (ref 0–0.1)
BASOPHILS NFR BLD: 1 % (ref 0–1)
BILIRUB SERPL-MCNC: 0.4 MG/DL (ref 0.2–1)
BUN SERPL-MCNC: 17 MG/DL (ref 6–20)
BUN/CREAT SERPL: 11 (ref 12–20)
CALCIUM SERPL-MCNC: 9 MG/DL (ref 8.5–10.1)
CALCULATED P AXIS, ECG09: 62 DEGREES
CALCULATED P AXIS, ECG09: 68 DEGREES
CALCULATED R AXIS, ECG10: -15 DEGREES
CALCULATED R AXIS, ECG10: 1 DEGREES
CALCULATED T AXIS, ECG11: 106 DEGREES
CALCULATED T AXIS, ECG11: 63 DEGREES
CHLORIDE SERPL-SCNC: 101 MMOL/L (ref 97–108)
CO2 SERPL-SCNC: 26 MMOL/L (ref 21–32)
CREAT SERPL-MCNC: 1.53 MG/DL (ref 0.7–1.3)
DIAGNOSIS, 93000: NORMAL
DIAGNOSIS, 93000: NORMAL
DIFFERENTIAL METHOD BLD: ABNORMAL
ECHO AO ASC DIAM: 3.39 CM
ECHO AO ROOT DIAM: 3.47 CM
ECHO AV AREA PEAK VELOCITY: 1.61 CM2
ECHO AV AREA/BSA PEAK VELOCITY: 0.9 CM2/M2
ECHO AV PEAK GRADIENT: 5.96 MMHG
ECHO AV PEAK VELOCITY: 122.04 CM/S
ECHO LA AREA 4C: 12.25 CM2
ECHO LA MAJOR AXIS: 3.75 CM
ECHO LA MINOR AXIS: 2.02 CM
ECHO LA VOL 2C: 26.34 ML (ref 18–58)
ECHO LA VOL 4C: 25.11 ML (ref 18–58)
ECHO LA VOL BP: 28.86 ML (ref 18–58)
ECHO LA VOL/BSA BIPLANE: 15.58 ML/M2 (ref 16–28)
ECHO LA VOLUME INDEX A2C: 14.22 ML/M2 (ref 16–28)
ECHO LA VOLUME INDEX A4C: 13.56 ML/M2 (ref 16–28)
ECHO LV E' LATERAL VELOCITY: 3.05 CENTIMETER/SECOND
ECHO LV E' SEPTAL VELOCITY: 2.8 CENTIMETER/SECOND
ECHO LV EDV A2C: 120.11 ML
ECHO LV EDV A4C: 158.01 ML
ECHO LV EDV BP: 138.19 ML (ref 67–155)
ECHO LV EDV INDEX A4C: 85.3 ML/M2
ECHO LV EDV INDEX BP: 74.6 ML/M2
ECHO LV EDV NDEX A2C: 64.9 ML/M2
ECHO LV EJECTION FRACTION A2C: 28 PERCENT
ECHO LV EJECTION FRACTION A4C: 40 PERCENT
ECHO LV EJECTION FRACTION BIPLANE: 36.9 PERCENT (ref 55–100)
ECHO LV ESV A2C: 87.07 ML
ECHO LV ESV A4C: 94.96 ML
ECHO LV ESV BP: 87.15 ML (ref 22–58)
ECHO LV ESV INDEX A2C: 47 ML/M2
ECHO LV ESV INDEX A4C: 51.3 ML/M2
ECHO LV ESV INDEX BP: 47.1 ML/M2
ECHO LV INTERNAL DIMENSION DIASTOLIC: 5.5 CM (ref 4.2–5.9)
ECHO LV INTERNAL DIMENSION SYSTOLIC: 4.5 CM
ECHO LV IVSD: 0.89 CM (ref 0.6–1)
ECHO LV MASS 2D: 187.2 G (ref 88–224)
ECHO LV MASS INDEX 2D: 101.1 G/M2 (ref 49–115)
ECHO LV POSTERIOR WALL DIASTOLIC: 0.92 CM (ref 0.6–1)
ECHO LVOT DIAM: 2.41 CM
ECHO LVOT PEAK GRADIENT: 0.73 MMHG
ECHO LVOT PEAK VELOCITY: 42.84 CM/S
ECHO MV A VELOCITY: 69.31 CENTIMETER/SECOND
ECHO MV AREA PHT: 5.85 CM2
ECHO MV E DECELERATION TIME (DT): 129.6 MS
ECHO MV E VELOCITY: 43.62 CENTIMETER/SECOND
ECHO MV PRESSURE HALF TIME (PHT): 37.59 MS
ECHO PV MAX VELOCITY: 60.92 CM/S
ECHO PV PEAK INSTANTANEOUS GRADIENT SYSTOLIC: 1.48 MMHG
ECHO RV INTERNAL DIMENSION: 3.71 CM
ECHO RV TAPSE: 1.41 CM (ref 1.5–2)
EOSINOPHIL # BLD: 0.2 K/UL (ref 0–0.4)
EOSINOPHIL NFR BLD: 3 % (ref 0–7)
ERYTHROCYTE [DISTWIDTH] IN BLOOD BY AUTOMATED COUNT: 14.9 % (ref 11.5–14.5)
EST. AVERAGE GLUCOSE BLD GHB EST-MCNC: 283 MG/DL
GLOBULIN SER CALC-MCNC: 4 G/DL (ref 2–4)
GLUCOSE BLD STRIP.AUTO-MCNC: 100 MG/DL (ref 65–100)
GLUCOSE BLD STRIP.AUTO-MCNC: 154 MG/DL (ref 65–100)
GLUCOSE BLD STRIP.AUTO-MCNC: 185 MG/DL (ref 65–100)
GLUCOSE BLD STRIP.AUTO-MCNC: 223 MG/DL (ref 65–100)
GLUCOSE BLD STRIP.AUTO-MCNC: 303 MG/DL (ref 65–100)
GLUCOSE BLD STRIP.AUTO-MCNC: 36 MG/DL (ref 65–100)
GLUCOSE BLD STRIP.AUTO-MCNC: 432 MG/DL (ref 65–100)
GLUCOSE BLD STRIP.AUTO-MCNC: 48 MG/DL (ref 65–100)
GLUCOSE BLD STRIP.AUTO-MCNC: 48 MG/DL (ref 65–100)
GLUCOSE BLD STRIP.AUTO-MCNC: 49 MG/DL (ref 65–100)
GLUCOSE BLD STRIP.AUTO-MCNC: 497 MG/DL (ref 65–100)
GLUCOSE BLD STRIP.AUTO-MCNC: 54 MG/DL (ref 65–100)
GLUCOSE BLD STRIP.AUTO-MCNC: 54 MG/DL (ref 65–100)
GLUCOSE BLD STRIP.AUTO-MCNC: 55 MG/DL (ref 65–100)
GLUCOSE BLD STRIP.AUTO-MCNC: 58 MG/DL (ref 65–100)
GLUCOSE SERPL-MCNC: 318 MG/DL (ref 65–100)
HBA1C MFR BLD: 11.5 % (ref 4–5.6)
HCT VFR BLD AUTO: 42.8 % (ref 36.6–50.3)
HGB BLD-MCNC: 14.2 G/DL (ref 12.1–17)
IMM GRANULOCYTES # BLD AUTO: 0 K/UL (ref 0–0.04)
IMM GRANULOCYTES NFR BLD AUTO: 0 % (ref 0–0.5)
LA VOL DISK BP: 27.03 ML (ref 18–58)
LYMPHOCYTES # BLD: 1.1 K/UL (ref 0.8–3.5)
LYMPHOCYTES NFR BLD: 17 % (ref 12–49)
MAGNESIUM SERPL-MCNC: 2.1 MG/DL (ref 1.6–2.4)
MCH RBC QN AUTO: 29.5 PG (ref 26–34)
MCHC RBC AUTO-ENTMCNC: 33.2 G/DL (ref 30–36.5)
MCV RBC AUTO: 89 FL (ref 80–99)
MONOCYTES # BLD: 0.7 K/UL (ref 0–1)
MONOCYTES NFR BLD: 10 % (ref 5–13)
NEUTS SEG # BLD: 4.7 K/UL (ref 1.8–8)
NEUTS SEG NFR BLD: 69 % (ref 32–75)
NRBC # BLD: 0 K/UL (ref 0–0.01)
NRBC BLD-RTO: 0 PER 100 WBC
P-R INTERVAL, ECG05: 148 MS
P-R INTERVAL, ECG05: 152 MS
PHOSPHATE SERPL-MCNC: 3.2 MG/DL (ref 2.6–4.7)
PLATELET # BLD AUTO: 252 K/UL (ref 150–400)
PMV BLD AUTO: 10.7 FL (ref 8.9–12.9)
POTASSIUM SERPL-SCNC: 4.1 MMOL/L (ref 3.5–5.1)
PROT SERPL-MCNC: 7.3 G/DL (ref 6.4–8.2)
Q-T INTERVAL, ECG07: 372 MS
Q-T INTERVAL, ECG07: 420 MS
QRS DURATION, ECG06: 78 MS
QRS DURATION, ECG06: 84 MS
QTC CALCULATION (BEZET), ECG08: 462 MS
QTC CALCULATION (BEZET), ECG08: 465 MS
RBC # BLD AUTO: 4.81 M/UL (ref 4.1–5.7)
SERVICE CMNT-IMP: ABNORMAL
SERVICE CMNT-IMP: NORMAL
SODIUM SERPL-SCNC: 133 MMOL/L (ref 136–145)
VENTRICULAR RATE, ECG03: 73 BPM
VENTRICULAR RATE, ECG03: 94 BPM
WBC # BLD AUTO: 6.8 K/UL (ref 4.1–11.1)

## 2021-05-07 PROCEDURE — 94761 N-INVAS EAR/PLS OXIMETRY MLT: CPT

## 2021-05-07 PROCEDURE — 82962 GLUCOSE BLOOD TEST: CPT

## 2021-05-07 PROCEDURE — 74011636637 HC RX REV CODE- 636/637: Performed by: STUDENT IN AN ORGANIZED HEALTH CARE EDUCATION/TRAINING PROGRAM

## 2021-05-07 PROCEDURE — 74011250636 HC RX REV CODE- 250/636: Performed by: STUDENT IN AN ORGANIZED HEALTH CARE EDUCATION/TRAINING PROGRAM

## 2021-05-07 PROCEDURE — 74011636637 HC RX REV CODE- 636/637

## 2021-05-07 PROCEDURE — 80053 COMPREHEN METABOLIC PANEL: CPT

## 2021-05-07 PROCEDURE — 74011000250 HC RX REV CODE- 250: Performed by: STUDENT IN AN ORGANIZED HEALTH CARE EDUCATION/TRAINING PROGRAM

## 2021-05-07 PROCEDURE — 83735 ASSAY OF MAGNESIUM: CPT

## 2021-05-07 PROCEDURE — 74011250637 HC RX REV CODE- 250/637: Performed by: STUDENT IN AN ORGANIZED HEALTH CARE EDUCATION/TRAINING PROGRAM

## 2021-05-07 PROCEDURE — 99233 SBSQ HOSP IP/OBS HIGH 50: CPT | Performed by: CLINICAL NURSE SPECIALIST

## 2021-05-07 PROCEDURE — 94760 N-INVAS EAR/PLS OXIMETRY 1: CPT

## 2021-05-07 PROCEDURE — 93005 ELECTROCARDIOGRAM TRACING: CPT

## 2021-05-07 PROCEDURE — 84100 ASSAY OF PHOSPHORUS: CPT

## 2021-05-07 PROCEDURE — 99222 1ST HOSP IP/OBS MODERATE 55: CPT | Performed by: FAMILY MEDICINE

## 2021-05-07 PROCEDURE — 85025 COMPLETE CBC W/AUTO DIFF WBC: CPT

## 2021-05-07 PROCEDURE — 36415 COLL VENOUS BLD VENIPUNCTURE: CPT

## 2021-05-07 PROCEDURE — 93306 TTE W/DOPPLER COMPLETE: CPT | Performed by: INTERNAL MEDICINE

## 2021-05-07 PROCEDURE — 65660000000 HC RM CCU STEPDOWN

## 2021-05-07 PROCEDURE — 93306 TTE W/DOPPLER COMPLETE: CPT

## 2021-05-07 RX ORDER — INSULIN GLARGINE 100 [IU]/ML
50 INJECTION, SOLUTION SUBCUTANEOUS 2 TIMES DAILY
Status: DISCONTINUED | OUTPATIENT
Start: 2021-05-07 | End: 2021-05-07

## 2021-05-07 RX ORDER — MAGNESIUM SULFATE 100 %
4 CRYSTALS MISCELLANEOUS AS NEEDED
Status: DISCONTINUED | OUTPATIENT
Start: 2021-05-07 | End: 2021-05-10 | Stop reason: HOSPADM

## 2021-05-07 RX ORDER — INSULIN LISPRO 100 [IU]/ML
10 INJECTION, SOLUTION INTRAVENOUS; SUBCUTANEOUS ONCE
Status: ACTIVE | OUTPATIENT
Start: 2021-05-08 | End: 2021-05-08

## 2021-05-07 RX ORDER — INSULIN LISPRO 100 [IU]/ML
INJECTION, SOLUTION INTRAVENOUS; SUBCUTANEOUS
Status: DISCONTINUED | OUTPATIENT
Start: 2021-05-07 | End: 2021-05-10 | Stop reason: HOSPADM

## 2021-05-07 RX ORDER — DEXTROSE 50 % IN WATER (D50W) INTRAVENOUS SYRINGE
12.5-25 AS NEEDED
Status: DISCONTINUED | OUTPATIENT
Start: 2021-05-07 | End: 2021-05-10 | Stop reason: HOSPADM

## 2021-05-07 RX ORDER — INSULIN GLARGINE 100 [IU]/ML
40 INJECTION, SOLUTION SUBCUTANEOUS DAILY
Status: DISCONTINUED | OUTPATIENT
Start: 2021-05-08 | End: 2021-05-08

## 2021-05-07 RX ORDER — INSULIN LISPRO 100 [IU]/ML
INJECTION, SOLUTION INTRAVENOUS; SUBCUTANEOUS
Status: COMPLETED
Start: 2021-05-07 | End: 2021-05-07

## 2021-05-07 RX ADMIN — AMLODIPINE BESYLATE 10 MG: 5 TABLET ORAL at 08:47

## 2021-05-07 RX ADMIN — LOSARTAN POTASSIUM 25 MG: 25 TABLET, FILM COATED ORAL at 08:47

## 2021-05-07 RX ADMIN — Medication 10 ML: at 21:23

## 2021-05-07 RX ADMIN — INSULIN HUMAN 25 UNITS: 100 INJECTION, SOLUTION PARENTERAL at 11:50

## 2021-05-07 RX ADMIN — ENOXAPARIN SODIUM 40 MG: 40 INJECTION SUBCUTANEOUS at 23:31

## 2021-05-07 RX ADMIN — CLOPIDOGREL BISULFATE 75 MG: 75 TABLET ORAL at 08:47

## 2021-05-07 RX ADMIN — Medication 10 ML: at 05:13

## 2021-05-07 RX ADMIN — CARVEDILOL 12.5 MG: 12.5 TABLET, FILM COATED ORAL at 17:52

## 2021-05-07 RX ADMIN — INSULIN GLARGINE 50 UNITS: 100 INJECTION, SOLUTION SUBCUTANEOUS at 08:47

## 2021-05-07 RX ADMIN — INSULIN HUMAN 25 UNITS: 100 INJECTION, SOLUTION PARENTERAL at 08:47

## 2021-05-07 RX ADMIN — INSULIN LISPRO 10 UNITS: 100 INJECTION, SOLUTION INTRAVENOUS; SUBCUTANEOUS at 23:33

## 2021-05-07 RX ADMIN — CARVEDILOL 12.5 MG: 12.5 TABLET, FILM COATED ORAL at 08:47

## 2021-05-07 RX ADMIN — Medication 16 G: at 15:20

## 2021-05-07 RX ADMIN — Medication 10 ML: at 05:14

## 2021-05-07 RX ADMIN — INSULIN LISPRO 2 UNITS: 100 INJECTION, SOLUTION INTRAVENOUS; SUBCUTANEOUS at 08:46

## 2021-05-07 RX ADMIN — FUROSEMIDE 40 MG: 40 TABLET ORAL at 08:47

## 2021-05-07 RX ADMIN — DEXTROSE MONOHYDRATE 12.5 G: 25 INJECTION, SOLUTION INTRAVENOUS at 15:44

## 2021-05-07 RX ADMIN — ATORVASTATIN CALCIUM 80 MG: 20 TABLET, FILM COATED ORAL at 08:47

## 2021-05-07 RX ADMIN — INSULIN LISPRO 10 UNITS: 100 INJECTION, SOLUTION INTRAVENOUS; SUBCUTANEOUS at 21:23
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
## 2021-05-07 NOTE — PROGRESS NOTES
Peetr Sarkar MD, [address] [place] [address] 12  Joon Rosenthal 33  [phone]      IMPRESSION and RECOMMENDATIONS     1. Cardiomyopathy:  Unclear etiology. Small vessel disease on cath at [address] [place] [address] 11/2020. Cont coreg, cozaar, etc.  No overt CHF or dysrhythmia.     2. Hypertroponinemia:  Stented OM2 and OM4 with BMS. These were the only lesion amenable to PCI. Cont plavix, statin. No ASA due to allergy. I suspect any other lingering CP is likely non-cardiac in etiology. Stable for discharge from a cardiac standpoint. I recommended f/u with his primary cardiologist at [address] [place] [address].     I have discussed this plan with the patient. He appears to understand this plan and wishes to proceed ahead. Subjective:       No complaints at this time. Objective:     Patient Vitals for the past 16 hrs:   BP Temp Pulse Resp SpO2 Weight   05/07/21 0755 120/78 98 °F (36.7 °C) 85 20 97 %    05/07/21 0700   86      05/07/21 0353 112/77 97.6 °F (36.4 °C) 88 20 97 %    05/06/21 2348 120/78 97.7 °F (36.5 °C) 94 20 96 %    05/06/21 2318   95      05/06/21 2035 (!) 160/99 98.2 °F (36.8 °C) 93 20 100 % 77.7 kg (171 lb 3.2 oz)   05/06/21 2008 (!) 134/93  96 20 99 %    05/06/21 2000 (!) 141/92  93 20 98 %    05/06/21 1945 (!) 153/95  92 15 100 %    05/06/21 1930 (!) 143/97  94 14 99 %    05/06/21 1915 (!) 140/93  91 26 99 %    05/06/21 1911 (!) 143/94  92 17 98 %    05/06/21 1740 130/86  91 15 96 %        HEENT Exam:     WNL         Lung Exam:     The patient is not dyspneic. Breath sounds are heard equally in all lung fields. There are no wheezes, rales, rhonchi, or rubs heard on auscultation. Heart Exam:     The rhythm is regular. The PMI is in the 5th intercostal space of the MCL. Apical impulse is normal. S1 is regular. S2 is physiologic. There is no S3, S4 gallop, murmur, click, or rub. Abdomen Exam:     Benign.          Extremities Exam:     No cyanosis, clubbing, edema. Distal pulses intact. Right groin without bruit/hematoma. Lab Results   Component Value Date/Time    Glucose 318 (H) 05/07/2021 03:18 AM    Sodium 133 (L) 05/07/2021 03:18 AM    Potassium 4.1 05/07/2021 03:18 AM    Chloride 101 05/07/2021 03:18 AM    CO2 26 05/07/2021 03:18 AM    BUN 17 05/07/2021 03:18 AM    Creatinine 1.53 (H) 05/07/2021 03:18 AM    Calcium 9.0 05/07/2021 03:18 AM     Recent Labs     05/07/21  0318 05/06/21  1247   WBC 6.8 7.7   HGB 14.2 15.8   HCT 42.8 48.0    274     Recent Labs     05/07/21 0318 05/06/21  1247   ALT 19 19   AP 94 107   TBILI 0.4 0.6   TP 7.3 8.0   ALB 3.3* 3.6   GLOB 4.0 4.4*     No results for input(s): INR, PTP, APTT, INREXT in the last 72 hours. No results for input(s): CPK, CKMB, TNIPOC in the last 72 hours.     No lab exists for component: TROPONINI, ITNL  Recent Labs     05/06/21  1746   TROIQ 0.15*     Lab Results   Component Value Date/Time    Cholesterol, total 217 (H) 03/09/2021 01:31 PM    HDL Cholesterol 40 03/09/2021 01:31 PM    LDL, calculated 101.2 (H) 03/09/2021 01:31 PM    Triglyceride 379 (H) 03/09/2021 01:31 PM    CHOL/HDL Ratio 5.4 (H) 03/09/2021 01:31 PM

## 2021-05-07 NOTE — ACP (ADVANCE CARE PLANNING)
in response to staff request, attempted Advance Medical Directive (AMD) consult with pt, . Yue Merlos on [address] unit. Pt was lying face down in bed. He affirmed having had a conversation with staff about AMD, but indicated he was not up to it at this time.  left him a copy of document and Your Right to Decide brochure. He was advised of spiritual care availability for support as needed. He thanked  for the visit. Visited by: Jessica Garcia.  Paging Service: [phone])

## 2021-05-07 NOTE — ACP (ADVANCE CARE PLANNING)
Advance Care Planning   Advance Care Planning Inpatient Note  [address]    Today's Date: 5/7/2021  Unit: [facility] 3 PROG CARE TELE 2    Received request from Staff. Upon review of chart and communication with care team, Spiritual Care will defer Advance Care planning with patient at this time. Patient was/were present in the room during visit. Goals of ACP Conversation:  Facilitate a discussion related to patient's goals of care as they align with the patient's values and beliefs    Health Care Decision Makers:      Click here to complete [address] including selection of the Healthcare Decision Maker Relationship (ie \"Primary\")     Today we:  Verified [address] (Patient Wishes) on file:  None     Assessment:     in response to staff request, attempted Advance Medical Directive (AMD) consult with pt, Mr. Deedee Goodwin on [facility] unit. Pt was lying face down in bed. He responded to 's greetings  initiated conversation for support, but pt did not engaged in conversation. He stated that he wanted to rest at this time. When  inquired,  he affirmed having had a conversation with staff about AMD, but indicated he was not up to it at this time.  left him a copy of document and Your Right to Decide brochure. He was advised of spiritual care availability for support as needed. Interventions:  Deferred conversation as patient not interested in completing an advance directive at this time     Outcomes/Plan:   A copy of AMD document left for pt for his review. Pt encouraged to called  when ready for consult.      Electronically signed by Genia Garcia on 5/7/2021 at 3:29 PM

## 2021-05-07 NOTE — DIABETES MGMT
[facility]    CLINICAL NURSE SPECIALIST CONSULT     Initial Presentation   Raul Diaz is a 54 y.o. male who presented to the ED 5/6/21 with a 1 day c/o severe chest and back pain with down to the right abdomen and right chest wall. Troponin was elevated and admitted for work-up. HX:   Past Medical History:   Diagnosis Date    Anxiety disorder     Asthma     Bipolar 1 disorder ([facility]     CAD (coronary artery disease)     MI x2 with 2 cardiac stent    CHF (congestive heart failure) ([facility]     with reduced EF, EF 20%    Chronic systolic congestive heart failure ([facility] 12/2/2020    Depression     Diabetes mellitus, type 2 ([facility]     on insulin. moderate proteinuria    Hypertension     Mood disorder (HCC)     Psychotic disorder ([facility])     Schizoaffective disorder, bipolar type ([facility]     Sleep disorder     Suicidal thoughts         DX: Angina, Cardiomyopathy    TX: Cardiology consultation: Cardiac cath, [address] course   Clinical progress has been uncomplicated. 5/6/21: Cardiac cath with PCI  Diabetes    Patient has known Type 2 diabetes, treated with Victoza, Lantus and Regular insulin PTA. Family history positive for diabetes. Admission  and A1c 11.5% indicate poor diabetes control.      Ambulatory blood glucose management provided by primary care provider: Teodora Daniels MD.    Vincent Douglas by Provider for advanced diabetes nursing assessment and care, specifically related to   [x] Survival skill education    Diabetes-related medical history  Acute complications: Hyperglycemia  Neurological complications  Peripheral neuropathy  Microvascular disease  Nephropathy  Macrovascular disease  CAD      Diabetes medication history  Drug class Currently in use Discontinued Never used   Biguanide      DDP-4 inhibitor       Sulfonylurea      Thiazolidinedione      GLP-1 RA Victoza 0.6mg/0.1mL  1.8mg SQ daily  Prescribed but has not picked up from pharmacy     SGLT-2 inhibitors      Basal insulin Lantus 50 units BID     Bolus insulin Novolin R   25 units with meals     Fixed Dose  Combinations        Subjective   I have had diabetes for so many years.   Patient was diagnosed with diabetes at age 17  Diabetes is managed by PCP- Last PCP visit was 4/21/21 when Victoza was resumed: has not yet started  Lives with hi ex-girlfriend and another roommate  Does not work    Patient reports the following home diabetes self-care practices:  Eating pattern  [x] Breakfast Eggs and Norva Orn  [x] Lunch  Skips or a bologna sandwich  [x] Dinner  \"Whatever we have at [employer]"  [x] Bedtime None  [x] Snacks None  [x] Beverages Oj, water   Physical activity pattern  [x] Aerobic exercise None  Monitoring pattern  [x] Breakfast 140, but usually in the 200s when \"I don't feel well\"  Taking medications pattern  [x] In-consistent administration: May take 1 humalog injection a week  [x] Affordable    Social determinants of health impacting diabetes self-management practices   Missing health appointments or obtaining medications due to lack of reliable transportation and Concerned that you need to know more about how to stay healthy with diabetes     Seen by cardiology: CAD- 2 vessel disease, Stented OM2 and OM4 with BMS   Will likely discharge home today  A1C 11.5%  Fasting glucose 185  24 hr glucose pattern: 185-477  Lantus increased to 50 units BID  Regular insulin with meals: 25 units/meal  Humalog Correctional insulin  Objective   Physical exam  General Alert, oriented and in no acute distress/ill-appearing. Conversant and cooperative. Vital Signs   Visit Vitals  /78 (BP 1 Location: Left arm, BP Patient Position: At rest)   Pulse 85   Temp 98 °F (36.7 °C)   Resp 20   Ht 5' 5\" (1.651 m)   Wt 77.7 kg (171 lb 3.2 oz)   SpO2 97%   BMI 28.49 kg/m²     Skin  Warm and dry. No acanthosis noted along neckline. No lipohypertrophy or lipoatrophy noted at injection sites   Heart   Regular rate and rhythm.  No murmurs, rubs or gallops  Lungs  Clear to auscultation without rales or rhonchi  Extremities No foot wounds        Laboratory      CBC WITH AUTOMATED DIFF    Collection Time: 05/07/21  3:18 AM   Result Value Ref Range    WBC 6.8 4.1 - 11.1 K/uL    RBC 4.81 4.10 - 5.70 M/uL    HGB 14.2 12.1 - 17.0 g/dL    HCT 42.8 36.6 - 50.3 %    MCV 89.0 80.0 - 99.0 FL    MCH 29.5 26.0 - 34.0 PG    MCHC 33.2 30.0 - 36.5 g/dL    RDW 14.9 (H) 11.5 - 14.5 %    PLATELET 631 657 - 380 K/uL    MPV 10.7 8.9 - 12.9 FL    NRBC 0.0 0  WBC    ABSOLUTE NRBC 0.00 0.00 - 0.01 K/uL    NEUTROPHILS 69 32 - 75 %    LYMPHOCYTES 17 12 - 49 %    MONOCYTES 10 5 - 13 %    EOSINOPHILS 3 0 - 7 %    BASOPHILS 1 0 - 1 %    IMMATURE GRANULOCYTES 0 0.0 - 0.5 %    ABS. NEUTROPHILS 4.7 1.8 - 8.0 K/UL    ABS. LYMPHOCYTES 1.1 0.8 - 3.5 K/UL    ABS. MONOCYTES 0.7 0.0 - 1.0 K/UL    ABS. EOSINOPHILS 0.2 0.0 - 0.4 K/UL    ABS. BASOPHILS 0.1 0.0 - 0.1 K/UL    ABS. IMM. GRANS. 0.0 0.00 - 0.04 K/UL    DF AUTOMATED           METABOLIC PANEL, COMPREHENSIVE    Collection Time: 05/07/21  3:18 AM   Result Value Ref Range    Sodium 133 (L) 136 - 145 mmol/L    Potassium 4.1 3.5 - 5.1 mmol/L    Chloride 101 97 - 108 mmol/L    CO2 26 21 - 32 mmol/L    Anion gap 6 5 - 15 mmol/L    Glucose 318 (H) 65 - 100 mg/dL    BUN 17 6 - 20 MG/DL    Creatinine 1.53 (H) 0.70 - 1.30 MG/DL    BUN/Creatinine ratio 11 (L) 12 - 20      GFR est AA 59 (L) >60 ml/min/1.73m2    GFR est non-AA 49 (L) >60 ml/min/1.73m2    Calcium 9.0 8.5 - 10.1 MG/DL    Bilirubin, total 0.4 0.2 - 1.0 MG/DL    ALT (SGPT) 19 12 - 78 U/L    AST (SGOT) 6 (L) 15 - 37 U/L    Alk.  phosphatase 94 45 - 117 U/L    Protein, total 7.3 6.4 - 8.2 g/dL    Albumin 3.3 (L) 3.5 - 5.0 g/dL    Globulin 4.0 2.0 - 4.0 g/dL    A-G Ratio 0.8 (L) 1.1 - 2.2           Blood glucose pattern        Assessment and Plan   Nursing Diagnosis Risk for unstable blood glucose pattern   Nursing Intervention Domain 6487 Decision-making Support Nursing Interventions Examined current inpatient diabetes control   Explored factors facilitating and impeding inpatient management  Identified self-management practices impeding diabetes control  Explored corrective strategies with patient and responsible inpatient provider   Informed patient of rational for insulin strategy while hospitalized       Evaluation   Paulo Hinojosa is a 54year old gentleman with uncontrolled diabetes admitted with unstable angina and found to have 2-vessel CAD now s/p PCI. A1C on admission 11.5% with admitting glucose 264. Lantus 40 units BID was started with 10 units Regular insulin with meals- after PCI, he ate very well and glucose penny to over 477 indicating significant insulin resistance. Insulin was adjusted to his PTA dosing and glucose now 185. His A1C was 7.2% 1/2020 when Victoza discontinued, it was recently re-prescribed but not yet started. Unfortunately, this gentleman has fair judgement and diabetes management at this time should focus on glucose monitoring, consistency with injections (as missing several mealtime injections) and basic diet planning. Patient counseled in the above strategies and will need continued outpatient support for ongoing reinforcement of skills. Recommendations   For inpatient use:  [x] Use of Subcutaneous Insulin Order set (3220)  Insulin Dosing Specific recommendation   Continue Basal                                      (Based on weight, BMI & GFR) 50 units Lantus BID. If fasting   glucose below 100, reduce dose   back to 40 units BID    Continue Nutritional                            (Based on CHO/dextrose load) 25 units Regular insulin with meals  Reduce to 20 units with meals if   pre-prandial glucose below 100    Adjust Corrective                               (Useful in adjusting insulin dosing) Regular insulin correctional scale:   ACHS            On Discharge   1. Patient to obtain a blood glucose reading four times daily. First thing in the morning prior to eating and drinking anything then before lunch, dinner and bedtime. Create a log and present to PCP for interpretation. 2. Will need a FUV with PCP within 1-2 weeks after hospital discharge for ongoing diabetes management     3. Continue insulin therapy at PTA dosing, Pick-up and start Victoza    4. On Discharge, please place an order for \"diabetes education\". This will trigger a referral for the Program for Diabetes [facility] which includes outpatient education with a certified diabetes educator. Billing Code(s)   [x] 48358     Before making these care recommendations, I personally reviewed the hosptialization record, including laboratory and diagnostic data, medications and examined the patient at bedside (circumstances permitting).   Total minutes: [id number] CASSIE Silva  Diabetes Clinical Nurse Specialist  [facility]  Access via [employer]

## 2021-05-07 NOTE — PROGRESS NOTES
Visited by: Zayda Emmanuel.  Paging Service: [phone])                                 Spiritual Care Assessment/Progress Note  [address]      NAME: Jorge Hernandez      MRN: [id number]  AGE: 54 y.o.  SEX: male  Anglican Affiliation: No preference   Language: English     5/7/2021     Total Time (in minutes): 17     Spiritual Assessment begun in [facility] PROG CARE TELE 2 through conversation with:         [x]Patient        [] Family    [] Friend(s)        Reason for Consult: Advance medical directive consult     Spiritual beliefs: (Please include comment if needed)     [] Identifies with a jacinta tradition:         [] Supported by a jacinta community:            [] Claims no spiritual orientation:           [] Seeking spiritual identity:                [] Adheres to an individual form of spirituality:           [x] Not able to assess:                           Identified resources for coping:      [] Prayer                               [] Music                  [] Guided Imagery     [] Family/friends                 [] Pet visits     [] Devotional reading                         [x] Unknown     [] Other:                                             Interventions offered during this visit: (See comments for more details)    Patient Interventions: Affirmation of emotions/emotional suffering, Initial/Spiritual assessment, patient floor, Prayer (assurance of)           Plan of Care:     [] Support spiritual and/or cultural needs    [] Support AMD and/or advance care planning process      [] Support grieving process   [] Coordinate Rites and/or Rituals    [] Coordination with community clergy   [] No spiritual needs identified at this time   [] Detailed Plan of Care below (See Comments)  [] Make referral to Music Therapy  [] Make referral to Pet Therapy     [] Make referral to Addiction services  [] Make referral to [facility]  [] Make referral to Spiritual Care Partner  [] No future visits requested        [x] Follow up upon further referrals     Comments:  in response to staff request, attempted Advance Medical Directive (AMD) consult with pt, Mr. Vargas Karime on [facility] unit. Pt was lying face down in bed. He responded to 's greetings  initiated conversation for support, but pt did not engaged in conversation. He stated that he wanted to rest at this time. When  inquired,  he affirmed having had a conversation with staff about AMD, but indicated he was not up to it at this time.  left him a copy of document and Your Right to Decide brochure. He was advised of spiritual care availability for support as needed. He thanked  for the visit. Visited by: Yvonne Garg.    Paging Service: [phone])

## 2021-05-07 NOTE — PROGRESS NOTES
Physician Progress Note      Mckenna Liu  CSN #:                  [id number]  :                       1970  ADMIT DATE:       2021 11:29 AM  DISCH DATE:  RESPONDING  PROVIDER #:        Sophia Cesar DO          QUERY TEXT:    Dear [facility] Team,  Pt admitted with Chest Pain and has CHF class 3 documented within the H&P and subsequent progress notes. If possible, please document in progress notes and discharge summary further specificity regarding the type and acuity of CHF:      The medical record reflects the following:    Risk Factors: 54 Yr M admitted with Chest pain w/ CAD; h/o MI    Clinical Indicators: Patient arrives to the ED with c/o 10 out 10 chest pain. Work up in the ED revealed slightly elevated troponin of 0.16, 0.15 with a BNP of 544. CXR negative for acute process. H&P states, CHF Class 3: Not with exacerbation. Home Lasix 40mg daily. Echo  with LVEF 30-35%, pt with ICD. Continue lasix 40mg daily. Cardiology was consulted and took patient for cardiac catheterization. Per procedure note on  states, Severe LV systolic dysfunction  EF 75% with severe GHK. Patient is scheduled for Lasix 40 mg PO daily. Treatment: BMP with BNP, cardiology consult, cardiac catheterization, frequent vital signs/monitoring and Lasix 40 mg PO daily. Thank you,  Riya Ospina RN, [place], [address] [place] [address]  Options provided:  -- Acute on Chronic Systolic CHF/HFrEF  -- Acute Systolic CHF/HFrEF  -- Chronic Systolic CHF/HFrEF  -- Other - I will add my own diagnosis  -- Disagree - Not applicable / Not valid  -- Disagree - Clinically unable to determine / Unknown  -- Refer to Clinical Documentation Reviewer    PROVIDER RESPONSE TEXT:    This patient has chronic systolic CHF/HFrEF. Query created by: Teofilo Perez on 2021 11:04 AM      QUERY TEXT:    Dear General acute hospital Team,  Patient admitted with chest pain with known CAD.  Patient with elevated troponin of 0.16, 0.15 with h/o MI. EKG with  NSR with ST segment elevation in anterior leads. Patient underwent cardiac catheterization in need of two PCI stents. If possible, please document in the progress notes and discharge summary if you are evaluating and/or treating any of the following: The medical record reflects the following:    Risk Factors: 54 Yr M admitted with Chest pain with CAD; h/o MI    Clinical Indicators: Patient arrived to the ED with c/o 10 out of 10 chest pain with h/o MI. Work up in the ED revealed slightly elevated troponin of 0.16, 0.15. EKG revealed  NSR with ST segment elevation in anterior leads. . CXR clear of acute process. Cardiology was consulted and a cardiac catheterization was performed on 5/6. Per procedure note, Obstructive 2VD. Successful BMS OM2: 2.25x15 Integrity. Successful BMS OM4: 2.25x26 Integrity. Severe LV systolic dysfunction EF 95% with severe GHK. Treatment: BMP with BNP, cardiac enzyme labs, EKG, cardiac catheterization with 2 stents placed, cardiology consult and frequent monitoring/vital signs. Thank you,  Adilene Christopher RN, [place], [address] [place] [address]  Options provided:  -- NSTEMI  -- Type 2 MI  -- Demand Ischemia with MI  -- Unstable Angina  -- Other - I will add my own diagnosis  -- Disagree - Not applicable / Not valid  -- Disagree - Clinically unable to determine / Unknown  -- Refer to Clinical Documentation Reviewer    PROVIDER RESPONSE TEXT:    This patient has an NSTEMI. Query created by:  Trev Araiza on 5/7/2021 11:11 AM      Electronically signed by:  Tyson Diaz DO 5/7/2021 11:16 AM
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
## 2021-05-07 NOTE — CARDIO/PULMONARY
[facility] Cardiopulmonary Rehab: 55 yo male admitted with chest pain dx. S/P cath on 5/6/21 received 2 BMS per Dr Kamini Carbajal. Information provided for post cath procedure. Explained Cardiac Rehab to patient. Pt not interested at this time. Pt does not drive and lives in [facility]. Cardiologist is at [facility]- pt thinks? Encouraged to take cardiac medications as prescribed.
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
## 2021-05-07 NOTE — PROGRESS NOTES
Reason for Admission: Chest pain and back pain                    RUR Score: 16% LOW            Plan for utilizing home health: Pt is requesting home health- limited FOC due to insurance and location. Attending aware and agreeable. CM sending referrals. PCP: First and Last name:  Louie Diallo MD   Name of Practice:    Are you a current patient: Yes/No: Yes   Approximate date of last visit: Last month   Can you participate in a virtual visit with your PCP: No                    Current Advanced Directive/Advance Care Plan: Full Code none on file- pt wishes to have his ex-girlfriend Socorro Worley as his primary MPOA and his brother as secondary. CM reached out to [facility] with [facility] who will attempt to complete AMD with pt prior to d/c. Healthcare Decision Maker:   Pt's brother Lyndsay Lino ([phone]) is [place] Hartley until pt completes AMD.                  Transition of Care Plan:                    Pt is a 54year old,  [place] male, admitted with chest and back pain. CM spoke with pt at bedside, pt was AOX4, confirming address, emergency contact and PCP. Pt states he lives with his ex-gf and is completely independent. Pt states he has no DME and does not drive. Pt states he walks to all he needs, uses his [employer] transport to get to follow up appts when he can make a reservation. Pt states he does not have anyone else to drive him. Pt states he has not had any HH or been to any SNF/IPR in the past. Pt confirmed insurance coverage of [facility], states no problems affording or accessing medications. CM Consult Noted-   CM spoke with pt regarding transportation at d/c- pt states he does not have anyone to pick him up, requested CM arranged. CM originally arranged and then was informed by providers that pt's BP is low- pt to d/c tomorrow vs Sunday pending progression. RNs please assist pt with arranging transportation please contact [phone].      CM spoke with pt regarding cardiologist follow up- pt requested CM arrange a f.u appt with the cardiologist seen at the hospital- AVS updated with f.u appts. [address] cannot accept for [address] accepted-  order for [facility] RN, PT and SW sent. AVS updated. CM will continue to follow and assist as needed. Care Management Interventions  PCP Verified by CM:  Yes  Mode of Transport at Discharge: Self  Transition of Care Consult (CM Consult): [address]: No  Reason [facility]: Out of service area  [address] [place]: No  Discharge Durable Medical Equipment: No  Health Maintenance Reviewed: Yes  Physical Therapy Consult: No  Occupational Therapy Consult: No  Speech Therapy Consult: No  Current Support Network: Lives with Spouse(Lives with ex-gf)  Confirm Follow Up Transport: Family  The Patient and/or Patient Representative was Provided with a Choice of Provider and Agrees with the Discharge Plan?: Yes  Freedom of Choice List was Provided with Basic Dialogue that Supports the Patient's Individualized Plan of Care/Goals, Treatment Preferences and Shares the Quality Data Associated with the Providers?: Yes  Discharge Location  Discharge Placement: Home with home health     LAWRENCE Quinonez, [address] [facility]   [phone]

## 2021-05-07 NOTE — PROGRESS NOTES
Bedside and Verbal shift change report given to Guevara Castillo (oncoming nurse) by Mg Rosas RN (offgoing nurse). Report included the following information SBAR, Kardex, Intake/Output, Accordion and Recent Results. SHIFT REPORT:    No acute events during this RN's shift    This patient was assisted with Intentional Toileting every 2 hours during this shift. Documentation of ambulation and output reflected on Flowsheet. Bedside and Verbal shift change report given to Gareth Lopez (oncoming nurse) by Cornelia Yoo RN  (offgoing nurse). Report included the following information SBAR, Kardex, Intake/Output, Accordion and Recent Results.

## 2021-05-08 LAB
ALBUMIN SERPL-MCNC: 2.9 G/DL (ref 3.5–5)
ALBUMIN/GLOB SERPL: 0.7 {RATIO} (ref 1.1–2.2)
ALP SERPL-CCNC: 96 U/L (ref 45–117)
ALT SERPL-CCNC: 18 U/L (ref 12–78)
ANION GAP SERPL CALC-SCNC: 8 MMOL/L (ref 5–15)
AST SERPL-CCNC: 17 U/L (ref 15–37)
BASOPHILS # BLD: 0.1 K/UL (ref 0–0.1)
BASOPHILS NFR BLD: 1 % (ref 0–1)
BILIRUB SERPL-MCNC: 0.4 MG/DL (ref 0.2–1)
BUN SERPL-MCNC: 28 MG/DL (ref 6–20)
BUN/CREAT SERPL: 15 (ref 12–20)
CALCIUM SERPL-MCNC: 8.2 MG/DL (ref 8.5–10.1)
CHLORIDE SERPL-SCNC: 101 MMOL/L (ref 97–108)
CO2 SERPL-SCNC: 24 MMOL/L (ref 21–32)
CREAT SERPL-MCNC: 1.9 MG/DL (ref 0.7–1.3)
DIFFERENTIAL METHOD BLD: ABNORMAL
EOSINOPHIL # BLD: 0.2 K/UL (ref 0–0.4)
EOSINOPHIL NFR BLD: 3 % (ref 0–7)
ERYTHROCYTE [DISTWIDTH] IN BLOOD BY AUTOMATED COUNT: 15.2 % (ref 11.5–14.5)
GLOBULIN SER CALC-MCNC: 4 G/DL (ref 2–4)
GLUCOSE BLD STRIP.AUTO-MCNC: 178 MG/DL (ref 65–100)
GLUCOSE BLD STRIP.AUTO-MCNC: 280 MG/DL (ref 65–100)
GLUCOSE BLD STRIP.AUTO-MCNC: 285 MG/DL (ref 65–100)
GLUCOSE BLD STRIP.AUTO-MCNC: 312 MG/DL (ref 65–100)
GLUCOSE BLD STRIP.AUTO-MCNC: 362 MG/DL (ref 65–100)
GLUCOSE BLD STRIP.AUTO-MCNC: 446 MG/DL (ref 65–100)
GLUCOSE BLD STRIP.AUTO-MCNC: 458 MG/DL (ref 65–100)
GLUCOSE SERPL-MCNC: 347 MG/DL (ref 65–100)
HCT VFR BLD AUTO: 41.9 % (ref 36.6–50.3)
HGB BLD-MCNC: 13.9 G/DL (ref 12.1–17)
IMM GRANULOCYTES # BLD AUTO: 0.1 K/UL (ref 0–0.04)
IMM GRANULOCYTES NFR BLD AUTO: 1 % (ref 0–0.5)
LYMPHOCYTES # BLD: 1.3 K/UL (ref 0.8–3.5)
LYMPHOCYTES NFR BLD: 16 % (ref 12–49)
MAGNESIUM SERPL-MCNC: 2.4 MG/DL (ref 1.6–2.4)
MCH RBC QN AUTO: 29.8 PG (ref 26–34)
MCHC RBC AUTO-ENTMCNC: 33.2 G/DL (ref 30–36.5)
MCV RBC AUTO: 89.7 FL (ref 80–99)
MONOCYTES # BLD: 1 K/UL (ref 0–1)
MONOCYTES NFR BLD: 12 % (ref 5–13)
NEUTS SEG # BLD: 5.5 K/UL (ref 1.8–8)
NEUTS SEG NFR BLD: 67 % (ref 32–75)
NRBC # BLD: 0 K/UL (ref 0–0.01)
NRBC BLD-RTO: 0 PER 100 WBC
PHOSPHATE SERPL-MCNC: 3.3 MG/DL (ref 2.6–4.7)
PLATELET # BLD AUTO: 246 K/UL (ref 150–400)
PMV BLD AUTO: 10.6 FL (ref 8.9–12.9)
POTASSIUM SERPL-SCNC: 3.8 MMOL/L (ref 3.5–5.1)
PROT SERPL-MCNC: 6.9 G/DL (ref 6.4–8.2)
RBC # BLD AUTO: 4.67 M/UL (ref 4.1–5.7)
SERVICE CMNT-IMP: ABNORMAL
SODIUM SERPL-SCNC: 133 MMOL/L (ref 136–145)
WBC # BLD AUTO: 8.1 K/UL (ref 4.1–11.1)

## 2021-05-08 PROCEDURE — 85025 COMPLETE CBC W/AUTO DIFF WBC: CPT

## 2021-05-08 PROCEDURE — 74011636637 HC RX REV CODE- 636/637: Performed by: STUDENT IN AN ORGANIZED HEALTH CARE EDUCATION/TRAINING PROGRAM

## 2021-05-08 PROCEDURE — 74011250637 HC RX REV CODE- 250/637: Performed by: STUDENT IN AN ORGANIZED HEALTH CARE EDUCATION/TRAINING PROGRAM

## 2021-05-08 PROCEDURE — 80053 COMPREHEN METABOLIC PANEL: CPT

## 2021-05-08 PROCEDURE — 99232 SBSQ HOSP IP/OBS MODERATE 35: CPT | Performed by: FAMILY MEDICINE

## 2021-05-08 PROCEDURE — 74011250636 HC RX REV CODE- 250/636: Performed by: STUDENT IN AN ORGANIZED HEALTH CARE EDUCATION/TRAINING PROGRAM

## 2021-05-08 PROCEDURE — 83735 ASSAY OF MAGNESIUM: CPT

## 2021-05-08 PROCEDURE — 82962 GLUCOSE BLOOD TEST: CPT

## 2021-05-08 PROCEDURE — 65660000000 HC RM CCU STEPDOWN

## 2021-05-08 PROCEDURE — 74011000250 HC RX REV CODE- 250: Performed by: STUDENT IN AN ORGANIZED HEALTH CARE EDUCATION/TRAINING PROGRAM

## 2021-05-08 PROCEDURE — 36415 COLL VENOUS BLD VENIPUNCTURE: CPT

## 2021-05-08 PROCEDURE — 84100 ASSAY OF PHOSPHORUS: CPT

## 2021-05-08 PROCEDURE — 94760 N-INVAS EAR/PLS OXIMETRY 1: CPT

## 2021-05-08 RX ORDER — LOSARTAN POTASSIUM 25 MG/1
12.5 TABLET ORAL DAILY
Status: DISCONTINUED | OUTPATIENT
Start: 2021-05-08 | End: 2021-05-10 | Stop reason: HOSPADM

## 2021-05-08 RX ORDER — CARVEDILOL 3.12 MG/1
3.12 TABLET ORAL 2 TIMES DAILY WITH MEALS
Status: DISCONTINUED | OUTPATIENT
Start: 2021-05-08 | End: 2021-05-10 | Stop reason: HOSPADM

## 2021-05-08 RX ORDER — INSULIN LISPRO 100 [IU]/ML
6 INJECTION, SOLUTION INTRAVENOUS; SUBCUTANEOUS
Status: DISCONTINUED | OUTPATIENT
Start: 2021-05-08 | End: 2021-05-09

## 2021-05-08 RX ORDER — INSULIN GLARGINE 100 [IU]/ML
50 INJECTION, SOLUTION SUBCUTANEOUS DAILY
Status: DISCONTINUED | OUTPATIENT
Start: 2021-05-09 | End: 2021-05-08

## 2021-05-08 RX ORDER — POTASSIUM CHLORIDE 750 MG/1
40 TABLET, FILM COATED, EXTENDED RELEASE ORAL
Status: COMPLETED | OUTPATIENT
Start: 2021-05-08 | End: 2021-05-08

## 2021-05-08 RX ORDER — INSULIN GLARGINE 100 [IU]/ML
50 INJECTION, SOLUTION SUBCUTANEOUS DAILY
Status: DISCONTINUED | OUTPATIENT
Start: 2021-05-08 | End: 2021-05-09

## 2021-05-08 RX ADMIN — CARVEDILOL 3.12 MG: 3.12 TABLET, FILM COATED ORAL at 16:54

## 2021-05-08 RX ADMIN — ENOXAPARIN SODIUM 40 MG: 40 INJECTION SUBCUTANEOUS at 21:52

## 2021-05-08 RX ADMIN — INSULIN LISPRO 6 UNITS: 100 INJECTION, SOLUTION INTRAVENOUS; SUBCUTANEOUS at 16:55

## 2021-05-08 RX ADMIN — INSULIN GLARGINE 50 UNITS: 100 INJECTION, SOLUTION SUBCUTANEOUS at 11:22

## 2021-05-08 RX ADMIN — INSULIN LISPRO 4 UNITS: 100 INJECTION, SOLUTION INTRAVENOUS; SUBCUTANEOUS at 21:51

## 2021-05-08 RX ADMIN — Medication 10 ML: at 13:27

## 2021-05-08 RX ADMIN — POTASSIUM CHLORIDE 40 MEQ: 750 TABLET, EXTENDED RELEASE ORAL at 07:21

## 2021-05-08 RX ADMIN — Medication 10 ML: at 03:47

## 2021-05-08 RX ADMIN — LOSARTAN POTASSIUM 12.5 MG: 25 TABLET ORAL at 09:32

## 2021-05-08 RX ADMIN — FUROSEMIDE 40 MG: 40 TABLET ORAL at 09:31

## 2021-05-08 RX ADMIN — CLOPIDOGREL BISULFATE 75 MG: 75 TABLET ORAL at 09:31

## 2021-05-08 RX ADMIN — CARVEDILOL 3.12 MG: 3.12 TABLET, FILM COATED ORAL at 09:31

## 2021-05-08 RX ADMIN — INSULIN LISPRO 6 UNITS: 100 INJECTION, SOLUTION INTRAVENOUS; SUBCUTANEOUS at 13:22

## 2021-05-08 RX ADMIN — INSULIN LISPRO 10 UNITS: 100 INJECTION, SOLUTION INTRAVENOUS; SUBCUTANEOUS at 09:35

## 2021-05-08 RX ADMIN — ATORVASTATIN CALCIUM 80 MG: 20 TABLET, FILM COATED ORAL at 09:31

## 2021-05-08 RX ADMIN — INSULIN LISPRO 6 UNITS: 100 INJECTION, SOLUTION INTRAVENOUS; SUBCUTANEOUS at 09:36

## 2021-05-08 RX ADMIN — INSULIN LISPRO 3 UNITS: 100 INJECTION, SOLUTION INTRAVENOUS; SUBCUTANEOUS at 16:56

## 2021-05-08 RX ADMIN — INSULIN LISPRO 10 UNITS: 100 INJECTION, SOLUTION INTRAVENOUS; SUBCUTANEOUS at 13:23

## 2021-05-08 NOTE — PROGRESS NOTES
Bedside and Verbal shift change report given to Guevara Castillo (oncoming nurse) by Scarlet Patrick (offgoing nurse). Report included the following information SBAR, Kardex, Intake/Output, Accordion and Recent Results. SHIFT REPORT:            This patient was assisted with Intentional Toileting every 2 hours during this shift. Documentation of ambulation and output reflected on Flowsheet. Bedside and Verbal shift change report given to [address] [place] [address] (oncoming nurse) by Scarlet Patrick  (offgoing nurse). Report included the following information SBAR, Kardex, Intake/Output, Accordion and Recent Results.
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
## 2021-05-09 LAB
ALBUMIN SERPL-MCNC: 3.2 G/DL (ref 3.5–5)
ALBUMIN/GLOB SERPL: 1 {RATIO} (ref 1.1–2.2)
ALP SERPL-CCNC: 99 U/L (ref 45–117)
ALT SERPL-CCNC: 17 U/L (ref 12–78)
ANION GAP SERPL CALC-SCNC: 5 MMOL/L (ref 5–15)
AST SERPL-CCNC: 7 U/L (ref 15–37)
BASOPHILS # BLD: 0.1 K/UL (ref 0–0.1)
BASOPHILS NFR BLD: 1 % (ref 0–1)
BILIRUB SERPL-MCNC: 0.3 MG/DL (ref 0.2–1)
BUN SERPL-MCNC: 19 MG/DL (ref 6–20)
BUN/CREAT SERPL: 14 (ref 12–20)
CALCIUM SERPL-MCNC: 8.8 MG/DL (ref 8.5–10.1)
CHLORIDE SERPL-SCNC: 106 MMOL/L (ref 97–108)
CO2 SERPL-SCNC: 26 MMOL/L (ref 21–32)
CREAT SERPL-MCNC: 1.4 MG/DL (ref 0.7–1.3)
DIFFERENTIAL METHOD BLD: ABNORMAL
EOSINOPHIL # BLD: 0.4 K/UL (ref 0–0.4)
EOSINOPHIL NFR BLD: 6 % (ref 0–7)
ERYTHROCYTE [DISTWIDTH] IN BLOOD BY AUTOMATED COUNT: 15.1 % (ref 11.5–14.5)
GLOBULIN SER CALC-MCNC: 3.3 G/DL (ref 2–4)
GLUCOSE BLD STRIP.AUTO-MCNC: 211 MG/DL (ref 65–100)
GLUCOSE BLD STRIP.AUTO-MCNC: 256 MG/DL (ref 65–100)
GLUCOSE BLD STRIP.AUTO-MCNC: 262 MG/DL (ref 65–100)
GLUCOSE BLD STRIP.AUTO-MCNC: 268 MG/DL (ref 65–100)
GLUCOSE SERPL-MCNC: 283 MG/DL (ref 65–100)
HCT VFR BLD AUTO: 40.9 % (ref 36.6–50.3)
HGB BLD-MCNC: 13.3 G/DL (ref 12.1–17)
IMM GRANULOCYTES # BLD AUTO: 0 K/UL (ref 0–0.04)
IMM GRANULOCYTES NFR BLD AUTO: 1 % (ref 0–0.5)
LYMPHOCYTES # BLD: 1.2 K/UL (ref 0.8–3.5)
LYMPHOCYTES NFR BLD: 20 % (ref 12–49)
MAGNESIUM SERPL-MCNC: 2.5 MG/DL (ref 1.6–2.4)
MCH RBC QN AUTO: 29 PG (ref 26–34)
MCHC RBC AUTO-ENTMCNC: 32.5 G/DL (ref 30–36.5)
MCV RBC AUTO: 89.3 FL (ref 80–99)
MONOCYTES # BLD: 0.8 K/UL (ref 0–1)
MONOCYTES NFR BLD: 13 % (ref 5–13)
NEUTS SEG # BLD: 3.6 K/UL (ref 1.8–8)
NEUTS SEG NFR BLD: 59 % (ref 32–75)
NRBC # BLD: 0 K/UL (ref 0–0.01)
NRBC BLD-RTO: 0 PER 100 WBC
PHOSPHATE SERPL-MCNC: 3 MG/DL (ref 2.6–4.7)
PLATELET # BLD AUTO: 237 K/UL (ref 150–400)
PMV BLD AUTO: 11 FL (ref 8.9–12.9)
POTASSIUM SERPL-SCNC: 4.3 MMOL/L (ref 3.5–5.1)
PROT SERPL-MCNC: 6.5 G/DL (ref 6.4–8.2)
RBC # BLD AUTO: 4.58 M/UL (ref 4.1–5.7)
SERVICE CMNT-IMP: ABNORMAL
SODIUM SERPL-SCNC: 137 MMOL/L (ref 136–145)
WBC # BLD AUTO: 6.1 K/UL (ref 4.1–11.1)

## 2021-05-09 PROCEDURE — 36415 COLL VENOUS BLD VENIPUNCTURE: CPT

## 2021-05-09 PROCEDURE — 74011000250 HC RX REV CODE- 250: Performed by: STUDENT IN AN ORGANIZED HEALTH CARE EDUCATION/TRAINING PROGRAM

## 2021-05-09 PROCEDURE — 85025 COMPLETE CBC W/AUTO DIFF WBC: CPT

## 2021-05-09 PROCEDURE — 74011250637 HC RX REV CODE- 250/637: Performed by: STUDENT IN AN ORGANIZED HEALTH CARE EDUCATION/TRAINING PROGRAM

## 2021-05-09 PROCEDURE — 74011636637 HC RX REV CODE- 636/637: Performed by: STUDENT IN AN ORGANIZED HEALTH CARE EDUCATION/TRAINING PROGRAM

## 2021-05-09 PROCEDURE — 83735 ASSAY OF MAGNESIUM: CPT

## 2021-05-09 PROCEDURE — 84100 ASSAY OF PHOSPHORUS: CPT

## 2021-05-09 PROCEDURE — 99232 SBSQ HOSP IP/OBS MODERATE 35: CPT | Performed by: FAMILY MEDICINE

## 2021-05-09 PROCEDURE — 65270000029 HC RM PRIVATE

## 2021-05-09 PROCEDURE — 80053 COMPREHEN METABOLIC PANEL: CPT

## 2021-05-09 PROCEDURE — 94760 N-INVAS EAR/PLS OXIMETRY 1: CPT

## 2021-05-09 PROCEDURE — 82962 GLUCOSE BLOOD TEST: CPT

## 2021-05-09 RX ORDER — INSULIN GLARGINE 100 [IU]/ML
40 INJECTION, SOLUTION SUBCUTANEOUS 2 TIMES DAILY
Status: DISCONTINUED | OUTPATIENT
Start: 2021-05-09 | End: 2021-05-10

## 2021-05-09 RX ORDER — INSULIN LISPRO 100 [IU]/ML
10 INJECTION, SOLUTION INTRAVENOUS; SUBCUTANEOUS
Status: DISCONTINUED | OUTPATIENT
Start: 2021-05-09 | End: 2021-05-10

## 2021-05-09 RX ADMIN — INSULIN LISPRO 10 UNITS: 100 INJECTION, SOLUTION INTRAVENOUS; SUBCUTANEOUS at 17:23

## 2021-05-09 RX ADMIN — INSULIN GLARGINE 40 UNITS: 100 INJECTION, SOLUTION SUBCUTANEOUS at 17:22

## 2021-05-09 RX ADMIN — LOSARTAN POTASSIUM 12.5 MG: 25 TABLET ORAL at 09:19

## 2021-05-09 RX ADMIN — Medication 10 ML: at 22:30

## 2021-05-09 RX ADMIN — INSULIN LISPRO 4 UNITS: 100 INJECTION, SOLUTION INTRAVENOUS; SUBCUTANEOUS at 22:30

## 2021-05-09 RX ADMIN — CLOPIDOGREL BISULFATE 75 MG: 75 TABLET ORAL at 09:19

## 2021-05-09 RX ADMIN — INSULIN LISPRO 4 UNITS: 100 INJECTION, SOLUTION INTRAVENOUS; SUBCUTANEOUS at 12:05

## 2021-05-09 RX ADMIN — INSULIN LISPRO 10 UNITS: 100 INJECTION, SOLUTION INTRAVENOUS; SUBCUTANEOUS at 12:05

## 2021-05-09 RX ADMIN — FUROSEMIDE 40 MG: 40 TABLET ORAL at 09:19

## 2021-05-09 RX ADMIN — CARVEDILOL 3.12 MG: 3.12 TABLET, FILM COATED ORAL at 17:22

## 2021-05-09 RX ADMIN — CARVEDILOL 3.12 MG: 3.12 TABLET, FILM COATED ORAL at 09:19

## 2021-05-09 RX ADMIN — INSULIN LISPRO 7 UNITS: 100 INJECTION, SOLUTION INTRAVENOUS; SUBCUTANEOUS at 17:22

## 2021-05-09 RX ADMIN — ATORVASTATIN CALCIUM 80 MG: 20 TABLET, FILM COATED ORAL at 09:18

## 2021-05-09 RX ADMIN — INSULIN LISPRO 7 UNITS: 100 INJECTION, SOLUTION INTRAVENOUS; SUBCUTANEOUS at 09:21

## 2021-05-09 RX ADMIN — INSULIN GLARGINE 40 UNITS: 100 INJECTION, SOLUTION SUBCUTANEOUS at 09:20

## 2021-05-09 NOTE — PROGRESS NOTES
Bedside and Verbal shift change report given to Patsy Pinedo RN (oncoming nurse) by Kimmie Brday RN (offgoing nurse). Report included the following information SBAR, Kardex, Intake/Output, MAR and Accordion.

## 2021-05-09 NOTE — PROGRESS NOTES
Bedside and Verbal shift change report given to [address] (oncoming nurse) from [facility]  (offgoing nurse).  Report included the following information SBAR, Kardex, Intake/Output, Accordion and Recent Results

## 2021-05-10 VITALS
BODY MASS INDEX: 28.14 KG/M2 | DIASTOLIC BLOOD PRESSURE: 82 MMHG | HEART RATE: 88 BPM | HEIGHT: 65 IN | WEIGHT: 168.87 LBS | OXYGEN SATURATION: 98 % | TEMPERATURE: 97.9 F | SYSTOLIC BLOOD PRESSURE: 115 MMHG | RESPIRATION RATE: 18 BRPM

## 2021-05-10 LAB
ALBUMIN SERPL-MCNC: 3.2 G/DL (ref 3.5–5)
ALBUMIN/GLOB SERPL: 0.8 {RATIO} (ref 1.1–2.2)
ALP SERPL-CCNC: 105 U/L (ref 45–117)
ALT SERPL-CCNC: 20 U/L (ref 12–78)
ANION GAP SERPL CALC-SCNC: 6 MMOL/L (ref 5–15)
AST SERPL-CCNC: 16 U/L (ref 15–37)
ATRIAL RATE: 91 BPM
BASOPHILS # BLD: 0.1 K/UL (ref 0–0.1)
BASOPHILS NFR BLD: 1 % (ref 0–1)
BILIRUB SERPL-MCNC: 0.3 MG/DL (ref 0.2–1)
BUN SERPL-MCNC: 20 MG/DL (ref 6–20)
BUN/CREAT SERPL: 15 (ref 12–20)
CALCIUM SERPL-MCNC: 8.4 MG/DL (ref 8.5–10.1)
CALCULATED P AXIS, ECG09: 52 DEGREES
CALCULATED R AXIS, ECG10: 23 DEGREES
CALCULATED T AXIS, ECG11: 80 DEGREES
CHLORIDE SERPL-SCNC: 106 MMOL/L (ref 97–108)
CO2 SERPL-SCNC: 24 MMOL/L (ref 21–32)
CREAT SERPL-MCNC: 1.37 MG/DL (ref 0.7–1.3)
DIAGNOSIS, 93000: NORMAL
DIFFERENTIAL METHOD BLD: ABNORMAL
EOSINOPHIL # BLD: 0.4 K/UL (ref 0–0.4)
EOSINOPHIL NFR BLD: 6 % (ref 0–7)
ERYTHROCYTE [DISTWIDTH] IN BLOOD BY AUTOMATED COUNT: 15 % (ref 11.5–14.5)
GLOBULIN SER CALC-MCNC: 4 G/DL (ref 2–4)
GLUCOSE BLD STRIP.AUTO-MCNC: 119 MG/DL (ref 65–100)
GLUCOSE BLD STRIP.AUTO-MCNC: 221 MG/DL (ref 65–100)
GLUCOSE SERPL-MCNC: 244 MG/DL (ref 65–100)
HCT VFR BLD AUTO: 43.1 % (ref 36.6–50.3)
HGB BLD-MCNC: 13.8 G/DL (ref 12.1–17)
IMM GRANULOCYTES # BLD AUTO: 0 K/UL (ref 0–0.04)
IMM GRANULOCYTES NFR BLD AUTO: 1 % (ref 0–0.5)
LYMPHOCYTES # BLD: 1.1 K/UL (ref 0.8–3.5)
LYMPHOCYTES NFR BLD: 18 % (ref 12–49)
MAGNESIUM SERPL-MCNC: 2.1 MG/DL (ref 1.6–2.4)
MCH RBC QN AUTO: 29.5 PG (ref 26–34)
MCHC RBC AUTO-ENTMCNC: 32 G/DL (ref 30–36.5)
MCV RBC AUTO: 92.1 FL (ref 80–99)
MONOCYTES # BLD: 0.5 K/UL (ref 0–1)
MONOCYTES NFR BLD: 9 % (ref 5–13)
NEUTS SEG # BLD: 4.1 K/UL (ref 1.8–8)
NEUTS SEG NFR BLD: 65 % (ref 32–75)
NRBC # BLD: 0 K/UL (ref 0–0.01)
NRBC BLD-RTO: 0 PER 100 WBC
P-R INTERVAL, ECG05: 142 MS
PHOSPHATE SERPL-MCNC: 2.6 MG/DL (ref 2.6–4.7)
PLATELET # BLD AUTO: 245 K/UL (ref 150–400)
PMV BLD AUTO: 10.9 FL (ref 8.9–12.9)
POTASSIUM SERPL-SCNC: 4.2 MMOL/L (ref 3.5–5.1)
PROT SERPL-MCNC: 7.2 G/DL (ref 6.4–8.2)
Q-T INTERVAL, ECG07: 372 MS
QRS DURATION, ECG06: 80 MS
QTC CALCULATION (BEZET), ECG08: 457 MS
RBC # BLD AUTO: 4.68 M/UL (ref 4.1–5.7)
SERVICE CMNT-IMP: ABNORMAL
SERVICE CMNT-IMP: ABNORMAL
SODIUM SERPL-SCNC: 136 MMOL/L (ref 136–145)
VENTRICULAR RATE, ECG03: 91 BPM
WBC # BLD AUTO: 6.2 K/UL (ref 4.1–11.1)

## 2021-05-10 PROCEDURE — 99238 HOSP IP/OBS DSCHRG MGMT 30/<: CPT | Performed by: FAMILY MEDICINE

## 2021-05-10 PROCEDURE — 74011250637 HC RX REV CODE- 250/637: Performed by: STUDENT IN AN ORGANIZED HEALTH CARE EDUCATION/TRAINING PROGRAM

## 2021-05-10 PROCEDURE — 80053 COMPREHEN METABOLIC PANEL: CPT

## 2021-05-10 PROCEDURE — 84100 ASSAY OF PHOSPHORUS: CPT

## 2021-05-10 PROCEDURE — 74011000250 HC RX REV CODE- 250: Performed by: STUDENT IN AN ORGANIZED HEALTH CARE EDUCATION/TRAINING PROGRAM

## 2021-05-10 PROCEDURE — 94760 N-INVAS EAR/PLS OXIMETRY 1: CPT

## 2021-05-10 PROCEDURE — 74011636637 HC RX REV CODE- 636/637: Performed by: STUDENT IN AN ORGANIZED HEALTH CARE EDUCATION/TRAINING PROGRAM

## 2021-05-10 PROCEDURE — 85025 COMPLETE CBC W/AUTO DIFF WBC: CPT

## 2021-05-10 PROCEDURE — 83735 ASSAY OF MAGNESIUM: CPT

## 2021-05-10 PROCEDURE — 82962 GLUCOSE BLOOD TEST: CPT

## 2021-05-10 PROCEDURE — 74011250636 HC RX REV CODE- 250/636: Performed by: STUDENT IN AN ORGANIZED HEALTH CARE EDUCATION/TRAINING PROGRAM

## 2021-05-10 PROCEDURE — 36415 COLL VENOUS BLD VENIPUNCTURE: CPT

## 2021-05-10 RX ORDER — INSULIN LISPRO 100 [IU]/ML
12 INJECTION, SOLUTION INTRAVENOUS; SUBCUTANEOUS
Qty: 10.8 ML | Refills: 0 | Status: SHIPPED | OUTPATIENT
Start: 2021-05-10 | End: 2021-06-09

## 2021-05-10 RX ORDER — INSULIN GLARGINE 100 [IU]/ML
45 INJECTION, SOLUTION SUBCUTANEOUS 2 TIMES DAILY
Qty: 27 ML | Refills: 0 | Status: SHIPPED | OUTPATIENT
Start: 2021-05-10 | End: 2021-05-10

## 2021-05-10 RX ORDER — LOSARTAN POTASSIUM 25 MG/1
12.5 TABLET ORAL DAILY
Qty: 30 TAB | Refills: 0 | Status: SHIPPED | OUTPATIENT
Start: 2021-05-10 | End: 2021-05-10

## 2021-05-10 RX ORDER — INSULIN LISPRO 100 [IU]/ML
12 INJECTION, SOLUTION INTRAVENOUS; SUBCUTANEOUS
Status: DISCONTINUED | OUTPATIENT
Start: 2021-05-10 | End: 2021-05-10 | Stop reason: HOSPADM

## 2021-05-10 RX ORDER — INSULIN GLARGINE 100 [IU]/ML
45 INJECTION, SOLUTION SUBCUTANEOUS 2 TIMES DAILY
Status: DISCONTINUED | OUTPATIENT
Start: 2021-05-10 | End: 2021-05-10 | Stop reason: HOSPADM

## 2021-05-10 RX ORDER — CARVEDILOL 3.12 MG/1
3.12 TABLET ORAL 2 TIMES DAILY WITH MEALS
Qty: 60 TAB | Refills: 0 | Status: SHIPPED | OUTPATIENT
Start: 2021-05-10 | End: 2021-05-10

## 2021-05-10 RX ORDER — LOSARTAN POTASSIUM 25 MG/1
12.5 TABLET ORAL DAILY
Qty: 30 TAB | Refills: 0 | Status: SHIPPED | OUTPATIENT
Start: 2021-05-10 | End: 2021-12-27

## 2021-05-10 RX ORDER — INSULIN LISPRO 100 [IU]/ML
12 INJECTION, SOLUTION INTRAVENOUS; SUBCUTANEOUS
Qty: 10.8 ML | Refills: 0 | Status: SHIPPED
Start: 2021-05-10 | End: 2021-05-10

## 2021-05-10 RX ORDER — INSULIN GLARGINE 100 [IU]/ML
45 INJECTION, SOLUTION SUBCUTANEOUS 2 TIMES DAILY
Qty: 27 ML | Refills: 0 | Status: SHIPPED | OUTPATIENT
Start: 2021-05-10 | End: 2021-06-09

## 2021-05-10 RX ORDER — CARVEDILOL 3.12 MG/1
3.12 TABLET ORAL 2 TIMES DAILY WITH MEALS
Qty: 60 TAB | Refills: 0 | Status: SHIPPED | OUTPATIENT
Start: 2021-05-10 | End: 2021-06-09

## 2021-05-10 RX ADMIN — CARVEDILOL 3.12 MG: 3.12 TABLET, FILM COATED ORAL at 08:13

## 2021-05-10 RX ADMIN — CLOPIDOGREL BISULFATE 75 MG: 75 TABLET ORAL at 08:13

## 2021-05-10 RX ADMIN — ENOXAPARIN SODIUM 40 MG: 40 INJECTION SUBCUTANEOUS at 01:31

## 2021-05-10 RX ADMIN — LOSARTAN POTASSIUM 12.5 MG: 25 TABLET ORAL at 08:12

## 2021-05-10 RX ADMIN — FUROSEMIDE 40 MG: 40 TABLET ORAL at 08:13

## 2021-05-10 RX ADMIN — INSULIN GLARGINE 45 UNITS: 100 INJECTION, SOLUTION SUBCUTANEOUS at 08:13

## 2021-05-10 RX ADMIN — INSULIN LISPRO 4 UNITS: 100 INJECTION, SOLUTION INTRAVENOUS; SUBCUTANEOUS at 08:13

## 2021-05-10 RX ADMIN — ATORVASTATIN CALCIUM 80 MG: 20 TABLET, FILM COATED ORAL at 08:13

## 2021-05-10 RX ADMIN — INSULIN LISPRO 12 UNITS: 100 INJECTION, SOLUTION INTRAVENOUS; SUBCUTANEOUS at 12:59
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
## 2021-05-10 NOTE — PROGRESS NOTES
CM Note:  Pt to d/c to home today transported by [facility] cab. Confirmation [id number]. He is to resume services with [facility]. AVS sent in [facility]. He will f/u with providers CHASITY Mendez RN

## 2021-05-10 NOTE — DISCHARGE INSTRUCTIONS
HOME DISCHARGE INSTRUCTIONS    Fay Cohn / [id number] : 1970    Admission date: 2021 Discharge date: 2021     Please bring this form with you to show your care provider at your follow-up appointment. Primary care provider:  Eran Banuelos MD    Discharging provider:  Lien Smart DO  - Family Medicine Resident  Dr. Sincere Hook - Attending, Family Medicine     You have been admitted to the hospital with the following diagnoses:    ACUTE DIAGNOSES:  Chest pain [R07.9]  . . . . . . . . . . . . . . . . . . . . . . . . . . . . . . . . . . . . . . . . . . . . . . . . . . . . . . . . . . . . . . . . . . . . . . . Isabel Gordon FOLLOW-UP CARE RECOMMENDATIONS:    You are well enough to be discharged from the hospital. However, because you were staying in a hospital, you are at greater risk of having been exposed to the coronavirus. PLEASE stay inside and quarantine for 14 days to prevent further spread of the coronavirus.     Appointments  Follow-up Information     Follow up With Specialties Details Why Contact Info    Eran Banuelos MD Family Medicine  Your PCP should be calling in the next week to schedule an appointment [facility]. Psychiatric [address]      Melissa Garnica MD Cardiology, Internal Medicine Schedule an appointment as soon as possible for a visit For follow up of visit with your cardiologist  [address] [place] [address] 12:   - START Lantus 45U twice daily - first dose 5/10 PM  - START Lispro 12U with meals   - CHANGE losartan to 12.5mg daily - first dose 5/11 AM   - CHANGE Carvedilol to 3.125mg twice daily  - first dose 510 PM  - STOP amlodipine 10mg daily     Please follow up with your PCP regarding:  - uncontrolled diabetes - needs to start Victoza  - HTN - holding amlodipine due to low blood pressure during stay    Please follow up with Cardiologist regarding:  - worsening HF  - recent cardiac catheterization      Follow-up tests needed: None    Pending test results: At the time of your discharge the following test results are still pending: None. Please make sure you review these results with your outpatient follow-up provider(s). Specific symptoms to watch for: chest pain, shortness of breath, fever, chills, nausea, vomiting, diarrhea, change in mentation, falling, weakness, bleeding. DIET/what to eat:  Diabetic Diet    ACTIVITY:  Activity as tolerated    Wound care: None    Equipment needed:  None    What to do if new or unexpected symptoms occur? If you experience any of the above symptoms (or should other concerns or questions arise after discharge) please call your primary care physician. Return to the emergency room if you cannot get hold of your doctor. · It is very important that you keep your follow-up appointment(s). · Please bring discharge papers, medication list (and/or medication bottles) to your follow-up appointments for review by your outpatient provider(s). · Please check the list of medications and be sure it includes every medication (even non-prescription medications) that your provider wants you to take. · It is important that you take the medication exactly as they are prescribed. · Keep your medication in the bottles provided by the pharmacist and keep a list of the medication names, dosages, and times to be taken in your wallet. · Do not take other medications without consulting your doctor. · If you have any questions about your medications or other instructions, please talk to your nurse or care provider before you leave the hospital.     Information obtained by:     I understand that if any problems occur once I am at home I am to contact my physician. These instructions were explained to me and I had the opportunity to ask questions. I understand and acknowledge receipt of the instructions indicated above. Physician's or R.N.'s Signature                                                                  Date/Time                                                                                                                                              Patient or Representative Signature                                                          Date/Time      [facility]/ED Visit Follow-Up Instructions/Information    You may have an in home follow up visit set up with [facility] or may wish to contact [facility] to set-up a visit:    What are we? [facility] is an in-home urgent care service staffed with emergency trained medical teams. We come to your home in a vehicle stocked with medical supplies and technology. An ER physician is always available if needed. When? As a part of your hospital follow-up, an appointment has been/ or can be set up for us to come see you. Usually, this will be 24-72 hours after you leave the hospital or as needed. [facility] is open 7am-9pm, 7 days a week, 365 days a year, including holidays. Why? We know that you cannot always get to your doctor after being in the hospital and that your doctor is not always available when you need them. Once your workup is complete, we'll call in your prescriptions, update your family doctor, and handle billing with your insurance so you can focus on feeling better, faster without leaving home. How much? We accept most major health insurance plans, including Medicaid, Medicare, and Medicare Advantage [facility], [employer], and [employer]. We also accept: credit, debit, health savings account (HSA), health reimbursement account (HRA) and flexible spending account (FSA) payments. [facility]'s prices compare to conventional urgent care facilities, but we bring the care to you.     How to reach us? Getting care is easy- use our mobile tereza ([facility]), website ([email or web address]) or call us [phone].

## 2021-05-10 NOTE — PROGRESS NOTES
Bedside and Verbal shift change report given to Claudette Jiang RN (oncoming nurse) by Pati Fonseca RN (offgoing nurse). Report included the following information SBAR, Kardex, Intake/Output, MAR and Accordion.

## 2021-05-10 NOTE — PROGRESS NOTES
Discharge medications reviewed with patient and appropriate educational materials and side effects teaching were provided. I have reviewed discharge instructions with the patient and he verbalized understanding. Leave smart education provided to patient and he verbalized understanding. AVS signed and given to patient. E-sign pad would not work for [employer]. Patient signed paper copy of AVS and AVS placed on chart. Patient dressed self. 1242: Medicaid transport to come at 1330.

## 2021-05-11 ENCOUNTER — PATIENT MESSAGE (OUTPATIENT)
Dept: CASE MANAGEMENT | Age: 51
End: 2021-05-11

## 2021-05-11 ENCOUNTER — TELEPHONE (OUTPATIENT)
Dept: CASE MANAGEMENT | Age: 51
End: 2021-05-11

## 2021-05-11 NOTE — TELEPHONE ENCOUNTER
5/11/21 12:09 PM   Educated patient about risk for severe COVID-19 due to risk factors according to CDC guidelines. CTN reviewed discharge instructions, medical action plan and red flag symptoms patient who verbalized understanding. Discussed COVID vaccination status--pt states he went to [facility] yesterday but they have the [employer] vaccine and he prefers to wait for one of the other types. . Education provided on COVID-19 vaccination as appropriate. Discussed exposure protocols and quarantine with CDC Guidelines. Patient was given an opportunity to verbalize any questions and concerns and agrees to contact CTN or health care provider for questions related to their healthcare. Pt states that he obtained his D/C medications and verbalizes understanding of the medications he needs to discontinue. He states he will not be able to keep his F/U appt with PCP tomorrow because his Medicaid transportation requires 3-day notice. He states he has rescheduled this for Monday 5/17 at 11 AM.  He confirms his appt with cardiology on 5/14 at 4 PM and states he will have transportation for this. I thanked pt for his time and the update and we disconnected. This concludes this episode of care.

## 2021-06-28 ENCOUNTER — TELEPHONE (OUTPATIENT)
Dept: FAMILY MEDICINE CLINIC | Age: 51
End: 2021-06-28

## 2021-06-28 NOTE — TELEPHONE ENCOUNTER
----- Message from Eduardo Galeano sent at 6/24/2021 11:09 AM EDT -----  Regarding: Dr. Nima Ward / telephone  General Message/Vendor Calls    Caller's first and last name: Wiley Stewart from [facility]      Reason for call: to advise pt is having issues sleeping for the last month      Callback required yes/no and why:yes for discussion      Best contact number(s):[phone]      Details to clarify the request: complaining of not sleeping well, that the trazodone 50mg is not working and if there was something else he could try, pt also said he has tried doubling the medication and that didn't help either. Wanting to know if Dr. Nima Ward can prescribe something different.        Eduardo Galeano

## 2021-06-29 DIAGNOSIS — F51.02 ADJUSTMENT INSOMNIA: Primary | ICD-10-CM

## 2021-06-29 RX ORDER — HYDROXYZINE 25 MG/1
TABLET, FILM COATED ORAL
Qty: 60 TABLET | Refills: 2 | Status: SHIPPED | OUTPATIENT
Start: 2021-06-29 | End: 2021-08-16 | Stop reason: SDUPTHER

## 2021-08-05 DIAGNOSIS — I50.22 CHRONIC SYSTOLIC CONGESTIVE HEART FAILURE (HCC): ICD-10-CM

## 2021-08-05 DIAGNOSIS — I25.10 CORONARY ARTERY DISEASE INVOLVING NATIVE CORONARY ARTERY OF NATIVE HEART WITHOUT ANGINA PECTORIS: ICD-10-CM

## 2021-08-05 RX ORDER — FUROSEMIDE 40 MG/1
40 TABLET ORAL DAILY
Qty: 30 TABLET | Refills: 5 | Status: ON HOLD | OUTPATIENT
Start: 2021-08-05 | End: 2021-08-11 | Stop reason: SDUPTHER

## 2021-08-05 NOTE — TELEPHONE ENCOUNTER
Pt states that he had a visit to ER and is in need of this medication. Also informed pt that a visit may be required.     Please advise

## 2021-08-08 ENCOUNTER — HOSPITAL ENCOUNTER (INPATIENT)
Age: 51
LOS: 3 days | Discharge: HOME HEALTH | DRG: 192 | End: 2021-08-11
Attending: EMERGENCY MEDICINE | Admitting: FAMILY MEDICINE
Payer: COMMERCIAL

## 2021-08-08 ENCOUNTER — APPOINTMENT (OUTPATIENT)
Dept: GENERAL RADIOLOGY | Age: 51
DRG: 192 | End: 2021-08-08
Attending: EMERGENCY MEDICINE
Payer: COMMERCIAL

## 2021-08-08 DIAGNOSIS — I25.10 CORONARY ARTERY DISEASE INVOLVING NATIVE CORONARY ARTERY OF NATIVE HEART, UNSPECIFIED WHETHER ANGINA PRESENT: ICD-10-CM

## 2021-08-08 DIAGNOSIS — I25.10 CORONARY ARTERY DISEASE INVOLVING NATIVE CORONARY ARTERY OF NATIVE HEART WITHOUT ANGINA PECTORIS: ICD-10-CM

## 2021-08-08 DIAGNOSIS — I24.9 ACS (ACUTE CORONARY SYNDROME) (HCC): ICD-10-CM

## 2021-08-08 DIAGNOSIS — E11.65 TYPE 2 DIABETES MELLITUS WITH HYPERGLYCEMIA, WITH LONG-TERM CURRENT USE OF INSULIN (HCC): ICD-10-CM

## 2021-08-08 DIAGNOSIS — F31.9 BIPOLAR 1 DISORDER (HCC): ICD-10-CM

## 2021-08-08 DIAGNOSIS — I10 ESSENTIAL HYPERTENSION: ICD-10-CM

## 2021-08-08 DIAGNOSIS — Z79.4 TYPE 2 DIABETES MELLITUS WITH HYPERGLYCEMIA, WITH LONG-TERM CURRENT USE OF INSULIN (HCC): ICD-10-CM

## 2021-08-08 DIAGNOSIS — I42.9 CARDIOMYOPATHY, UNSPECIFIED TYPE (HCC): ICD-10-CM

## 2021-08-08 DIAGNOSIS — I50.22 CHRONIC SYSTOLIC CONGESTIVE HEART FAILURE (HCC): ICD-10-CM

## 2021-08-08 DIAGNOSIS — I50.9 ACUTE ON CHRONIC CONGESTIVE HEART FAILURE, UNSPECIFIED HEART FAILURE TYPE (HCC): Primary | ICD-10-CM

## 2021-08-08 DIAGNOSIS — R77.8 ELEVATED TROPONIN: ICD-10-CM

## 2021-08-08 LAB
ALBUMIN SERPL-MCNC: 3 G/DL (ref 3.5–5)
ALBUMIN/GLOB SERPL: 0.8 {RATIO} (ref 1.1–2.2)
ALP SERPL-CCNC: 81 U/L (ref 45–117)
ALT SERPL-CCNC: 22 U/L (ref 12–78)
ANION GAP SERPL CALC-SCNC: 5 MMOL/L (ref 5–15)
AST SERPL-CCNC: 30 U/L (ref 15–37)
BASOPHILS # BLD: 0.1 K/UL (ref 0–0.1)
BASOPHILS NFR BLD: 1 % (ref 0–1)
BILIRUB SERPL-MCNC: 1.2 MG/DL (ref 0.2–1)
BNP SERPL-MCNC: 4261 PG/ML
BUN SERPL-MCNC: 13 MG/DL (ref 6–20)
BUN/CREAT SERPL: 10 (ref 12–20)
CALCIUM SERPL-MCNC: 8.4 MG/DL (ref 8.5–10.1)
CHLORIDE SERPL-SCNC: 108 MMOL/L (ref 97–108)
CO2 SERPL-SCNC: 26 MMOL/L (ref 21–32)
COMMENT, HOLDF: NORMAL
COVID-19 RAPID TEST, COVR: NOT DETECTED
CREAT SERPL-MCNC: 1.24 MG/DL (ref 0.7–1.3)
D DIMER PPP FEU-MCNC: 0.64 MG/L FEU (ref 0–0.65)
DIFFERENTIAL METHOD BLD: ABNORMAL
EOSINOPHIL # BLD: 0.2 K/UL (ref 0–0.4)
EOSINOPHIL NFR BLD: 2 % (ref 0–7)
ERYTHROCYTE [DISTWIDTH] IN BLOOD BY AUTOMATED COUNT: 15.3 % (ref 11.5–14.5)
GLOBULIN SER CALC-MCNC: 3.6 G/DL (ref 2–4)
GLUCOSE BLD STRIP.AUTO-MCNC: 107 MG/DL (ref 65–117)
GLUCOSE BLD STRIP.AUTO-MCNC: 140 MG/DL (ref 65–117)
GLUCOSE SERPL-MCNC: 78 MG/DL (ref 65–100)
HCT VFR BLD AUTO: 37.7 % (ref 36.6–50.3)
HGB BLD-MCNC: 12.1 G/DL (ref 12.1–17)
IMM GRANULOCYTES # BLD AUTO: 0 K/UL (ref 0–0.04)
IMM GRANULOCYTES NFR BLD AUTO: 0 % (ref 0–0.5)
LYMPHOCYTES # BLD: 1.1 K/UL (ref 0.8–3.5)
LYMPHOCYTES NFR BLD: 13 % (ref 12–49)
MCH RBC QN AUTO: 29.7 PG (ref 26–34)
MCHC RBC AUTO-ENTMCNC: 32.1 G/DL (ref 30–36.5)
MCV RBC AUTO: 92.6 FL (ref 80–99)
MONOCYTES # BLD: 0.9 K/UL (ref 0–1)
MONOCYTES NFR BLD: 10 % (ref 5–13)
NEUTS SEG # BLD: 6.7 K/UL (ref 1.8–8)
NEUTS SEG NFR BLD: 74 % (ref 32–75)
NRBC # BLD: 0 K/UL (ref 0–0.01)
NRBC BLD-RTO: 0 PER 100 WBC
PLATELET # BLD AUTO: 291 K/UL (ref 150–400)
PMV BLD AUTO: 10.4 FL (ref 8.9–12.9)
POTASSIUM SERPL-SCNC: 4.6 MMOL/L (ref 3.5–5.1)
PROCALCITONIN SERPL-MCNC: <0.05 NG/ML
PROT SERPL-MCNC: 6.6 G/DL (ref 6.4–8.2)
RBC # BLD AUTO: 4.07 M/UL (ref 4.1–5.7)
SAMPLES BEING HELD,HOLD: NORMAL
SERVICE CMNT-IMP: ABNORMAL
SERVICE CMNT-IMP: NORMAL
SODIUM SERPL-SCNC: 139 MMOL/L (ref 136–145)
SOURCE, COVRS: NORMAL
TROPONIN I SERPL-MCNC: 0.19 NG/ML
TROPONIN I SERPL-MCNC: 0.21 NG/ML
WBC # BLD AUTO: 9 K/UL (ref 4.1–11.1)

## 2021-08-08 PROCEDURE — 74011000250 HC RX REV CODE- 250: Performed by: EMERGENCY MEDICINE

## 2021-08-08 PROCEDURE — 83880 ASSAY OF NATRIURETIC PEPTIDE: CPT

## 2021-08-08 PROCEDURE — 87635 SARS-COV-2 COVID-19 AMP PRB: CPT

## 2021-08-08 PROCEDURE — 80053 COMPREHEN METABOLIC PANEL: CPT

## 2021-08-08 PROCEDURE — 93005 ELECTROCARDIOGRAM TRACING: CPT

## 2021-08-08 PROCEDURE — 96374 THER/PROPH/DIAG INJ IV PUSH: CPT

## 2021-08-08 PROCEDURE — 85379 FIBRIN DEGRADATION QUANT: CPT

## 2021-08-08 PROCEDURE — 96365 THER/PROPH/DIAG IV INF INIT: CPT

## 2021-08-08 PROCEDURE — 36415 COLL VENOUS BLD VENIPUNCTURE: CPT

## 2021-08-08 PROCEDURE — 2709999900 HC NON-CHARGEABLE SUPPLY

## 2021-08-08 PROCEDURE — 99285 EMERGENCY DEPT VISIT HI MDM: CPT

## 2021-08-08 PROCEDURE — 74011250636 HC RX REV CODE- 250/636: Performed by: EMERGENCY MEDICINE

## 2021-08-08 PROCEDURE — 84145 PROCALCITONIN (PCT): CPT

## 2021-08-08 PROCEDURE — 85025 COMPLETE CBC W/AUTO DIFF WBC: CPT

## 2021-08-08 PROCEDURE — 94761 N-INVAS EAR/PLS OXIMETRY MLT: CPT

## 2021-08-08 PROCEDURE — 71045 X-RAY EXAM CHEST 1 VIEW: CPT

## 2021-08-08 PROCEDURE — 96375 TX/PRO/DX INJ NEW DRUG ADDON: CPT

## 2021-08-08 PROCEDURE — 65660000000 HC RM CCU STEPDOWN

## 2021-08-08 PROCEDURE — 96366 THER/PROPH/DIAG IV INF ADDON: CPT

## 2021-08-08 PROCEDURE — 82962 GLUCOSE BLOOD TEST: CPT

## 2021-08-08 PROCEDURE — 84484 ASSAY OF TROPONIN QUANT: CPT

## 2021-08-08 RX ORDER — ATORVASTATIN CALCIUM 20 MG/1
80 TABLET, FILM COATED ORAL DAILY
Status: DISCONTINUED | OUTPATIENT
Start: 2021-08-09 | End: 2021-08-11 | Stop reason: HOSPADM

## 2021-08-08 RX ORDER — CLOPIDOGREL BISULFATE 75 MG/1
75 TABLET ORAL DAILY
Status: DISCONTINUED | OUTPATIENT
Start: 2021-08-09 | End: 2021-08-11 | Stop reason: HOSPADM

## 2021-08-08 RX ORDER — NITROGLYCERIN 20 MG/100ML
0-20 INJECTION INTRAVENOUS
Status: DISCONTINUED | OUTPATIENT
Start: 2021-08-08 | End: 2021-08-09

## 2021-08-08 RX ORDER — INSULIN LISPRO 100 [IU]/ML
INJECTION, SOLUTION INTRAVENOUS; SUBCUTANEOUS
Status: DISCONTINUED | OUTPATIENT
Start: 2021-08-08 | End: 2021-08-11 | Stop reason: HOSPADM

## 2021-08-08 RX ORDER — POLYETHYLENE GLYCOL 3350 17 G/17G
17 POWDER, FOR SOLUTION ORAL DAILY PRN
Status: DISCONTINUED | OUTPATIENT
Start: 2021-08-08 | End: 2021-08-11 | Stop reason: HOSPADM

## 2021-08-08 RX ORDER — PANTOPRAZOLE SODIUM 40 MG/1
40 TABLET, DELAYED RELEASE ORAL
Status: DISCONTINUED | OUTPATIENT
Start: 2021-08-09 | End: 2021-08-11 | Stop reason: HOSPADM

## 2021-08-08 RX ORDER — LOSARTAN POTASSIUM 25 MG/1
12.5 TABLET ORAL DAILY
Status: DISCONTINUED | OUTPATIENT
Start: 2021-08-09 | End: 2021-08-08

## 2021-08-08 RX ORDER — SODIUM CHLORIDE 0.9 % (FLUSH) 0.9 %
5-40 SYRINGE (ML) INJECTION AS NEEDED
Status: DISCONTINUED | OUTPATIENT
Start: 2021-08-08 | End: 2021-08-11 | Stop reason: HOSPADM

## 2021-08-08 RX ORDER — MORPHINE SULFATE 4 MG/ML
4 INJECTION INTRAVENOUS ONCE
Status: COMPLETED | OUTPATIENT
Start: 2021-08-08 | End: 2021-08-08

## 2021-08-08 RX ORDER — ONDANSETRON 4 MG/1
4 TABLET, ORALLY DISINTEGRATING ORAL
Status: DISCONTINUED | OUTPATIENT
Start: 2021-08-08 | End: 2021-08-11 | Stop reason: HOSPADM

## 2021-08-08 RX ORDER — FUROSEMIDE 10 MG/ML
40 INJECTION INTRAMUSCULAR; INTRAVENOUS DAILY
Status: DISCONTINUED | OUTPATIENT
Start: 2021-08-09 | End: 2021-08-09

## 2021-08-08 RX ORDER — FUROSEMIDE 10 MG/ML
40 INJECTION INTRAMUSCULAR; INTRAVENOUS
Status: COMPLETED | OUTPATIENT
Start: 2021-08-08 | End: 2021-08-08

## 2021-08-08 RX ORDER — LOSARTAN POTASSIUM 25 MG/1
25 TABLET ORAL DAILY
Status: DISCONTINUED | OUTPATIENT
Start: 2021-08-09 | End: 2021-08-11 | Stop reason: HOSPADM

## 2021-08-08 RX ORDER — MAGNESIUM SULFATE 100 %
4 CRYSTALS MISCELLANEOUS AS NEEDED
Status: DISCONTINUED | OUTPATIENT
Start: 2021-08-08 | End: 2021-08-11 | Stop reason: HOSPADM

## 2021-08-08 RX ORDER — DEXTROSE 50 % IN WATER (D50W) INTRAVENOUS SYRINGE
12.5-25 AS NEEDED
Status: DISCONTINUED | OUTPATIENT
Start: 2021-08-08 | End: 2021-08-11 | Stop reason: HOSPADM

## 2021-08-08 RX ORDER — ONDANSETRON 2 MG/ML
4 INJECTION INTRAMUSCULAR; INTRAVENOUS
Status: DISCONTINUED | OUTPATIENT
Start: 2021-08-08 | End: 2021-08-11 | Stop reason: HOSPADM

## 2021-08-08 RX ORDER — ENOXAPARIN SODIUM 100 MG/ML
40 INJECTION SUBCUTANEOUS DAILY
Status: DISCONTINUED | OUTPATIENT
Start: 2021-08-09 | End: 2021-08-09

## 2021-08-08 RX ORDER — ALBUTEROL SULFATE 0.83 MG/ML
2.5 SOLUTION RESPIRATORY (INHALATION)
Status: DISCONTINUED | OUTPATIENT
Start: 2021-08-08 | End: 2021-08-11 | Stop reason: HOSPADM

## 2021-08-08 RX ORDER — SODIUM CHLORIDE 0.9 % (FLUSH) 0.9 %
5-40 SYRINGE (ML) INJECTION EVERY 8 HOURS
Status: DISCONTINUED | OUTPATIENT
Start: 2021-08-08 | End: 2021-08-11 | Stop reason: HOSPADM

## 2021-08-08 RX ORDER — HYDROXYZINE 25 MG/1
25 TABLET, FILM COATED ORAL
Status: DISCONTINUED | OUTPATIENT
Start: 2021-08-08 | End: 2021-08-11 | Stop reason: HOSPADM

## 2021-08-08 RX ORDER — ONDANSETRON 2 MG/ML
4 INJECTION INTRAMUSCULAR; INTRAVENOUS
Status: COMPLETED | OUTPATIENT
Start: 2021-08-08 | End: 2021-08-08

## 2021-08-08 RX ADMIN — FUROSEMIDE 40 MG: 10 INJECTION, SOLUTION INTRAMUSCULAR; INTRAVENOUS at 11:28

## 2021-08-08 RX ADMIN — MORPHINE SULFATE 4 MG: 4 INJECTION INTRAVENOUS at 11:32

## 2021-08-08 RX ADMIN — NITROGLYCERIN 20 MCG/MIN: 20 INJECTION INTRAVENOUS at 19:21

## 2021-08-08 RX ADMIN — Medication 10 ML: at 21:13

## 2021-08-08 RX ADMIN — ONDANSETRON 4 MG: 2 INJECTION INTRAMUSCULAR; INTRAVENOUS at 11:30

## 2021-08-08 RX ADMIN — NITROGLYCERIN 10 MCG/MIN: 20 INJECTION INTRAVENOUS at 11:50

## 2021-08-08 NOTE — H&P
[address] [facility] [address] [place] [address]   Office [phone]  Fax [phone]       Admission H&P     Name: Deja Rodríguez MRN: [id number]  Sex: Male   YOB: 1970  Age: 54 y.o. PCP: Leila Zacarias MD     Source of Information: patient, medical records    Chief complaint: SOB    History of Present Illness  Deja Rodríguez is a 54 y.o. male with PMHx of prior MI x3, CAD (s/p stent 05/2021), CHF (EF 35-40% on echo 5/7/2021), T2DM, MDD, BPD, chronic back pain who presents to the ED complaining of SOB. Orthopnea with 3 pillows, usually uses 1 pillow. Also reports MURRAY with walking. Not taking lasix for last several days due to running out of medication. Received first covid shot on Friday. Endorses cough for 1 month. Decreasing tobacco products, now smoking 1 cigarette/day. Right side CP 8/10 on presentation now 7/10, aggravated by taking deep breath/coughing. COVID Questions:   Experiencing any of the following symptoms: fever, chills, cough, SOB, diarrhea, URI symptoms. SOB, cough  Any Sick contacts with fever, cough, diarrhea, SOB, URI symptoms. Unknown  Traveled out of state or out of country. No  Lives with roommate. Has been staying at home. In the ED:  Vitals: Temp 98.3   /102      RR 35  SatO2  92% on RA  Labs: Trop 0.21, proBNP 4261, D-dimer: neg  Imaging: CXR: Cardiomegaly and moderate bilateral pulmonary edema versus nonspecific pneumonia. No visible pleural effusion. Treatment: Lasix 40mg IV x1, Morphine 4mg IV x1, Zofran 4mg IV x1, Nitro gtt    EKG: \"Rhythm: sinus tachycardia; Rate (approx.): 118; Axis: normal; QRS interval: normal ; ST/T wave: T wave inverted; Other findings: abnormal ekg. \" per ED physician    Patient Vitals for the past 12 hrs:   Temp Pulse Resp BP SpO2   08/08/21 1322   28     08/08/21 1316  (!) 118 28     08/08/21 1315    113/83    08/08/21 0942 98.3 °F (36.8 °C) (!) 118 (!) 35 (!) 138/102 92 %       Review of Systems  Review of Systems   Constitutional: Negative for chills and fever. HENT: Negative for congestion and rhinorrhea. Eyes: Negative for redness. Respiratory: Positive for shortness of breath. Cardiovascular: Negative for leg swelling. Gastrointestinal: Negative for abdominal pain, blood in stool and diarrhea. Genitourinary: Negative for difficulty urinating and hematuria. Skin: Negative for color change and pallor. Neurological: Negative for speech difficulty. Psychiatric/Behavioral: Negative for agitation and confusion. Home Medications   Prior to Admission medications    Medication Sig Start Date End Date Taking? Authorizing Provider   furosemide (LASIX) 40 mg tablet Take 1 Tablet by mouth daily. 8/5/21   Genevieve Connolly MD   hydrOXYzine HCL (ATARAX) 25 mg tablet Take two to three tablets by mouth at bedtime for sleep. 6/29/21   Genevieve Connolly MD   losartan (COZAAR) 25 mg tablet Take 0.5 Tabs by mouth daily. 5/10/21   Jumana Valdes DO   traZODone (DESYREL) 50 mg tablet Take 50 mg by mouth nightly as needed for Sleep. Provider, Historical   liraglutide (VICTOZA) 0.6 mg/0.1 mL (18 mg/3 mL) pnij 1.8 mg by SubCUTAneous route daily. 4/21/21   Genevieve Connolly MD   atorvastatin (LIPITOR) 80 mg tablet Take 1 Tab by mouth daily. 4/8/21   Genevieve Connolly MD   clopidogreL (PLAVIX) 75 mg tab Take 1 Tab by mouth daily. 4/8/21   Genevieve Connolly MD   pantoprazole (PROTONIX) 40 mg tablet Take 1 Tab by mouth Daily (before breakfast). 4/8/21   Genevieve Connolly MD   albuterol (PROVENTIL HFA, VENTOLIN HFA, PROAIR HFA) 90 mcg/actuation inhaler Take 2 Puffs by inhalation every four (4) hours as needed for Wheezing or Shortness of Breath. 4/8/21   Genevieve Connolly MD       Allergies  Allergies   Allergen Reactions    Acetaminophen Unknown (comments), Anaphylaxis and Shortness of Breath     Other reaction(s): anaphylaxis/angioedema  Other reaction(s):  Other (see comments)  Other reaction(s): anaphylaxis/angioedema  Throat swelling    Aspirin Hives, Anaphylaxis and Shortness of Breath     Other reaction(s): anaphylaxis/angioedema  Other reaction(s): anaphylaxis/angioedema    Unable To Obtain Unable to Obtain     Patient states his allergic to greens    Aspirin Shortness of Breath    Spinach Leaf Unknown (comments)     Green leaves    Tylenol [Acetaminophen] Shortness of Breath       Past Medical History  Past Medical History:   Diagnosis Date    Anxiety disorder     Asthma     [facility]     CAD (coronary artery disease)     MI x2 with 2 cardiac stent    CHF [facility]congestive heart failure) ([facility])     with reduced EF, EF 20%    Chronic systolic congestive heart failure ([facility] 12/2/2020    Depression     Diabetes mellitus, type 2 ([facility])     on insulin. moderate proteinuria    Hypertension     Mood disorder ([facility]     Psychotic disorder ([facility])     Schizoaffective disorder, bipolar type ([facility]     Sleep disorder     Suicidal thoughts        Previous Hospitalization(s)  Past Surgical History:   Procedure Laterality Date    HX CORONARY STENT PLACEMENT      HX IMPLANTABLE CARDIOVERTER DEFIBRILLATOR         Family History  Family History   Problem Relation Age of Onset    Heart Attack Mother     Hypertension Mother     Diabetes Mother     Heart Attack Father     Hypertension Father     Diabetes Father     Depression Neg Hx     Suicide Neg Hx     Psychotic Disorder Neg Hx     Dementia Neg Hx     Substance Abuse Neg Hx        Social History  Alcohol history: Not at all   Smoking history: Smokes 1 cigarette per day, cutting down  Illicit drug history: Not at all  Living arrangement: patient lives with their roommate. Ambulates: Independently     Vital Signs  Visit Vitals  /83   Pulse (!) 118   Temp 98.3 °F (36.8 °C)   Resp 28   Ht 5' 5\" (1.651 m)   Wt 164 lb (74.4 kg)   SpO2 92%   BMI 27.29 kg/m²       Physical Exam      General No acute distress. Alert. Cooperative.    HENT Head Normocephalic and atraumatic. Eyes Conjunctivae are normal, no discharge. No scleral icterus. Cardio Normal rate, regular rhythm. Exam reveals no gallop and no friction rub. No murmur heard. No chest wall tenderness. Pulmonary Effort normal and breath sounds normal. No respiratory distress. No wheezes, no rales. Moderate crackles in bilateral lung fields. Abdominal Soft. Bowel sounds normal. No distension. No tenderness.  Deferred. Extremities  Musculoskeletal:      No LE edema. Distal pulses intact. Full ROM in all extremities   Neurological Alert and oriented to person, place, and time. Dermatology Skin is warm and dry. No rash noted. No erythema or pallor. Psychiatric Affect and judgment normal.        Laboratory Data  Recent Results (from the past 8 hour(s))   CBC WITH AUTOMATED DIFF    Collection Time: 08/08/21 11:08 AM   Result Value Ref Range    WBC 9.0 4.1 - 11.1 K/uL    RBC 4.07 (L) 4.10 - 5.70 M/uL    HGB 12.1 12.1 - 17.0 g/dL    HCT 37.7 36.6 - 50.3 %    MCV 92.6 80.0 - 99.0 FL    MCH 29.7 26.0 - 34.0 PG    MCHC 32.1 30.0 - 36.5 g/dL    RDW 15.3 (H) 11.5 - 14.5 %    PLATELET 987 138 - 723 K/uL    MPV 10.4 8.9 - 12.9 FL    NRBC 0.0 0  WBC    ABSOLUTE NRBC 0.00 0.00 - 0.01 K/uL    NEUTROPHILS 74 32 - 75 %    LYMPHOCYTES 13 12 - 49 %    MONOCYTES 10 5 - 13 %    EOSINOPHILS 2 0 - 7 %    BASOPHILS 1 0 - 1 %    IMMATURE GRANULOCYTES 0 0.0 - 0.5 %    ABS. NEUTROPHILS 6.7 1.8 - 8.0 K/UL    ABS. LYMPHOCYTES 1.1 0.8 - 3.5 K/UL    ABS. MONOCYTES 0.9 0.0 - 1.0 K/UL    ABS. EOSINOPHILS 0.2 0.0 - 0.4 K/UL    ABS. BASOPHILS 0.1 0.0 - 0.1 K/UL    ABS. IMM.  GRANS. 0.0 0.00 - 0.04 K/UL    DF AUTOMATED     METABOLIC PANEL, COMPREHENSIVE    Collection Time: 08/08/21 11:08 AM   Result Value Ref Range    Sodium 139 136 - 145 mmol/L    Potassium 4.6 3.5 - 5.1 mmol/L    Chloride 108 97 - 108 mmol/L    CO2 26 21 - 32 mmol/L    Anion gap 5 5 - 15 mmol/L    Glucose 78 65 - 100 mg/dL    BUN 13 6 - 20 MG/DL    Creatinine 1.24 0.70 - 1.30 MG/DL    BUN/Creatinine ratio 10 (L) 12 - 20      GFR est AA >60 >60 ml/min/1.73m2    GFR est non-AA >60 >60 ml/min/1.73m2    Calcium 8.4 (L) 8.5 - 10.1 MG/DL    Bilirubin, total 1.2 (H) 0.2 - 1.0 MG/DL    ALT (SGPT) 22 12 - 78 U/L    AST (SGOT) 30 15 - 37 U/L    Alk. phosphatase 81 45 - 117 U/L    Protein, total 6.6 6.4 - 8.2 g/dL    Albumin 3.0 (L) 3.5 - 5.0 g/dL    Globulin 3.6 2.0 - 4.0 g/dL    A-G Ratio 0.8 (L) 1.1 - 2.2     TROPONIN I    Collection Time: 08/08/21 11:08 AM   Result Value Ref Range    Troponin-I, Qt. 0.21 (H) <0.05 ng/mL   NT-PRO BNP    Collection Time: 08/08/21 11:08 AM   Result Value Ref Range    NT pro-BNP 4,261 (H) <125 PG/ML   SAMPLES BEING HELD    Collection Time: 08/08/21 11:08 AM   Result Value Ref Range    SAMPLES BEING HELD 1RED     COMMENT        Add-on orders for these samples will be processed based on acceptable specimen integrity and analyte stability, which may vary by analyte. D DIMER    Collection Time: 08/08/21 11:08 AM   Result Value Ref Range    D-dimer 0.64 0.00 - 0.65 mg/L FEU       Imaging  CXR Results  (Last 48 hours)               08/08/21 1053  XR CHEST PORT Final result    Impression:      Cardiomegaly and moderate bilateral pulmonary edema versus nonspecific   pneumonia. No visible pleural effusion. Consider PA and lateral chest views when   the patient can better tolerate. Narrative:  EXAM: XR CHEST PORT       INDICATION: Shortness of breath and chest pain. CHF. COMPARISON: CT chest on 5/6/2021. Chest views on 5/6/2021 and 4/20/2019. TECHNIQUE: Portable upright chest AP view       FINDINGS: Defibrillator line is unchanged.       ] Is not imaged. Left costophrenic angle was not imaged. Cardiac monitoring   wires overlie the thorax. Cardiomegaly may be increased. Pulmonary vascular   prominence is increased. Bilateral pulmonary opacities are moderate and new. No pneumothorax.  No evidence of pleural effusion. The visualized bones and upper abdomen are age-appropriate. CT Results  (Last 48 hours)    None            Assessment and Plan     Lizzie Canales is a 54 y.o. male with a PMHx of prior MI x3, CAD, CHF, T2DM, MDD, BPD, chronic back pain who is admitted for Acute on Chronic Heart Failure. Acute on Chronic Heart Failure  Patient with known history of HFrEF (EF 35-40% on echo 5/7/2021; EF 20% per Left Heart Cath 5/6/2021) presents with SOB, MURRAY, PND for 1 week after running out of home Lasix. Crackles on exam but saturating well on room air. POA proBNP 4261, Trop 0.21, CXR showed cardiomegaly and bilateral pulmonary edema.  - Admit to stepdown  - Monitor vitals per unit protocol, strict I&O  - O2: as needed  - Lasix 40 IV daily  - Continue nitro gtt  - Daily CBC, CMP, Mag, Phos  - Rapid COVID test, Procal  - Consulted Cardiology    Elevated Troponin: POA Troponin 0.21. Likely 2/2 CHF exacerbation  - Trend troponin q6hrs     Diabetes Mellitus T2:  Lab Results   Component Value Date/Time    Hemoglobin A1c 11.5 (H) 05/06/2021 05:46 PM   - Holding home liraglutide  - Insulin Sliding Scale normal sensitivity with AC&HS glucose checks. - Hypoglycemia protocols ordered. Hypertension: Stable  - BP on admission 113/83.  - Continue home cozaar 25mg daily  - Hold home carvedilol 12.5mg daily  - Will continue to monitor at this time and readjust as BP's trend.     CAD (s/p stent 05/2021)  - Continue home Plavix 75mg daily    Asthma:   - Continue albuterol inhaler prn  - Follow up with pulmonology outpatient for pulmonary function testing for likely COPD     Hyperlipidemia:   Lab Results   Component Value Date/Time    Cholesterol, total 217 (H) 03/09/2021 01:31 PM    HDL Cholesterol 40 03/09/2021 01:31 PM    LDL, calculated 101.2 (H) 03/09/2021 01:31 PM    VLDL, calculated 75.8 03/09/2021 01:31 PM    Triglyceride 379 (H) 03/09/2021 01:31 PM    CHOL/HDL Ratio 5.4 (H) 03/09/2021 01:31 PM -Continue home lipitor 80mg daily    Insomnia:  - Continue home Atarax 25mg qhs prn    Bipolar Disorder/MDD: Stable. - No home meds    Hx of CVA: Patient reports history of stroke in 2021. No records found.  - On lipitor    Obesity BMI Body mass index is 27.29 kg/m². - Encouraging lifestyle modifications and further follow up outpatient. FEN/GI - Cardiac diet, no concentrated sweets   Activity - Ambulate as tolerated  DVT prophylaxis - Lovenox  GI prophylaxis - Protonix  Fall prophylaxis - Not indicated at this time. Disposition - Admit to Stepdown. Plan to d/c to Home. Code Status - DNR. Discussed with Patient  Next of Kin Name and [facility],  [address] [place] [address]     Patient seen and examined with Dr. Vinicio Webb. Patient Radha Barrett will be discussed with Dr. Diana Rivera.      1:19 PM, 08/08/21  Leo Watkins MD  Family Medicine Resident       For Billing    Chief Complaint   Patient presents with    Shortness of Breath    Chest Pain    Abdominal Pain       Hospital Problems  Date Reviewed: 4/21/2021        Codes Class Noted POA    Acute heart failure ([facility] ICD-10-CM: I50.9  ICD-9-CM: 428.9  8/8/2021 Unknown
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
## 2021-08-08 NOTE — Clinical Note
Single view of the left ventricle obtained using power injection. Total volume = 40 mL. Rate = 10 mL/sec. Pressure = 600 PSI. Rate of rise = 0.5 sec.

## 2021-08-08 NOTE — Clinical Note
TRANSFER - OUT REPORT:     Verbal report given to: MARIO . Report consisted of patient's Situation, Background, Assessment and   Recommendations(SBAR). Opportunity for questions and clarification was provided. Patient transported with a Registered Nurse. Patient transported to: holding area.

## 2021-08-08 NOTE — Clinical Note
Called Dr. Nieto office in regards to message below. Informed  Dr. Valerio is off today and if she needs to get paged to speak with Dr. Nieto in regards to mutual patient.  stated she will follow up with Dr. Zhu  and will return call to office to update us. No further questions at this time.    TRANSFER - IN REPORT:     Verbal report received from: Ofelia Hoang. Report consisted of patient's Situation, Background, Assessment and   Recommendations(SBAR). Opportunity for questions and clarification was provided. Assessment completed upon patient's arrival to unit and care assumed. Patient transported with a Registered Nurse, [address] [place] [address] / Patient Care Tech and Monitor.

## 2021-08-08 NOTE — Clinical Note
Sheath #1: Sheath: left in place. Site secured by Tegaderm. Hemostasis achieved. Upon evaluation of the common femoral artery stick using fluoroscopy, the access site puncture was high.

## 2021-08-08 NOTE — PROGRESS NOTES
8/8/2021   4:18 PM  Addendum: patient is open to [facility] set up by us at a previous admission. Will need resumption orders. Reason for Admission:  Acute heart failure                   RUR Score:          None yet patient just admitted           Plan for utilizing home health:      Patient has a nurse coming out, will review chart to find out name of company. PCP: First and Last name:  Alber Coppola MD   Name of Practice:    Are you a current patient: Yes/No: Yes   Approximate date of last visit: 5 months ago   Can you participate in a virtual visit with your PCP: Yes                    Current Advanced Directive/Advance Care Plan: DNR--not documented, patient does not have an ACP by his own admission    Healthcare Decision Maker:   Click here to complete [address] including selection of the Healthcare Decision Maker Relationship (ie \"Primary\")           Patient does not know who he would want his decision maker to be--spiritual care consult placed for AMD planning. Transition of Care Plan:                    Patient lives with his ex girlfriend in a single story home with 2 steps to enter. Prior to admission he states he is independent with ADLs. Does not drive, relies on Medicaid transportation. He is open to a home health nurse but does not remember agency--he states it was us who set it up, so I will review chart and addend note. He does not use any DME but is amenable if he needs it. Patient sees Dr. Vera Solorio as his PCP and last had a visit ~5 months ago, has another visit set up for a couple weeks from now. Patient uses [facility] for prescriptions and they are typically covered by insurance, however part of the reason he is here is that he ran out of his Lasix. He will need medicaid transport set up at discharge. He received his first covid vaccine dose last week. Assessment done via phone due to droplet plus precautions. Transition of Care Plan  1.  Continue medical management/treatment  2. Likely home when ready, needs are yet unclear  3. Will need resumption orders for home health  4. Will need medicaid transport set up for discharge  5. Spiritual care consult placed for AMD planning  6. CM will follow    Care Management Interventions  PCP Verified by CM: Yes (Dr. Tish Dunn)  Mode of Transport at Discharge: Other (see comment) (will need [facility] transport)  Transition of Care Consult (CM Consult): Discharge Planning, Home Health  Current Support Network:  Other (lives with a roommate)  Confirm Follow Up Transport: Other (see comment) (medicaid cab)  Discharge Location  Discharge Placement: Home with family assistance    LAWRENCE Fonseca

## 2021-08-08 NOTE — ACP (ADVANCE CARE PLANNING)
Advance Care Planning     Advance Care Planning Activator (Inpatient)  Conversation Note      Date of ACP Conversation: 08/08/21     Conversation Conducted with:   Patient with Decision Making Capacity    ACP Activator: Grant [id number] Decision Maker:    Current Designated Health Care Decision Maker:     Click here to complete [place] including selection of the Healthcare Decision Maker Relationship (ie \"Primary\")  Today we discussed decision maker, patient is undecided. Spiritual care consult placed. Care Preferences    Ventilation: \"If you were in your present state of health and suddenly became very ill and were unable to breathe on your own, what would your preference be about the use of a ventilator (breathing machine) if it were available to you? \"      If patient would desire the use of a ventilator (breathing machine), answer \"yes\", if not \"no\":yes    \"If your health worsens and it becomes clear that your chance of recovery is unlikely, what would your preference be about the use of a ventilator (breathing machine) if it were available to you? \"     Would the patient desire the use of a ventilator (breathing machine)? YES      Resuscitation  \"CPR works best to restart the heart when there is a sudden event, like a heart attack, in someone who is otherwise healthy. Unfortunately, CPR does not typically restart the heart for people who have serious health conditions or who are very sick. \"    \"In the event your heart stopped as a result of an underlying serious health condition, would you want attempts to be made to restart your heart (answer \"yes\" for attempt to resuscitate) or would you prefer a natural death (answer \"no\" for do not attempt to resuscitate)? \" yes      [] Yes  [] No   Educated Patient / Shereen Jesús regarding differences between Advance Directives and portable DNR orders.     Length of ACP Conversation in minutes:  5    Conversation Outcomes:  [x] ACP discussion completed  [] Existing advance directive reviewed with patient; no changes to patient's previously recorded wishes     [] New Advance Directive completed   [] Portable Do Not Resuscitate prepared for Provider review and signature  [] POLST/POST/MOLST/MOST prepared for Provider review and signature      Follow-up plan:    [x] Schedule follow-up conversation to continue planning  [] Referred individual to Provider for additional questions/concerns   [] Advised patient/agent/surrogate to review completed ACP document and update if needed with changes in condition, patient preferences or care setting     [] This note routed to one or more involved healthcare providers    Spiritual care consult placed as patient has no surviving family and does not know who he would want to make decisions.      LAWRENCE Lozano

## 2021-08-08 NOTE — PROGRESS NOTES
[address] [place] [address]   Office [phone]  Fax [phone]          Assessment and Plan     Dina Nguyen is a 54 y.o. male with a PMHx of prior MI x3, CAD, CHF, T2DM, MDD, BPD, chronic back pain who is admitted for Acute on Chronic Heart Failure. 24 Hour Events: NAEO. Acute on Chronic Heart Failure  Patient with known history of HFrEF (EF 35-40% on echo 5/7/2021; EF 20% per Left Heart Cath 5/6/2021) presents with SOB, MURRAY, PND for 1 week after running out of home Lasix. POA proBNP 4261, Trop 0.21, CXR showed cardiomegaly and bilateral pulmonary edema. Rapid COVID test neg, Procal neg. - Admitted to stepdown  - Monitor vitals per unit protocol, strict I&O  - Daily weights  - O2: as needed  - Increase Lasix 40 IV BID  - Wean nitro gtt  - Daily CBC, CMP, Mag  - Cardiology consulted, appreciate recs     Diabetes Mellitus T2: Last A1c 11.5 on 5/6/21.  - Holding home liraglutide  - Insulin Sliding Scale normal sensitivity with AC&HS glucose checks. - Hypoglycemia protocols ordered. Elevated Troponin: Resolved. POA Troponin 0.21. Likely 2/2 CHF exacerbation. Trended down to 0.19 8/9.  - discontinue trending     Hypertension: Stable  - BP on admission 113/83.  - Continue home cozaar 25mg daily  - Hold home carvedilol 12.5mg daily  - Will continue to monitor at this time and readjust as BP's trend.     CAD (s/p stent 05/2021)  - Continue home Plavix 75mg daily     Asthma:   - Continue albuterol inhaler prn  - Follow up with pulmonology outpatient for pulmonary function testing for likely COPD     Hyperlipidemia: The 10-year ASCVD risk score (Shahnaz Cano, et al., 2013) is: 22%. -Continue home lipitor 80mg daily     Insomnia:  - Continue home Atarax 25mg qhs prn     Bipolar Disorder/MDD: Stable. - No home meds     Hx of CVA: Patient reports history of stroke in 2021. No records found.  - On lipitor     Obesity BMI Body mass index is 27.29 kg/m².   - Encouraging lifestyle modifications and further follow up outpatient.     FEN/GI - Cardiac diet, no concentrated sweets   Activity - Ambulate as tolerated  DVT prophylaxis - Lovenox  GI prophylaxis - Protonix  Fall prophylaxis - Not indicated at this time. Disposition - Admit to Stepdown. Plan to d/c to Home. Code Status - DNR. Discussed with Patient  Next of Kin Name and [facility],  [address] [place] [address] - [phone]      I appreciate the opportunity to participate in the care of this patient,  Fortino Sanchez MD  Family Medicine Resident         Subjective / Objective     Subjective  Feeling better this AM. Says he is still short of breath, but improved. No other complaints. Respiratory:   O2 Device: None (Room air)   Visit Vitals  /83   Pulse (!) 118   Temp 98.3 °F (36.8 °C)   Resp 28   Ht 5' 5\" (1.651 m)   Wt 164 lb (74.4 kg)   SpO2 92%   BMI 27.29 kg/m²     Physical Examination:   General appearance - alert, well appearing, and in no distress  Chest - Mild crackles at lung bases, no wheezes, rales or rhonchi, symmetric air entry  Heart - normal rate, regular rhythm, normal S1, S2, no murmurs, rubs, clicks or gallops,   Abdomen - soft, nontender, nondistended, no masses or organomegaly  Neurological - alert, oriented, normal speech, no focal findings  Extremities - peripheral pulses normal, no pedal edema, no clubbing or cyanosis    I/O:     Date 08/08/21 0700 - 08/09/21 0659 08/09/21 0700 - 08/10/21 0659   Shift 0111-9578 1411-2287 24 Hour Total 5336-8189 5018-9889 24 Hour Total   INTAKE   P.O.  440 440        P. O.  440 440      I. V.(mL/kg/hr)  82.4(0.1) 82.4(0)        Nitroglycerin Volume (ml)  82.4 82.4      Shift Total(mL/kg)  522. 4(7) 522. 4(7)      OUTPUT   Urine(mL/kg/hr)    300  300     Urine Voided    300  300     Urine Occurrence(s)  1 x 1 x      Shift Total(mL/kg)    300(4)  300(4)   NET  522.4 522.4 -300  -300   Weight (kg) 74.4 74.4 74.4 74.4 74.4 74.4         Inpatient Medications  Current Facility-Administered Medications   Medication Dose Route Frequency    nitroglycerin (Tridil) 200 mcg/ml infusion  0-20 mcg/min IntraVENous TITRATE    sodium chloride (NS) flush 5-40 mL  5-40 mL IntraVENous Q8H    sodium chloride (NS) flush 5-40 mL  5-40 mL IntraVENous PRN    polyethylene glycol (MIRALAX) packet 17 g  17 g Oral DAILY PRN    ondansetron (ZOFRAN ODT) tablet 4 mg  4 mg Oral Q8H PRN    Or    ondansetron (ZOFRAN) injection 4 mg  4 mg IntraVENous Q6H PRN    [START ON 8/9/2021] enoxaparin (LOVENOX) injection 40 mg  40 mg SubCUTAneous DAILY    [START ON 8/9/2021] furosemide (LASIX) injection 40 mg  40 mg IntraVENous DAILY    insulin lispro (HUMALOG) injection   SubCUTAneous AC&HS    glucose chewable tablet 16 g  4 Tablet Oral PRN    dextrose (D50W) injection syrg 12.5-25 g  12.5-25 g IntraVENous PRN    glucagon (GLUCAGEN) injection 1 mg  1 mg IntraMUSCular PRN    [START ON 8/9/2021] atorvastatin (LIPITOR) tablet 80 mg  80 mg Oral DAILY    [START ON 8/9/2021] clopidogreL (PLAVIX) tablet 75 mg  75 mg Oral DAILY    hydrOXYzine HCL (ATARAX) tablet 25 mg  25 mg Oral QHS PRN    [START ON 8/9/2021] pantoprazole (PROTONIX) tablet 40 mg  40 mg Oral ACB    albuterol (PROVENTIL VENTOLIN) nebulizer solution 2.5 mg  2.5 mg Nebulization Q4H PRN    [START ON 8/9/2021] losartan (COZAAR) tablet 25 mg  25 mg Oral DAILY     Current Outpatient Medications   Medication Sig    furosemide (LASIX) 40 mg tablet Take 1 Tablet by mouth daily.  hydrOXYzine HCL (ATARAX) 25 mg tablet Take two to three tablets by mouth at bedtime for sleep.  losartan (COZAAR) 25 mg tablet Take 0.5 Tabs by mouth daily.  traZODone (DESYREL) 50 mg tablet Take 50 mg by mouth nightly as needed for Sleep.  liraglutide (VICTOZA) 0.6 mg/0.1 mL (18 mg/3 mL) pnij 1.8 mg by SubCUTAneous route daily.  atorvastatin (LIPITOR) 80 mg tablet Take 1 Tab by mouth daily.  clopidogreL (PLAVIX) 75 mg tab Take 1 Tab by mouth daily.     pantoprazole (PROTONIX) 40 mg tablet Take 1 Tab by mouth Daily (before breakfast).  albuterol (PROVENTIL HFA, VENTOLIN HFA, PROAIR HFA) 90 mcg/actuation inhaler Take 2 Puffs by inhalation every four (4) hours as needed for Wheezing or Shortness of Breath. Allergies  Allergies   Allergen Reactions    Acetaminophen Unknown (comments), Anaphylaxis and Shortness of Breath     Other reaction(s): anaphylaxis/angioedema  Other reaction(s):  Other (see comments)  Other reaction(s): anaphylaxis/angioedema  Throat swelling    Aspirin Hives, Anaphylaxis and Shortness of Breath     Other reaction(s): anaphylaxis/angioedema  Other reaction(s): anaphylaxis/angioedema    Unable To Obtain Unable to Obtain     Patient states his allergic to greens    Aspirin Shortness of Breath    Spinach Leaf Unknown (comments)     Green leaves    Tylenol [Acetaminophen] Shortness of Breath       CBC:  Recent Labs     08/08/21  1108   WBC 9.0   HGB 12.1   HCT 37.7          Metabolic Panel:  Recent Labs     08/08/21  1108      K 4.6      CO2 26   BUN 13   CREA 1.24   GLU 78   CA 8.4*   ALB 3.0*   ALT 22            For Billing    Chief Complaint   Patient presents with    Shortness of Breath    Chest Pain    Abdominal Pain       Hospital Problems  Date Reviewed: 4/21/2021        Codes Class Noted POA    Acute heart failure ([facility] [address] ICD-10-CM: I50.9  ICD-9-CM: 428.9  8/8/2021 Unknown

## 2021-08-09 ENCOUNTER — APPOINTMENT (OUTPATIENT)
Dept: NON INVASIVE DIAGNOSTICS | Age: 51
DRG: 192 | End: 2021-08-09
Attending: INTERNAL MEDICINE
Payer: COMMERCIAL

## 2021-08-09 LAB
ALBUMIN SERPL-MCNC: 2.6 G/DL (ref 3.5–5)
ALBUMIN/GLOB SERPL: 0.7 {RATIO} (ref 1.1–2.2)
ALP SERPL-CCNC: 77 U/L (ref 45–117)
ALT SERPL-CCNC: 21 U/L (ref 12–78)
ANION GAP SERPL CALC-SCNC: 4 MMOL/L (ref 5–15)
APTT PPP: 27.7 SEC (ref 22.1–31)
AST SERPL-CCNC: 13 U/L (ref 15–37)
ATRIAL RATE: 118 BPM
BASOPHILS # BLD: 0.1 K/UL (ref 0–0.1)
BASOPHILS NFR BLD: 1 % (ref 0–1)
BILIRUB SERPL-MCNC: 0.8 MG/DL (ref 0.2–1)
BUN SERPL-MCNC: 20 MG/DL (ref 6–20)
BUN/CREAT SERPL: 12 (ref 12–20)
CALCIUM SERPL-MCNC: 8.1 MG/DL (ref 8.5–10.1)
CALCULATED P AXIS, ECG09: 51 DEGREES
CALCULATED R AXIS, ECG10: 11 DEGREES
CALCULATED T AXIS, ECG11: 88 DEGREES
CHLORIDE SERPL-SCNC: 106 MMOL/L (ref 97–108)
CO2 SERPL-SCNC: 28 MMOL/L (ref 21–32)
CREAT SERPL-MCNC: 1.61 MG/DL (ref 0.7–1.3)
DIAGNOSIS, 93000: NORMAL
DIFFERENTIAL METHOD BLD: ABNORMAL
ECHO AO ASC DIAM: 3.15 CM
ECHO AO ROOT DIAM: 3.48 CM
ECHO AV AREA PEAK VELOCITY: 2.89 CM2
ECHO AV AREA VTI: 2.54 CM2
ECHO AV AREA/BSA PEAK VELOCITY: 1.6 CM2/M2
ECHO AV AREA/BSA VTI: 1.4 CM2/M2
ECHO AV MEAN GRADIENT: 2.08 MMHG
ECHO AV PEAK GRADIENT: 3.35 MMHG
ECHO AV PEAK VELOCITY: 91.53 CM/S
ECHO AV VTI: 15.84 CM
ECHO LA AREA 4C: 21.78 CM2
ECHO LA MAJOR AXIS: 4.56 CM
ECHO LA MINOR AXIS: 2.51 CM
ECHO LA VOL 2C: 70.86 ML (ref 18–58)
ECHO LA VOL 4C: 60.93 ML (ref 18–58)
ECHO LA VOL BP: 72.08 ML (ref 18–58)
ECHO LA VOL/BSA BIPLANE: 39.6 ML/M2 (ref 16–28)
ECHO LA VOLUME INDEX A2C: 38.93 ML/M2 (ref 16–28)
ECHO LA VOLUME INDEX A4C: 33.48 ML/M2 (ref 16–28)
ECHO LV E' LATERAL VELOCITY: 7.28 CENTIMETER/SECOND
ECHO LV E' SEPTAL VELOCITY: 5.57 CENTIMETER/SECOND
ECHO LV INTERNAL DIMENSION DIASTOLIC: 5.44 CM (ref 4.2–5.9)
ECHO LV INTERNAL DIMENSION SYSTOLIC: 4.88 CM
ECHO LV IVSD: 1.1 CM (ref 0.6–1)
ECHO LV MASS 2D: 239.1 G (ref 88–224)
ECHO LV MASS INDEX 2D: 131.4 G/M2 (ref 49–115)
ECHO LV POSTERIOR WALL DIASTOLIC: 1.11 CM (ref 0.6–1)
ECHO LVOT DIAM: 2 CM
ECHO LVOT PEAK GRADIENT: 2.82 MMHG
ECHO LVOT PEAK VELOCITY: 83.93 CM/S
ECHO LVOT SV: 40.2 ML
ECHO LVOT VTI: 12.78 CM
ECHO MV A VELOCITY: 34.96 CENTIMETER/SECOND
ECHO MV AREA PHT: 7.42 CM2
ECHO MV E DECELERATION TIME (DT): 102.22 MS
ECHO MV E VELOCITY: 92.97 CENTIMETER/SECOND
ECHO MV PRESSURE HALF TIME (PHT): 29.64 MS
ECHO PV MAX VELOCITY: 58.58 CM/S
ECHO PV PEAK INSTANTANEOUS GRADIENT SYSTOLIC: 1.39 MMHG
ECHO RV INTERNAL DIMENSION: 4.48 CM
ECHO RV TAPSE: 1.66 CM (ref 1.5–2)
ECHO TV REGURGITANT MAX VELOCITY: 282.04 CM/S
ECHO TV REGURGITANT PEAK GRADIENT: 31.82 MMHG
EOSINOPHIL # BLD: 0.3 K/UL (ref 0–0.4)
EOSINOPHIL NFR BLD: 6 % (ref 0–7)
ERYTHROCYTE [DISTWIDTH] IN BLOOD BY AUTOMATED COUNT: 14.9 % (ref 11.5–14.5)
ERYTHROCYTE [DISTWIDTH] IN BLOOD BY AUTOMATED COUNT: 15 % (ref 11.5–14.5)
GLOBULIN SER CALC-MCNC: 4 G/DL (ref 2–4)
GLUCOSE BLD STRIP.AUTO-MCNC: 138 MG/DL (ref 65–117)
GLUCOSE BLD STRIP.AUTO-MCNC: 140 MG/DL (ref 65–117)
GLUCOSE BLD STRIP.AUTO-MCNC: 171 MG/DL (ref 65–117)
GLUCOSE BLD STRIP.AUTO-MCNC: 201 MG/DL (ref 65–117)
GLUCOSE SERPL-MCNC: 125 MG/DL (ref 65–100)
HCT VFR BLD AUTO: 36.4 % (ref 36.6–50.3)
HCT VFR BLD AUTO: 39.9 % (ref 36.6–50.3)
HGB BLD-MCNC: 11.5 G/DL (ref 12.1–17)
HGB BLD-MCNC: 12.6 G/DL (ref 12.1–17)
IMM GRANULOCYTES # BLD AUTO: 0 K/UL (ref 0–0.04)
IMM GRANULOCYTES NFR BLD AUTO: 0 % (ref 0–0.5)
LA VOL DISK BP: 66.63 ML (ref 18–58)
LVOT MG: 1.7 MMHG
LYMPHOCYTES # BLD: 1 K/UL (ref 0.8–3.5)
LYMPHOCYTES NFR BLD: 17 % (ref 12–49)
MAGNESIUM SERPL-MCNC: 2 MG/DL (ref 1.6–2.4)
MCH RBC QN AUTO: 29.4 PG (ref 26–34)
MCH RBC QN AUTO: 29.8 PG (ref 26–34)
MCHC RBC AUTO-ENTMCNC: 31.6 G/DL (ref 30–36.5)
MCHC RBC AUTO-ENTMCNC: 31.6 G/DL (ref 30–36.5)
MCV RBC AUTO: 93.1 FL (ref 80–99)
MCV RBC AUTO: 94.3 FL (ref 80–99)
MONOCYTES # BLD: 0.6 K/UL (ref 0–1)
MONOCYTES NFR BLD: 10 % (ref 5–13)
NEUTS SEG # BLD: 3.7 K/UL (ref 1.8–8)
NEUTS SEG NFR BLD: 66 % (ref 32–75)
NRBC # BLD: 0 K/UL (ref 0–0.01)
NRBC # BLD: 0 K/UL (ref 0–0.01)
NRBC BLD-RTO: 0 PER 100 WBC
NRBC BLD-RTO: 0 PER 100 WBC
P-R INTERVAL, ECG05: 142 MS
PHOSPHATE SERPL-MCNC: 4.1 MG/DL (ref 2.6–4.7)
PLATELET # BLD AUTO: 255 K/UL (ref 150–400)
PLATELET # BLD AUTO: 294 K/UL (ref 150–400)
PMV BLD AUTO: 10 FL (ref 8.9–12.9)
PMV BLD AUTO: 9.7 FL (ref 8.9–12.9)
POTASSIUM SERPL-SCNC: 3.9 MMOL/L (ref 3.5–5.1)
PROT SERPL-MCNC: 6.6 G/DL (ref 6.4–8.2)
Q-T INTERVAL, ECG07: 344 MS
QRS DURATION, ECG06: 78 MS
QTC CALCULATION (BEZET), ECG08: 482 MS
RBC # BLD AUTO: 3.91 M/UL (ref 4.1–5.7)
RBC # BLD AUTO: 4.23 M/UL (ref 4.1–5.7)
SERVICE CMNT-IMP: ABNORMAL
SODIUM SERPL-SCNC: 138 MMOL/L (ref 136–145)
THERAPEUTIC RANGE,PTTT: NORMAL SECS (ref 58–77)
VENTRICULAR RATE, ECG03: 118 BPM
WBC # BLD AUTO: 5.7 K/UL (ref 4.1–11.1)
WBC # BLD AUTO: 6.3 K/UL (ref 4.1–11.1)

## 2021-08-09 PROCEDURE — 74011250636 HC RX REV CODE- 250/636: Performed by: STUDENT IN AN ORGANIZED HEALTH CARE EDUCATION/TRAINING PROGRAM

## 2021-08-09 PROCEDURE — 36415 COLL VENOUS BLD VENIPUNCTURE: CPT

## 2021-08-09 PROCEDURE — 93306 TTE W/DOPPLER COMPLETE: CPT

## 2021-08-09 PROCEDURE — 85025 COMPLETE CBC W/AUTO DIFF WBC: CPT

## 2021-08-09 PROCEDURE — 82962 GLUCOSE BLOOD TEST: CPT

## 2021-08-09 PROCEDURE — 93306 TTE W/DOPPLER COMPLETE: CPT | Performed by: INTERNAL MEDICINE

## 2021-08-09 PROCEDURE — 84100 ASSAY OF PHOSPHORUS: CPT

## 2021-08-09 PROCEDURE — 83735 ASSAY OF MAGNESIUM: CPT

## 2021-08-09 PROCEDURE — 74011636637 HC RX REV CODE- 636/637: Performed by: STUDENT IN AN ORGANIZED HEALTH CARE EDUCATION/TRAINING PROGRAM

## 2021-08-09 PROCEDURE — 85027 COMPLETE CBC AUTOMATED: CPT

## 2021-08-09 PROCEDURE — 80053 COMPREHEN METABOLIC PANEL: CPT

## 2021-08-09 PROCEDURE — 74011250637 HC RX REV CODE- 250/637: Performed by: STUDENT IN AN ORGANIZED HEALTH CARE EDUCATION/TRAINING PROGRAM

## 2021-08-09 PROCEDURE — 65610000006 HC RM INTENSIVE CARE

## 2021-08-09 PROCEDURE — 2709999900 HC NON-CHARGEABLE SUPPLY

## 2021-08-09 PROCEDURE — 85730 THROMBOPLASTIN TIME PARTIAL: CPT

## 2021-08-09 PROCEDURE — 99223 1ST HOSP IP/OBS HIGH 75: CPT | Performed by: FAMILY MEDICINE

## 2021-08-09 RX ORDER — FUROSEMIDE 10 MG/ML
40 INJECTION INTRAMUSCULAR; INTRAVENOUS 2 TIMES DAILY
Status: DISCONTINUED | OUTPATIENT
Start: 2021-08-09 | End: 2021-08-11

## 2021-08-09 RX ORDER — HEPARIN SODIUM 5000 [USP'U]/ML
5000 INJECTION, SOLUTION INTRAVENOUS; SUBCUTANEOUS EVERY 8 HOURS
Status: DISCONTINUED | OUTPATIENT
Start: 2021-08-10 | End: 2021-08-11 | Stop reason: HOSPADM

## 2021-08-09 RX ORDER — HEPARIN SODIUM 10000 [USP'U]/100ML
12-25 INJECTION, SOLUTION INTRAVENOUS
Status: DISCONTINUED | OUTPATIENT
Start: 2021-08-09 | End: 2021-08-09

## 2021-08-09 RX ADMIN — FUROSEMIDE 40 MG: 10 INJECTION, SOLUTION INTRAMUSCULAR; INTRAVENOUS at 17:19

## 2021-08-09 RX ADMIN — Medication 10 ML: at 05:22

## 2021-08-09 RX ADMIN — LOSARTAN POTASSIUM 25 MG: 25 TABLET, FILM COATED ORAL at 08:55

## 2021-08-09 RX ADMIN — INSULIN LISPRO 2 UNITS: 100 INJECTION, SOLUTION INTRAVENOUS; SUBCUTANEOUS at 21:52

## 2021-08-09 RX ADMIN — PANTOPRAZOLE SODIUM 40 MG: 40 TABLET, DELAYED RELEASE ORAL at 08:54

## 2021-08-09 RX ADMIN — Medication 10 ML: at 21:53

## 2021-08-09 RX ADMIN — FUROSEMIDE 40 MG: 10 INJECTION, SOLUTION INTRAMUSCULAR; INTRAVENOUS at 08:54

## 2021-08-09 RX ADMIN — CLOPIDOGREL BISULFATE 75 MG: 75 TABLET, FILM COATED ORAL at 08:55

## 2021-08-09 RX ADMIN — INSULIN LISPRO 2 UNITS: 100 INJECTION, SOLUTION INTRAVENOUS; SUBCUTANEOUS at 08:54

## 2021-08-09 RX ADMIN — Medication 10 ML: at 12:30

## 2021-08-09 RX ADMIN — ENOXAPARIN SODIUM 40 MG: 40 INJECTION SUBCUTANEOUS at 08:54

## 2021-08-09 RX ADMIN — ATORVASTATIN CALCIUM 80 MG: 20 TABLET, FILM COATED ORAL at 08:55

## 2021-08-09 NOTE — PROGRESS NOTES
Physician Progress Note      Paul Posey  CSN #:                  [id number]  :                       1970  ADMIT DATE:       2021 9:23 AM  DISCH DATE:  RESPONDING  PROVIDER #:        Niesha Mejia MD          QUERY TEXT:    Dear Family Practice Team,  Pt admitted with Acute on Chronic systolic CHF. Pt noted to have SOB with documented RR 30+ per vital signs with 3-4L via NC. If possible, please document in the progress notes and discharge summary if you are evaluating and/or treating any of the following: The medical record reflects the following:    Risk Factors: 54 Yr M admitted with Acute on Chronic Systolic CHF    Clinical Indicators: Patient arrived to the ED with c/o increased SOB. ED MD Farmer progress note mentioned rales on admission with tachycardia. Per the patient's documented vital signs patient with RR 35-41on 3-4 L of O2 via NC. On  at 0400 patient's oxygen dropped to 89% and oxygen was titrated from 2L to 4L. Patient continues to be tachycardic with +. CXR revealing Cardiomegaly and moderate bilateral pulmonary edema versus nonspecific pneumonia. No visible pleural effusion. Consider PA and lateral chest views when the patient can better tolerate. Rapid COVID-19 negative. Treatment: CBC/BMP, CXR, COVID-19 and frequent vital signs/monitoring with supplemental oxygen as needed. Thank you,  Karen Rivera RN, [facility], [address] [place] [address]  Options provided:  -- Acute respiratory failure with hypoxia  -- Acute hypoxia only  -- Other - I will add my own diagnosis  -- Disagree - Not applicable / Not valid  -- Disagree - Clinically unable to determine / Unknown  -- Refer to Clinical Documentation Reviewer    PROVIDER RESPONSE TEXT:    This patient is in acute respiratory failure with hypoxia. Query created by:  [employer] on 2021 11:15 AM      Electronically signed by:  Niesha Mejia MD 2021 12:24 PM

## 2021-08-09 NOTE — PROGRESS NOTES
Problem: Falls - Risk of  Goal: *Absence of Falls  Description: Document Argenis Hannah Fall Risk and appropriate interventions in the flowsheet.   8/9/2021 0533 by Catia Manrique RN  Outcome: Progressing Towards Goal  Note: Fall Risk Interventions:  Mobility Interventions: Patient to call before getting OOB         Medication Interventions: Patient to call before getting OOB         History of Falls Interventions: Bed/chair exit alarm, Investigate reason for fall      8/9/2021 0437 by Catia Manrique RN  Outcome: Progressing Towards Goal  Note: Fall Risk Interventions:  Mobility Interventions: Patient to call before getting OOB         Medication Interventions: Patient to call before getting OOB         History of Falls Interventions: Bed/chair exit alarm, Investigate reason for fall         Problem: Patient Education: Go to Patient Education Activity  Goal: Patient/Family Education  8/9/2021 0533 by Catia Manrique RN  Outcome: Progressing Towards Goal  8/9/2021 0437 by Catia Manrique RN  Outcome: Progressing Towards Goal

## 2021-08-09 NOTE — PROGRESS NOTES
Bedside and Verbal shift change report given to Sumeet Childs RN (oncoming nurse) by Chelsi Altamirano RN (offgoing nurse). Report included the following information SBAR, Kardex, MAR and Cardiac Rhythm Sinus Tach.     0700: Patient in bed. Complains of 5 out of 10 chest pain. On Nitro gtt at 20 mcg/ml. On 3 liters O2.   0900: Patient sleeping. 1130: O2 sats down in the mid 80-s while sleeping. Tele-Intensivist informed. O2 up to 4 liters. Patient without complaints. 1300: Patient resting quietly in bed.   1500: Patient without complaints. Encouraged patient to call for assistance before getting out of bed. Call light in reach. 1700: Patient without complaints. 1800: Patient out of bed. Walking around room. Encouraged pt to call for assistance before getting out of bed. Bedside and Verbal shift change report given to Eryn Nguyen RN (oncoming nurse) by Sumeet Childs RN (offgoing nurse). Report included the following information SBAR, Kardex, MAR and Cardiac Rhythm Sinus Tach.
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
## 2021-08-09 NOTE — PROGRESS NOTES
SPEECH THERAPY SCREENING:  SERVICES ARE NOT INDICATED AT THIS TIME    [facility] for [facility] therapy based on results obtained during the nursing admission assessment. The patients chart was reviewed and the patient is not appropriate for a skilled therapy evaluation at this time unless he is not tolerating his diet. Please consult speech therapy if any therapy needs arise. Thank you.     Sharmin Craven, SLP

## 2021-08-09 NOTE — PROGRESS NOTES
Spiritual Care Assessment/Progress Note  [address]      NAME: Zulay Painting      MRN: [id number]  AGE: 54 y.o.  SEX: male  Bahai Affiliation: No preference   Language: English     8/9/2021     Total Time (in minutes): 54     Spiritual Assessment begun in [facility] through conversation with:         [x]Patient        [] Family    [] Friend(s)        Reason for Consult: Advance medical directive consult     Spiritual beliefs: (Please include comment if needed)     [x] Identifies with a jacinta tradition:   Ivory Mann       [] Supported by a jacinta community:            [] Claims no spiritual orientation:           [] Seeking spiritual identity:                [] Adheres to an individual form of spirituality:           [] Not able to assess:                           Identified resources for coping:      [] Prayer                               [] Music                  [] Guided Imagery     [] Family/friends                 [] Pet visits     [x] Devotional reading                         [] Unknown     [] Other:                                              Interventions offered during this visit: (See comments for more details)    Patient Interventions: Advance medical directive consult, Affirmation of emotions/emotional suffering, Affirmation of jacinta, Catharsis/review of pertinent events in supportive environment, Initial/Spiritual assessment, Critical care, Prayer (assurance of), Bahai beliefs/image of God discussed           Plan of Care:     [] Support spiritual and/or cultural needs    [] Support AMD and/or advance care planning process      [] Support grieving process   [] Coordinate Rites and/or Rituals    [] Coordination with community clergy   [] No spiritual needs identified at this time   [] Detailed Plan of Care below (See Comments)  [] Make referral to Music Therapy  [] Make referral to Pet Therapy     [] Make referral to Addiction services  [] Make referral to [facility]  [] Make referral to Spiritual Care Partner  [] No future visits requested        [x] Follow up upon further referrals     Comments: The reason for this visit initially was an ICU Advance Medical Directive (AMD) consult with patient, Mr. Dina Nguyen. Mr Estuardo Garcia welcomed the visit and claimed that he does not know what an Advance Medical Directive is.  reviewed and explained the AMD, as well as how [facility] [address] [place] [address] works. Mr. Estuardo Garcia has one son and is still legally  but estranged from his wife who lives in [place] with his son Erum Cespedes. Mr. Estuardo Garcia expressed that he wants his son involved as a decision maker and called his wife in [place]. to relay this message to his son. He called while  was in the room and  spoke with his wife, collecting her contact information. The reason for contacting his wife is that he said that his son is slow and the mother Nellie Ru) needs to be contacted first. Mr. Estuardo Garcia would rather not have Kurt Owens involved but is considering listing her on the AMD. He needs time to think about this. Mr. Estuardo Garcia also called his girlfriend that he lives with now, Ms. Gerri Coronado and confirmed with her to list her as a medical decision maker. As of now, Mr. Estuardo Garcia is not sure who to list as primary or secondary.  went over sections I and II of the AMD form and left the form with him to list primary and secondary after he makes that decision and to call for the  again after that is done. Mr. Estuardo Garcia was cooperative and respectful. He said he is a believer in [address] with [place] beliefs, asked for a Bible and thanked . Advised nurse to contact [facility] for any further referrals.     Chaplain Herve Arboleda M.Div.  Danni Alicia ([phone])

## 2021-08-09 NOTE — PROGRESS NOTES
Problem: Falls - Risk of  Goal: *Absence of Falls  Description: Document Kady Valdez Fall Risk and appropriate interventions in the flowsheet.   8/9/2021 1635 by Piero Carvalho  Outcome: Progressing Towards Goal  Note: Fall Risk Interventions:  Mobility Interventions: Patient to call before getting OOB         Medication Interventions: Patient to call before getting OOB         History of Falls Interventions: Door open when patient unattended, Room close to nurse's station      8/9/2021 1632 by Piero Carvalho  Outcome: Progressing Towards Goal  Note: Fall Risk Interventions:  Mobility Interventions: Patient to call before getting OOB         Medication Interventions: Patient to call before getting OOB         History of Falls Interventions: Door open when patient unattended, Room close to nurse's station         Problem: Patient Education: Go to Patient Education Activity  Goal: Patient/Family Education  8/9/2021 1635 by Piero Carvalho  Outcome: Progressing Towards Goal  8/9/2021 1632 by Piero Carvalho  Outcome: Progressing Towards Goal

## 2021-08-09 NOTE — NURSE NAVIGATOR
Chart reviewed by Heart Failure Nurse Navigator. Heart Failure database completed. Patient admitted with acute on chronic HFrEF after running out of lasix at home. EF:  Current Echo is pending. Recent Echo 5/7/21 with EF of 35 to 40%. ACEi/ARB/ARNi: Losartan 25 mg daily. BB: Per office note, had been on coreg 12.5 mg BID. Aldosterone Antagonist: **    Obstructive Sleep Apnea Screening:   STOP-BANG score:   Referred to Sleep Medicine:     CRT **. NYHA Functional Class **. Heart Failure Teach Back in Patient Education. Heart Failure Avoiding Triggers on Discharge Instructions. Cardiologist: Dr. Valerie San discharge follow up phone call to be made within 48-72 hours of discharge.

## 2021-08-09 NOTE — PROGRESS NOTES
Readdressed code status with Dr. Axel Arita after conflicting code status documentation in the chart. Patient adamantly DNR when asked about code status preference during admission, but per case management note patient opted for Full code. Patient reports he has thought about it further and decided to become full code in order to give best chances to live to see son grow up. Answered all questions.      Rosy Tracy MD

## 2021-08-09 NOTE — PROGRESS NOTES
Bedside and Verbal shift change report given to Aquiles Tucker RN (oncoming nurse) by Michelle Mcadams RN (offgoing nurse). Report included the following information SBAR, Intake/Output, MAR, Recent Results, Cardiac Rhythm ST and Alarm Parameters . Primary Nurse Natalie Herron RN and Michelle Mcadams RN performed a dual skin assessment on this patient No impairment noted  Jesús score is See Flowsheets    2000  Assessment, see flowsheets. Pt repositions self. Pt is on 6lpm NC with humidification. Pt lungs diminished. 2200  Pt repositioned self. 0000  Reassessment, see flowsheets. Pt repositions self.    0200  Pt repositions self. Resting quietly. 0400  Reassessment, see flowsheets. Pt removes 6lpm NC and refuses to place back on. Satting well on room air. Pt repositions self.    0500  Pt labs drawn. 0600  Pt repositions self. Meds administered, see MAR. Pt downgraded to stepdown status. Bedside and Verbal shift change report given to Beatrice Wright RN (oncoming nurse) by Aquiles Tucker RN (offgoing nurse). Report included the following information SBAR, Intake/Output, MAR, Recent Results, Cardiac Rhythm NSR, ST and Alarm Parameters .

## 2021-08-09 NOTE — ACP (ADVANCE CARE PLANNING)
Advance Care Planning   Advance Care Planning Inpatient Note  [address]    Today's Date: 8/9/2021  Unit: [facility] INTENSIVE CARE    Received request from Consult. Upon review of chart and communication with care team, patient's decision making abilities are not in question. Patient was/were present in the room during visit. Goals of ACP Conversation:  Discuss Advance Care planning documents  Facilitate a discussion related to patient's goals of care as they align with the patient's values and beliefs    Health Care Decision Makers:      Click here to complete [address] including selection of the Healthcare Decision Maker Relationship (ie \"Primary\")     Today we:  Documented Next of Kin, per patient report    Advance Care Planning Documents (Patient Wishes) on file:  None     Assessment:    ICU Advance Medical Directive (AMD) consult with patient, Mr. Deja Rodríguez. Mr Radha Singh welcomed the visit and claimed that he does not know what an Advance Medical Directive is.  reviewed and explained the AMD, as well as how [place] [address] works. Mr. Radha Singh has one son and is still legally  but estranged from his wife who lives in [place] with his son Shayan Garcia. Mr. Radha Singh expressed that he wants his son involved as a decision maker and called his wife in [place]. to relay this message to his son. He called while  was in the room and  spoke with his wife, collecting her contact information. The reason for contacting his wife is that he said that his son is slow and the mother Jennifer Call needs to be contacted first. Mr. Radha Singh would rather not have Tea Guzman involved but is considering listing her on the AMD. He needs time to think about this. Mr. Radha Singh also called his girlfriend that he lives with now, Ms. Gallego Sam and confirmed with her to list her as a medical decision maker.  As of now, Mr. Radha Singh is not sure who to list as primary or secondary.  went over sections I and II of the AMD form and left the form with him to list primary and secondary after he makes that decision and to call for the  again after that is done. Mr. Royal Tuttle was cooperative and respectful. He said he is a believer in [address] with [place] beliefs, and thanked .          Interventions:  Provided education on documents for clarity and greater understanding  Discussed and provided education on state decision maker hierarchy  Encouraged ongoing ACP conversation with future decision makers and loved ones  Reviewed but did not complete ACP document     Outcomes/Plan:  Teach Back Method used to verify the patient/Healthcare Decision Maker's understanding of key information in the advance directive documents     Electronically signed by Jennett Landau, Chaplain on 8/9/2021 at 1:35 PM

## 2021-08-09 NOTE — PROGRESS NOTES
[address] [facility] [address] [address]   Office [phone]  Fax [phone]          Assessment and Plan     She Nunez is a 54 y.o. male with a PMHx of prior MI x3, CAD, CHF, T2DM, MDD, BPD, chronic back pain who is admitted for Acute on Chronic Heart Failure. 24 Hour Events: NAEO. Acute on Chronic Heart Failure  Acute Hypoxic Respiratory Failure  Patient with known history of HFrEF (EF 35-40% on echo 5/7/2021; EF 20% per Left Heart Cath 5/6/2021) presents with SOB, MURRAY, PND for 1 week after running out of home Lasix. POA proBNP 4261, Trop 0.21, CXR showed cardiomegaly and bilateral pulmonary edema. Rapid COVID test neg, Procal neg. S/p Nitro gtt. Using 6L O2 NC today. Echo 8/9 showed EF 15%. - Admitted to stepdown  - Monitor vitals per unit protocol, strict I&O  - Daily weights  - O2: as needed  - Lasix 40 IV BID  - Daily CBC, CMP, Mag  - Cardiology consulted, appreciate recs     - cath today     Diabetes Mellitus T2: Last A1c 11.5 on 5/6/21.   - Holding home liraglutide  - Insulin Sliding Scale normal sensitivity with AC&HS glucose checks. - Hypoglycemia protocols ordered. Elevated Troponin: Resolved. POA Troponin 0.21. Likely 2/2 CHF exacerbation. Trended down to 0.19 8/9.  - discontinue trending     Hypertension: Stable  - BP on admission 113/83.  - Continue home cozaar 25mg daily  - Hold home carvedilol 12.5mg daily  - Will continue to monitor at this time and readjust as BP's trend.     CAD (s/p stent 05/2021)  - Continue home Plavix 75mg daily     Asthma:   - Continue albuterol inhaler prn  - Follow up with pulmonology outpatient for pulmonary function testing for likely COPD     Hyperlipidemia: The 10-year ASCVD risk score (David Barrientos., et al., 2013) is: 18.8%. - Continue home lipitor 80mg daily     Insomnia:  - Continue home Atarax 25mg qhs prn     Bipolar Disorder/MDD: Stable. - No home meds     Hx of CVA: Patient reports history of stroke in 2021.  No records found.  - On lipitor     Obesity BMI Body mass index is 27.29 kg/m². - Encouraging lifestyle modifications and further follow up outpatient.     FEN/GI - Cardiac diet, no concentrated sweets   Activity - Ambulate as tolerated  DVT prophylaxis - Lovenox  GI prophylaxis - Protonix  Fall prophylaxis - Not indicated at this time. Disposition - Admit to Stepdown. Plan to d/c to Home. Code Status - FULL. Discussed with Patient  Next of Kin Name and [address] [facility],  [address] - [phone]      I appreciate the opportunity to participate in the care of this patient,  Alexa Herrera MD  Family Medicine Resident         Subjective / Objective     Subjective  Feels better this AM. He says he continues to have shortness of breath, but no other complaints. Respiratory: O2 Flow Rate (L/min): 4 l/min O2 Device: None (Room air)   Visit Vitals  /72   Pulse 100   Temp 97.9 °F (36.6 °C)   Resp 22   Ht 5' 5\" (1.651 m)   Wt 165 lb (74.8 kg)   SpO2 98%   BMI 27.46 kg/m²     Physical Examination:   General appearance - alert, well appearing, and in no distress  Chest - Mild crackles at lung bases, improved from yesterday, no wheezes, rales or rhonchi, symmetric air entry  Heart - normal rate, regular rhythm, normal S1, S2, no murmurs, rubs, clicks or gallops,   Abdomen - soft, nontender, nondistended, no masses or organomegaly  Neurological - alert, oriented, normal speech, no focal findings  Extremities - peripheral pulses normal, no pedal edema, no clubbing or cyanosis    I/O:  Date 08/08/21 0700 - 08/09/21 0659 08/09/21 0700 - 08/10/21 0659   Shift 0360-9979 0562-8902 24 Hour Total 5539-2654 4317-7394 24 Hour Total   INTAKE   P.O.  440 440        P. O.  440 440      I. V.(mL/kg/hr)  82.4(0.1) 82.4(0)        Nitroglycerin Volume (ml)  82.4 82.4      Shift Total(mL/kg)  522. 4(7) 522. 4(7)      OUTPUT   Urine(mL/kg/hr)    1125  1125     Urine Voided    1125  1125     Urine Occurrence(s)  1 x 1 x      Shift Total(mL/kg)    1125(15)  1125(15) NET  522.4 522.4 -1125  -1125   Weight (kg) 74.4 74.4 74.4 74.8 74.8 74.8      Date 08/08/21 0700 - 08/09/21 0659 08/09/21 0700 - 08/10/21 0659   Shift 4489-7695 1363-2282 24 Hour Total 4214-6877 0056-9503 24 Hour Total   INTAKE   P.O.  440 440        P. O.  440 440      I. V.(mL/kg/hr)  82.4(0.1) 82.4(0)        Nitroglycerin Volume (ml)  82.4 82.4      Shift Total(mL/kg)  522. 4(7) 522. 4(7)      OUTPUT   Urine(mL/kg/hr)    1125  1125     Urine Voided    1125  1125     Urine Occurrence(s)  1 x 1 x      Shift Total(mL/kg)    1125(15)  1125(15)   NET  522.4 522.4 -1125  -1125   Weight (kg) 74.4 74.4 74.4 74.8 74.8 74.8         Inpatient Medications  Current Facility-Administered Medications   Medication Dose Route Frequency    furosemide (LASIX) injection 40 mg  40 mg IntraVENous BID    sodium chloride (NS) flush 5-40 mL  5-40 mL IntraVENous Q8H    sodium chloride (NS) flush 5-40 mL  5-40 mL IntraVENous PRN    polyethylene glycol (MIRALAX) packet 17 g  17 g Oral DAILY PRN    ondansetron (ZOFRAN ODT) tablet 4 mg  4 mg Oral Q8H PRN    Or    ondansetron (ZOFRAN) injection 4 mg  4 mg IntraVENous Q6H PRN    insulin lispro (HUMALOG) injection   SubCUTAneous AC&HS    glucose chewable tablet 16 g  4 Tablet Oral PRN    dextrose (D50W) injection syrg 12.5-25 g  12.5-25 g IntraVENous PRN    glucagon (GLUCAGEN) injection 1 mg  1 mg IntraMUSCular PRN    atorvastatin (LIPITOR) tablet 80 mg  80 mg Oral DAILY    clopidogreL (PLAVIX) tablet 75 mg  75 mg Oral DAILY    hydrOXYzine HCL (ATARAX) tablet 25 mg  25 mg Oral QHS PRN    pantoprazole (PROTONIX) tablet 40 mg  40 mg Oral ACB    albuterol (PROVENTIL VENTOLIN) nebulizer solution 2.5 mg  2.5 mg Nebulization Q4H PRN    losartan (COZAAR) tablet 25 mg  25 mg Oral DAILY       Allergies  Allergies   Allergen Reactions    Acetaminophen Unknown (comments), Anaphylaxis and Shortness of Breath     Other reaction(s): anaphylaxis/angioedema  Other reaction(s):  Other (see comments)  Other reaction(s): anaphylaxis/angioedema  Throat swelling    Aspirin Hives, Anaphylaxis and Shortness of Breath     Other reaction(s): anaphylaxis/angioedema  Other reaction(s): anaphylaxis/angioedema    Unable To Obtain Unable to Obtain     Patient states his allergic to greens    Aspirin Shortness of Breath    Spinach Leaf Unknown (comments)     Green leaves    Tylenol [Acetaminophen] Shortness of Breath       CBC:  Recent Labs     08/09/21  1210 08/09/21  0132 08/08/21  1108   WBC 5.7 6.3 9.0   HGB 12.6 11.5* 12.1   HCT 39.9 36.4* 37.7    255 621       Metabolic Panel:  Recent Labs     08/09/21  0132 08/08/21  1108    139   K 3.9 4.6    108   CO2 28 26   BUN 20 13   CREA 1.61* 1.24   * 78   CA 8.1* 8.4*   MG 2.0  --    PHOS 4.1  --    ALB 2.6* 3.0*   ALT 21 22            For Billing    Chief Complaint   Patient presents with    Shortness of Breath    Chest Pain    Abdominal Pain       Hospital Problems  Date Reviewed: 4/21/2021        Codes Class Noted POA    Acute heart failure ([facility] [address] ICD-10-CM: I50.9  ICD-9-CM: 428.9  8/8/2021 Unknown

## 2021-08-09 NOTE — PROGRESS NOTES
Case Management follow-up:    RUR 20%-(moderate)    Problems:    Heart failure   CAD     met with pt to complete AMD.However,pt is still sorting through these issues before finalizing the paperwork. Pt resides with his ex-girlfriend . Pt has medicaid transportation to take him to & from his appointments. Pt is open to  [facility] for SN,PT,OT,and SW.  Prior to discharge,pt will need home health resumption orders. I notified [facility] that pt is in [facility].     Faustino Ramirez

## 2021-08-09 NOTE — PROGRESS NOTES
Problem: Falls - Risk of  Goal: *Absence of Falls  Description: Document Luc Ibrahim Fall Risk and appropriate interventions in the flowsheet.   Outcome: Progressing Towards Goal  Note: Fall Risk Interventions:  Mobility Interventions: Patient to call before getting OOB         Medication Interventions: Patient to call before getting OOB         History of Falls Interventions: Bed/chair exit alarm, Investigate reason for fall         Problem: Patient Education: Go to Patient Education Activity  Goal: Patient/Family Education  Outcome: Progressing Towards Goal

## 2021-08-09 NOTE — VIRTUAL HEALTH
My recent office note:    Neida Keita 1970   Office/Outpatient Visit  Visit Date: Mon, May 24, 2021 3:30 pm  Provider: Daron Griffin MD (Assistant: Isidoro Escobedo LPN )  Location: [facility] AT [facility] ([facility])[address] [place]. [address] [phone]    Electronically signed by Isabelle Tyler MD on  05/24/2021 03:44:06 PM                           Subjective:    CC: Mr. Sarahi Díaz is a 54year old [de-identified] or  male. His primary care physician is Chris Aparicio MD.  This is a new to our office/seen in hospital follow-up visit. He is here today following a transition of care from the inpatient hospital setting. He was recently in the hospital: for cardiac catheterization on 5/6/2021 at [place]. Medication list reviewed. He has a history of cardiomyopathy ( with ischemia ), coronary artery disease of the native coronary artery, and hypercholesterolemia. HPI:       Other specified postprocedural states noted. Coronary Artery Disease:   He is here today hospital follow-up. The course of the disease has been stable. He denies angina, chest pain, lower extremity edema, exercise limitation, orthopnea, paroxysmal nocturnal dyspnea and shortness of breath. Current level of activity includes ability to walk. A cardiac cath has been done ( performed 5/6/2021 ). Hypercholesterolemia: Current treatment includes diet. Regarding cardiomyopathy:   He is here today hospital follow up. A transthoracic ECHO has been done ( performed 5/7/2021 ). cath done 5/6/2021   ROS:   Discussed relevant lab and imaging studies along with the plan of treatment with the nurse. EYES:  Negative for blurred vision and eye pain. E/N/T:  Negative for epistaxis and hoarseness. CARDIOVASCULAR:  Negative for chest pain, orthopnea, palpitations and pedal edema. RESPIRATORY:  Negative for cough and hemoptysis.     GASTROINTESTINAL:  Negative for abdominal pain and dysphagia. MUSCULOSKELETAL:  Negative for arthralgias and back pain. NEUROLOGICAL:  Negative for headaches, paresthesias, and weakness. HEMATOLOGIC/LYMPHATIC:  Negative for easy bruising, bleeding, and lymphadenopathy. PSYCHIATRIC:  No symptoms reported. Past Medical History / Family History / Social History:     Last Reviewed on 2021 03:33 PM by Mimi Das LPN  Past Medical History:       Past Cardiac Procedures/Tests:  TESTING ( Dr Mylene Lima)     1. ISCHEMIC CARDIOMYOPATHY  a.  Echo (19):  EF 30-35%, R.  Mildly dil LA. Mild MR/TR/ND. PASP 37.  b.  Cath (21): LAD patent mid stent, ap40. OM2 95, OM4 95 (left dom). RCA p40, m70 (non-dom). EF 20% with severe GHK. No AVG, MR 1+.  c.  PCI (21): BMS OM2: 2.25x15 Integrity. BMS OM4: 2.25x26 Integrity. d.  Echo (21):  EF 35-40%, D.    2.  DIABETES MELLITUS    3. DYSLIPIDEMIA      Family History:   Father: ;  myocardial infarction;  type 2 diabetes   Mother: ;  myocardial infarction;  type 2 diabetes     Social History:   Social History:   Marital Status: Single     Tobacco/Alcohol/Supplements:   Last Reviewed on 2021 03:33 PM by Mimi Das LPN  TOBACCO/ALCOHOL/SUPPLEMENTS   Tobacco: Current Smoker: He currently smokes every day, 1-5 cigarettes per day. Smoking cessation intervention counseling was conducted. Alcohol: Drinks alcohol on a social basis only. Caffeine:  He admits to consuming caffeine via soda. Substance Abuse History:   Last Reviewed on 2021 03:33 PM by Mimi Das LPN  Substance Use/Abuse:   None     Mental Health History:   Last Reviewed on 2021 03:33 PM by Luz Rodas LPN, [facility] (eg STDs):    Last Reviewed on 2021 03:33 PM by Mimi Das LPN    Current Problems:   Last Reviewed on 2021 03:33 PM by Mimi Das LPN  Pure hypercholesterolemia  Pure hypercholesterolemia  Atherosclerotic heart disease of native coronary artery  Atherosclerotic heart disease of native coronary artery without angina pectoris  Ischemic cardiomyopathy  Cardiomyopathy  Other specified postprocedural states    Allergies:   Last Reviewed on 5/24/2021 03:33 PM by Mimi Cobian LPN  aspirin:    Tylenol:      Current Medications:   Last Reviewed on 5/24/2021 03:33 PM by Mimi Cobian LPN  Lantus Solostar U-100 Insulin   traZODone 50 mg oral tablet [1  po q hs prn]  carvediloL 12.5 mg oral tablet [take 1 tablet (12.5 mg) by oral route 2 times per day with food]  atorvastatin 80 mg oral tablet [take 1 tablet (80 mg) by oral route once daily]  furosemide 40 mg oral tablet [take 1 tablet (40 mg) by oral route once daily]  clopidogreL 75 mg oral tablet [take 1 tablet (75 mg) by oral route once daily]  losartan 25 mg oral tablet [take 1 tablet (25 mg) by oral route once daily]  pantoprazole 40 mg oral tablet, delayed release (enteric coated) [take 1 tablet (40 mg) by oral route once daily]  Victoza 3-Enrico 0.6 mg/0.1 mL (18 mg/3 mL) subcutaneous Pen Injector [inject 1.2 mg by subcutaneous route once daily]  Proventil HFA 90 mcg/actuation Inhalation HFA Aerosol Inhaler [inhale 1 - 2 puffs (90 - 180 mcg) by inhalation route every 4 hours as needed]    Objective:    Vitals:     Current: 5/24/2021 3:33:06 PM  Ht:  5 ft, 5 in; Wt: 172 lbs;  BMI: 28. 6BP: 110/89 mm Hg (right arm, sitting);  P: 103 bpm (sitting)    Exams:   GENERAL:  Alert, oriented to person, place and time x3. EYES:  Normal lids without xanthelasma; conjunctiva unremarkable; no scleral icterus. HEENT:  Face symmetric, voice clear, extraocular muscles intact. NECK:  Supple. No bruits, No JVD. LUNGS:  Clear to auscultation. No rales, no wheezing. CARDIAC: Regular rate and rhythm. S1 and S2 normal. No murmur , gallops or rubs. ABDOMEN:  Soft. Positive bowel sounds. Non-tender. MUSCULOSKELETAL:  Normal range of motion, strength and tone. EXTREMITIES:  No edema.    Good muscle tone and strength. SKIN: No significant rashes or lesions; no suspicious moles. NEUROLOGICAL:  No focal deficits, cranial nerves II-XII are grossly intact. PSYCHIATRIC:  Appropriate affect and demeanor; normal speech pattern; grossly normal memory. Assessment:   Doing well from a cardiac standpoint. Cont outpt meds. Discussed diet, exercise, and smoking cessation (3 cigs/d). F/U 6 months.

## 2021-08-09 NOTE — PROGRESS NOTES
0030 Bedside and Verbal shift change report given to Cece Vasquez (oncoming nurse) by Shaneka Rdz (offgoing nurse). Report included the following information SBAR, Kardex, ED Summary, Intake/Output, MAR, Accordion, Recent Results and Cardiac Rhythm Sinus Tach.                   0700 Bedside and Verbal shift change report given to Aishwarya Vizcaino (oncoming nurse) by Lissett Bhatt (offgoing nurse). Report included the following information SBAR, Kardex, Intake/Output, MAR, Accordion, Recent Results and Cardiac Rhythm NSR.

## 2021-08-09 NOTE — CONSULTS
[facility] ICU TEAM Progress Note    Name: Lizzie Canales   : 1970   MRN: [id number]   Date: 2021           ICU Assessment     1. Cardiomyopathy  2. Chest pain  3. Coronary artery disease  4. MEERA  5. Acute on chronic systolic heart failure            ICU Comprehensive Plan of Care:    NEURO: Intact  CV: Keep MAP >65; cardiology on board, continue Plavix and statin echo with significant drop in EF. May need consideration for LifeVest.  No evidence of arrhythmias. Continue Cozaar. May need to consider Imdur. PULM: Continue supplemental oxygen as required. If patient continues to have hypoxia out of proportion will need to consider CT PE protocol. Continue home CPAP settings at night  GI: Oral diet  RENAL/: Monitor Cr and UOP; increase diuresis to twice daily dosing. Depending on renal function and hemodynamics, low threshold to initiate dobutamine or milrinone to faciitate diuresis  ENDO: BG Goal 140-180; glycemic control  HEME/ONC: Tx Hgb < 7, Plt<10k; transfuse as needed  MICRO:  FU on Cx; cont broad abx coverage  Code Status: DNR  LOS: 1     DVT: Heparin SQ       Discussed Care Plan with Bedside RN       Subjective:   Progress Note: 2021      Reason for ICU Admission: Chest pain    HPI:  [id number] male with systolic heart failure EF of 40% who presents with after having diuresis over the last month. Patient now is short of breath and complaining of chest pain. Patient had symptomatic chest pain 7/10 upon arrival to the hospital and is placed on on a nitro drip. Patient currently has 0 out of 10 chest pain. Patient continues to feel short of breath with dyspnea on exertion.  denies HA fevers abdominal pain      Active  pain Problem List:     Problem List  Date Reviewed: 2021        Codes Class    Acute heart failure (HCC) ICD-10-CM: I50.9  ICD-9-CM: 428.9         ACS (acute coronary syndrome) (HCC) ICD-10-CM: I24.9  ICD-9-CM: 411.1         CAD (coronary artery disease) ICD-10-CM: I25.10  ICD-9-CM: 414.00     Overview Signed 5/6/2021  4:46 PM by Ana Rivera DO     MI x2 with 2 cardiac stent             Chest pain ICD-10-CM: R07.9  ICD-9-CM: 786.50         Major depression ICD-10-CM: F32.9  ICD-9-CM: 296.20         Suicide ([facility] ICD-10-CM: U65. 8XXA  ICD-9-CM: E958.9         Chronic systolic congestive heart failure (HCC) ICD-10-CM: I50.22  ICD-9-CM: 428.22, 428.0         History of CVA (cerebrovascular accident) ICD-10-CM: Z86.73  ICD-9-CM: V12.54         Type 2 diabetes with nephropathy (HCC) ICD-10-CM: E11.21  ICD-9-CM: 250.40, 583.81         Adjustment disorder with depressed mood ICD-10-CM: F43.21  ICD-9-CM: 309.0         Bipolar 1 disorder (HCC) ICD-10-CM: F31.9  ICD-9-CM: 296.7         Borderline personality disorder (HCC) ICD-10-CM: F60.3  ICD-9-CM: 301.83         Suicidal behavior ICD-10-CM: R45.89  ICD-9-CM: V62.89         Emotional lability ICD-10-CM: R45.86  ICD-9-CM: 799.24         Diabetes mellitus ([facility] ICD-10-CM: E11.9  ICD-9-CM: 250.00         HTN (hypertension) ICD-10-CM: I10  ICD-9-CM: 401.9         Unspecified asthma, with exacerbation ICD-10-CM: J45.901  ICD-9-CM: 493.92     Overview Signed 3/5/2012  9:53 PM by Baldo COLLAZO no exacerbation. Past Medical History:      has a past medical history of Anxiety disorder, Asthma, Bipolar 1 disorder ([facility] CAD (coronary artery disease), CHF (congestive heart failure) ([facility] Chronic systolic congestive heart failure ([facility] (12/2/2020), Depression, Diabetes mellitus, type 2 ([address] Hypertension, Mood disorder ([facility] Psychotic disorder ([facility] Schizoaffective disorder, bipolar type ([facility] Sleep disorder, and Suicidal thoughts.  He also has no past medical history of Arrhythmia, Arthritis, Autoimmune disease ([facility] Cancer ([facility] Chronic kidney disease, Chronic pain, COPD, Dementia, Dissociative disorder, Eating disorder, GERD (gastroesophageal reflux disease), Homicide attempt, Liver disease, Other ill-defined conditions(799.89), PUD (peptic ulcer disease), Seizures ([facility] Stroke ([facility] Substance abuse ([facility] Thromboembolus ([facility] Thyroid disease, or Withdrawal syndrome ([facility] Past Surgical History:      has a past surgical history that includes hx coronary stent placement and hx implantable cardioverter defibrillator. Home Medications:     Prior to Admission medications    Medication Sig Start Date End Date Taking? Authorizing Provider   losartan (COZAAR) 25 mg tablet Take 0.5 Tabs by mouth daily. 5/10/21  Yes Edin Whitfield,    atorvastatin (LIPITOR) 80 mg tablet Take 1 Tab by mouth daily. 4/8/21  Yes Yesenia Link MD   pantoprazole (PROTONIX) 40 mg tablet Take 1 Tab by mouth Daily (before breakfast). 4/8/21  Yes Yesenia Link MD   albuterol (PROVENTIL HFA, VENTOLIN HFA, PROAIR HFA) 90 mcg/actuation inhaler Take 2 Puffs by inhalation every four (4) hours as needed for Wheezing or Shortness of Breath. 4/8/21  Yes Yesenia Link MD   furosemide (LASIX) 40 mg tablet Take 1 Tablet by mouth daily. 8/5/21   Yesenia Link MD   hydrOXYzine HCL (ATARAX) 25 mg tablet Take two to three tablets by mouth at bedtime for sleep. 6/29/21   Yesenia Link MD   traZODone (DESYREL) 50 mg tablet Take 50 mg by mouth nightly as needed for Sleep. Provider, Historical   liraglutide (VICTOZA) 0.6 mg/0.1 mL (18 mg/3 mL) pnij 1.8 mg by SubCUTAneous route daily. 4/21/21   Yesenia Link MD   clopidogreL (PLAVIX) 75 mg tab Take 1 Tab by mouth daily. 4/8/21   Yesenia Link MD       Allergies/Social/Family History: Allergies   Allergen Reactions    Acetaminophen Unknown (comments), Anaphylaxis and Shortness of Breath     Other reaction(s): anaphylaxis/angioedema  Other reaction(s):  Other (see comments)  Other reaction(s): anaphylaxis/angioedema  Throat swelling    Aspirin Hives, Anaphylaxis and Shortness of Breath     Other reaction(s): anaphylaxis/angioedema  Other reaction(s): anaphylaxis/angioedema    Unable To Obtain Unable to Obtain     Patient states his allergic to greens    Aspirin Shortness of Breath    Spinach Leaf Unknown (comments)     Green leaves    Tylenol [Acetaminophen] Shortness of Breath      Social History     Tobacco Use    Smoking status: Current Every Day Smoker     Packs/day: 1.00     Types: Cigarettes    Smokeless tobacco: Never Used   Substance Use Topics    Alcohol use: Not Currently     Comment: occasionally      Family History   Problem Relation Age of Onset    Heart Attack Mother     Hypertension Mother     Diabetes Mother     Heart Attack Father     Hypertension Father     Diabetes Father     Depression Neg Hx     Suicide Neg Hx     Psychotic Disorder Neg Hx     Dementia Neg Hx     Substance Abuse Neg Hx        Review of Systems:      10 pt ros is neg except as nhoted in hp    Objective:   Vital Signs:  Visit Vitals  /86   Pulse (!) 102   Temp 97.9 °F (36.6 °C)   Resp 17   Ht 5' 5\" (1.651 m)   Wt 74.8 kg (165 lb)   SpO2 98%   BMI 27.46 kg/m²    O2 Flow Rate (L/min): 4 l/min O2 Device: None (Room air) Temp (24hrs), Av °F (36.7 °C), Min:97.9 °F (36.6 °C), Max:98.1 °F (36.7 °C)           Intake/Output:     Intake/Output Summary (Last 24 hours) at 2021 1556  Last data filed at 2021 1300  Gross per 24 hour   Intake 882.35 ml   Output 1425 ml   Net -542.65 ml         Physical Exam:    Gen: interactive, speaking in full sentences, NAD  HEENT: PERRL, NC, AT  Neck: no JVD, midline trach  CV: normal sinus rhythm  Pulm: symmetric chest rise bilaterally  Abd: soft, NT ND  Ext: + edema, no cyanosis, clubbing  MSK: no deformities  Neuro: interactive, following commands, spont CHANCE      LABS AND  DATA: Personally reviewed  Recent Labs     21  1210 21  0132   WBC 5.7 6.3   HGB 12.6 11.5*   HCT 39.9 36.4*    255     Recent Labs     21  0132 21  1108    139   K 3.9 4.6    108   CO2 28 26   BUN 20 13   CREA 1.61* 1.24   * 78   CA 8.1* 8.4*   MG 2.0  --    PHOS 4.1  --      Recent Labs     08/09/21  0132 08/08/21  1108   AP 77 81   TP 6.6 6.6   ALB 2.6* 3.0*   GLOB 4.0 3.6     Recent Labs     08/09/21  1204   APTT 27.7      No results for input(s): PHI, PCO2I, PO2I, FIO2I in the last 72 hours. Recent Labs     08/08/21  1919 08/08/21  1108   TROIQ 0.19* 0.21*       Hemodynamics:   PAP:   CO:     Wedge:   CI:     CVP:    SVR:       PVR:       Ventilator Settings:  Mode Rate Tidal Volume Pressure FiO2 PEEP                    Peak airway pressure:      Minute ventilation:          MEDS: Reviewed       I performed all aspects of the physical examination via Telemedicine associated with two way audio and video communication and with the on-site assistance of Nurse. I am located in [place] and the patient is located in [place] [address]. Patient is critically ill in the ICU. I  personally  reviewed the pertinent medical records, laboratory/ pathology data and radiographic images. The decision making regarding this patient is as documented above, which was generated  following  discussion  with the multidisciplinary team and creation of a treatment plan for  the patient. We discussed the patient's interval history and future coordination of care and  plans. The patient's medications  were reviewed and changes made as stipulated above. Due to  critical illness impairing one or more vital organs of this patient resulting in life threatening clinical situation  I have provided direct, frequent personal  assessment and manipulation in management plan and spent 35  minutes  of  critical care time excluding the time spent on procedures and teaching.   Greater than 50% of this time  in patient's care was  employed  in counseling and coordination of care and engaged in face to face discussion of case management issues, addressing questions, and outlining a plan of  therapy.     Julee Humphrey MD  [address]  8/9/2021

## 2021-08-10 LAB
ALBUMIN SERPL-MCNC: 2.9 G/DL (ref 3.5–5)
ALBUMIN/GLOB SERPL: 0.7 {RATIO} (ref 1.1–2.2)
ALP SERPL-CCNC: 81 U/L (ref 45–117)
ALT SERPL-CCNC: 29 U/L (ref 12–78)
ANION GAP SERPL CALC-SCNC: 5 MMOL/L (ref 5–15)
AST SERPL-CCNC: 9 U/L (ref 15–37)
BILIRUB SERPL-MCNC: 0.5 MG/DL (ref 0.2–1)
BUN SERPL-MCNC: 23 MG/DL (ref 6–20)
BUN/CREAT SERPL: 19 (ref 12–20)
CALCIUM SERPL-MCNC: 8.5 MG/DL (ref 8.5–10.1)
CHLORIDE SERPL-SCNC: 107 MMOL/L (ref 97–108)
CO2 SERPL-SCNC: 28 MMOL/L (ref 21–32)
CREAT SERPL-MCNC: 1.2 MG/DL (ref 0.7–1.3)
ERYTHROCYTE [DISTWIDTH] IN BLOOD BY AUTOMATED COUNT: 14.8 % (ref 11.5–14.5)
GLOBULIN SER CALC-MCNC: 4.4 G/DL (ref 2–4)
GLUCOSE BLD STRIP.AUTO-MCNC: 104 MG/DL (ref 65–117)
GLUCOSE BLD STRIP.AUTO-MCNC: 133 MG/DL (ref 65–117)
GLUCOSE BLD STRIP.AUTO-MCNC: 169 MG/DL (ref 65–117)
GLUCOSE BLD STRIP.AUTO-MCNC: 225 MG/DL (ref 65–117)
GLUCOSE BLD STRIP.AUTO-MCNC: 269 MG/DL (ref 65–117)
GLUCOSE SERPL-MCNC: 176 MG/DL (ref 65–100)
HCT VFR BLD AUTO: 39.9 % (ref 36.6–50.3)
HGB BLD-MCNC: 12.7 G/DL (ref 12.1–17)
MAGNESIUM SERPL-MCNC: 2.2 MG/DL (ref 1.6–2.4)
MCH RBC QN AUTO: 29.3 PG (ref 26–34)
MCHC RBC AUTO-ENTMCNC: 31.8 G/DL (ref 30–36.5)
MCV RBC AUTO: 92.1 FL (ref 80–99)
NRBC # BLD: 0 K/UL (ref 0–0.01)
NRBC BLD-RTO: 0 PER 100 WBC
PLATELET # BLD AUTO: 293 K/UL (ref 150–400)
PMV BLD AUTO: 9.8 FL (ref 8.9–12.9)
POTASSIUM SERPL-SCNC: 3.9 MMOL/L (ref 3.5–5.1)
PROT SERPL-MCNC: 7.3 G/DL (ref 6.4–8.2)
RBC # BLD AUTO: 4.33 M/UL (ref 4.1–5.7)
SERVICE CMNT-IMP: ABNORMAL
SERVICE CMNT-IMP: NORMAL
SODIUM SERPL-SCNC: 140 MMOL/L (ref 136–145)
WBC # BLD AUTO: 5.5 K/UL (ref 4.1–11.1)

## 2021-08-10 PROCEDURE — 74011000250 HC RX REV CODE- 250: Performed by: SPECIALIST

## 2021-08-10 PROCEDURE — 74011250637 HC RX REV CODE- 250/637: Performed by: STUDENT IN AN ORGANIZED HEALTH CARE EDUCATION/TRAINING PROGRAM

## 2021-08-10 PROCEDURE — 2709999900 HC NON-CHARGEABLE SUPPLY: Performed by: SPECIALIST

## 2021-08-10 PROCEDURE — 77030004532 HC CATH ANGI DX IMP BSC -A: Performed by: SPECIALIST

## 2021-08-10 PROCEDURE — 74011000636 HC RX REV CODE- 636: Performed by: SPECIALIST

## 2021-08-10 PROCEDURE — 74011636637 HC RX REV CODE- 636/637: Performed by: STUDENT IN AN ORGANIZED HEALTH CARE EDUCATION/TRAINING PROGRAM

## 2021-08-10 PROCEDURE — 4A023N7 MEASUREMENT OF CARDIAC SAMPLING AND PRESSURE, LEFT HEART, PERCUTANEOUS APPROACH: ICD-10-PCS | Performed by: SPECIALIST

## 2021-08-10 PROCEDURE — 77030029065 HC DRSG HEMO QCLOT ZMED -B

## 2021-08-10 PROCEDURE — 65660000000 HC RM CCU STEPDOWN

## 2021-08-10 PROCEDURE — 83735 ASSAY OF MAGNESIUM: CPT

## 2021-08-10 PROCEDURE — C1894 INTRO/SHEATH, NON-LASER: HCPCS | Performed by: SPECIALIST

## 2021-08-10 PROCEDURE — 85027 COMPLETE CBC AUTOMATED: CPT

## 2021-08-10 PROCEDURE — 80053 COMPREHEN METABOLIC PANEL: CPT

## 2021-08-10 PROCEDURE — 82962 GLUCOSE BLOOD TEST: CPT

## 2021-08-10 PROCEDURE — 99152 MOD SED SAME PHYS/QHP 5/>YRS: CPT | Performed by: SPECIALIST

## 2021-08-10 PROCEDURE — B2111ZZ FLUOROSCOPY OF MULTIPLE CORONARY ARTERIES USING LOW OSMOLAR CONTRAST: ICD-10-PCS | Performed by: SPECIALIST

## 2021-08-10 PROCEDURE — 74011250636 HC RX REV CODE- 250/636: Performed by: STUDENT IN AN ORGANIZED HEALTH CARE EDUCATION/TRAINING PROGRAM

## 2021-08-10 PROCEDURE — 93458 L HRT ARTERY/VENTRICLE ANGIO: CPT | Performed by: SPECIALIST

## 2021-08-10 PROCEDURE — 77010033678 HC OXYGEN DAILY

## 2021-08-10 PROCEDURE — 36415 COLL VENOUS BLD VENIPUNCTURE: CPT

## 2021-08-10 PROCEDURE — 99232 SBSQ HOSP IP/OBS MODERATE 35: CPT | Performed by: FAMILY MEDICINE

## 2021-08-10 PROCEDURE — 74011250636 HC RX REV CODE- 250/636: Performed by: INTERNAL MEDICINE

## 2021-08-10 PROCEDURE — 74011250636 HC RX REV CODE- 250/636: Performed by: SPECIALIST

## 2021-08-10 RX ORDER — SODIUM CHLORIDE 0.9 % (FLUSH) 0.9 %
5-40 SYRINGE (ML) INJECTION AS NEEDED
Status: DISCONTINUED | OUTPATIENT
Start: 2021-08-10 | End: 2021-08-10 | Stop reason: HOSPADM

## 2021-08-10 RX ORDER — DIPHENHYDRAMINE HYDROCHLORIDE 50 MG/ML
25 INJECTION, SOLUTION INTRAMUSCULAR; INTRAVENOUS
Status: DISCONTINUED | OUTPATIENT
Start: 2021-08-10 | End: 2021-08-11 | Stop reason: HOSPADM

## 2021-08-10 RX ORDER — SODIUM CHLORIDE 9 MG/ML
75 INJECTION, SOLUTION INTRAVENOUS CONTINUOUS
Status: DISCONTINUED | OUTPATIENT
Start: 2021-08-10 | End: 2021-08-10 | Stop reason: HOSPADM

## 2021-08-10 RX ORDER — HYDROCORTISONE SODIUM SUCCINATE 100 MG/2ML
100 INJECTION, POWDER, FOR SOLUTION INTRAMUSCULAR; INTRAVENOUS
Status: DISCONTINUED | OUTPATIENT
Start: 2021-08-10 | End: 2021-08-11 | Stop reason: HOSPADM

## 2021-08-10 RX ORDER — FENTANYL CITRATE 50 UG/ML
INJECTION, SOLUTION INTRAMUSCULAR; INTRAVENOUS AS NEEDED
Status: DISCONTINUED | OUTPATIENT
Start: 2021-08-10 | End: 2021-08-10 | Stop reason: HOSPADM

## 2021-08-10 RX ORDER — MIDAZOLAM HYDROCHLORIDE 1 MG/ML
INJECTION, SOLUTION INTRAMUSCULAR; INTRAVENOUS AS NEEDED
Status: DISCONTINUED | OUTPATIENT
Start: 2021-08-10 | End: 2021-08-10 | Stop reason: HOSPADM

## 2021-08-10 RX ORDER — HEPARIN SODIUM 200 [USP'U]/100ML
INJECTION, SOLUTION INTRAVENOUS
Status: COMPLETED | OUTPATIENT
Start: 2021-08-10 | End: 2021-08-10

## 2021-08-10 RX ORDER — LIDOCAINE HYDROCHLORIDE 10 MG/ML
INJECTION INFILTRATION; PERINEURAL AS NEEDED
Status: DISCONTINUED | OUTPATIENT
Start: 2021-08-10 | End: 2021-08-10 | Stop reason: HOSPADM

## 2021-08-10 RX ORDER — SODIUM CHLORIDE 9 MG/ML
INJECTION, SOLUTION INTRAVENOUS
Status: COMPLETED | OUTPATIENT
Start: 2021-08-10 | End: 2021-08-10

## 2021-08-10 RX ORDER — SODIUM CHLORIDE 0.9 % (FLUSH) 0.9 %
5-40 SYRINGE (ML) INJECTION EVERY 8 HOURS
Status: DISCONTINUED | OUTPATIENT
Start: 2021-08-10 | End: 2021-08-11 | Stop reason: HOSPADM

## 2021-08-10 RX ORDER — SODIUM CHLORIDE 0.9 % (FLUSH) 0.9 %
5-40 SYRINGE (ML) INJECTION AS NEEDED
Status: DISCONTINUED | OUTPATIENT
Start: 2021-08-10 | End: 2021-08-11 | Stop reason: HOSPADM

## 2021-08-10 RX ORDER — SODIUM CHLORIDE 0.9 % (FLUSH) 0.9 %
5-40 SYRINGE (ML) INJECTION EVERY 8 HOURS
Status: DISCONTINUED | OUTPATIENT
Start: 2021-08-10 | End: 2021-08-10 | Stop reason: HOSPADM

## 2021-08-10 RX ORDER — SODIUM CHLORIDE 9 MG/ML
75 INJECTION, SOLUTION INTRAVENOUS CONTINUOUS
Status: DISPENSED | OUTPATIENT
Start: 2021-08-10 | End: 2021-08-10

## 2021-08-10 RX ADMIN — Medication 10 ML: at 21:15

## 2021-08-10 RX ADMIN — Medication 10 ML: at 06:49

## 2021-08-10 RX ADMIN — FUROSEMIDE 40 MG: 10 INJECTION, SOLUTION INTRAMUSCULAR; INTRAVENOUS at 08:46

## 2021-08-10 RX ADMIN — Medication 10 ML: at 21:16

## 2021-08-10 RX ADMIN — INSULIN LISPRO 3 UNITS: 100 INJECTION, SOLUTION INTRAVENOUS; SUBCUTANEOUS at 07:35

## 2021-08-10 RX ADMIN — SODIUM CHLORIDE 75 ML/HR: 900 INJECTION, SOLUTION INTRAVENOUS at 13:55

## 2021-08-10 RX ADMIN — HEPARIN SODIUM 5000 UNITS: 5000 INJECTION INTRAVENOUS; SUBCUTANEOUS at 21:15

## 2021-08-10 RX ADMIN — HEPARIN SODIUM 5000 UNITS: 5000 INJECTION INTRAVENOUS; SUBCUTANEOUS at 06:48

## 2021-08-10 RX ADMIN — FUROSEMIDE 40 MG: 10 INJECTION, SOLUTION INTRAMUSCULAR; INTRAVENOUS at 17:16

## 2021-08-10 RX ADMIN — INSULIN LISPRO 5 UNITS: 100 INJECTION, SOLUTION INTRAVENOUS; SUBCUTANEOUS at 17:16

## 2021-08-10 RX ADMIN — Medication 10 ML: at 06:48

## 2021-08-10 RX ADMIN — PANTOPRAZOLE SODIUM 40 MG: 40 TABLET, DELAYED RELEASE ORAL at 06:48

## 2021-08-10 NOTE — PROGRESS NOTES
[address] [facility] [address] [place] [address]   Office [phone]  Fax [phone]          Assessment and Plan     Jacoby Pierce is a 54 y.o. male with a PMHx of prior MI x3, CAD, CHF, T2DM, MDD, BPD, chronic back pain who is admitted for Acute on Chronic Heart Failure. 24 Hour Events: NAEO. Acute Hypoxic Respiratory Failure 2/2 Acute on Chronic Heart Failure  Patient with known history of HFrEF (EF 35-40% on echo 5/7/2021; EF 20% per Left Heart Cath 5/6/2021) presents with SOB, MURRAY, PND for 1 week after running out of home Lasix. POA proBNP 4261, Trop 0.21, CXR showed cardiomegaly and bilateral pulmonary edema. Rapid COVID test neg, Procal neg. S/p Nitro gtt. Using room air today. Echo 8/9 showed EF 15%. Cath 8/10 showed: patent stent, LV dysfunction out of proportion to CAD. - Monitor vitals per unit protocol, strict I&O  - Daily weights  - O2: as needed  - Lasix 40 IV BID  - Daily CBC, CMP, Mag  - Cardiology consulted, appreciate recs  - Needs Cardiac rehab after discharge     Diabetes Mellitus T2: Last A1c 11.5 on 5/6/21.   - Holding home liraglutide  - Insulin Sliding Scale normal sensitivity with AC&HS glucose checks. - Hypoglycemia protocols ordered. - Follow up outpatient    Elevated Troponin: Resolved. POA Troponin 0.21. Likely 2/2 CHF exacerbation. Trended down to 0.19 8/9.  - discontinue trending     Hypertension: Stable  - BP on admission 113/83.  - Continue home cozaar 25mg daily  - Hold home carvedilol 12.5mg daily  - Will continue to monitor at this time and readjust as BP's trend.     CAD (s/p stent 05/2021)  - Continue home Plavix 75mg daily     Asthma:   - Continue albuterol inhaler prn  - Follow up with pulmonology outpatient for pulmonary function testing for likely COPD     Hyperlipidemia: The 10-year ASCVD risk score (Jaren Waters, et al., 2013) is: 18.2%. - Continue home lipitor 80mg daily     Insomnia:  - Continue home Atarax 25mg qhs prn     Bipolar Disorder/MDD: Stable.   - No home meds     Hx of CVA: Patient reports history of stroke in 2021. No records found.  - On lipitor     Obesity BMI Body mass index is 27.29 kg/m². - Encouraging lifestyle modifications and further follow up outpatient.     FEN/GI - Cardiac diet, no concentrated sweets   Activity - Ambulate as tolerated  DVT prophylaxis - Lovenox  GI prophylaxis - Protonix  Fall prophylaxis - Not indicated at this time. Disposition - Admit to Stepdown. Plan to d/c to Home. Code Status - FULL. Discussed with Patient  Next of Kin Name and [facility],  [address] [place] [address] Drive - [phone]      I appreciate the opportunity to participate in the care of this patient,  Chance Hernandez MD  Family Medicine Resident         Subjective / Objective     Subjective  Feeling well this AM. No complaints. Respiratory: O2 Flow Rate (L/min): 2 l/min O2 Device: None (Room air)   Visit Vitals  /62 (BP 1 Location: Left upper arm, BP Patient Position: At rest)   Pulse 96   Temp 97.5 °F (36.4 °C)   Resp 19   Ht 5' 5\" (1.651 m)   Wt 165 lb (74.8 kg)   SpO2 94%   BMI 27.46 kg/m²     Physical Examination:   General appearance - alert, well appearing, and in no distress  Chest - Mild crackles at lung bases, continuing to improve, no wheezes, rales or rhonchi, symmetric air entry  Heart - normal rate, regular rhythm, normal S1, S2, no murmurs, rubs, clicks or gallops,   Abdomen - soft, nontender, nondistended, no masses or organomegaly  Neurological - alert, oriented, normal speech, no focal findings  Extremities - peripheral pulses normal, no pedal edema, no clubbing or cyanosis    I/O:  Date 08/09/21 1900 - 08/10/21 0659 08/10/21 0700 - 08/11/21 0659   Shift 7694-4477 24 Hour Total 4242-7983 1042-5066 24 Hour Total   INTAKE   P.O.  480 120  120     P. O.  480 120  120   Shift Total(mL/kg)  480(6.4) 120(1.6)  120(1.6)   OUTPUT   Urine(mL/kg/hr) 100 1525 200(0.2)  200     Urine Voided 100 1525 200  200     Urine Occurrence(s) 0 x 0 x      Stool Stool Occurrence(s) 0 x 0 x      Shift Total(mL/kg) 100(1.3) 1525(20.4) 200(2.7)  200(2.7)   NET -100 -1045 -80  -80   Weight (kg) 74.8 74.8 74.8 74.8 74.8      Date 08/09/21 1900 - 08/10/21 0659 08/10/21 0700 - 08/11/21 0659   Shift 1967-2939 24 Hour Total 1466-2338 3074-4566 24 Hour Total   INTAKE   P.O.  480 120  120     P. O.  480 120  120   Shift Total(mL/kg)  480(6.4) 120(1.6)  120(1.6)   OUTPUT   Urine(mL/kg/hr) 100 1525 200(0.2)  200     Urine Voided 100 1525 200  200     Urine Occurrence(s) 0 x 0 x      Stool          Stool Occurrence(s) 0 x 0 x      Shift Total(mL/kg) 100(1.3) 1525(20.4) 200(2.7)  200(2.7)   NET -100 -1045 -80  -80   Weight (kg) 74.8 74.8 74.8 74.8 74.8         Inpatient Medications  Current Facility-Administered Medications   Medication Dose Route Frequency    sodium chloride (NS) flush 5-40 mL  5-40 mL IntraVENous Q8H    sodium chloride (NS) flush 5-40 mL  5-40 mL IntraVENous PRN    diphenhydrAMINE (BENADRYL) injection 25 mg  25 mg IntraVENous ONCE PRN    hydrocortisone Sod Succ (PF) (SOLU-CORTEF) injection 100 mg  100 mg IntraVENous ONCE PRN    0.9% sodium chloride infusion  75 mL/hr IntraVENous CONTINUOUS    furosemide (LASIX) injection 40 mg  40 mg IntraVENous BID    heparin (porcine) injection 5,000 Units  5,000 Units SubCUTAneous Q8H    sodium chloride (NS) flush 5-40 mL  5-40 mL IntraVENous Q8H    sodium chloride (NS) flush 5-40 mL  5-40 mL IntraVENous PRN    polyethylene glycol (MIRALAX) packet 17 g  17 g Oral DAILY PRN    ondansetron (ZOFRAN ODT) tablet 4 mg  4 mg Oral Q8H PRN    Or    ondansetron (ZOFRAN) injection 4 mg  4 mg IntraVENous Q6H PRN    insulin lispro (HUMALOG) injection   SubCUTAneous AC&HS    glucose chewable tablet 16 g  4 Tablet Oral PRN    dextrose (D50W) injection syrg 12.5-25 g  12.5-25 g IntraVENous PRN    glucagon (GLUCAGEN) injection 1 mg  1 mg IntraMUSCular PRN    atorvastatin (LIPITOR) tablet 80 mg  80 mg Oral DAILY    clopidogreL (PLAVIX) tablet 75 mg  75 mg Oral DAILY    hydrOXYzine HCL (ATARAX) tablet 25 mg  25 mg Oral QHS PRN    pantoprazole (PROTONIX) tablet 40 mg  40 mg Oral ACB    albuterol (PROVENTIL VENTOLIN) nebulizer solution 2.5 mg  2.5 mg Nebulization Q4H PRN    losartan (COZAAR) tablet 25 mg  25 mg Oral DAILY       Allergies  Allergies   Allergen Reactions    Acetaminophen Unknown (comments), Anaphylaxis and Shortness of Breath     Other reaction(s): anaphylaxis/angioedema  Other reaction(s):  Other (see comments)  Other reaction(s): anaphylaxis/angioedema  Throat swelling    Aspirin Hives, Anaphylaxis and Shortness of Breath     Other reaction(s): anaphylaxis/angioedema  Other reaction(s): anaphylaxis/angioedema    Unable To Obtain Unable to Obtain     Patient states his allergic to greens    Aspirin Shortness of Breath    Spinach Leaf Unknown (comments)     Green leaves    Tylenol [Acetaminophen] Shortness of Breath       CBC:  Recent Labs     08/10/21  0530 08/09/21  1210 08/09/21  0132   WBC 5.5 5.7 6.3   HGB 12.7 12.6 11.5*   HCT 39.9 39.9 36.4*    294 452       Metabolic Panel:  Recent Labs     08/10/21  0530 08/09/21  0132 08/08/21  1108    138 139   K 3.9 3.9 4.6    106 108   CO2 28 28 26   BUN 23* 20 13   CREA 1.20 1.61* 1.24   * 125* 78   CA 8.5 8.1* 8.4*   MG 2.2 2.0  --    PHOS  --  4.1  --    ALB 2.9* 2.6* 3.0*   ALT 29 21 22            For Billing    Chief Complaint   Patient presents with    Shortness of Breath    Chest Pain    Abdominal Pain       Hospital Problems  Date Reviewed: 4/21/2021        Codes Class Noted POA    Acute heart failure ([facility] ICD-10-CM: I50.9  ICD-9-CM: 428.9  8/8/2021 Unknown

## 2021-08-10 NOTE — CARDIO/PULMONARY
[facility] Cardiopulmonary Rehab: 55 yo male admitted with SOB (8/8). Per Dr Purvi Kumar, dx includes: HFrEF, likely due to running out of Lasix. S/P cardiac cath; no PCI indicated (8/10). LVEF 15%, by echo (8/9/21),which is a significant decline from LVEF 35-40% by echo (5/7/21). Pt is familiar to Cardiac Rehab RN from recent admission, s/p PTCA with BMS to OM (5/6/21). Pt declined outpatient cardiac rehab at that time, as he lives in [facility] and does not drive. Per Dr Purvi Kumar, \"LV dysfunction out of proportion to CAD. \" Plan continued medical mgmt of HFrEF. Patient is eligible for cardiac rehab program, if he chooses to participate.

## 2021-08-10 NOTE — PROGRESS NOTES
Case Management follow-up:    RUR 18%    Problems:     Acute on chronic heart failure  · Echo on 8/9/21\":LV: Calculated LVEF is 15%. Visually measured ejection fraction. Normal cavity size and wall thickness. Severely reduced systolic function. Severe hypokinesis of the basal anterior, basal anterolateral, basal anteroseptal, basal inferior, basal inferoseptal, basal inferolateral, mid anterior, mid anterolateral, mid anteroseptal, mid inferior, mid inferoseptal and mid inferolateral wall(s). · RV: Moderately reduced systolic function. Pericardium: Trivial-to-small pericardial effusion adjacent to right atrium. \"  CAD (stent placed 5/21)    Past Medical history:    HTN,CVA,bipolar disorder,DMT2,asthma    Plan:  Per discussion with the  resident yesterday,pt is a full code. Cardiac catherization today @ 1300pm.Please see Dr Tri Summers note for details. Orders obtained and faxed through [facility] to [facility] for service resumption. [facility] has accepted pt for home health services when discharged. Patient lives with his ex girlfriend in a single story home with 2 steps to enter. When discharged,pt will need either family/friends to transport him home or medicaid transport. Plan discussed with [address] [facility] resident. Conrado Ormond    TRANSFER - OUT REPORT:    Verbal report given to Ebonie Leyva on Adalid Lopez  being transferred to [id number] for routine progression of care   Report consisted of patients Situation, Background, Assessment and   Recommendations(SBAR).       Conrado Ormond

## 2021-08-10 NOTE — PROGRESS NOTES
0700- Bedside and Verbal shift change report given to Yue (oncoming nurse) by Calton Opitz (offgoing nurse). Report included the following information SBAR, ED Summary, Intake/Output, MAR, Recent Results and Cardiac Rhythm sinsu rhythm. 0730- TRANSFER - OUT REPORT:    Verbal report given to Zoe(name) on [facility]  being transferred to 5th floor (unit) for routine progression of care       Report consisted of patients Situation, Background, Assessment and   Recommendations(SBAR). Information from the following report(s) SBAR, Kardex, Accordion, Med Rec Status and Cardiac Rhythm sinus rhythm  was reviewed with the receiving nurse. Lines:   Peripheral IV 08/08/21 Right Antecubital (Active)   Site Assessment Clean, dry, & intact 08/10/21 0400   Phlebitis Assessment 0 08/10/21 0400   Infiltration Assessment 0 08/10/21 0400   Dressing Status Clean, dry, & intact 08/10/21 0400   Dressing Type Transparent;Tape 08/10/21 0400   Hub Color/Line Status Pink;Capped 08/10/21 0400   Action Taken Open ports on tubing capped 08/10/21 0400   Alcohol Cap Used Yes 08/10/21 0400       Peripheral IV 08/08/21 Distal;Left Basilic (Active)   Site Assessment Clean, dry, & intact 08/10/21 0400   Phlebitis Assessment 0 08/10/21 0400   Infiltration Assessment 0 08/10/21 0400   Dressing Status Clean, dry, & intact 08/10/21 0400   Dressing Type Transparent;Tape 08/10/21 0400   Hub Color/Line Status Pink;Capped 08/10/21 0400   Action Taken Open ports on tubing capped 08/10/21 0400   Alcohol Cap Used Yes 08/10/21 0400        Opportunity for questions and clarification was provided.       Patient transported with:

## 2021-08-10 NOTE — PROCEDURES
Cath:  Obstructive 2VD:     LAD patent mid stent, ap40; D1 inf br 90     OM2 30 (ISR), OM4 40 (ISR) (left dom)     RCA p70, m90 (non-dom)  Severe LV systolic dysfunction     EF 15% with severe GHK. No AVG, MR 2+. LV dysfunction out of proportion to CAD.   Continued med mgmt of HFrEF

## 2021-08-10 NOTE — PROGRESS NOTES
Bedside and Verbal shift change report given to Esteban Templeton RN (oncoming nurse) by Puja Carroll RN (offgoing nurse). Report included the following information SBAR, Kardex, Intake/Output, MAR and Recent Results.

## 2021-08-10 NOTE — PROGRESS NOTES
Problem: Falls - Risk of  Goal: *Absence of Falls  Description: Document B[place] Fall Risk and appropriate interventions in the flowsheet.   Outcome: Progressing Towards Goal  Note: Fall Risk Interventions:  Mobility Interventions: Bed/chair exit alarm, Communicate number of staff needed for ambulation/transfer, Patient to call before getting OOB, PT Consult for mobility concerns, PT Consult for assist device competence, Strengthening exercises (ROM-active/passive), Utilize walker, cane, or other assistive device         Medication Interventions: Bed/chair exit alarm, Evaluate medications/consider consulting pharmacy, Patient to call before getting OOB, Teach patient to arise slowly         History of Falls Interventions: Bed/chair exit alarm, Consult care management for discharge planning, Door open when patient unattended, Investigate reason for fall, Room close to nurse's station, Evaluate medications/consider consulting pharmacy         Problem: Patient Education: Go to Patient Education Activity  Goal: Patient/Family Education  Outcome: Progressing Towards Goal     Problem: Heart Failure: Day 1  Goal: Activity/Safety  Outcome: Progressing Towards Goal  Goal: Nutrition/Diet  Outcome: Progressing Towards Goal

## 2021-08-10 NOTE — PROGRESS NOTES
Roxanne Cuenca MD, [address] [facility] [address], [place] [address]  [phone]      IMPRESSION and RECOMMENDATIONS     1.  HFrEF:  Likely due to running out of lasix. Agree with bid iv lasix for now until reaches euvolemia. Today is probably the last day. Cont cozaar. Not with worsening LV dysfunction. Will pursue cath today. May have restenosis of stent(s) placed in May as they were somewhat small vessels (small stent diameter) and bare-metal stents given I was worried about compliance issues. If new lesion or restenosis, will consider JONNA (Karl) today.     2. CAD:  Minimally abnormal troponin. Cath today @ 1pm.  The risks (including but not limited to death, myocardial infarction, cerebrovascular accident, dysrhythmia, renal failure, vascular complication, allergy, and/or need for emergency surgery), benefits, and alternatives have been explained. Verbal informed consent has been obtained. Cont plavix, statin. ASA allergy.     I have discussed this plan with the patient. He appears to understand this plan and wishes to proceed ahead. Subjective:       No complaints.         Objective:     Patient Vitals for the past 16 hrs:   BP Temp Pulse Resp SpO2   08/10/21 0745   95     08/10/21 0700 (!) 119/90  95 17 100 %   08/10/21 0600   (!) 101 29 (!) 89 %   08/10/21 0500 (!) 122/93  98 19 90 %   08/10/21 0400 107/76 98.1 °F (36.7 °C) 93 21 92 %   08/10/21 0358   93     08/10/21 0300 115/84  89 26 (!) 89 %   08/10/21 0200 109/75  93 28 93 %   08/10/21 0100 (!) 103/58  (!) 101 14    08/10/21 0000 103/64 97.9 °F (36.6 °C) 96 21 94 %   08/09/21 2358   95     08/09/21 2302   90     08/09/21 2300 104/71  90 24 90 %   08/09/21 2200 121/80  98 24 98 %   08/09/21 2100 102/70  96 27 95 %   08/09/21 2000 93/78 97.9 °F (36.6 °C) 97 16 99 %   08/09/21 1900 (!) 120/100  (!) 102 20    08/09/21 1800 (!) 122/97  99 22    08/09/21 1700 125/85  (!) 105 24 98 %   08/09/21 1600 106/79 97.6 °F (36.4 °C) (!) 104 24 100 %       HEENT Exam:     WNL         Lung Exam:     The patient is not dyspneic. Breath sounds are heard equally in all lung fields. There are no wheezes, rhonchi, or rubs heard on auscultation. Minimal bibasilar rales (improved). Heart Exam:     The rhythm is regular. The PMI is in the 5th intercostal space of the MCL. Apical impulse is normal. S1 is regular. S2 is physiologic. There is no S3, S4 gallop, murmur, click, or rub. Abdomen Exam:     Benign. Extremities Exam:     No cyanosis, clubbing, edema. Distal pulses intact. Lab Results   Component Value Date/Time    Glucose 176 (H) 08/10/2021 05:30 AM    Sodium 140 08/10/2021 05:30 AM    Potassium 3.9 08/10/2021 05:30 AM    Chloride 107 08/10/2021 05:30 AM    CO2 28 08/10/2021 05:30 AM    BUN 23 (H) 08/10/2021 05:30 AM    Creatinine 1.20 08/10/2021 05:30 AM    Calcium 8.5 08/10/2021 05:30 AM     Recent Labs     08/10/21  0530 08/09/21  1210   WBC 5.5 5.7   HGB 12.7 12.6   HCT 39.9 39.9    294     Recent Labs     08/10/21  0530 08/09/21  0132   ALT 29 21   AP 81 77   TBILI 0.5 0.8   TP 7.3 6.6   ALB 2.9* 2.6*   GLOB 4.4* 4.0     Recent Labs     08/09/21  1204   APTT 27.7      No results for input(s): CPK, CKMB, TNIPOC in the last 72 hours.     No lab exists for component: Shanae Nguyen  Recent Labs     08/08/21  1919   TROIQ 0.19*     Lab Results   Component Value Date/Time    Cholesterol, total 217 (H) 03/09/2021 01:31 PM    HDL Cholesterol 40 03/09/2021 01:31 PM    LDL, calculated 101.2 (H) 03/09/2021 01:31 PM    Triglyceride 379 (H) 03/09/2021 01:31 PM    CHOL/HDL Ratio 5.4 (H) 03/09/2021 01:31 PM

## 2021-08-10 NOTE — NURSE NAVIGATOR
Met with patient briefly in room. Introduced self and role of HF NN. Assessed his current understanding of HF. Patient states he ran out of lasix and had called his provider for refill but started having symptoms. Patient states he was out of lasix though for about 1 month. Reviewed importance of medications for HF with patient who notes he is aware of this now. [facility], he has scale, and he does weigh daily. Reviewed HF zones and action of early recognition of symptoms and notification of provider. Patient was somewhat distracted because he had just received his meals after earlier catheterization. Patient given Living with HF Education book, HF calendar, and HF zone handout. Will follow up for additional HF education tomorrow.

## 2021-08-10 NOTE — PROGRESS NOTES
TRANSFER - IN REPORT:    Verbal report received from [facility] RN(name) on Miguel Ángel Leahy  being received from cath lab(unit) for routine progression of care      Report consisted of patients Situation, Background, Assessment and   Recommendations(SBAR). Information from the following report(s) Procedure Summary was reviewed with the receiving nurse. Opportunity for questions and clarification was provided. Assessment completed upon patients arrival to unit and care assumed. 1348: Patient received from cath lab. Patient with sheath to right groin site. Site clean, dry and intact. No hematoma. Pulses palpable. VS stable. Patient with no complaints at this time. HOB flat. 1404: Blood aspirated from sheath then right groin sheath pulled 6 Fr right Groin. Ling Tristan applied. Manual pressure held by Mihir BARKER. 1414: Hemostasis achieved at 1414. Dressing applied. Pt voices understanding of post procedure bedrest instructions. 1525: Patient transported upstairs. Site clean, dry and intact. No hematoma. Pulses palpable. VS stable. Patient with no complaints at this time. HOB flat. 1530: TRANSFER - OUT REPORT:    Verbal report given to Yue RN(name) on Miguel Ángel Leahy  being transferred to 5th floor(unit) for routine progression of care       Report consisted of patients Situation, Background, Assessment and   Recommendations(SBAR). Information from the following report(s) SBAR, Procedure Summary, MAR and Cardiac Rhythm NSR was reviewed with the receiving nurse.     Lines:   Peripheral IV 08/08/21 Distal;Left Basilic (Active)   Site Assessment Clean, dry, & intact 08/10/21 0900   Phlebitis Assessment 0 08/10/21 0900   Infiltration Assessment 0 08/10/21 0900   Dressing Status Clean, dry, & intact 08/10/21 0900   Dressing Type Tape;Transparent 08/10/21 0900   Hub Color/Line Status Pink;Capped 08/10/21 0900   Action Taken Open ports on tubing capped 08/10/21 0900   Alcohol Cap Used Yes 08/10/21 0900        Opportunity for questions and clarification was provided. Patient transported with:   Monitor  Registered Nurse     1530: HOB elevated 30 degrees.

## 2021-08-11 ENCOUNTER — TELEPHONE (OUTPATIENT)
Dept: FAMILY MEDICINE CLINIC | Age: 51
End: 2021-08-11

## 2021-08-11 VITALS
RESPIRATION RATE: 17 BRPM | WEIGHT: 164.24 LBS | HEIGHT: 65 IN | BODY MASS INDEX: 27.36 KG/M2 | DIASTOLIC BLOOD PRESSURE: 82 MMHG | TEMPERATURE: 98.1 F | SYSTOLIC BLOOD PRESSURE: 109 MMHG | HEART RATE: 95 BPM | OXYGEN SATURATION: 99 %

## 2021-08-11 LAB
ALBUMIN SERPL-MCNC: 3 G/DL (ref 3.5–5)
ALBUMIN/GLOB SERPL: 0.7 {RATIO} (ref 1.1–2.2)
ALP SERPL-CCNC: 78 U/L (ref 45–117)
ALT SERPL-CCNC: 26 U/L (ref 12–78)
ANION GAP SERPL CALC-SCNC: 3 MMOL/L (ref 5–15)
AST SERPL-CCNC: 13 U/L (ref 15–37)
BILIRUB SERPL-MCNC: 0.6 MG/DL (ref 0.2–1)
BUN SERPL-MCNC: 23 MG/DL (ref 6–20)
BUN/CREAT SERPL: 18 (ref 12–20)
CALCIUM SERPL-MCNC: 8.4 MG/DL (ref 8.5–10.1)
CHLORIDE SERPL-SCNC: 107 MMOL/L (ref 97–108)
CO2 SERPL-SCNC: 29 MMOL/L (ref 21–32)
CREAT SERPL-MCNC: 1.31 MG/DL (ref 0.7–1.3)
ERYTHROCYTE [DISTWIDTH] IN BLOOD BY AUTOMATED COUNT: 14.7 % (ref 11.5–14.5)
GLOBULIN SER CALC-MCNC: 4.4 G/DL (ref 2–4)
GLUCOSE BLD STRIP.AUTO-MCNC: 200 MG/DL (ref 65–117)
GLUCOSE BLD STRIP.AUTO-MCNC: 245 MG/DL (ref 65–117)
GLUCOSE SERPL-MCNC: 178 MG/DL (ref 65–100)
HCT VFR BLD AUTO: 40.5 % (ref 36.6–50.3)
HGB BLD-MCNC: 13 G/DL (ref 12.1–17)
MAGNESIUM SERPL-MCNC: 2.2 MG/DL (ref 1.6–2.4)
MCH RBC QN AUTO: 29.5 PG (ref 26–34)
MCHC RBC AUTO-ENTMCNC: 32.1 G/DL (ref 30–36.5)
MCV RBC AUTO: 91.8 FL (ref 80–99)
NRBC # BLD: 0 K/UL (ref 0–0.01)
NRBC BLD-RTO: 0 PER 100 WBC
PLATELET # BLD AUTO: 326 K/UL (ref 150–400)
PMV BLD AUTO: 10.1 FL (ref 8.9–12.9)
POTASSIUM SERPL-SCNC: 4.1 MMOL/L (ref 3.5–5.1)
PROT SERPL-MCNC: 7.4 G/DL (ref 6.4–8.2)
RBC # BLD AUTO: 4.41 M/UL (ref 4.1–5.7)
SERVICE CMNT-IMP: ABNORMAL
SERVICE CMNT-IMP: ABNORMAL
SODIUM SERPL-SCNC: 139 MMOL/L (ref 136–145)
WBC # BLD AUTO: 6.9 K/UL (ref 4.1–11.1)

## 2021-08-11 PROCEDURE — 82962 GLUCOSE BLOOD TEST: CPT

## 2021-08-11 PROCEDURE — 74011250637 HC RX REV CODE- 250/637: Performed by: STUDENT IN AN ORGANIZED HEALTH CARE EDUCATION/TRAINING PROGRAM

## 2021-08-11 PROCEDURE — 80053 COMPREHEN METABOLIC PANEL: CPT

## 2021-08-11 PROCEDURE — 74011250636 HC RX REV CODE- 250/636: Performed by: INTERNAL MEDICINE

## 2021-08-11 PROCEDURE — 74011250636 HC RX REV CODE- 250/636: Performed by: STUDENT IN AN ORGANIZED HEALTH CARE EDUCATION/TRAINING PROGRAM

## 2021-08-11 PROCEDURE — 94760 N-INVAS EAR/PLS OXIMETRY 1: CPT

## 2021-08-11 PROCEDURE — 83735 ASSAY OF MAGNESIUM: CPT

## 2021-08-11 PROCEDURE — 99238 HOSP IP/OBS DSCHRG MGMT 30/<: CPT | Performed by: FAMILY MEDICINE

## 2021-08-11 PROCEDURE — 74011636637 HC RX REV CODE- 636/637: Performed by: STUDENT IN AN ORGANIZED HEALTH CARE EDUCATION/TRAINING PROGRAM

## 2021-08-11 PROCEDURE — 85027 COMPLETE CBC AUTOMATED: CPT

## 2021-08-11 PROCEDURE — 36415 COLL VENOUS BLD VENIPUNCTURE: CPT

## 2021-08-11 RX ORDER — FUROSEMIDE 40 MG/1
40 TABLET ORAL DAILY
Status: DISCONTINUED | OUTPATIENT
Start: 2021-08-12 | End: 2021-08-11 | Stop reason: HOSPADM

## 2021-08-11 RX ORDER — FUROSEMIDE 40 MG/1
40 TABLET ORAL DAILY
Qty: 30 TABLET | Refills: 0 | Status: SHIPPED | OUTPATIENT
Start: 2021-08-11 | End: 2021-08-16 | Stop reason: SDUPTHER

## 2021-08-11 RX ADMIN — CLOPIDOGREL BISULFATE 75 MG: 75 TABLET, FILM COATED ORAL at 08:02

## 2021-08-11 RX ADMIN — Medication 10 ML: at 05:21

## 2021-08-11 RX ADMIN — FUROSEMIDE 40 MG: 10 INJECTION, SOLUTION INTRAMUSCULAR; INTRAVENOUS at 08:03

## 2021-08-11 RX ADMIN — LOSARTAN POTASSIUM 25 MG: 25 TABLET, FILM COATED ORAL at 08:02

## 2021-08-11 RX ADMIN — INSULIN LISPRO 3 UNITS: 100 INJECTION, SOLUTION INTRAVENOUS; SUBCUTANEOUS at 08:02

## 2021-08-11 RX ADMIN — HEPARIN SODIUM 5000 UNITS: 5000 INJECTION INTRAVENOUS; SUBCUTANEOUS at 05:21

## 2021-08-11 RX ADMIN — PANTOPRAZOLE SODIUM 40 MG: 40 TABLET, DELAYED RELEASE ORAL at 05:22

## 2021-08-11 RX ADMIN — INSULIN LISPRO 3 UNITS: 100 INJECTION, SOLUTION INTRAVENOUS; SUBCUTANEOUS at 12:35

## 2021-08-11 RX ADMIN — ATORVASTATIN CALCIUM 80 MG: 20 TABLET, FILM COATED ORAL at 08:02

## 2021-08-11 NOTE — PROGRESS NOTES
Problem: Falls - Risk of  Goal: *Absence of Falls  Description: Document Ada Freeman Fall Risk and appropriate interventions in the flowsheet.   Outcome: Progressing Towards Goal  Note: Fall Risk Interventions:  Mobility Interventions: Bed/chair exit alarm, Communicate number of staff needed for ambulation/transfer, Strengthening exercises (ROM-active/passive), Utilize walker, cane, or other assistive device, Utilize gait belt for transfers/ambulation         Medication Interventions: Bed/chair exit alarm, Patient to call before getting OOB, Teach patient to arise slowly         History of Falls Interventions: Bed/chair exit alarm

## 2021-08-11 NOTE — PROGRESS NOTES
8/11/2021   CARE MANAGEMENT NOTE:  Pt transferred from ICU to the 5th floor. EMR reviewed and handoff received from previous  (Brandi). Pt was admitted with acute heart failure. Reportedly, pt resides with his ex-girlfriend. RUR 23%    Transition Plan of Care:  1. Cardiology following for medical management - pt is s/p cardiac cath on 8/10.    2.  Pt will return home with [facility] (PT, OT, SW) for resumption of care  3. Outpt f/u  4. Pt will arrange his own transportation home    CM will continue to follow pt until discharged.   Flaco

## 2021-08-11 NOTE — NURSE NAVIGATOR
Met with patient prior to discharge. He said his [facility] was supposed to have mailed him his lasix. Patient relies on Medicaid transportation. [facility] that his lasix was mailed out on 8/7 by [employer] and \"should\" be in his mailbox when he gets home. Patient unable to confirm whether this has arrived. Discussed with his nurse, Coleman High, who contacted Dr. Norris Chance. Partial prescription sent by him to [facility] for patient to  on the way home today to ensure he has lasix until mail order arrives. Reviewed follow up appointments with patient. Assisted patient on his speaker phone to set up transport home today with stop at [facility]. Also assisted patient to set up Medicaid transportation to hospital  follow up appointments on Monday Aug 16th to PCP and Thursday, Aug 25th to Dr. Burke Canales. Booking numbers for Medicaid transportation and  times added to [facility] form along with instructions on picking up lasix at [facility] on way home today. [employer] form reviewed with patient. Reviewed HF symptoms, daily weight parameters, and early recognition of symptoms and notification of provider. Patient again confirms he has working scale at home. Patient is out of area for [facility] [employer]. 1315:  Returned to patient room and he had already left. He left the [employer] form on the bedside table. He had all information contained on this form in his discharge instructions and on the booking numbers and transport information was on his cell phone.

## 2021-08-11 NOTE — TELEPHONE ENCOUNTER
----- Message from Bala Rios sent at 8/11/2021 10:19 AM EDT -----  Regarding: /telephone  Contact: [phone]  General Message/Vendor Calls    Caller's first and last name: Freddy Newman with [employer]      Reason for call: Just letting the provider know that the patient is admitted into [facility].        Callback required yes/no and why: No      Best contact number(s): [phone]      Details to clarify the request: N/A      Bala Rios

## 2021-08-11 NOTE — PROGRESS NOTES
Problem: Falls - Risk of  Goal: *Absence of Falls  Description: Document Betty Baer Fall Risk and appropriate interventions in the flowsheet.   Outcome: Progressing Towards Goal  Note: Fall Risk Interventions:  Mobility Interventions: Bed/chair exit alarm, Communicate number of staff needed for ambulation/transfer, Strengthening exercises (ROM-active/passive), Utilize walker, cane, or other assistive device, Utilize gait belt for transfers/ambulation         Medication Interventions: Bed/chair exit alarm, Patient to call before getting OOB, Teach patient to arise slowly, Utilize gait belt for transfers/ambulation         History of Falls Interventions: Bed/chair exit alarm, Door open when patient unattended, Room close to nurse's station, Utilize gait belt for transfer/ambulation

## 2021-08-11 NOTE — ROUTINE PROCESS
Discharge instructions reviewed with Patient verbalized understanding. Discharge medications reviewed with Patient and prescriptions given to patient. Patient made aware of follow up MD visits. An After Visit Summary was printed and given to the patient, hardcopy signed and placed in chart. Peripheral IV was removed. Patient transported home via Medicaid transport.

## 2021-08-11 NOTE — DISCHARGE SUMMARY
[address] [place] [address]   Office [phone]  Fax [phone]       Discharge / Transfer / Off-Service Note     Name: Jacoby Pierce MRN: [id number]  Sex: Male   YOB: 1970  Age: 54 y.o. PCP: Rodney Roa MD     Date of admission: 8/8/2021  Date of discharge/transfer: 8/11/2021    Attending physician at admission: Maria G Hanson MD  Attending physician at discharge/transfer: Maria G Hanson MD  Resident physician at discharge/transfer: Katty Ignacio MD     Consultants during hospitalization  IP CONSULT TO [id number] [facility]     Admission diagnoses   Acute heart failure ([address] [I50.9]    Recommended follow-up after discharge    1. Jori Dandy, MD  2. Cardiology - Dr. Claudette Parkinson     Things to follow up on with PCP:   Diabetes  Hypercholesterolemia  Heart Failure - changes to medications after admission  Hypertension  Asthma - PFTs may be necessary to assess for COPD  Bipolar Disorder/MDD    Things to follow up on with Cardiology:   Heart Failure  Hypertension    ------------------------------------------------------------------------------------------------------------------    History of Present Illness    As per admitting provider, Dr. Stacey Estrada:   Margo Teran is a 54 y.o. male with PMHx of prior MI x3, CAD (s/p stent 05/2021), CHF (EF 35-40% on echo 5/7/2021), T2DM, MDD, BPD, chronic back pain who presents to the ED complaining of SOB. Orthopnea with 3 pillows, usually uses 1 pillow. Also reports MURRAY with walking. Not taking lasix for last several days due to running out of medication. Received first covid shot on Friday. Endorses cough for 1 month. Decreasing tobacco products, now smoking 1 cigarette/day. Right side CP 8/10 on presentation now 7/10, aggravated by taking deep breath/coughing. \"      Hospital course  Jacoby Pierce was admitted into the Family Medicine Service from 8/8/2021 to 8/11/2021 for Acute heart failure ([facility] [I50.9].  During his stay, he required diuresis with 40mg of IV Lasix, along with O2 via nasal cannula up to 6L. His SOB improved and he required no supplemental oxygen by the time of discharge. Cardiology followed along and assessed him with an Echo and Cath and gave recommendations. During the course of this admission, the following conditions were addressed/managed:    Acute Hypoxic Respiratory Failure 2/2 Acute on Chronic Heart Failure  Patient with known history of HFrEF (EF 35-40% on echo 5/7/2021; EF 20% per Left Heart Cath 5/6/2021). POA proBNP 4261, Trop 0.21, CXR showed cardiomegaly and bilateral pulmonary edema. Rapid COVID test neg, Procal neg. S/p Nitro gtt. He was able to transition to room air by time of discharge. Echo on 8/9 showed EF 15%. Cath 8/10 showed: patent stent, LV dysfunction out of proportion to CAD. - Change Lasix 40mg PO daily to 40mg PO BID  - Follow up with Cardiology  - Needs Cardiac rehab after discharge     Diabetes Mellitus T2: Last A1c 11.5 on 5/6/21.   - Continue home liraglutide 1.8 mg SubQ daily  - Follow up with PCP     Elevated Troponin: Resolved. POA Troponin 0.21. Likely 2/2 CHF exacerbation. Trended down to 0.19 8/9.     Hypertension: BP at discharge 116/78.  - Continue home cozaar 25mg daily  - Follow up with PCP     CAD: S/p 5 stents in May 2021. Cath during this stay showed patent LAD stent (see below for full results). - Continue home Plavix 75mg daily     Asthma: Stable. - Continue albuterol inhaler prn  - Follow up with pulmonology outpatient for pulmonary function testing for possible COPD     Hyperlipidemia: The 10-year ASCVD risk score (Tiny Milan et al., 2013) is: 18.2% calculated based on lipid panel 3/9/2021.  - Continue home lipitor 80mg daily  - Follow up with PCP     Insomnia: Stable  - Continue home Atarax 25mg qhs prn  - Continue home trazodone 50mg nightly     Bipolar Disorder/MDD: Stable. - No home meds  - Follow up with PCP    GERD: Stable.   - Continue on home Protonix 40mg daily.     Hx of CVA: Patient reports history of stroke in 2021. No records found.  - Continue home lipitor 80mg daily     Obesity: Stable. - Continue encouraging lifestyle modifications and further follow up outpatient. Physical exam at discharge:    Vitals Reviewed. Patient Vitals for the past 12 hrs:   Temp Pulse Resp BP SpO2   08/11/21 1054 98.1 °F (36.7 °C) 95 17 109/82 99 %   08/11/21 0817 97.5 °F (36.4 °C) 92 19 119/82 100 %   08/11/21 0711  100      08/11/21 0315 97.7 °F (36.5 °C) 100 19 116/78 95 %      General Oriented to person, place, and time and well-developed. Appears well-nourished, no distress. Not diaphoretic. HENT Head Normocephalic and atraumatic. Eyes Conjunctivae are normal, no discharge. No scleral icterus. Cardio Normal rate, regular rhythm. Exam reveals no gallop and no friction rub. No murmur heard. No chest wall tenderness. Pulmonary Effort normal. Mild crackles at bilateral lung bases. No respiratory distress. No wheezes, no rales. Abdominal Soft. Bowel sounds normal. No distension. No tenderness.  Deferred. Extremities No edema of lower extremities. No tenderness. Distal pulses intact. Neurological Alert and oriented to person, place, and time. Dermatology Skin is warm and dry. No rash noted. No erythema or pallor. Psychiatric Affect and judgment normal.        Condition at discharge: Stable.     Labs  Recent Labs     08/11/21  0523 08/10/21  0530 08/09/21  1210   WBC 6.9 5.5 5.7   HGB 13.0 12.7 12.6   HCT 40.5 39.9 39.9    293 294     Recent Labs     08/11/21  0523 08/10/21  0530 08/09/21  0132    140 138   K 4.1 3.9 3.9    107 106   CO2 29 28 28   BUN 23* 23* 20   CREA 1.31* 1.20 1.61*   * 176* 125*   CA 8.4* 8.5 8.1*   MG 2.2 2.2 2.0   PHOS  --   --  4.1     Recent Labs     08/11/21  0523 08/10/21  0530 08/09/21  0132   ALT 26 29 21   AP 78 81 77   TBILI 0.6 0.5 0.8   TP 7.4 7.3 6.6   ALB 3.0* 2.9* 2.6* GLOB 4.4* 4.4* 4.0     Recent Labs     08/11/21  1140 08/11/21  0703 08/10/21  2046 08/10/21  1636 08/10/21  1353 08/09/21  1530 08/09/21  1204 08/08/21  2111 08/08/21  1919   TROIQ  --   --   --   --   --   --   --   --  0.19*   APTT  --   --   --   --   --   --  27.7  --   --    GLUCPOC 245* 200* 169* 269* 104   < >  --    < >  --     < > = values in this interval not displayed. Micro:  No results found for: CULT    Imaging:  XR CHEST PORT    Result Date: 8/8/2021  EXAM: XR CHEST PORT INDICATION: Shortness of breath and chest pain. CHF. COMPARISON: CT chest on 5/6/2021. Chest views on 5/6/2021 and 4/20/2019. TECHNIQUE: Portable upright chest AP view FINDINGS: Defibrillator line is unchanged. ] Is not imaged. Left costophrenic angle was not imaged. Cardiac monitoring wires overlie the thorax. Cardiomegaly may be increased. Pulmonary vascular prominence is increased. Bilateral pulmonary opacities are moderate and new. No pneumothorax. No evidence of pleural effusion. The visualized bones and upper abdomen are age-appropriate. Cardiomegaly and moderate bilateral pulmonary edema versus nonspecific pneumonia. No visible pleural effusion. Consider PA and lateral chest views when the patient can better tolerate. Procedures / Diagnostic Studies  ECHO ADULT COMPLETE  Result Date: 8/9/2021  · LV: Calculated LVEF is 15%. Visually measured ejection fraction. Normal cavity size and wall thickness. Severely reduced systolic function. Severe hypokinesis of the basal anterior, basal anterolateral, basal anteroseptal, basal inferior, basal inferoseptal, basal inferolateral, mid anterior, mid anterolateral, mid anteroseptal, mid inferior, mid inferoseptal and mid inferolateral wall(s). · RV: Moderately reduced systolic function. · Pericardium: Trivial-to-small pericardial effusion adjacent to right atrium.         CARDIAC PROCEDURE  Result Date: 8/10/2021  Cath: Obstructive 2VD:    LAD patent mid stent, ap40; D1 inf br 90    OM2 30 (ISR), OM4 40 (ISR) (left dom)    RCA p70, m90 (non-dom) Severe LV systolic dysfunction    EF 15% with severe GHK. No AVG, MR 2+. LV dysfunction out of proportion to CAD. Continued med mgmt of HFrEF     Chronic diagnoses   Problem List as of 8/11/2021 Date Reviewed: 4/21/2021        Codes Class Noted - Resolved    RESOLVED: Suicidal ideations ICD-10-CM: R45.851  ICD-9-CM: V62.84 Acute 4/10/2012 - 5/23/2014        Acute heart failure ([facility] ICD-10-CM: I50.9  ICD-9-CM: 428.9  8/8/2021 - Present        ACS (acute coronary syndrome) (HCC) ICD-10-CM: I24.9  ICD-9-CM: 411.1  5/7/2021 - Present        CAD (coronary artery disease) ICD-10-CM: I25.10  ICD-9-CM: 414.00  Unknown - Present    Overview Signed 5/6/2021  4:46 PM by Elizabeth Michel, DO     MI x2 with 2 cardiac stent             Chest pain ICD-10-CM: R07.9  ICD-9-CM: 786.50  5/6/2021 - Present        Major depression ICD-10-CM: F32.9  ICD-9-CM: 296.20  3/8/2021 - Present        Suicide ([facility] ICD-10-CM: F83. 8XXA  ICD-9-CM: E958.9  3/8/2021 - Present        Chronic systolic congestive heart failure (HCC) ICD-10-CM: I50.22  ICD-9-CM: 428.22, 428.0  12/2/2020 - Present        History of CVA (cerebrovascular accident) ICD-10-CM: Z86.73  ICD-9-CM: V12.54  12/2/2020 - Present        Type 2 diabetes with nephropathy ([facility] ICD-10-CM: E11.21  ICD-9-CM: 250.40, 583.81  2/28/2020 - Present        Adjustment disorder with depressed mood ICD-10-CM: F43.21  ICD-9-CM: 309.0  4/28/2012 - Present        Bipolar 1 disorder ([place] [address] ICD-10-CM: F31.9  ICD-9-CM: 296.7  4/10/2012 - Present        Borderline personality disorder ([place] [address] ICD-10-CM: F60.3  ICD-9-CM: 301.83  4/10/2012 - Present        Suicidal behavior ICD-10-CM: R45.89  ICD-9-CM: V62.89  3/24/2012 - Present        Emotional lability ICD-10-CM: R45.86  ICD-9-CM: 799.24  3/24/2012 - Present        Diabetes mellitus ([place] [address] ICD-10-CM: E11.9  ICD-9-CM: 250.00  3/5/2012 - Present        HTN (hypertension) ICD-10-CM: I10  ICD-9-CM: 401.9  3/5/2012 - Present        Unspecified asthma, with exacerbation ICD-10-CM: J45.901  ICD-9-CM: 493.92  3/5/2012 - Present    Overview Signed 3/5/2012  9:53 PM by Jeniffer COLLAZO no exacerbation. RESOLVED: Schizoaffective disorder, bipolar type ([facility] ICD-10-CM: F25.0  ICD-9-CM: 295.70  4/10/2012 - 4/10/2012              Discharge Medication List as of 8/11/2021 12:55 PM      CONTINUE these medications which have CHANGED    Details   furosemide (LASIX) 40 mg tablet Take 1 Tablet by mouth daily. , Normal, Disp-30 Tablet, R-0         CONTINUE these medications which have NOT CHANGED    Details   hydrOXYzine HCL (ATARAX) 25 mg tablet Take two to three tablets by mouth at bedtime for sleep., Normal, Disp-60 Tablet, R-2      losartan (COZAAR) 25 mg tablet Take 0.5 Tabs by mouth daily. , Normal, Disp-30 Tab, R-0      traZODone (DESYREL) 50 mg tablet Take 50 mg by mouth nightly as needed for Sleep., Historical Med      liraglutide (VICTOZA) 0.6 mg/0.1 mL (18 mg/3 mL) pnij 1.8 mg by SubCUTAneous route daily. , Normal, Disp-10 Adjustable Dose Pre-filled Pen Syringe, R-1      atorvastatin (LIPITOR) 80 mg tablet Take 1 Tab by mouth daily. , Normal, Disp-30 Tab, R-5      clopidogreL (PLAVIX) 75 mg tab Take 1 Tab by mouth daily. , Normal, Disp-30 Tab, R-5      pantoprazole (PROTONIX) 40 mg tablet Take 1 Tab by mouth Daily (before breakfast). , Normal, Disp-30 Tab, R-5      albuterol (PROVENTIL HFA, VENTOLIN HFA, PROAIR HFA) 90 mcg/actuation inhaler Take 2 Puffs by inhalation every four (4) hours as needed for Wheezing or Shortness of Breath., Normal, Disp-1 Inhaler, R-5              Diet:  Cardiac diet. Activity:  As tolerated     Disposition: Home or Self Care    Discharge instructions to patient/family  Please seek medical attention for any new or worsening symptoms particularly chest pain, shortness of breath, abdominal pain, nausea, vomiting.     Follow up plans/appointments  Follow-up Information     Follow up With Specialties Details Why Contact Info    Darius King MD Family Medicine Go on 8/16/2021 Follow up appointment for hospitalization for acute on chronic heart failure scheduled for 8:20am on August 16, 2021. Please arrive 15 min early and bring insurance info. Wear face mask and limit accompanying persons.  [address]   [address] [facility], [address]    Erik Lay MD Cardiology On 8/25/2021 4:15pm to follow up on your heart failure [address]  [phone]             Bertrand Norman MD  Family Medicine Resident       For Billing    Chief Complaint   Patient presents with    Shortness of Breath    Chest Pain    Abdominal Pain       Hospital Problems  Date Reviewed: 4/21/2021        Codes Class Noted POA    Acute heart failure ([facility] ICD-10-CM: I50.9  ICD-9-CM: 428.9  8/8/2021 Unknown

## 2021-08-11 NOTE — PROGRESS NOTES
Bedside and Verbal shift change report given to LUCERO Steve (oncoming nurse) by Juany BARAHONA RN  (offgoing nurse). Report included the following information SBAR, Kardex, Intake/Output, MAR and Recent Results.

## 2021-08-11 NOTE — PROGRESS NOTES
Rickie Seo MD, [address] [place] [address] [address] [place] [address],[address], [facility]  [phone]      IMPRESSION and RECOMMENDATIONS     1.  HFrEF:  Likely due to running out of lasix. Cont cozaar. Cath showed no new CAD to explain worsening LV dysfunction. Will change lasix to po every day. No BB due to soft BP. I have arranged f/u with me 8/25/21 @ 4:15pm.     2. CAD:  Cont plavix due to ASA allergy. Cont statin. Seems stable for discharge from a cardiac standpoint.     I have discussed this plan with the patient. He appears to understand this plan and wishes to proceed ahead. Subjective:       No complaints. Objective:     Patient Vitals for the past 16 hrs:   BP Temp Pulse Resp SpO2 Weight   08/11/21 0817 119/82 97.5 °F (36.4 °C) 92 19 100 %    08/11/21 0810      74.5 kg (164 lb 3.9 oz)   08/11/21 0711   100      08/11/21 0315 116/78 97.7 °F (36.5 °C) 100 19 95 %    08/10/21 2353   95      08/10/21 2321 111/68 97.7 °F (36.5 °C) 96 19 94 %    08/10/21 1821 101/62 97.5 °F (36.4 °C) 96 19 94 %        HEENT Exam:     WNL         Lung Exam:     The patient is not dyspneic. Breath sounds are heard equally in all lung fields. There are no wheezes, rhonchi, or rubs heard on auscultation. Minimal bibasilar rales (improved). Heart Exam:     The rhythm is regular. The PMI is in the 5th intercostal space of the MCL. Apical impulse is normal. S1 is regular. S2 is physiologic. There is no S3, S4 gallop, murmur, click, or rub. Abdomen Exam:     Benign. Extremities Exam:     No cyanosis, clubbing, edema. Distal pulses intact.            Lab Results   Component Value Date/Time    Glucose 178 (H) 08/11/2021 05:23 AM    Sodium 139 08/11/2021 05:23 AM    Potassium 4.1 08/11/2021 05:23 AM    Chloride 107 08/11/2021 05:23 AM    CO2 29 08/11/2021 05:23 AM    BUN 23 (H) 08/11/2021 05:23 AM    Creatinine 1.31 (H) 08/11/2021 05:23 AM    Calcium 8.4 (L) 08/11/2021 05:23 AM     Recent Labs     08/11/21  0523 08/10/21  0530   WBC 6.9 5.5   HGB 13.0 12.7   HCT 40.5 39.9    293     Recent Labs     08/11/21  0523 08/10/21  0530   ALT 26 29   AP 78 81   TBILI 0.6 0.5   TP 7.4 7.3   ALB 3.0* 2.9*   GLOB 4.4* 4.4*     Recent Labs     08/09/21  1204   APTT 27.7      No results for input(s): CPK, CKMB, TNIPOC in the last 72 hours.     No lab exists for component: Tomasz Copper  Recent Labs     08/08/21  1919   TROIQ 0.19*     Lab Results   Component Value Date/Time    Cholesterol, total 217 (H) 03/09/2021 01:31 PM    HDL Cholesterol 40 03/09/2021 01:31 PM    LDL, calculated 101.2 (H) 03/09/2021 01:31 PM    Triglyceride 379 (H) 03/09/2021 01:31 PM    CHOL/HDL Ratio 5.4 (H) 03/09/2021 01:31 PM

## 2021-08-11 NOTE — DISCHARGE INSTRUCTIONS
HOME DISCHARGE INSTRUCTIONS    Fauzia Peterson / [id number] : 1970    Admission date: 2021 Discharge date: 2021 9:06 AM     Please bring this form with you to show your care provider at your follow-up appointment. Primary care provider:  Kalen Cabrera MD    Discharging provider:  Lisa Burnett MD  - Family Medicine Resident  Mendoza Harmon MD - Family Medicine Attending      You have been admitted to the hospital with the following diagnoses:    ACUTE DIAGNOSES:  Acute heart failure ([facility] [I50.9]    . . . . . . . . . . . . . . . . . . . . . . . . . . . . . . . . . . . . . . . . . . . . . . . . . . . . . . . . . . . . . . . . . . . . . . . .   You are well enough to be discharged from the hospital. However, because you were inpatient in a hospital, you are at greater risk of having been exposed to the coronavirus. PLEASE stay inside and self-quarantine for 14 days to prevent further spread of the coronavirus. . . . . . . . . . . . . . . . . . . . . . . . . . . . . . . . . . . . . . . . . . . . . . . . . . . . . . . . . . . . . . . . . . . . . . . . .     You were admitted to the hospital for difficulty breathing. Labs results and diagnostic studies suggested that this was due to worsening of your heart failure. We gave you medication to help get the fluid off. This medication is call Lasix. It is VERY important that your take your medications every single day, or this problem will return. . . . . . . . . . . . . . . . . . . . . . . . . . . . . . . . . . . . . . . . . . . . . . . . . . . . . . . . . . . . . . . . . . . . . . . . Sepideh Rubrittney FOLLOW-UP CARE RECOMMENDATIONS:       Appointments  Follow-up Information     Follow up With Specialties Details Why Contact Info    Kalen Cabrera MD Family Medicine Go on 2021 Follow up appointment for hospitalization for acute on chronic heart failure scheduled for :20am on 2021.  Please arrive 15 min early and bring insurance info. Wear face mask and limit accompanying persons. [address]   [address], [address] [facility] [phone]    Modesto Collet, MD Cardiology On 8/25/2021 4:15pm to follow up on your heart failure [address]  [phone]               Follow-up tests needed:   Pulmonary function testing to assess for possible lung disease  Cardiology to follow up on your heart failure     Please strongly consider cardiac rehabilitation         Pending test results: At the time of your discharge the following test results are still pending: none. Please make sure you review these results with your outpatient follow-up provider(s). DIET/what to eat:  Cardiac, low salt diet    ACTIVITY:  Activity as tolerated    Wound care: N/a    Equipment needed:  N/a    Specific symptoms to watch for: chest pain, shortness of breath, fever, chills, nausea, vomiting, diarrhea, change in mentation, falling, weakness, bleeding. What to do if new or unexpected symptoms occur? If you experience any of the above symptoms (or should other concerns or questions arise after discharge) please call your primary care physician. Return to the emergency room if you cannot get hold of your doctor. · It is very important that you keep your follow-up appointment(s). · Please bring discharge papers, medication list (and/or medication bottles) to your follow-up appointments for review by your outpatient provider(s). · Please check the list of medications and be sure it includes every medication (even non-prescription medications) that your provider wants you to take. · It is important that you take the medication exactly as they are prescribed.    · Keep your medication in the bottles provided by the pharmacist and keep a list of the medication names, dosages, and times to be taken in your wallet. · Do not take other medications without consulting your doctor. · If you have any questions about your medications or other instructions, please talk to your nurse or care provider before you leave the hospital.     Information obtained by:     I understand that if any problems occur once I am at home I am to contact my physician. These instructions were explained to me and I had the opportunity to ask questions. I understand and acknowledge receipt of the instructions indicated above. Physician's or R.N.'s Signature                                                                  Date/Time                                                                                                                                              Patient or Representative Signature                                                          Date/Time    [facility] Visit Follow-Up Instructions/Information    You may have an in home follow up visit set up with [facility] or may wish to contact [facility] to set-up a visit:    What are we? [facility] is an in-home urgent care service staffed with emergency trained medical teams. We come to your home in a vehicle stocked with medical supplies and technology. An ER physician is always available if needed. When? As a part of your hospital follow-up, an appointment has been/ or can be set up for us to come see you. Usually, this will be 24-72 hours after you leave the hospital or as needed. [facility] is open 7am-9pm, 7 days a week, 365 days a year, including holidays. Why? We know that you cannot always get to your doctor after being in the hospital and that your doctor is not always available when you need them.  Once your workup is complete, we'll call in your prescriptions, update your family doctor, and handle billing with your insurance so you can focus on feeling better, faster without leaving home. How much? We accept most major health insurance plans, including Medicaid, Medicare, and Medicare Advantage [address] [facility], [facility], and [employer]. We also accept: credit, debit, health savings account (HSA), health reimbursement account (HRA) and flexible spending account (FSA) payments. [facility] prices compare to conventional urgent care facilities, but we bring the care to you. How to reach us? Getting care is easy- use our mobile tereza (Tune), website ([email or web address]) or call us [phone].

## 2021-08-11 NOTE — MED STUDENT NOTES
*ATTENTION:  This note has been created by a medical student for educational purposes only. Please do not refer to the content of this note for clinical decision-making, billing, or other purposes. Please see attending physicians note to obtain clinical information on this patient. *         [address] [place] [address]   Office [phone]  Fax [phone]     Assessment and Plan     Ramila Mora a 54 y.o. male with a PMHx of prior MI x3, CAD, CHF, T2DM, MDD, BPD, chronic back pain who is admitted for Acute on Chronic Heart Failure.     24 Hour Events: NAEON.     Acute on Chronic Heart Failure: Patient improved clinically, decreased SOB now on RA. Stable for discharge. Patient with known history of HFrEF (EF 35-40% on echo 5/7/2021; EF 20% per Left Heart Cath 5/6/2021) presents with SOB, MURRAY, PND for 1 week after running out of home Lasix. POA proBNP 4261, Trop 0.21, CXR showed cardiomegaly and bilateral pulmonary edema. Rapid COVID test neg, Procal neg. S/p Nitro gtt. Using 6L O2 NC today. Echo 8/9 showed EF 15%. Cath 8/10 no intervention indicated, LV dysfunction out of proportion to CAD. - Monitor vitals per unit protocol, strict I&O  - Daily weights  - O2: as needed  - Cardiology recs: Lasix 40 PO BID, continue Losartan 25mg daily, plavix, statin  - F/u with heart failure clinic outpt    Diabetes Mellitus T2: Last A1c 11.5 on 5/6/21.   - Holding home liraglutide  - Insulin Sliding Scale normal sensitivity with AC&HS glucose checks. - Hypoglycemia protocols ordered.     Elevated Troponin: Resolved. POA Troponin 0.21. Likely 2/2 CHF exacerbation.  Trended down to 0.19 8/9.  - discontinue trending     Hypertension: Stable  - BP on admission 113/83.  - Continue home cozaar 25mg daily  - Hold home carvedilol 12.5mg daily  - Will continue to monitor at this time and readjust as BP's trend.     CAD (s/p stent 05/2021)  - Continue home Plavix 75mg daily     Asthma:   - Continue albuterol inhaler prn  - Follow up with pulmonology outpatient for pulmonary function testing for likely COPD     Hyperlipidemia: The 10-year ASCVD risk score (Mariaelena Aceves., et al., 2013) is: 18.8%. - Continue home lipitor 80mg daily     Insomnia:  - Continue home Atarax 25mg qhs prn     Bipolar Disorder/MDD: Stable. - No home meds     Hx of CVA: Patient reports history of stroke in 2021. No records found.  - On lipitor     Obesity BMI Body mass index is 27.29 kg/m². - Encouraging lifestyle modifications and further follow up outpatient.     FEN/GI - Cardiac diet, no concentrated sweets   Activity - Ambulate as tolerated  DVT prophylaxis - Lovenox  GI prophylaxis - Protonix  Fall prophylaxis - Not indicated at this time. Disposition - Admit to Stepdown. Plan to d/c to Home. Code Status - FULL. Discussed with Patient  Next of Kin Name and Quentinan [phone]    I appreciate the opportunity to participate in the care of this patient,  Echo Burk, Medical Student    Attending note to follow    Subjective / Objective     Subjective  Patient reporting improvement in SOB, on RA. Objective  VS: Blood pressure 116/78, pulse 100, temperature 97.7 °F (36.5 °C), resp. rate 19, height 5' 5\" (1.651 m), weight 165 lb (74.8 kg), SpO2 95 %. O2 Flow Rate (L/min): 2 l/min O2 Device: None (Room air)     Date 08/10/21 0700 - 08/11/21 0659 08/11/21 0700 - 08/12/21 0659   Shift 0700-1859 1900-0659 24 Hour Total 5444-7632 3983-5355 24 Hour Total   INTAKE   P.O. 120 600 720        P. O. 120 600 720      Shift Total(mL/kg) 120(1.6) 600(8) 720(9.6)      OUTPUT   Urine(mL/kg/hr) 200(0.2) 350(0.4) 550(0.3)        Urine Voided 200 350 550        Urine Occurrence(s)  0 x 0 x      Emesis/NG output  0 0        Emesis  0 0        Emesis Occurrence(s)  0 x 0 x      Other  0 0        Other Output  0 0      Stool  0 0        Stool Occurrence(s)  0 x 0 x        Stool  0 0      Blood  0 0        Quantitative Blood Loss  0 0 Blood  0 0      Shift Total(mL/kg) 200(2.7) 350(4.7) 550(7.3)      NET -80 250 170      Weight (kg) 74.8 74.8 74.8 74.8 74.8 74.8       CBC:  Recent Labs     08/11/21  0523 08/10/21  0530 08/09/21  1210   WBC 6.9 5.5 5.7   HGB 13.0 12.7 12.6   HCT 40.5 39.9 39.9    293 022       Metabolic Panel:  Recent Labs     08/11/21  0523 08/10/21  0530 08/09/21  0132    140 138   K 4.1 3.9 3.9    107 106   CO2 29 28 28   BUN 23* 23* 20   CREA 1.31* 1.20 1.61*   * 176* 125*   CA 8.4* 8.5 8.1*   MG 2.2 2.2 2.0   PHOS  --   --  4.1   ALB 3.0* 2.9* 2.6*   ALT 26 29 21       Physical Exam    Vitals Reviewed. General Lying in bed comfortably no acute distress. Cardio Normal rate and rhythm. No murmurs appreciated. Pulmonary Faint bibasilar crackles, improved from previous. Abdominal Soft. Normoactive BS. Nondistended. Nontender. Extremities No LE edema. No tenderness. Distal pulses intact. Neurological Alert and oriented to person, place, and time. Dermatology Skin is warm and dry. No rash noted. No erythema or pallor.     Psychiatric Affect and judgment normal.      For Billing   Chief Complaint   Patient presents with    Shortness of Breath    Chest Pain    Abdominal Pain       Hospital Problems  Date Reviewed: 4/21/2021        Codes Class Noted POA    Acute heart failure ([facility] ICD-10-CM: I50.9  ICD-9-CM: 428.9  8/8/2021 Unknown

## 2021-08-13 ENCOUNTER — TELEPHONE (OUTPATIENT)
Dept: CASE MANAGEMENT | Age: 51
End: 2021-08-13

## 2021-08-13 ENCOUNTER — TELEPHONE (OUTPATIENT)
Dept: FAMILY MEDICINE CLINIC | Age: 51
End: 2021-08-13

## 2021-08-13 NOTE — TELEPHONE ENCOUNTER
----- Message from Fleet Kocher sent at 8/13/2021  3:18 PM EDT -----  Regarding: Dr. Shyanne Vyas first and last name: Tanneroscar Fisher, [facility]      Reason for call: Patient status. Callback required yes/no and why: no, if needed. Best contact number(s): [phone]      Details to clarify the request: Caller stated the pt is doing very well from a therapy perspective. Caller would like to let Dr. Prakash Taylor know she does not believe the patient is requiring any more physical therapy after evaluation.       Fleet Kocher

## 2021-08-13 NOTE — TELEPHONE ENCOUNTER
HEART FAILURE NURSE NAVIGATOR POST DISCHARGE FOLLOW UP PHONE CALL     [facility] contacted patient by telephone to perform post hospital discharge follow up call. Verified patient name and date of birth as identifiers. Provided introduction to self and role. Reviewed discharge instructions; confirmed patient is in receipt of all prescribed HF medications. Patient confirms he received lasix and has all of his medications and is taking. Reinforced importance of daily weights and dietary restrictions, following low sodium diet. Patient reports his weigh was 164 this morning and he has no increase in HF aymptoma. Reinforced signs/symptoms of HF and when to notify the physician. Confirmed knowledge of scheduled follow up appointment: Patient confirms he has transportation arranged for all follow up appointments and can attend. Patient given opportunity to ask questions/express concerns. All questions answered with good understanding.

## 2021-08-16 ENCOUNTER — OFFICE VISIT (OUTPATIENT)
Dept: FAMILY MEDICINE CLINIC | Age: 51
End: 2021-08-16
Payer: COMMERCIAL

## 2021-08-16 VITALS
OXYGEN SATURATION: 98 % | RESPIRATION RATE: 20 BRPM | HEIGHT: 65 IN | BODY MASS INDEX: 27.19 KG/M2 | TEMPERATURE: 97.4 F | WEIGHT: 163.2 LBS | HEART RATE: 103 BPM | SYSTOLIC BLOOD PRESSURE: 107 MMHG | DIASTOLIC BLOOD PRESSURE: 77 MMHG

## 2021-08-16 DIAGNOSIS — E11.65 TYPE 2 DIABETES MELLITUS WITH HYPERGLYCEMIA, WITH LONG-TERM CURRENT USE OF INSULIN (HCC): ICD-10-CM

## 2021-08-16 DIAGNOSIS — Z79.4 TYPE 2 DIABETES MELLITUS WITH HYPERGLYCEMIA, WITH LONG-TERM CURRENT USE OF INSULIN (HCC): ICD-10-CM

## 2021-08-16 DIAGNOSIS — F51.02 ADJUSTMENT INSOMNIA: ICD-10-CM

## 2021-08-16 DIAGNOSIS — I50.22 CHRONIC SYSTOLIC CONGESTIVE HEART FAILURE (HCC): ICD-10-CM

## 2021-08-16 DIAGNOSIS — Z09 HOSPITAL DISCHARGE FOLLOW-UP: ICD-10-CM

## 2021-08-16 DIAGNOSIS — I25.10 CORONARY ARTERY DISEASE INVOLVING NATIVE CORONARY ARTERY OF NATIVE HEART WITHOUT ANGINA PECTORIS: Primary | ICD-10-CM

## 2021-08-16 PROCEDURE — 1111F DSCHRG MED/CURRENT MED MERGE: CPT | Performed by: FAMILY MEDICINE

## 2021-08-16 PROCEDURE — 99495 TRANSJ CARE MGMT MOD F2F 14D: CPT | Performed by: FAMILY MEDICINE

## 2021-08-16 RX ORDER — INSULIN GLARGINE 100 [IU]/ML
26 INJECTION, SOLUTION SUBCUTANEOUS DAILY
Qty: 12 ADJUSTABLE DOSE PRE-FILLED PEN SYRINGE | Refills: 1
Start: 2021-08-16 | End: 2021-10-27 | Stop reason: SDUPTHER

## 2021-08-16 RX ORDER — HYDROXYZINE 25 MG/1
TABLET, FILM COATED ORAL
Qty: 60 TABLET | Refills: 2 | Status: SHIPPED | OUTPATIENT
Start: 2021-08-16

## 2021-08-16 RX ORDER — FUROSEMIDE 40 MG/1
40 TABLET ORAL DAILY
Qty: 90 TABLET | Refills: 1 | Status: SHIPPED | OUTPATIENT
Start: 2021-08-16 | End: 2022-02-04 | Stop reason: DRUGHIGH

## 2021-08-16 NOTE — PATIENT INSTRUCTIONS
Diabetes:  Blood sugar goals:  Hemoglobin A1c under 7  Fasting blood sugar   Blood sugar 2 hours after a meal under 180, 4 hours after a meal under 120  No hypoglycemia (sugars under 70 and symptomatic low sugars)    Blood sugar control with diet and exercise:  Exercise 45 minutes per day. This makes your insulin work better. It also allows insulin levels to fall helping with weight loss. Every night, try to fast from your evening meal to breakfast (at least 12 hours) without eating anything. This uses stored energy in your liver and makes insulin work better. Avoid simples sugars such as table sugar in drinks (sodas, lemonade, sweet tea, wine), desserts, candy. Also avoid fruit juices and high fructose corn syrup. Avoid frequent consumption of fruit especially grapes and bananas. A single serving of fruit daily is all you should have. Finally, drink less milk which has milk sugar in it. Control your starch intake. Men should have 3-4 carb portions per meal.  Women should have 2-3 carb portions per meal.  A carb serving is the equivalent of a slice of bread. Control your blood pressure and cholesterol. These problems are common in diabetes. AND, don't smoke. All of these problems contribute to heart disease and stroke risk. Get a yearly eye exam and flu shot. Make sure your vaccines are up to date particularly the pneumonia vaccines. Inspect your feet daily. Wear comfortable protective shoes and clean socks. Seek medical care if there are sore, calluses or numbness and burning of your feet. See your doctor at least every 6 months if you are on oral medicines or more often if the diabetic control is not at goal or if you are on insulin. Take your medicines religiously. STOP the SUGAR    The first step in dietary efforts at weight loss is removing as much sugar from the diet as possible.   Most dietary sugar is in the forms of table sugar (sucrose), fruit sugar (fructose) and milk sugar (lactose). But, you need to watch labels to see if processed foods have added sugars under other names. To eliminate sucrose, eliminate sweet drinks entirely including soft drinks, sports drinks, lemonade, sweet tea and wine. Also, eliminate candy and make desserts a \"special occasion only treat\". Don't eat desserts with every meal or every night. Most fructose is found in fruit and this should be markedly limited. Fruit juice should be avoided. If you do eat fruit, eat fruits that are lower in sugar such as: strawberries, blackberries, raspberries, lemon, grapefruit and watermelon. Eat small portions and think of the fruit as a garnish or small treat. Bananas, mangos, cherries and grapes are higher in sugar and should be avoided. Avoid high fructose corn syrup (an artificial sweetener). Milk sugar, or lactose, should be avoided as well. Milk is a good source of calcium but not for people struggling with weight issues. Put a little cream in your coffee or tea but otherwise avoid milk. How can you avoid sugar? For starters, don't buy it and don't bring it in the house. It won't tempt you that way!     For more information:    Try the internet for videos about sugar addiction or try [email or web address].

## 2021-08-16 NOTE — PROGRESS NOTES
Transitional Care Management Progress Note    Patient: Kishan Taylor  : 1970  PCP: Gregory Agarwal MD    Date of office visit: 2021   Date of admission: 21  Date of discharge: 21  Hospital: [place]    Call initiated w/i 2 business dates of discharge: done   Date of the most recent call to the patient: done     Assessment/Plan:   Diagnoses and all orders for this visit:    1. Coronary artery disease involving native coronary artery of native heart without angina pectoris  -     furosemide (LASIX) 40 mg tablet; Take 1 Tablet by mouth daily. Chest pain free. Be sure to FU with Dr. Lenise Lennox    2. Chronic systolic congestive heart failure (HCC)  -     furosemide (LASIX) 40 mg tablet; Take 1 Tablet by mouth daily. Refilled loop  Take consistently    3. Type 2 diabetes mellitus with hyperglycemia, with long-term current use of insulin (HCC)  -     insulin glargine (LANTUS,BASAGLAR) 100 unit/mL (3 mL) inpn; 26 Units by SubCUTAneous route daily. He is adamant that he remains on lantus but not humalog so have advised to continue that and Victoza and contact me if ANY hypoglycemia    4. Hospital discharge follow-up  -     [place] DISCHARGE MEDS RECONCILED W/ CURRENT OUTPATIENT MED LIST  Updated. States he has all other meds other than lasix and atarax    5. Adjustment insomnia  -     hydrOXYzine HCL (ATARAX) 25 mg tablet; Take two to three tablets by mouth at bedtime for sleep. Subjective:   Kishan Taylor is a 54 y.o. male presenting today for follow-up after hospital discharge. This encounter and supporting documentation was reviewed if available. Medication reconciliation was performed today. The main problem requiring admission was CHF. Complications during admission: none      Interval history/Current status: Ran out of lasix and hospitalized for CHF. Needs refill of lasix and hydroxyzine. Wants to stop trazadone.   Is on insulin which is not on his DC paperwork. States taking glargine 26 units per day  No hypoglycemia. Sugars  fasting. Remains on victoza 1.8    Walking his puppy daily. No dyspnea. Has appointment with Dr. Graciela Emmanuel when that is. Being seen by home health nurses. Admitting symptoms have: significantly improved      Medications marked \"taking\" at this time:  Prior to Admission medications    Medication Sig Start Date End Date Taking? Authorizing Provider   furosemide (LASIX) 40 mg tablet Take 1 Tablet by mouth daily. 8/11/21   Geovanna Givens MD   hydrOXYzine HCL (ATARAX) 25 mg tablet Take two to three tablets by mouth at bedtime for sleep. 6/29/21   Mona Harley MD   losartan (COZAAR) 25 mg tablet Take 0.5 Tabs by mouth daily. 5/10/21   Ya Meraz DO   traZODone (DESYREL) 50 mg tablet Take 50 mg by mouth nightly as needed for Sleep. Provider, Historical   liraglutide (VICTOZA) 0.6 mg/0.1 mL (18 mg/3 mL) pnij 1.8 mg by SubCUTAneous route daily. 4/21/21   Mona Harley MD   atorvastatin (LIPITOR) 80 mg tablet Take 1 Tab by mouth daily. 4/8/21   Mona Harley MD   clopidogreL (PLAVIX) 75 mg tab Take 1 Tab by mouth daily. 4/8/21   Mona Harley MD   pantoprazole (PROTONIX) 40 mg tablet Take 1 Tab by mouth Daily (before breakfast). 4/8/21   Mona Harley MD   albuterol (PROVENTIL HFA, VENTOLIN HFA, PROAIR HFA) 90 mcg/actuation inhaler Take 2 Puffs by inhalation every four (4) hours as needed for Wheezing or Shortness of Breath.  4/8/21   Mona Harley MD        ROS:  Negative except for poor sleep             Objective:     Visit Vitals  /77 (BP 1 Location: Right upper arm, BP Patient Position: Sitting, BP Cuff Size: Adult)   Pulse (!) 103   Temp 97.4 °F (36.3 °C) (Temporal)   Resp 20   Ht 5' 5\" (1.651 m)   Wt 163 lb 3.2 oz (74 kg)   SpO2 98%   BMI 27.16 kg/m²        General Well appearing, A&O X 4  Neck without nodes normal thyroid  Lungs clear to ausculation  CV RRR, No M, R, or G.  No edema. Neuro Normal Speech  Psych Oriented to person place and time with normal affect and mood. No hallucinations or abnormal thought      We discussed the expected course, resolution and complications of the diagnosis(es) in detail. Medication risks, benefits, costs, interactions, and alternatives were discussed as indicated. I advised him to contact the office if his condition worsens, changes or fails to improve as anticipated. He expressed understanding with the diagnosis(es) and plan.      Darwin Vargas MD

## 2021-08-16 NOTE — PROGRESS NOTES
Lizzie Canales is a 54 y.o. male      Chief Complaint   Patient presents with   [facility] Follow Up     Acute heart failure- [facility]         1. Have you been to the ER, urgent care clinic since your last visit? Yes   Hospitalized since your last visit? [facility] heart failure      2. Have you seen or consulted any other health care providers outside of the [address] [place] [address] since your last visit? Include any pap smears or colon screening.   Yes Dr. Jake Parkinson

## 2021-09-16 ENCOUNTER — OFFICE VISIT (OUTPATIENT)
Dept: FAMILY MEDICINE CLINIC | Age: 51
End: 2021-09-16
Payer: COMMERCIAL

## 2021-09-16 VITALS
SYSTOLIC BLOOD PRESSURE: 117 MMHG | TEMPERATURE: 97.1 F | OXYGEN SATURATION: 96 % | WEIGHT: 164.8 LBS | HEART RATE: 110 BPM | DIASTOLIC BLOOD PRESSURE: 88 MMHG | RESPIRATION RATE: 16 BRPM | HEIGHT: 65 IN | BODY MASS INDEX: 27.46 KG/M2

## 2021-09-16 DIAGNOSIS — Z79.4 TYPE 2 DIABETES MELLITUS WITH HYPERGLYCEMIA, WITH LONG-TERM CURRENT USE OF INSULIN (HCC): ICD-10-CM

## 2021-09-16 DIAGNOSIS — I25.10 CORONARY ARTERY DISEASE INVOLVING NATIVE CORONARY ARTERY OF NATIVE HEART WITHOUT ANGINA PECTORIS: ICD-10-CM

## 2021-09-16 DIAGNOSIS — M54.6 CHRONIC MIDLINE THORACIC BACK PAIN: ICD-10-CM

## 2021-09-16 DIAGNOSIS — G89.29 CHRONIC MIDLINE THORACIC BACK PAIN: ICD-10-CM

## 2021-09-16 DIAGNOSIS — I50.22 CHRONIC SYSTOLIC CONGESTIVE HEART FAILURE (HCC): Primary | ICD-10-CM

## 2021-09-16 DIAGNOSIS — J98.01 BRONCHOSPASM: ICD-10-CM

## 2021-09-16 DIAGNOSIS — E11.65 TYPE 2 DIABETES MELLITUS WITH HYPERGLYCEMIA, WITH LONG-TERM CURRENT USE OF INSULIN (HCC): ICD-10-CM

## 2021-09-16 LAB — GLUCOSE POC: 194 MG/DL

## 2021-09-16 PROCEDURE — 82947 ASSAY GLUCOSE BLOOD QUANT: CPT | Performed by: FAMILY MEDICINE

## 2021-09-16 PROCEDURE — 99214 OFFICE O/P EST MOD 30 MIN: CPT | Performed by: FAMILY MEDICINE

## 2021-09-16 RX ORDER — ALBUTEROL SULFATE 90 UG/1
2 AEROSOL, METERED RESPIRATORY (INHALATION)
Qty: 2 EACH | Refills: 3 | Status: SHIPPED | OUTPATIENT
Start: 2021-09-16 | End: 2022-06-16

## 2021-09-16 RX ORDER — CYCLOBENZAPRINE HCL 10 MG
10 TABLET ORAL
Qty: 30 TABLET | Refills: 1 | Status: SHIPPED | OUTPATIENT
Start: 2021-09-16

## 2021-09-16 NOTE — PROGRESS NOTES
Josefina Tucker  54 y.o. male  1970  FQR:[id number]  [facility]  Progress Note     Encounter Date: 9/16/2021    Assessment and Plan:     Encounter Diagnoses     ICD-10-CM ICD-9-CM   1. Chronic systolic congestive heart failure (HCC)  I50.22 428.22     428.0   2. Type 2 diabetes mellitus with hyperglycemia, with long-term current use of insulin (HCC)  E11.65 250.00    Z79.4 790.29     V58.67   3. Coronary artery disease involving native coronary artery of native heart without angina pectoris  I25.10 414.01   4. Chronic midline thoracic back pain  M54.6 724.1    G89.29 338.29   5. Bronchospasm  J98.01 519.11       1. Type 2 diabetes mellitus with hyperglycemia, with long-term current use of insulin ([facility])  Glucose 194 today  Remains on lantus and victoza daily.  - AMB POC GLUCOSE, QUANTITATIVE, BLOOD    2. Coronary artery disease involving native coronary artery of native heart without angina pectoris  No current chest pain but cardiac rate is up and not on beta blocker  We made patient an appointment today to see Dr. Annabelle Morris  - Renetta Davis    3. Chronic systolic congestive heart failure ([facility]  As above, appears compensated for now  - REFERRAL TO CARDIOLOGY    4. Chronic midline thoracic back pain  Trial of muscle relaxants for midthoracic back pain  - cyclobenzaprine (FLEXERIL) 10 mg tablet; Take 1 Tablet by mouth three (3) times daily as needed for Muscle Spasm(s). Dispense: 30 Tablet; Refill: 1    5. Bronchospasm-refilled albuterol    I have discussed the diagnosis with the patient and the intended plan as seen in the above orders. he has expressed understanding. The patient has received an after-visit summary and questions were answered concerning future plans. I have discussed medication side effects and warnings with the patient as well.     Electronically Signed: Darwin Vargas MD    Current Medications after this visit     Current Outpatient Medications Medication Sig    cyclobenzaprine (FLEXERIL) 10 mg tablet Take 1 Tablet by mouth three (3) times daily as needed for Muscle Spasm(s).  albuterol (PROVENTIL HFA, VENTOLIN HFA, PROAIR HFA) 90 mcg/actuation inhaler Take 2 Puffs by inhalation every four (4) hours as needed for Wheezing or Shortness of Breath.  insulin glargine (LANTUS,BASAGLAR) 100 unit/mL (3 mL) inpn 26 Units by SubCUTAneous route daily.  hydrOXYzine HCL (ATARAX) 25 mg tablet Take two to three tablets by mouth at bedtime for sleep.  furosemide (LASIX) 40 mg tablet Take 1 Tablet by mouth daily.  losartan (COZAAR) 25 mg tablet Take 0.5 Tabs by mouth daily.  liraglutide (VICTOZA) 0.6 mg/0.1 mL (18 mg/3 mL) pnij 1.8 mg by SubCUTAneous route daily.  atorvastatin (LIPITOR) 80 mg tablet Take 1 Tab by mouth daily.  clopidogreL (PLAVIX) 75 mg tab Take 1 Tab by mouth daily.  pantoprazole (PROTONIX) 40 mg tablet Take 1 Tab by mouth Daily (before breakfast). No current facility-administered medications for this visit. Medications Discontinued During This Encounter   Medication Reason    albuterol (PROVENTIL HFA, VENTOLIN HFA, PROAIR HFA) 90 mcg/actuation inhaler REORDER     ~~~~~~~~~~~~~~~~~~~~~~~~~~~~~~~~~~~~~~~~~~~~~~~~~~~~~~~~~~~    Chief Complaint   Patient presents with    Follow-up     1 month f/u medication visit        History provided by patient  History of Present Illness   José Copeland is a 54 y.o. male who presents to clinic today for:  Follow-up (1 month f/u medication visit )    CHF, CAD  On lasix now and taking consistently  Only a little chest pain  Can walk and hour and a half without chest pain  Walks dog regularly  Has stents  Not on beta blocker? Has not been back to Cardiology, Dr. Chaka Steiner. Asthma Needs inhaler    DM2  Lantus 50 units per day  Remains on Victoza    Today 194 in clinic  No shakes or sweats from low sugars    Throbbing back pain  Allergic to tylenol and aspirin.     Health Maintenance  Will do at future visit  Health Maintenance Due   Topic Date Due    Hepatitis C Screening  Never done    Pneumococcal 0-64 years (1 of 2 - PPSV23) Never done    DTaP/Tdap/Td series (1 - Tdap) Never done    Colorectal Cancer Screening Combo  Never done    A1C test (Diabetic or Prediabetic)  08/06/2021    Foot Exam Q1  08/11/2021    MICROALBUMIN Q1  08/11/2021    Flu Vaccine (1) Never done    COVID-19 Vaccine (2 - Moderna 2-dose series) 09/03/2021     Review of Systems   Review of Systems   Constitutional: Negative for chills, fever and weight loss. Respiratory: Negative for cough, sputum production and shortness of breath. Cardiovascular: Negative for chest pain, palpitations and leg swelling. Musculoskeletal: Positive for back pain. Vitals/Objective:     Vitals:    09/16/21 0944   BP: 117/88   Pulse: (!) 110   Resp: 16   Temp: 97.1 °F (36.2 °C)   TempSrc: Temporal   SpO2: 96%   Weight: 164 lb 12.8 oz (74.8 kg)   Height: 5' 5\" (1.651 m)     Body mass index is 27.42 kg/m². Wt Readings from Last 3 Encounters:   09/16/21 164 lb 12.8 oz (74.8 kg)   08/16/21 163 lb 3.2 oz (74 kg)   08/11/21 164 lb 3.9 oz (74.5 kg)         Objective  Physical Exam  Vitals and nursing note reviewed. Constitutional:       Appearance: Normal appearance. He is not toxic-appearing. HENT:      Head: Normocephalic and atraumatic. Cardiovascular:      Rate and Rhythm: Regular rhythm. Tachycardia present. Heart sounds: Normal heart sounds. No murmur heard. No gallop. Pulmonary:      Effort: Pulmonary effort is normal. No respiratory distress. Breath sounds: Normal breath sounds. No wheezing, rhonchi or rales. Musculoskeletal:      Cervical back: No muscular tenderness. Comments: Complains of pain in paraspinous muscles around T8 and 9. Minimal tenderness, normal motion. Appears only in minimal pain currently. Lymphadenopathy:      Cervical: No cervical adenopathy. Neurological:      Mental Status: He is alert. Psychiatric:         Mood and Affect: Mood normal.         Behavior: Behavior normal.         Thought Content: Thought content normal.         Judgment: Judgment normal.           Recent Results (from the past 24 hour(s))   AMB POC GLUCOSE, QUANTITATIVE, BLOOD    Collection Time: 09/16/21 10:04 AM   Result Value Ref Range    Glucose  MG/DL      Disposition     Follow-up and Dispositions  ·   Return in about 3 months (around 12/16/2021) for Blood pressure follow up, Diabetes follow up. No future appointments. History   Patient's past medical, surgical and family histories were reviewed and updated. Past Medical History:   Diagnosis Date    Anxiety disorder     Asthma     Bipolar 1 disorder ([address]     CAD (coronary artery disease)     MI x2 with 2 cardiac stent    CHF (congestive heart failure) ([facility]     with reduced EF, EF 20%    Chronic systolic congestive heart failure ([facility] 12/2/2020    Depression     Diabetes mellitus, type 2 ([address]     on insulin.  moderate proteinuria    Hypertension     Mood disorder ([address]     Psychotic disorder ([facility])     Schizoaffective disorder, bipolar type ([address]     Sleep disorder     Suicidal thoughts      Past Surgical History:   Procedure Laterality Date    HX CORONARY STENT PLACEMENT      HX IMPLANTABLE CARDIOVERTER DEFIBRILLATOR       Family History   Problem Relation Age of Onset    Heart Attack Mother     Hypertension Mother     Diabetes Mother     Heart Attack Father     Hypertension Father     Diabetes Father     Depression Neg Hx     Suicide Neg Hx     Psychotic Disorder Neg Hx     Dementia Neg Hx     Substance Abuse Neg Hx      Social History     Tobacco Use    Smoking status: Former Smoker     Packs/day: 1.00     Types: Cigarettes    Smokeless tobacco: Never Used   Substance Use Topics    Alcohol use: Not Currently     Comment: occasionally    Drug use: Not Currently     Types: Marijuana     Comment: rare       Allergies     Allergies   Allergen Reactions    Acetaminophen Unknown (comments), Anaphylaxis and Shortness of Breath     Other reaction(s): anaphylaxis/angioedema  Other reaction(s):  Other (see comments)  Other reaction(s): anaphylaxis/angioedema  Throat swelling    Aspirin Hives, Anaphylaxis and Shortness of Breath     Other reaction(s): anaphylaxis/angioedema  Other reaction(s): anaphylaxis/angioedema    Unable To Obtain Unable to Obtain     Patient states his allergic to greens    Aspirin Shortness of Breath    Spinach Leaf Unknown (comments)     Green leaves    Tylenol [Acetaminophen] Shortness of Breath

## 2021-09-16 NOTE — PATIENT INSTRUCTIONS
Diabetes:  Blood sugar goals:  Hemoglobin A1c under 7  Fasting blood sugar   Blood sugar 2 hours after a meal under 180, 4 hours after a meal under 120  No hypoglycemia (sugars under 70 and symptomatic low sugars)    Blood sugar control with diet and exercise:  Exercise 45 minutes per day. This makes your insulin work better. It also allows insulin levels to fall helping with weight loss. Every night, try to fast from your evening meal to breakfast (at least 12 hours) without eating anything. This uses stored energy in your liver and makes insulin work better. Avoid simples sugars such as table sugar in drinks (sodas, lemonade, sweet tea, wine), desserts, candy. Also avoid fruit juices and high fructose corn syrup. Avoid frequent consumption of fruit especially grapes and bananas. A single serving of fruit daily is all you should have. Finally, drink less milk which has milk sugar in it. Control your starch intake. Men should have 3-4 carb portions per meal.  Women should have 2-3 carb portions per meal.  A carb serving is the equivalent of a slice of bread. Control your blood pressure and cholesterol. These problems are common in diabetes. AND, don't smoke. All of these problems contribute to heart disease and stroke risk. Get a yearly eye exam and flu shot. Make sure your vaccines are up to date particularly the pneumonia vaccines. Inspect your feet daily. Wear comfortable protective shoes and clean socks. Seek medical care if there are sore, calluses or numbness and burning of your feet. See your doctor at least every 6 months if you are on oral medicines or more often if the diabetic control is not at goal or if you are on insulin. Take your medicines religiously. STOP the SUGAR    The first step in dietary efforts at weight loss is removing as much sugar from the diet as possible.   Most dietary sugar is in the forms of table sugar (sucrose), fruit sugar (fructose) and milk sugar (lactose). But, you need to watch labels to see if processed foods have added sugars under other names. To eliminate sucrose, eliminate sweet drinks entirely including soft drinks, sports drinks, lemonade, sweet tea and wine. Also, eliminate candy and make desserts a \"special occasion only treat\". Don't eat desserts with every meal or every night. Most fructose is found in fruit and this should be markedly limited. Fruit juice should be avoided. If you do eat fruit, eat fruits that are lower in sugar such as: strawberries, blackberries, raspberries, lemon, grapefruit and watermelon. Eat small portions and think of the fruit as a garnish or small treat. Bananas, mangos, cherries and grapes are higher in sugar and should be avoided. Avoid high fructose corn syrup (an artificial sweetener). Milk sugar, or lactose, should be avoided as well. Milk is a good source of calcium but not for people struggling with weight issues. Put a little cream in your coffee or tea but otherwise avoid milk. How can you avoid sugar? For starters, don't buy it and don't bring it in the house. It won't tempt you that way! For more information:    Try the internet for videos about sugar addiction or try diet [employer]. Hypoglycemia: Care Instructions  Overview     Hypoglycemia means that your blood sugar is low and your body is not getting enough fuel. Some people get low blood sugar from not eating often enough. Some medicines to treat diabetes can cause low blood sugar. People who have had surgery on their stomachs or intestines may get hypoglycemia. Problems with the pancreas, kidneys, or liver also can cause low blood sugar. A snack or drink with sugar in it will raise your blood sugar and should ease your symptoms right away. Your doctor may recommend that you change or stop your medicines until you can get your blood sugar levels under control.  In the long run, you may need to change your diet and eating habits so that you get enough fuel for your body throughout the day. Follow-up care is a key part of your treatment and safety. Be sure to make and go to all appointments, and call your doctor if you are having problems. It's also a good idea to know your test results and keep a list of the medicines you take. How can you care for yourself at home? · Learn your signs of low blood sugar. They are different for everyone. Some common early signs include:  ? Nausea. ? Hunger. ? Feeling nervous, irritable, or shaky. ? Cold, clammy skin. ? Sweating (when you're not exercising). · Use the \"rule of 15\" to treat low blood sugar. This includes eating 15 grams of carbohydrate from a quick-sugar food, such as 3 or 4 glucose tablets or ½ cup of juice. Wait 15 minutes and check your blood sugar. If it is still below 70 mg/dL, eat another 15 grams of carbohydrate. Repeat this every 15 minutes until your blood sugar is in a safe target range. · Once your blood sugar is in a safe range, eat a snack or meal to prevent recurrent low blood sugar. · Make sure family, friends, and coworkers know the symptoms of low blood sugar and know how to get your sugar level up. · If you were prescribed a glucagon kit, always have it with you. Make sure friends and family know how to use it. When should you call for help? Call 911 anytime you think you may need emergency care. For example, call if:    · You passed out (lost consciousness).     · You are confused or cannot think clearly.     · Your blood sugar is very high or very low. Watch closely for changes in your health, and be sure to contact your doctor if:    · Your blood sugar stays outside the level your doctor set for you.     · You have any problems. Where can you learn more? Go to http://www.[employer].com/  Enter R955 in the search box to learn more about \"Hypoglycemia: Care Instructions. \"  Current as of: August 31, 2020               Content Version: 13.0  © 1982-4475 [employer]. Care instructions adapted under license by [employer] (which disclaims liability or warranty for this information). If you have questions about a medical condition or this instruction, always ask your healthcare professional. [address] any warranty or liability for your use of this information.

## 2021-10-27 DIAGNOSIS — E11.65 TYPE 2 DIABETES MELLITUS WITH HYPERGLYCEMIA, WITH LONG-TERM CURRENT USE OF INSULIN (HCC): ICD-10-CM

## 2021-10-27 DIAGNOSIS — Z79.4 TYPE 2 DIABETES MELLITUS WITH HYPERGLYCEMIA, WITH LONG-TERM CURRENT USE OF INSULIN (HCC): ICD-10-CM

## 2021-10-27 DIAGNOSIS — I25.10 CORONARY ARTERY DISEASE INVOLVING NATIVE CORONARY ARTERY OF NATIVE HEART WITHOUT ANGINA PECTORIS: ICD-10-CM

## 2021-10-28 RX ORDER — CLOPIDOGREL BISULFATE 75 MG/1
75 TABLET ORAL DAILY
Qty: 30 TABLET | Refills: 5 | Status: SHIPPED | OUTPATIENT
Start: 2021-10-28 | End: 2022-07-11

## 2021-10-28 RX ORDER — INSULIN GLARGINE 100 [IU]/ML
26 INJECTION, SOLUTION SUBCUTANEOUS DAILY
Qty: 12 ADJUSTABLE DOSE PRE-FILLED PEN SYRINGE | Refills: 1 | Status: SHIPPED | OUTPATIENT
Start: 2021-10-28 | End: 2021-11-10 | Stop reason: ALTCHOICE

## 2021-11-10 ENCOUNTER — OFFICE VISIT (OUTPATIENT)
Dept: FAMILY MEDICINE CLINIC | Age: 51
End: 2021-11-10
Payer: COMMERCIAL

## 2021-11-10 VITALS
BODY MASS INDEX: 27.16 KG/M2 | HEART RATE: 108 BPM | OXYGEN SATURATION: 98 % | HEIGHT: 65 IN | TEMPERATURE: 98 F | SYSTOLIC BLOOD PRESSURE: 125 MMHG | WEIGHT: 163 LBS | RESPIRATION RATE: 18 BRPM | DIASTOLIC BLOOD PRESSURE: 88 MMHG

## 2021-11-10 DIAGNOSIS — Z79.4 TYPE 2 DIABETES MELLITUS WITH HYPERGLYCEMIA, WITH LONG-TERM CURRENT USE OF INSULIN (HCC): Primary | ICD-10-CM

## 2021-11-10 DIAGNOSIS — E11.65 TYPE 2 DIABETES MELLITUS WITH HYPERGLYCEMIA, WITH LONG-TERM CURRENT USE OF INSULIN (HCC): Primary | ICD-10-CM

## 2021-11-10 DIAGNOSIS — M54.6 ACUTE MIDLINE THORACIC BACK PAIN: ICD-10-CM

## 2021-11-10 PROCEDURE — 99214 OFFICE O/P EST MOD 30 MIN: CPT | Performed by: FAMILY MEDICINE

## 2021-11-10 RX ORDER — TRAMADOL HYDROCHLORIDE 50 MG/1
50 TABLET ORAL
Qty: 15 TABLET | Refills: 0 | Status: SHIPPED | OUTPATIENT
Start: 2021-11-10 | End: 2021-11-15

## 2021-11-10 RX ORDER — INSULIN GLARGINE 100 [IU]/ML
INJECTION, SOLUTION SUBCUTANEOUS
Qty: 30 ML | Refills: 1 | Status: SHIPPED | OUTPATIENT
Start: 2021-11-10 | End: 2021-12-27

## 2021-11-10 NOTE — PROGRESS NOTES
Luis Alberto Remy  54 y.o. male  1970  [id number]  [facility] MEDICINE  Progress Note     Encounter Date: 11/10/2021    Assessment and Plan:     Encounter Diagnoses     ICD-10-CM ICD-9-CM   1. Type 2 diabetes mellitus with hyperglycemia, with long-term current use of insulin (HCC)  E11.65 250.00    Z79.4 790.29     V58.67   2. Acute midline thoracic back pain  M54.6 724.1       1. Type 2 diabetes mellitus with hyperglycemia, with long-term current use of insulin (HCC)  Refilled meds  Control currently appears good  - liraglutide (VICTOZA) 0.6 mg/0.1 mL (18 mg/3 mL) pnij; 1.8 mg by SubCUTAneous route daily. Dispense: 10 Adjustable Dose Pre-filled Pen Syringe; Refill: 1  - insulin glargine (LANTUS) 100 unit/mL injection; 26 units subcutaneously daily. Dispense: 30 mL; Refill: 1    2. Acute midline thoracic back pain  Appears in mild or moderate pain currently. States he cannot get xrays today. Fall prevention discussed. Also discussed that if he needs chronic pain meds will needs to see pain clinic.  - traMADoL (ULTRAM) 50 mg tablet; Take 1 Tablet by mouth every six (6) hours as needed for Pain for up to 5 days. Max Daily Amount: 200 mg. Dispense: 15 Tablet; Refill: 0  - XR SPINE THORAC 3 V; Future      I have discussed the diagnosis with the patient and the intended plan as seen in the above orders. he has expressed understanding. The patient has received an after-visit summary and questions were answered concerning future plans. I have discussed medication side effects and warnings with the patient as well. Electronically Signed: Yumiko Fonseca MD    Current Medications after this visit     Current Outpatient Medications   Medication Sig    liraglutide (VICTOZA) 0.6 mg/0.1 mL (18 mg/3 mL) pnij 1.8 mg by SubCUTAneous route daily.  insulin glargine (LANTUS) 100 unit/mL injection 26 units subcutaneously daily.     traMADoL (ULTRAM) 50 mg tablet Take 1 Tablet by mouth every six (6) hours as needed for Pain for up to 5 days. Max Daily Amount: 200 mg.  clopidogreL (PLAVIX) 75 mg tab Take 1 Tablet by mouth daily.  cyclobenzaprine (FLEXERIL) 10 mg tablet Take 1 Tablet by mouth three (3) times daily as needed for Muscle Spasm(s).  albuterol (PROVENTIL HFA, VENTOLIN HFA, PROAIR HFA) 90 mcg/actuation inhaler Take 2 Puffs by inhalation every four (4) hours as needed for Wheezing or Shortness of Breath.  hydrOXYzine HCL (ATARAX) 25 mg tablet Take two to three tablets by mouth at bedtime for sleep.  furosemide (LASIX) 40 mg tablet Take 1 Tablet by mouth daily.  losartan (COZAAR) 25 mg tablet Take 0.5 Tabs by mouth daily.  atorvastatin (LIPITOR) 80 mg tablet Take 1 Tab by mouth daily.  pantoprazole (PROTONIX) 40 mg tablet Take 1 Tab by mouth Daily (before breakfast). No current facility-administered medications for this visit. Medications Discontinued During This Encounter   Medication Reason    insulin glargine (LANTUS,BASAGLAR) 100 unit/mL (3 mL) inpn Alternate Therapy    liraglutide (VICTOZA) 0.6 mg/0.1 mL (18 mg/3 mL) pnij REORDER     ~~~~~~~~~~~~~~~~~~~~~~~~~~~~~~~~~~~~~~~~~~~~~~~~~~~~~~~~~~~    Chief Complaint   Patient presents with   Nelia Bejarano of the roof this past Saturday hurt back     Diabetes    Medication Refill       History provided by patient  History of Present Illness   Sobia Chowdhury is a 54 y.o. male who presents to clinic today for:  Fall (Nellilinda Horton of the roof this past Saturday hurt back ), Diabetes, and Medication Refill    DM2  Checks sugar BID  Fasting 120  Low 78 (he could feel that)  Evenings usually 159  Still on glargine 26 units  Still on Victoza,    Hypertension  At goal  Takes meds consistently    Nelli Sol from roof   Two stories. Landed on back  Did not go to ER>  Stayed on couch.   Hurts mid thoracic back  No pain legs, no incontinence    Health Maintenance  Will do at future visit  Health Maintenance Due   Topic Date Due    Hepatitis C Screening  Never done    DTaP/Tdap/Td series (1 - Tdap) Never done    Colorectal Cancer Screening Combo  Never done    A1C test (Diabetic or Prediabetic)  08/06/2021    Foot Exam Q1  08/11/2021    MICROALBUMIN Q1  08/11/2021    Flu Vaccine (1) Never done    COVID-19 Vaccine (2 - Moderna 2-dose series) 09/03/2021     Review of Systems   Review of Systems   Constitutional: Negative for chills, fever and weight loss. Respiratory: Negative for cough and shortness of breath. Cardiovascular: Negative for chest pain and leg swelling. Gastrointestinal: Negative. Genitourinary: Negative. Musculoskeletal: Positive for back pain and falls. Neurological: Negative for weakness. Psychiatric/Behavioral: Negative. Vitals/Objective:     Vitals:    11/10/21 0940   BP: 125/88   Pulse: (!) 108   Resp: 18   Temp: 98 °F (36.7 °C)   TempSrc: Temporal   SpO2: 98%   Weight: 163 lb (73.9 kg)   Height: 5' 5\" (1.651 m)     Body mass index is 27.12 kg/m². Wt Readings from Last 3 Encounters:   11/10/21 163 lb (73.9 kg)   09/16/21 164 lb 12.8 oz (74.8 kg)   08/16/21 163 lb 3.2 oz (74 kg)         Objective  Physical Exam  Vitals and nursing note reviewed. Constitutional:       Appearance: Normal appearance. He is not toxic-appearing. HENT:      Head: Normocephalic and atraumatic. Cardiovascular:      Rate and Rhythm: Normal rate and regular rhythm. Heart sounds: Normal heart sounds. No murmur heard. No gallop. Pulmonary:      Effort: Pulmonary effort is normal. No respiratory distress. Breath sounds: Normal breath sounds. No wheezing, rhonchi or rales. Musculoskeletal:      Cervical back: No muscular tenderness. Lymphadenopathy:      Cervical: No cervical adenopathy. Neurological:      Mental Status: He is alert. Psychiatric:         Mood and Affect: Mood normal.         Behavior: Behavior normal.         Thought Content:  Thought content normal.         Judgment: Judgment normal.       BACK EXAM:   Inspection: normal skin, soft tissue and bony appearance with normal cervical and lumbar lordotic curve, no gross edema or evidence of acute injury;   Palpation: Mildly tender to palpation T12-L1 area, worse with all ranges of motion in that area  Neurovascular: deep tendon reflexes: 2/4 left patellar,  2/4 right patellar,  2/4 left Achilles,  2/4 right Achilles, Symmetric Reflexes without clonus;   Muscular Strength: 5/5 bilateral quadriceps;  5/5 bilateral tibialis anterior;  5/5 bilateral extensor hallucis longus;  5/5 bilateral extensor digitorum longus and brevis;  5/5 bilateral peroneus longus and brevis;  5/5 bilateral gastrocnemius-soleus;   Range of Motion: decreased in all motions in T12, high lumbar area  Maneuvers:   (-) bilateral straight leg raise     Full Range of motion of both hips without pain or limitation    No results found for this or any previous visit (from the past 24 hour(s)). Disposition     Follow-up and Dispositions  ·   Return in about 4 months (around 3/10/2022) for Blood pressure follow up, Diabetes follow up, Medication follow up. No future appointments. History   Patient's past medical, surgical and family histories were reviewed and updated. Past Medical History:   Diagnosis Date    Anxiety disorder     Asthma     Bipolar 1 disorder ([address]     CAD (coronary artery disease)     MI x2 with 2 cardiac stent    CHF (congestive heart failure) ([address]     with reduced EF, EF 20%    Chronic systolic congestive heart failure ([address] 12/2/2020    Depression     Diabetes mellitus, type 2 ([address]     on insulin.  moderate proteinuria    Hypertension     Mood disorder (HCC)     Psychotic disorder (HCC)     Schizoaffective disorder, bipolar type ([address]     Sleep disorder     Suicidal thoughts      Past Surgical History:   Procedure Laterality Date    HX CORONARY STENT PLACEMENT      HX IMPLANTABLE CARDIOVERTER DEFIBRILLATOR       Family History Problem Relation Age of Onset    Heart Attack Mother     Hypertension Mother     Diabetes Mother     Heart Attack Father     Hypertension Father     Diabetes Father     Depression Neg Hx     Suicide Neg Hx     Psychotic Disorder Neg Hx     Dementia Neg Hx     Substance Abuse Neg Hx      Social History     Tobacco Use    Smoking status: Former Smoker     Packs/day: 1.00     Types: Cigarettes    Smokeless tobacco: Never Used   Substance Use Topics    Alcohol use: Not Currently     Comment: occasionally    Drug use: Not Currently     Types: Marijuana     Comment: rare       Allergies     Allergies   Allergen Reactions    Acetaminophen Unknown (comments), Anaphylaxis and Shortness of Breath     Other reaction(s): anaphylaxis/angioedema  Other reaction(s):  Other (see comments)  Other reaction(s): anaphylaxis/angioedema  Throat swelling    Aspirin Hives, Anaphylaxis and Shortness of Breath     Other reaction(s): anaphylaxis/angioedema  Other reaction(s): anaphylaxis/angioedema    Unable To Obtain Unable to Obtain     Patient states his allergic to greens    Aspirin Shortness of Breath    Spinach Leaf Unknown (comments)     Green leaves    Tylenol [Acetaminophen] Shortness of Breath

## 2021-12-27 ENCOUNTER — TELEPHONE (OUTPATIENT)
Dept: FAMILY MEDICINE CLINIC | Age: 51
End: 2021-12-27

## 2021-12-27 ENCOUNTER — APPOINTMENT (OUTPATIENT)
Dept: GENERAL RADIOLOGY | Age: 51
DRG: 190 | End: 2021-12-27
Attending: EMERGENCY MEDICINE
Payer: COMMERCIAL

## 2021-12-27 ENCOUNTER — HOSPITAL ENCOUNTER (INPATIENT)
Age: 51
LOS: 2 days | Discharge: HOME | DRG: 190 | End: 2021-12-29
Attending: EMERGENCY MEDICINE | Admitting: FAMILY MEDICINE
Payer: COMMERCIAL

## 2021-12-27 ENCOUNTER — APPOINTMENT (OUTPATIENT)
Dept: CT IMAGING | Age: 51
DRG: 190 | End: 2021-12-27
Attending: EMERGENCY MEDICINE
Payer: COMMERCIAL

## 2021-12-27 DIAGNOSIS — I21.4 NSTEMI (NON-ST ELEVATED MYOCARDIAL INFARCTION) (HCC): Primary | ICD-10-CM

## 2021-12-27 DIAGNOSIS — Z79.4 TYPE 2 DIABETES MELLITUS WITH HYPERGLYCEMIA, WITH LONG-TERM CURRENT USE OF INSULIN (HCC): ICD-10-CM

## 2021-12-27 DIAGNOSIS — R07.9 CHEST PAIN: ICD-10-CM

## 2021-12-27 DIAGNOSIS — I25.10 CORONARY ARTERY DISEASE INVOLVING NATIVE CORONARY ARTERY OF NATIVE HEART, UNSPECIFIED WHETHER ANGINA PRESENT: ICD-10-CM

## 2021-12-27 DIAGNOSIS — F31.9 BIPOLAR 1 DISORDER (HCC): ICD-10-CM

## 2021-12-27 DIAGNOSIS — I24.9 ACS (ACUTE CORONARY SYNDROME) (HCC): ICD-10-CM

## 2021-12-27 DIAGNOSIS — I10 PRIMARY HYPERTENSION: ICD-10-CM

## 2021-12-27 DIAGNOSIS — I50.23 SYSTOLIC CHF, ACUTE ON CHRONIC (HCC): ICD-10-CM

## 2021-12-27 DIAGNOSIS — E11.21 TYPE 2 DIABETES WITH NEPHROPATHY (HCC): ICD-10-CM

## 2021-12-27 DIAGNOSIS — I25.9 CHEST PAIN DUE TO MYOCARDIAL ISCHEMIA, UNSPECIFIED ISCHEMIC CHEST PAIN TYPE: ICD-10-CM

## 2021-12-27 DIAGNOSIS — E11.65 TYPE 2 DIABETES MELLITUS WITH HYPERGLYCEMIA, WITH LONG-TERM CURRENT USE OF INSULIN (HCC): ICD-10-CM

## 2021-12-27 PROBLEM — I50.21 ACUTE HFREF (HEART FAILURE WITH REDUCED EJECTION FRACTION) (HCC): Status: ACTIVE | Noted: 2021-08-08

## 2021-12-27 LAB
ALBUMIN SERPL-MCNC: 2.4 G/DL (ref 3.5–5)
ALBUMIN/GLOB SERPL: 0.6 {RATIO} (ref 1.1–2.2)
ALP SERPL-CCNC: 89 U/L (ref 45–117)
ALT SERPL-CCNC: 42 U/L (ref 12–78)
ANION GAP SERPL CALC-SCNC: 8 MMOL/L (ref 5–15)
APTT PPP: 25.9 SEC (ref 22.1–31)
AST SERPL-CCNC: 16 U/L (ref 15–37)
BASOPHILS # BLD: 0.1 K/UL (ref 0–0.1)
BASOPHILS NFR BLD: 1 % (ref 0–1)
BILIRUB SERPL-MCNC: 1.2 MG/DL (ref 0.2–1)
BNP SERPL-MCNC: 3918 PG/ML
BUN SERPL-MCNC: 21 MG/DL (ref 6–20)
BUN/CREAT SERPL: 16 (ref 12–20)
CALCIUM SERPL-MCNC: 8.4 MG/DL (ref 8.5–10.1)
CHLORIDE SERPL-SCNC: 110 MMOL/L (ref 97–108)
CO2 SERPL-SCNC: 23 MMOL/L (ref 21–32)
COMMENT, HOLDF: NORMAL
CREAT SERPL-MCNC: 1.33 MG/DL (ref 0.7–1.3)
D DIMER PPP FEU-MCNC: 0.71 MG/L FEU (ref 0–0.65)
DIFFERENTIAL METHOD BLD: ABNORMAL
EOSINOPHIL # BLD: 0.2 K/UL (ref 0–0.4)
EOSINOPHIL NFR BLD: 3 % (ref 0–7)
ERYTHROCYTE [DISTWIDTH] IN BLOOD BY AUTOMATED COUNT: 16.5 % (ref 11.5–14.5)
GLOBULIN SER CALC-MCNC: 3.8 G/DL (ref 2–4)
GLUCOSE BLD STRIP.AUTO-MCNC: 63 MG/DL (ref 65–117)
GLUCOSE BLD STRIP.AUTO-MCNC: 98 MG/DL (ref 65–117)
GLUCOSE SERPL-MCNC: 196 MG/DL (ref 65–100)
HCT VFR BLD AUTO: 40.9 % (ref 36.6–50.3)
HGB BLD-MCNC: 12.9 G/DL (ref 12.1–17)
IMM GRANULOCYTES # BLD AUTO: 0.1 K/UL (ref 0–0.04)
IMM GRANULOCYTES NFR BLD AUTO: 1 % (ref 0–0.5)
LYMPHOCYTES # BLD: 0.9 K/UL (ref 0.8–3.5)
LYMPHOCYTES NFR BLD: 14 % (ref 12–49)
MCH RBC QN AUTO: 28.1 PG (ref 26–34)
MCHC RBC AUTO-ENTMCNC: 31.5 G/DL (ref 30–36.5)
MCV RBC AUTO: 89.1 FL (ref 80–99)
MONOCYTES # BLD: 0.7 K/UL (ref 0–1)
MONOCYTES NFR BLD: 11 % (ref 5–13)
NEUTS SEG # BLD: 4.9 K/UL (ref 1.8–8)
NEUTS SEG NFR BLD: 70 % (ref 32–75)
NRBC # BLD: 0 K/UL (ref 0–0.01)
NRBC BLD-RTO: 0 PER 100 WBC
PLATELET # BLD AUTO: 331 K/UL (ref 150–400)
PMV BLD AUTO: 9.6 FL (ref 8.9–12.9)
POTASSIUM SERPL-SCNC: 3.8 MMOL/L (ref 3.5–5.1)
PROT SERPL-MCNC: 6.2 G/DL (ref 6.4–8.2)
RBC # BLD AUTO: 4.59 M/UL (ref 4.1–5.7)
SAMPLES BEING HELD,HOLD: NORMAL
SERVICE CMNT-IMP: ABNORMAL
SERVICE CMNT-IMP: NORMAL
SODIUM SERPL-SCNC: 141 MMOL/L (ref 136–145)
THERAPEUTIC RANGE,PTTT: NORMAL SECS (ref 58–77)
TROPONIN-HIGH SENSITIVITY: 1038 NG/L (ref 0–76)
TROPONIN-HIGH SENSITIVITY: 1099 NG/L (ref 0–76)
WBC # BLD AUTO: 6.8 K/UL (ref 4.1–11.1)

## 2021-12-27 PROCEDURE — 99284 EMERGENCY DEPT VISIT MOD MDM: CPT

## 2021-12-27 PROCEDURE — 85730 THROMBOPLASTIN TIME PARTIAL: CPT

## 2021-12-27 PROCEDURE — 85025 COMPLETE CBC W/AUTO DIFF WBC: CPT

## 2021-12-27 PROCEDURE — 74011250636 HC RX REV CODE- 250/636: Performed by: STUDENT IN AN ORGANIZED HEALTH CARE EDUCATION/TRAINING PROGRAM

## 2021-12-27 PROCEDURE — 84484 ASSAY OF TROPONIN QUANT: CPT

## 2021-12-27 PROCEDURE — 99223 1ST HOSP IP/OBS HIGH 75: CPT | Performed by: FAMILY MEDICINE

## 2021-12-27 PROCEDURE — 93005 ELECTROCARDIOGRAM TRACING: CPT

## 2021-12-27 PROCEDURE — 65660000000 HC RM CCU STEPDOWN

## 2021-12-27 PROCEDURE — 96374 THER/PROPH/DIAG INJ IV PUSH: CPT

## 2021-12-27 PROCEDURE — 71275 CT ANGIOGRAPHY CHEST: CPT

## 2021-12-27 PROCEDURE — 74011250637 HC RX REV CODE- 250/637: Performed by: STUDENT IN AN ORGANIZED HEALTH CARE EDUCATION/TRAINING PROGRAM

## 2021-12-27 PROCEDURE — 71046 X-RAY EXAM CHEST 2 VIEWS: CPT

## 2021-12-27 PROCEDURE — 85379 FIBRIN DEGRADATION QUANT: CPT

## 2021-12-27 PROCEDURE — 83880 ASSAY OF NATRIURETIC PEPTIDE: CPT

## 2021-12-27 PROCEDURE — 94761 N-INVAS EAR/PLS OXIMETRY MLT: CPT

## 2021-12-27 PROCEDURE — 74011000636 HC RX REV CODE- 636: Performed by: RADIOLOGY

## 2021-12-27 PROCEDURE — 82962 GLUCOSE BLOOD TEST: CPT

## 2021-12-27 PROCEDURE — 74011250636 HC RX REV CODE- 250/636: Performed by: EMERGENCY MEDICINE

## 2021-12-27 PROCEDURE — 36415 COLL VENOUS BLD VENIPUNCTURE: CPT

## 2021-12-27 PROCEDURE — 80053 COMPREHEN METABOLIC PANEL: CPT

## 2021-12-27 RX ORDER — ONDANSETRON 4 MG/1
4 TABLET, ORALLY DISINTEGRATING ORAL
Status: DISCONTINUED | OUTPATIENT
Start: 2021-12-27 | End: 2021-12-29 | Stop reason: HOSPADM

## 2021-12-27 RX ORDER — CARVEDILOL 3.12 MG/1
3.12 TABLET ORAL 2 TIMES DAILY WITH MEALS
COMMUNITY
End: 2021-12-29

## 2021-12-27 RX ORDER — SODIUM CHLORIDE 0.9 % (FLUSH) 0.9 %
5-40 SYRINGE (ML) INJECTION AS NEEDED
Status: DISCONTINUED | OUTPATIENT
Start: 2021-12-27 | End: 2021-12-29 | Stop reason: HOSPADM

## 2021-12-27 RX ORDER — CYCLOBENZAPRINE HCL 10 MG
10 TABLET ORAL
Status: DISCONTINUED | OUTPATIENT
Start: 2021-12-27 | End: 2021-12-29 | Stop reason: HOSPADM

## 2021-12-27 RX ORDER — ALBUTEROL SULFATE 0.83 MG/ML
2.5 SOLUTION RESPIRATORY (INHALATION)
Status: DISCONTINUED | OUTPATIENT
Start: 2021-12-27 | End: 2021-12-29 | Stop reason: HOSPADM

## 2021-12-27 RX ORDER — INSULIN GLARGINE 100 [IU]/ML
15 INJECTION, SOLUTION SUBCUTANEOUS 2 TIMES DAILY
Status: DISCONTINUED | OUTPATIENT
Start: 2021-12-27 | End: 2021-12-27

## 2021-12-27 RX ORDER — NITROGLYCERIN 0.4 MG/1
0.4 TABLET SUBLINGUAL AS NEEDED
Status: DISCONTINUED | OUTPATIENT
Start: 2021-12-27 | End: 2021-12-29 | Stop reason: HOSPADM

## 2021-12-27 RX ORDER — HEPARIN SODIUM 5000 [USP'U]/ML
4000 INJECTION, SOLUTION INTRAVENOUS; SUBCUTANEOUS ONCE
Status: DISCONTINUED | OUTPATIENT
Start: 2021-12-27 | End: 2021-12-27

## 2021-12-27 RX ORDER — ATORVASTATIN CALCIUM 20 MG/1
80 TABLET, FILM COATED ORAL DAILY
Status: DISCONTINUED | OUTPATIENT
Start: 2021-12-28 | End: 2021-12-29 | Stop reason: HOSPADM

## 2021-12-27 RX ORDER — MAGNESIUM SULFATE 100 %
4 CRYSTALS MISCELLANEOUS AS NEEDED
Status: DISCONTINUED | OUTPATIENT
Start: 2021-12-27 | End: 2021-12-29 | Stop reason: HOSPADM

## 2021-12-27 RX ORDER — INSULIN LISPRO 100 [IU]/ML
INJECTION, SOLUTION INTRAVENOUS; SUBCUTANEOUS
Status: DISCONTINUED | OUTPATIENT
Start: 2021-12-27 | End: 2021-12-29 | Stop reason: HOSPADM

## 2021-12-27 RX ORDER — INSULIN GLARGINE 100 [IU]/ML
25 INJECTION, SOLUTION SUBCUTANEOUS 2 TIMES DAILY
Status: DISCONTINUED | OUTPATIENT
Start: 2021-12-27 | End: 2021-12-27

## 2021-12-27 RX ORDER — SODIUM CHLORIDE 0.9 % (FLUSH) 0.9 %
5-40 SYRINGE (ML) INJECTION EVERY 8 HOURS
Status: DISCONTINUED | OUTPATIENT
Start: 2021-12-27 | End: 2021-12-29 | Stop reason: HOSPADM

## 2021-12-27 RX ORDER — HEPARIN SODIUM 1000 [USP'U]/ML
4000 INJECTION, SOLUTION INTRAVENOUS; SUBCUTANEOUS ONCE
Status: DISCONTINUED | OUTPATIENT
Start: 2021-12-27 | End: 2021-12-27

## 2021-12-27 RX ORDER — PANTOPRAZOLE SODIUM 40 MG/1
40 TABLET, DELAYED RELEASE ORAL
Status: DISCONTINUED | OUTPATIENT
Start: 2021-12-28 | End: 2021-12-29 | Stop reason: HOSPADM

## 2021-12-27 RX ORDER — FUROSEMIDE 10 MG/ML
40 INJECTION INTRAMUSCULAR; INTRAVENOUS DAILY
Status: DISCONTINUED | OUTPATIENT
Start: 2021-12-27 | End: 2021-12-29 | Stop reason: HOSPADM

## 2021-12-27 RX ORDER — MORPHINE SULFATE 2 MG/ML
1 INJECTION, SOLUTION INTRAMUSCULAR; INTRAVENOUS
Status: DISCONTINUED | OUTPATIENT
Start: 2021-12-27 | End: 2021-12-29 | Stop reason: HOSPADM

## 2021-12-27 RX ORDER — POLYETHYLENE GLYCOL 3350 17 G/17G
17 POWDER, FOR SOLUTION ORAL DAILY PRN
Status: DISCONTINUED | OUTPATIENT
Start: 2021-12-27 | End: 2021-12-29 | Stop reason: HOSPADM

## 2021-12-27 RX ORDER — MORPHINE SULFATE 4 MG/ML
4 INJECTION INTRAVENOUS
Status: COMPLETED | OUTPATIENT
Start: 2021-12-27 | End: 2021-12-27

## 2021-12-27 RX ORDER — CLOPIDOGREL BISULFATE 75 MG/1
75 TABLET ORAL DAILY
Status: DISCONTINUED | OUTPATIENT
Start: 2021-12-28 | End: 2021-12-29 | Stop reason: HOSPADM

## 2021-12-27 RX ORDER — TRAZODONE HYDROCHLORIDE 50 MG/1
100 TABLET ORAL
COMMUNITY

## 2021-12-27 RX ORDER — HYDROXYZINE HYDROCHLORIDE 10 MG/1
10 TABLET, FILM COATED ORAL
Status: DISCONTINUED | OUTPATIENT
Start: 2021-12-27 | End: 2021-12-29 | Stop reason: HOSPADM

## 2021-12-27 RX ORDER — HEPARIN SODIUM 10000 [USP'U]/100ML
12-25 INJECTION, SOLUTION INTRAVENOUS
Status: DISCONTINUED | OUTPATIENT
Start: 2021-12-27 | End: 2021-12-28

## 2021-12-27 RX ORDER — INSULIN GLARGINE 100 [IU]/ML
20 INJECTION, SOLUTION SUBCUTANEOUS
Status: DISCONTINUED | OUTPATIENT
Start: 2021-12-27 | End: 2021-12-28

## 2021-12-27 RX ORDER — INSULIN GLARGINE 100 [IU]/ML
50 INJECTION, SOLUTION SUBCUTANEOUS 2 TIMES DAILY
Status: ON HOLD | COMMUNITY
End: 2021-12-29 | Stop reason: SDUPTHER

## 2021-12-27 RX ORDER — HEPARIN SODIUM 1000 [USP'U]/ML
4000 INJECTION, SOLUTION INTRAVENOUS; SUBCUTANEOUS ONCE
Status: COMPLETED | OUTPATIENT
Start: 2021-12-27 | End: 2021-12-27

## 2021-12-27 RX ORDER — HEPARIN SODIUM 10000 [USP'U]/100ML
12-25 INJECTION, SOLUTION INTRAVENOUS
Status: DISCONTINUED | OUTPATIENT
Start: 2021-12-27 | End: 2021-12-27

## 2021-12-27 RX ORDER — DEXTROSE 50 % IN WATER (D50W) INTRAVENOUS SYRINGE
12.5-25 AS NEEDED
Status: DISCONTINUED | OUTPATIENT
Start: 2021-12-27 | End: 2021-12-29 | Stop reason: HOSPADM

## 2021-12-27 RX ORDER — ONDANSETRON 2 MG/ML
4 INJECTION INTRAMUSCULAR; INTRAVENOUS
Status: DISCONTINUED | OUTPATIENT
Start: 2021-12-27 | End: 2021-12-29 | Stop reason: HOSPADM

## 2021-12-27 RX ORDER — CARVEDILOL 3.12 MG/1
3.12 TABLET ORAL 2 TIMES DAILY WITH MEALS
Status: DISCONTINUED | OUTPATIENT
Start: 2021-12-27 | End: 2021-12-29

## 2021-12-27 RX ADMIN — CYCLOBENZAPRINE 10 MG: 10 TABLET, FILM COATED ORAL at 16:56

## 2021-12-27 RX ADMIN — HEPARIN SODIUM 12 UNITS/KG/HR: 10000 INJECTION, SOLUTION INTRAVENOUS at 17:14

## 2021-12-27 RX ADMIN — FUROSEMIDE 40 MG: 10 INJECTION, SOLUTION INTRAMUSCULAR; INTRAVENOUS at 16:58

## 2021-12-27 RX ADMIN — MORPHINE SULFATE 4 MG: 4 INJECTION INTRAVENOUS at 14:04

## 2021-12-27 RX ADMIN — IOPAMIDOL 100 ML: 755 INJECTION, SOLUTION INTRAVENOUS at 15:07

## 2021-12-27 RX ADMIN — ONDANSETRON 4 MG: 2 INJECTION INTRAMUSCULAR; INTRAVENOUS at 17:16

## 2021-12-27 RX ADMIN — CARVEDILOL 3.12 MG: 3.12 TABLET, FILM COATED ORAL at 16:56

## 2021-12-27 RX ADMIN — MORPHINE SULFATE 1 MG: 2 INJECTION, SOLUTION INTRAMUSCULAR; INTRAVENOUS at 17:15

## 2021-12-27 RX ADMIN — HEPARIN SODIUM 4000 UNITS: 1000 INJECTION INTRAVENOUS; SUBCUTANEOUS at 16:59

## 2021-12-27 RX ADMIN — Medication 10 ML: at 17:21

## 2021-12-27 RX ADMIN — Medication 10 ML: at 23:34
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
## 2021-12-27 NOTE — PROGRESS NOTES
[facility]: MED RECONCILIATION    Comments/Recommendations:   Patient alert and oriented for medication reconciliation and had medication bottles at bedside, patient states all medications were present except insulin. Patient did not take any medications today prior to hospital arrival.   Confirmed preferred pharmacy as [facility] on [address]. Medications added:     Coreg-left filled June 2021 but patient confirms he is currently taking, states he had a back-log of medication at home  trazodone    Medications removed:    losartan    Medications adjusted:    Lantus 50 units BID adjusted from 26 units daily-confirmed insulin regimen twice with patient as significant dose increase per outpatient notes from PCP    Information obtained from: patient, medication bottles, Rx query, outpatient notes    Allergies: Acetaminophen, Aspirin, Unable to obtain, Aspirin, Spinach leaf, and Tylenol [acetaminophen]    Prior to Admission Medications:     Medication Documentation Review Audit       Reviewed by Manasa Lamb (Pharmacist) on 12/27/21 at 1609      Medication Sig Documenting Provider Last Dose Status Taking? albuterol (PROVENTIL HFA, VENTOLIN HFA, PROAIR HFA) 90 mcg/actuation inhaler Take 2 Puffs by inhalation every four (4) hours as needed for Wheezing or Shortness of Breath. Tara Burnett MD  Active Yes   atorvastatin (LIPITOR) 80 mg tablet Take 1 Tab by mouth daily. Tara Burnett MD 12/26/2021 Active Yes   carvediloL (Coreg) 3.125 mg tablet Take 3.125 mg by mouth two (2) times daily (with meals). Provider, Historical 12/26/2021 Active Yes           Med Note (MANASA AWAN   Mon Dec 27, 2021  4:09 PM) Rx bottle in room filled June 2021 but patient confirms he is still taking medication   clopidogreL (PLAVIX) 75 mg tab Take 1 Tablet by mouth daily.  Tara Burnett MD 12/26/2021 Active Yes   cyclobenzaprine (FLEXERIL) 10 mg tablet Take 1 Tablet by mouth three (3) times daily as needed for Muscle Spasm(s). Zohaib Pacheco MD  Active Yes   furosemide (LASIX) 40 mg tablet Take 1 Tablet by mouth daily. Zohaib Pacheco MD 12/26/2021 Active Yes   hydrOXYzine HCL (ATARAX) 25 mg tablet Take two to three tablets by mouth at bedtime for sleep. Zohaib Pacheco MD  Active Yes   insulin glargine (Lantus U-100 Insulin) 100 unit/mL injection 50 Units by SubCUTAneous route two (2) times a day. Provider, Historical 12/26/2021 Active Yes   liraglutide (VICTOZA) 0.6 mg/0.1 mL (18 mg/3 mL) pnij 1.8 mg by SubCUTAneous route daily. Zohaib Pacheco MD 12/26/2021 Active Yes   pantoprazole (PROTONIX) 40 mg tablet Take 1 Tab by mouth Daily (before breakfast). Zohaib Pacheco MD 12/26/2021 Active Yes   traZODone (DESYREL) 50 mg tablet Take 100 mg by mouth nightly.  Provider, Historical 12/26/2021 Active Yes                    Thank you,   Manasa Sanders, PharmD,BCPS   Contact: [phone]

## 2021-12-27 NOTE — Clinical Note
Sheath #1: Closed using Mynx and Hemostasis Pad. Site secured by transparent dressing. Pressure held for: 3 minutes.

## 2021-12-27 NOTE — TELEPHONE ENCOUNTER
I spoke to Mr. James. He has been having stomach cramps since yesterday. Had a bowel movement yesterday and this morning. Stool is green. No fever, has been vomiting. Told him to go to ER or an Urgent  [facility] since we only have 1 Dr this week and are booked. He agreed.

## 2021-12-27 NOTE — Clinical Note
TRANSFER - OUT REPORT:     Verbal report given to: (at bedside). Report consisted of patient's Situation, Background, Assessment and   Recommendations(SBAR). Opportunity for questions and clarification was provided. Patient transported with a Registered Nurse and [address] [place] [address] / [facility] [employer]. Patient transported to: recovery.
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
## 2021-12-27 NOTE — Clinical Note
Right femoral artery. Accessed successfully. Femoral access needle used. Using fluoro guidance.  Number of attempts =  1.

## 2021-12-27 NOTE — ED TRIAGE NOTES
Patient arrives via EMS with c/o left sided chest pain x 1 day. Denies pain radiating to other areas. Pain is sharp in nature, worse with inspiration. 10/10 on pain scale. Further reports left sided headache x 2 weeks. Denies any new numbness/tingling, vision change, new weakness. Hx of MI in May of 2021, Patient has Pacemaker. Hx of stroke with left sided deficit and slurred speech,     Hx of DM, asthma. Received 1 Nitro sublingual by EMS  Per EMS: BP: 190/110 and 186/84. B.

## 2021-12-28 LAB
ANION GAP SERPL CALC-SCNC: 10 MMOL/L (ref 5–15)
APTT PPP: 33 SEC (ref 22.1–31)
ATRIAL RATE: 118 BPM
BASOPHILS # BLD: 0.1 K/UL (ref 0–0.1)
BASOPHILS NFR BLD: 1 % (ref 0–1)
BUN SERPL-MCNC: 25 MG/DL (ref 6–20)
BUN/CREAT SERPL: 17 (ref 12–20)
CALCIUM SERPL-MCNC: 9.1 MG/DL (ref 8.5–10.1)
CALCULATED P AXIS, ECG09: 49 DEGREES
CALCULATED R AXIS, ECG10: -5 DEGREES
CALCULATED T AXIS, ECG11: 75 DEGREES
CHLORIDE SERPL-SCNC: 108 MMOL/L (ref 97–108)
CO2 SERPL-SCNC: 23 MMOL/L (ref 21–32)
CREAT SERPL-MCNC: 1.47 MG/DL (ref 0.7–1.3)
DIAGNOSIS, 93000: NORMAL
DIFFERENTIAL METHOD BLD: ABNORMAL
EOSINOPHIL # BLD: 0.1 K/UL (ref 0–0.4)
EOSINOPHIL NFR BLD: 2 % (ref 0–7)
ERYTHROCYTE [DISTWIDTH] IN BLOOD BY AUTOMATED COUNT: 17.2 % (ref 11.5–14.5)
GLUCOSE BLD STRIP.AUTO-MCNC: 153 MG/DL (ref 65–117)
GLUCOSE BLD STRIP.AUTO-MCNC: 167 MG/DL (ref 65–117)
GLUCOSE BLD STRIP.AUTO-MCNC: 69 MG/DL (ref 65–117)
GLUCOSE BLD STRIP.AUTO-MCNC: 73 MG/DL (ref 65–117)
GLUCOSE BLD STRIP.AUTO-MCNC: 80 MG/DL (ref 65–117)
GLUCOSE SERPL-MCNC: 104 MG/DL (ref 65–100)
HCT VFR BLD AUTO: 47.5 % (ref 36.6–50.3)
HGB BLD-MCNC: 14.8 G/DL (ref 12.1–17)
IMM GRANULOCYTES # BLD AUTO: 0.1 K/UL (ref 0–0.04)
IMM GRANULOCYTES NFR BLD AUTO: 1 % (ref 0–0.5)
LYMPHOCYTES # BLD: 0.7 K/UL (ref 0.8–3.5)
LYMPHOCYTES NFR BLD: 12 % (ref 12–49)
MAGNESIUM SERPL-MCNC: 2 MG/DL (ref 1.6–2.4)
MCH RBC QN AUTO: 28 PG (ref 26–34)
MCHC RBC AUTO-ENTMCNC: 31.2 G/DL (ref 30–36.5)
MCV RBC AUTO: 90 FL (ref 80–99)
MONOCYTES # BLD: 0.5 K/UL (ref 0–1)
MONOCYTES NFR BLD: 8 % (ref 5–13)
NEUTS SEG # BLD: 4.3 K/UL (ref 1.8–8)
NEUTS SEG NFR BLD: 76 % (ref 32–75)
NRBC # BLD: 0 K/UL (ref 0–0.01)
NRBC BLD-RTO: 0 PER 100 WBC
P-R INTERVAL, ECG05: 150 MS
PLATELET # BLD AUTO: 352 K/UL (ref 150–400)
PMV BLD AUTO: 9.9 FL (ref 8.9–12.9)
POTASSIUM SERPL-SCNC: 4.3 MMOL/L (ref 3.5–5.1)
Q-T INTERVAL, ECG07: 338 MS
QRS DURATION, ECG06: 80 MS
QTC CALCULATION (BEZET), ECG08: 473 MS
RBC # BLD AUTO: 5.28 M/UL (ref 4.1–5.7)
RBC MORPH BLD: ABNORMAL
SERVICE CMNT-IMP: ABNORMAL
SERVICE CMNT-IMP: ABNORMAL
SERVICE CMNT-IMP: NORMAL
SODIUM SERPL-SCNC: 141 MMOL/L (ref 136–145)
THERAPEUTIC RANGE,PTTT: ABNORMAL SECS (ref 58–77)
VENTRICULAR RATE, ECG03: 118 BPM
WBC # BLD AUTO: 5.8 K/UL (ref 4.1–11.1)

## 2021-12-28 PROCEDURE — 74011250636 HC RX REV CODE- 250/636: Performed by: FAMILY MEDICINE

## 2021-12-28 PROCEDURE — 74011636637 HC RX REV CODE- 636/637: Performed by: STUDENT IN AN ORGANIZED HEALTH CARE EDUCATION/TRAINING PROGRAM

## 2021-12-28 PROCEDURE — B211YZZ FLUOROSCOPY OF MULTIPLE CORONARY ARTERIES USING OTHER CONTRAST: ICD-10-PCS | Performed by: SPECIALIST

## 2021-12-28 PROCEDURE — 80048 BASIC METABOLIC PNL TOTAL CA: CPT

## 2021-12-28 PROCEDURE — 77030003390 HC NDL ANGI MRTM -A: Performed by: SPECIALIST

## 2021-12-28 PROCEDURE — 83735 ASSAY OF MAGNESIUM: CPT

## 2021-12-28 PROCEDURE — 85025 COMPLETE CBC W/AUTO DIFF WBC: CPT

## 2021-12-28 PROCEDURE — 85730 THROMBOPLASTIN TIME PARTIAL: CPT

## 2021-12-28 PROCEDURE — 74011000636 HC RX REV CODE- 636: Performed by: SPECIALIST

## 2021-12-28 PROCEDURE — C1894 INTRO/SHEATH, NON-LASER: HCPCS | Performed by: SPECIALIST

## 2021-12-28 PROCEDURE — 74011250636 HC RX REV CODE- 250/636: Performed by: SPECIALIST

## 2021-12-28 PROCEDURE — 2709999900 HC NON-CHARGEABLE SUPPLY: Performed by: SPECIALIST

## 2021-12-28 PROCEDURE — 99152 MOD SED SAME PHYS/QHP 5/>YRS: CPT | Performed by: SPECIALIST

## 2021-12-28 PROCEDURE — 82962 GLUCOSE BLOOD TEST: CPT

## 2021-12-28 PROCEDURE — 74011250636 HC RX REV CODE- 250/636: Performed by: STUDENT IN AN ORGANIZED HEALTH CARE EDUCATION/TRAINING PROGRAM

## 2021-12-28 PROCEDURE — C1760 CLOSURE DEV, VASC: HCPCS | Performed by: SPECIALIST

## 2021-12-28 PROCEDURE — 74011250637 HC RX REV CODE- 250/637: Performed by: STUDENT IN AN ORGANIZED HEALTH CARE EDUCATION/TRAINING PROGRAM

## 2021-12-28 PROCEDURE — 36415 COLL VENOUS BLD VENIPUNCTURE: CPT

## 2021-12-28 PROCEDURE — 77030004532 HC CATH ANGI DX IMP BSC -A: Performed by: SPECIALIST

## 2021-12-28 PROCEDURE — 4A023N7 MEASUREMENT OF CARDIAC SAMPLING AND PRESSURE, LEFT HEART, PERCUTANEOUS APPROACH: ICD-10-PCS | Performed by: SPECIALIST

## 2021-12-28 PROCEDURE — 65660000000 HC RM CCU STEPDOWN

## 2021-12-28 PROCEDURE — 99232 SBSQ HOSP IP/OBS MODERATE 35: CPT | Performed by: FAMILY MEDICINE

## 2021-12-28 PROCEDURE — 93454 CORONARY ARTERY ANGIO S&I: CPT | Performed by: SPECIALIST

## 2021-12-28 PROCEDURE — 77030029065 HC DRSG HEMO QCLOT ZMED -B: Performed by: SPECIALIST

## 2021-12-28 PROCEDURE — 74011000250 HC RX REV CODE- 250: Performed by: SPECIALIST

## 2021-12-28 RX ORDER — SODIUM CHLORIDE 9 MG/ML
75 INJECTION, SOLUTION INTRAVENOUS CONTINUOUS
Status: DISPENSED | OUTPATIENT
Start: 2021-12-28 | End: 2021-12-28

## 2021-12-28 RX ORDER — HYDROCORTISONE SODIUM SUCCINATE 100 MG/2ML
100 INJECTION, POWDER, FOR SOLUTION INTRAMUSCULAR; INTRAVENOUS
Status: ACTIVE | OUTPATIENT
Start: 2021-12-28 | End: 2021-12-29

## 2021-12-28 RX ORDER — LIDOCAINE HYDROCHLORIDE 10 MG/ML
INJECTION INFILTRATION; PERINEURAL AS NEEDED
Status: DISCONTINUED | OUTPATIENT
Start: 2021-12-28 | End: 2021-12-28 | Stop reason: HOSPADM

## 2021-12-28 RX ORDER — DIPHENHYDRAMINE HYDROCHLORIDE 50 MG/ML
25 INJECTION, SOLUTION INTRAMUSCULAR; INTRAVENOUS
Status: ACTIVE | OUTPATIENT
Start: 2021-12-28 | End: 2021-12-29

## 2021-12-28 RX ORDER — SODIUM CHLORIDE 0.9 % (FLUSH) 0.9 %
5-40 SYRINGE (ML) INJECTION AS NEEDED
Status: DISCONTINUED | OUTPATIENT
Start: 2021-12-28 | End: 2021-12-29 | Stop reason: HOSPADM

## 2021-12-28 RX ORDER — SODIUM CHLORIDE 0.9 % (FLUSH) 0.9 %
5-40 SYRINGE (ML) INJECTION EVERY 8 HOURS
Status: DISCONTINUED | OUTPATIENT
Start: 2021-12-28 | End: 2021-12-29 | Stop reason: HOSPADM

## 2021-12-28 RX ORDER — FENTANYL CITRATE 50 UG/ML
INJECTION, SOLUTION INTRAMUSCULAR; INTRAVENOUS AS NEEDED
Status: DISCONTINUED | OUTPATIENT
Start: 2021-12-28 | End: 2021-12-28 | Stop reason: HOSPADM

## 2021-12-28 RX ORDER — HEPARIN SODIUM 200 [USP'U]/100ML
INJECTION, SOLUTION INTRAVENOUS
Status: COMPLETED | OUTPATIENT
Start: 2021-12-28 | End: 2021-12-28

## 2021-12-28 RX ORDER — MIDAZOLAM HYDROCHLORIDE 1 MG/ML
INJECTION, SOLUTION INTRAMUSCULAR; INTRAVENOUS AS NEEDED
Status: DISCONTINUED | OUTPATIENT
Start: 2021-12-28 | End: 2021-12-28 | Stop reason: HOSPADM

## 2021-12-28 RX ORDER — HEPARIN SODIUM 1000 [USP'U]/ML
4000 INJECTION, SOLUTION INTRAVENOUS; SUBCUTANEOUS ONCE
Status: COMPLETED | OUTPATIENT
Start: 2021-12-28 | End: 2021-12-28

## 2021-12-28 RX ORDER — INSULIN GLARGINE 100 [IU]/ML
10 INJECTION, SOLUTION SUBCUTANEOUS
Status: DISCONTINUED | OUTPATIENT
Start: 2021-12-28 | End: 2021-12-29 | Stop reason: HOSPADM

## 2021-12-28 RX ADMIN — CLOPIDOGREL BISULFATE 75 MG: 75 TABLET, FILM COATED ORAL at 10:50

## 2021-12-28 RX ADMIN — CARVEDILOL 3.12 MG: 3.12 TABLET, FILM COATED ORAL at 18:21

## 2021-12-28 RX ADMIN — INSULIN GLARGINE 20 UNITS: 100 INJECTION, SOLUTION SUBCUTANEOUS at 00:01

## 2021-12-28 RX ADMIN — SODIUM CHLORIDE 75 ML/HR: 9 INJECTION, SOLUTION INTRAVENOUS at 18:51

## 2021-12-28 RX ADMIN — MORPHINE SULFATE 1 MG: 2 INJECTION, SOLUTION INTRAMUSCULAR; INTRAVENOUS at 22:01

## 2021-12-28 RX ADMIN — FUROSEMIDE 40 MG: 10 INJECTION, SOLUTION INTRAMUSCULAR; INTRAVENOUS at 10:48

## 2021-12-28 RX ADMIN — CARVEDILOL 3.12 MG: 3.12 TABLET, FILM COATED ORAL at 10:50

## 2021-12-28 RX ADMIN — ATORVASTATIN CALCIUM 80 MG: 20 TABLET, FILM COATED ORAL at 10:49

## 2021-12-28 RX ADMIN — INSULIN GLARGINE 10 UNITS: 100 INJECTION, SOLUTION SUBCUTANEOUS at 22:00

## 2021-12-28 RX ADMIN — MORPHINE SULFATE 1 MG: 2 INJECTION, SOLUTION INTRAMUSCULAR; INTRAVENOUS at 00:01

## 2021-12-28 RX ADMIN — Medication 10 ML: at 22:00

## 2021-12-28 RX ADMIN — Medication 10 ML: at 18:52

## 2021-12-28 RX ADMIN — MORPHINE SULFATE 1 MG: 2 INJECTION, SOLUTION INTRAMUSCULAR; INTRAVENOUS at 11:07

## 2021-12-28 RX ADMIN — Medication 10 ML: at 18:21

## 2021-12-28 RX ADMIN — HEPARIN SODIUM 4000 UNITS: 1000 INJECTION INTRAVENOUS; SUBCUTANEOUS at 04:13

## 2021-12-28 RX ADMIN — PANTOPRAZOLE SODIUM 40 MG: 40 TABLET, DELAYED RELEASE ORAL at 10:50

## 2021-12-28 RX ADMIN — Medication 10 ML: at 10:59

## 2021-12-28 NOTE — CARDIO/PULMONARY
[facility] Cardiopulmonary Rehab:  Chart Review completed from troponin elevation hospital report. Possible NSTEMI. Following history of present illness & current care regimen.   Will continue to follow cardiac evaluation status, to determine if patient meets criteria for inpt cardiac rehab visitation & enrollment in outpatient cardiac rehab (phase 2) program.

## 2021-12-28 NOTE — PROGRESS NOTES
[address] [facility] [address] [address]   Office [phone]  Fax [phone]          Assessment and Plan     Wojciech Peres is a 54 y.o. male with a PMHx of CAD (s/p 5 stents 05/2021), HFrEF (EF 15% 08/2021), HTN, Asthma, Bipolar 1/MDD who is admitted for NSTEMI and AoC HFrEF. Overnight Events: NAEO. NSTEMI in s/o CAD: S/p 2 stents in May 2021. S/p Cath 8/10/2021 with no stenting. Pt presents today with chest pain for 2 days with Trop 1099 -> 1038. EKG today with no changes from EKG in 08/2021. CTA no PE, small pericardial effusion, diminished enhancement in L side of heart. Follows w/ Dr. Simeon Jiang outpatient. - strict I&O  - O2 prn, wean as tolerated  - Plavix 75mg daily  - Patient is allergic to ASA  - Lipitor 80mg daily  - Coreg 3.125mg BID  - Heparin gtt   - Nitrostat prn  - Morphine 1mg IV q4 prn  - Daily PTT  - Daily CBC, BMP  - Consulted Cardiology, appreciate recs     - possible Cath today     AoC HFrEF: Last Echo 8/9/2021: EF 15%. Pt presents today with NSTEMI, BNP 3918. Likely mild exacerbation given that BNP isn't much above baseline, but significant crackles on exam.  - Lasix 40mg IV (home lasix 40mg PO daily)  - Continue Coreg 3.125mg BID  - Follow up outpatient to consider quadruple therapy  - Cardiology consulted, appreciate recs     QTc Prolongation: 473ms on admission EKG. - avoid new QTc prolonging meds     Hypertension: POA /107.  - Continuing home Coreg 3.125mg BID   - Will continue to monitor at this time and readjust as BP's trend.     Diabetes Mellitus T2: Pt reports that home regimen is 50U BID, records suggest 26U daily. Per chart review, he was not on any long-acting insulin during admission in August and blood sugar was well-controlled with only Lispro. Lab Results   Component Value Date/Time     Hemoglobin A1c 11.5 (H) 05/06/2021 05:46 PM   - Holding home Liraglutide.  - Lantus 10U qhs  - Insulin Sliding Scale normal sensitivity with AC&HS glucose checks.   - Hypoglycemia protocols ordered. - consider addition of SGLT2i outpatient given pt's DM and HFrEF     Hyperlipidemia:   Lab Results   Component Value Date/Time     Cholesterol, total 217 (H) 03/09/2021 01:31 PM     HDL Cholesterol 40 03/09/2021 01:31 PM     LDL, calculated 101.2 (H) 03/09/2021 01:31 PM     VLDL, calculated 75.8 03/09/2021 01:31 PM     Triglyceride 379 (H) 03/09/2021 01:31 PM     CHOL/HDL Ratio 5.4 (H) 03/09/2021 01:31 PM   - Continue home lipitor 80mg daily     Asthma: Chronic.  - Continue home albuterol inhaler     Insomnia: Stable  - Continue home Atarax 25mg qhs prn     Bipolar Disorder/MDD: Stable. - No home meds  - Follow up with PCP     GERD: Stable. - Continue on home Protonix 40mg daily.     Hx of CVA: Patient reports history of stroke in 2021. No records found.  - Continue home lipitor 80mg daily  - Patient is allergic to ASA     FEN/GI - Diabetic/Cardiac diet. Activity - Ambulate as tolerated  DVT prophylaxis - Heparin  GI prophylaxis - Protonix  Fall prophylaxis - Not indicated at this time. Disposition - Admit to Telemetry. Plan to d/c to TBD. Consulting PT/OT  Code Status - DNI, discussed with patient / caregivers. Next of Kin Name and [address] [place] (Sister) - [phone]    Manish Dumas will be discussed with Chris Hardy, *. I appreciate the opportunity to participate in the care of this patient,  Leeanne Jane MD  [facility] Medicine Resident         Subjective / Objective     Subjective  Patient seen and examined at bedside. Reports feeling well this AM. Says his CP and SOB is improved. Denies N/V, dysuria.     Respiratory:   O2 Device: None (Room air)   Visit Vitals  BP (!) 125/92   Pulse (!) 103   Temp 97.4 °F (36.3 °C)   Resp 20   Ht 5' 5\" (1.651 m)   Wt 163 lb (73.9 kg)   SpO2 94%   BMI 27.12 kg/m²     Physical Examination:   General appearance - alert, well appearing, and in no distress  Chest - no wheezes or rhonchi, mild crackles at bilateral bases, improved from admission symmetric air entry  Heart - normal rate, regular rhythm, normal S1, S2, no murmurs, rubs, clicks or gallops,   Abdomen - soft, nontender, nondistended, no masses or organomegaly  Neurological - alert, oriented, normal speech, no focal findings  Extremities - peripheral pulses normal, no pedal edema, no clubbing or cyanosis    I/O:         Inpatient Medications  Current Facility-Administered Medications   Medication Dose Route Frequency    heparin 25,000 units in D5W 250 ml infusion  12-25 Units/kg/hr IntraVENous TITRATE    sodium chloride (NS) flush 5-40 mL  5-40 mL IntraVENous Q8H    sodium chloride (NS) flush 5-40 mL  5-40 mL IntraVENous PRN    polyethylene glycol (MIRALAX) packet 17 g  17 g Oral DAILY PRN    ondansetron (ZOFRAN ODT) tablet 4 mg  4 mg Oral Q8H PRN    Or    ondansetron (ZOFRAN) injection 4 mg  4 mg IntraVENous Q6H PRN    nitroglycerin (NITROSTAT) tablet 0.4 mg  0.4 mg SubLINGual PRN    furosemide (LASIX) injection 40 mg  40 mg IntraVENous DAILY    albuterol (PROVENTIL VENTOLIN) nebulizer solution 2.5 mg  2.5 mg Nebulization Q4H PRN    atorvastatin (LIPITOR) tablet 80 mg  80 mg Oral DAILY    carvediloL (COREG) tablet 3.125 mg  3.125 mg Oral BID WITH MEALS    clopidogreL (PLAVIX) tablet 75 mg  75 mg Oral DAILY    cyclobenzaprine (FLEXERIL) tablet 10 mg  10 mg Oral TID PRN    hydrOXYzine HCL (ATARAX) tablet 10 mg  10 mg Oral QHS PRN    pantoprazole (PROTONIX) tablet 40 mg  40 mg Oral ACB    insulin lispro (HUMALOG) injection   SubCUTAneous AC&HS    glucose chewable tablet 16 g  4 Tablet Oral PRN    dextrose (D50W) injection syrg 12.5-25 g  12.5-25 g IntraVENous PRN    glucagon (GLUCAGEN) injection 1 mg  1 mg IntraMUSCular PRN    insulin glargine (LANTUS) injection 20 Units  20 Units SubCUTAneous QHS    morphine injection 1 mg  1 mg IntraVENous Q4H PRN     Current Outpatient Medications   Medication Sig    insulin glargine (Lantus U-100 Insulin) 100 unit/mL injection 50 Units by SubCUTAneous route two (2) times a day.  traZODone (DESYREL) 50 mg tablet Take 100 mg by mouth nightly.  carvediloL (Coreg) 3.125 mg tablet Take 3.125 mg by mouth two (2) times daily (with meals).  liraglutide (VICTOZA) 0.6 mg/0.1 mL (18 mg/3 mL) pnij 1.8 mg by SubCUTAneous route daily.  clopidogreL (PLAVIX) 75 mg tab Take 1 Tablet by mouth daily.  cyclobenzaprine (FLEXERIL) 10 mg tablet Take 1 Tablet by mouth three (3) times daily as needed for Muscle Spasm(s).  albuterol (PROVENTIL HFA, VENTOLIN HFA, PROAIR HFA) 90 mcg/actuation inhaler Take 2 Puffs by inhalation every four (4) hours as needed for Wheezing or Shortness of Breath.  hydrOXYzine HCL (ATARAX) 25 mg tablet Take two to three tablets by mouth at bedtime for sleep.  furosemide (LASIX) 40 mg tablet Take 1 Tablet by mouth daily.  atorvastatin (LIPITOR) 80 mg tablet Take 1 Tab by mouth daily.  pantoprazole (PROTONIX) 40 mg tablet Take 1 Tab by mouth Daily (before breakfast). Allergies  Allergies   Allergen Reactions    Acetaminophen Unknown (comments), Anaphylaxis and Shortness of Breath     Other reaction(s): anaphylaxis/angioedema  Other reaction(s):  Other (see comments)  Other reaction(s): anaphylaxis/angioedema  Throat swelling    Aspirin Hives, Anaphylaxis and Shortness of Breath     Other reaction(s): anaphylaxis/angioedema  Other reaction(s): anaphylaxis/angioedema    Unable To Obtain Unable to Obtain     Patient states his allergic to greens    Aspirin Shortness of Breath    Spinach Leaf Unknown (comments)     Green leaves    Tylenol [Acetaminophen] Shortness of Breath       CBC:  Recent Labs     12/28/21  0259 12/27/21  1045   WBC 5.8 6.8   HGB 14.8 12.9   HCT 47.5 40.9    875       Metabolic Panel:  Recent Labs     12/28/21  0259 12/27/21  1045    141   K 4.3 3.8    110*   CO2 23 23   BUN 25* 21*   CREA 1.47* 1.33*   * 196*   CA 9.1 8.4*   MG 2.0  --    ALB --  2.4*   ALT  --  42       Imaging/procedures: CT Results (most recent):  Results from Hospital Encounter encounter on 12/27/21    CTA CHEST W OR W WO CONT    Narrative  INDICATION:   chest pain, tachycardia, elevated dimer    COMPARISON: 5/6/2021    TECHNIQUE:  Precontrast  images were obtained to localize the volume for acquisition. Multislice helical CT arteriography was performed from the diaphragm to the  thoracic inlet during uneventful rapid bolus intravenous administration of 100  mL of Isovue-370. Lung and soft tissue windows were generated. Post processing  was performed and coronal reformatted images were also generated. 3 D imaging  was performed. CT dose reduction was achieved through use of a standardized  protocol tailored for this examination and automatic exposure control for dose  modulation. FINDINGS:  There are small bilateral effusions and minor basilar atelectasis. There is mild  groundglass opacity in the lungs with a few small focal area of hyperaeration  may represent changes of edema. No definite pulmonary emboli identified. There is cardiomegaly. There is diminished enhancement of the left heart  consistent with decreased ejection fraction. There is a small pericardial  effusion. There are a few borderline enlarged lymph nodes in the anterior mediastinum  nonspecific but most likely reactive. There are few lymph nodes in the rhonda  which are borderline enlarged most likely reactive. The visualized portions of the upper abdominal organs are normal.    Impression  1. No definite pulmonary emboli identified. There are small pleural  effusions/minor basilar atelectasis. There is cardiomegaly and there is a small pericardial effusion. There is  diminished enhancement left side of the heart consistent with diminished cardiac  output. There is increased groundglass opacity in the lungs may represent  changes of edema.     Slightly prominent anterior mediastinal lymph nodes are nonspecific but most  likely reactive.          For Billing    Chief Complaint   Patient presents with   [facility] Chest Pain       Hospital Problems  Date Reviewed: 11/10/2021          Codes Class Noted POA    * (Principal) NSTEMI (non-ST elevated myocardial infarction) ([facility] ICD-10-CM: I21.4  ICD-9-CM: 410.70  12/27/2021 Unknown        Acute HFrEF (heart failure with reduced ejection fraction) (HCC) ICD-10-CM: I50.21  ICD-9-CM: 428.21  8/8/2021 Unknown        CAD (coronary artery disease) ICD-10-CM: I25.10  ICD-9-CM: 414.00  Unknown Yes    Overview Signed 5/6/2021  4:46 PM by Rupal Louie DO     MI x2 with 2 cardiac stent             Major depression ICD-10-CM: F32.9  ICD-9-CM: 296.20  3/8/2021 Yes        Chronic systolic congestive heart failure (HCC) ICD-10-CM: I50.22  ICD-9-CM: 428.22, 428.0  12/2/2020 Yes        Type 2 diabetes with nephropathy ([facility] ICD-10-CM: E11.21  ICD-9-CM: 250.40, 583.81  2/28/2020 Yes        Bipolar 1 disorder ([facility] ICD-10-CM: F31.9  ICD-9-CM: 296.7  4/10/2012 Yes        Emotional lability ICD-10-CM: R45.86  ICD-9-CM: 799.24  3/24/2012 Yes        HTN (hypertension) ICD-10-CM: I10  ICD-9-CM: 401.9  3/5/2012 Yes
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
## 2021-12-28 NOTE — ED NOTES
Assumed care of patient. Introduced self as primary nurse using [address] [place] [address] Nw. Stretcher in low locked position with call bell within reach. Pt updated on plan of care and wait times. Pt instructed to use call bell if assistance is needed.

## 2021-12-28 NOTE — NURSE NAVIGATOR
Chart reviewed by Heart Failure Nurse Navigator. Heart Failure database completed. Patient admitted with sudden onset of chest pain and evidence of NSTEMI. Patient with possible element of acute on chronic HFrEF and received IV diuretic. Last admission was August 2021 for acute on chronic HF after running out of lasix at home. EF:  EF 15% on most recent Echo from 8/9/21. ACEi/ARB/ARNi: **    BB: Coreg 3.125 mg BID. Aldosterone Antagonist: **    Obstructive Sleep Apnea Screening:   STOP-BANG score:   Referred to Sleep Medicine:     CRT Not currently indicated. ICD in situ. NYHA Functional Class **. Heart Failure Teach Back in Patient Education. Heart Failure Avoiding Triggers on Discharge Instructions. Cardiologist: Dr Malia Peterson discharge follow up phone call to be made within 48-72 hours of discharge.

## 2021-12-28 NOTE — PROGRESS NOTES
Physical Therapy orders acknowledged, chart reviewed and discussed with nurse patient plan for cath procedure later today. Will hold PT evaluation and follow up when medically appropriate for PT interventions.

## 2021-12-28 NOTE — PROGRESS NOTES
Occupational therapy note:  Orders received, chart reviewed. Patient with plan for cath procedure later today. Will hold OT evaluation and follow up when medically appropriate for OT interventions. Denia Keita MS OTR/L

## 2021-12-28 NOTE — PROCEDURES
Cath:  Obstructive 2VD:     LAD patent mid stent, ap40; D1 inf br 90     OM2 30 (ISR), OM4 40 (ISR) (left dom)     RCA p70, m100 (likely culprit) (non-dom)  RFA mynx    Mildly abnormal troponin likely due to occluded, small RCA not amenable to PCI. Cont med mgmt.   Likely home in AM.

## 2021-12-28 NOTE — ED NOTES
Verbal shift change report given to Alida RN (oncoming nurse) by Pati Hamilton RN (offgoing nurse). Report included the following information SBAR, ED Summary, Intake/Output, MAR and Recent Results. - DVT: SCDs due to thrombocytopenia  - Diet: NPO Admitted with Hgb 9.4, WBC 3.14 and Platelets 83.  - likely bone marrow suppression 2/2 alcohol abuse  - trend CBC daily  - transfuse of Hgb <7  - transfuse for platelets <10, <15 if febrile, <50 if actively bleeding

## 2021-12-28 NOTE — PROGRESS NOTES
4:04 PM  TRANSFER - IN REPORT:    Verbal report received from Brandi RN(name) on Federico Borges  being received from cath lab(unit) for routine post - op      Report consisted of patients Situation, Background, Assessment and   Recommendations(SBAR). Information from the following report(s) SBAR and Procedure Summary was reviewed with the receiving nurse. Opportunity for questions and clarification was provided. Assessment completed upon patients arrival to unit and care assumed. 5:10 PM  Report called to receiving RN.     5:40 PM  Site check performed with bedside RN.

## 2021-12-28 NOTE — ED NOTES
Bedside and Verbal shift change report given to Bette Bentley RN (oncoming nurse) by Jose Ramon Blanc RN (offgoing nurse). Report included the following information SBAR, Kardex, ED Summary, Intake/Output, MAR and Recent Results.

## 2021-12-28 NOTE — PROGRESS NOTES
My last office note:    Chiki Mon 1970   Office/Outpatient Visit  Visit Date: Wed, Sep 22, 2021 4:30 pm  Provider: Jimbo Brock MD (Assistant: Conchita Marcus RN )  Location: [facility] ([facility])[address] [place]. [address] [phone]    Electronically signed by Miko Suarez MD on  09/22/2021 04:42:50 PM                           Subjective:    CC: Mr. Fang Deutsch is a 54year old Black or  male. His primary care physician is Christa Jean MD.  This is a recent heart catheterization follow-up visit. He presents today with a complaint of shortness of breath. He is here today following a transition of care from the inpatient hospital setting. He was recently in the hospital: for cardiac catheterization on 8/10/2021 at [facility]. Patient did not bring medication list or bottles for review. Pt verbalized meds He has a history of cardiomyopathy ( with ischemia ), coronary artery disease of the native coronary artery, and hypercholesterolemia. HPI:           Coronary Artery Disease:   He is here today cath follow up. Currently, his treatment regimen consists of Coreg,  Lipitor, and Plavix. Current level of activity includes ability to walk. A cardiac cath has been done ( performed 8/10/2021 ). Hypercholesterolemia: Current treatment includes diet. Regarding cardiomyopathy:   He is here today cath follow up. The course of the disease has been waxing and waning. He is currently NYHA class I.  Current level of activity includes ability to walk. Currently, his treatment regimen consists of Coreg, a diuretic ( furosemide ), and Losartan. Current symptoms include chronic cough. He denies chest pain, chest tightness, dyspnea, edema, orthopnea, orthostatic hypotension or paroxysmal nocturnal dyspnea. A transthoracic ECHO has been done ( performed 5/7/2021 ).   cath done 8/10/2021       Regarding shortness of breath: This has been noted for the past week. This tends to be worse lying flat. ROS:   Discussed relevant lab and imaging studies along with the plan of treatment with the nurse. EYES:  Negative for blurred vision and eye pain. E/N/T:  Negative for epistaxis and hoarseness. CARDIOVASCULAR:  Negative for chest pain, orthopnea, palpitations and pedal edema. RESPIRATORY:  Negative for cough and hemoptysis. GASTROINTESTINAL:  Negative for abdominal pain and dysphagia. MUSCULOSKELETAL:  Negative for arthralgias and back pain. NEUROLOGICAL:  Negative for headaches, paresthesias, and weakness. HEMATOLOGIC/LYMPHATIC:  Negative for easy bruising, bleeding, and lymphadenopathy. PSYCHIATRIC:  No symptoms reported. Past Medical History / Family History / Social History:     Last Reviewed on 2021 04:16 PM by Mary Mclean  Past Medical History:     INFLUENZA VACCINE: was last done    COVID-19 VACCINE: was last done 2021 The next one is due  2021 moderna     Past Cardiac Procedures/Tests:  TESTING ( Dr Gege Graves)     1. ISCHEMIC CARDIOMYOPATHY  a.  Echo (19):  EF 30-35%, R.  Mildly dil LA. Mild MR/TR/OH. PASP 37.  b.  Cath (21): LAD patent mid stent, ap40. OM2 95, OM4 95 (left dom). RCA p40, m70 (non-dom). EF 20% with severe GHK. No AVG, MR 1+.  c.  PCI (21): BMS OM2: 2.25x15 Integrity. BMS OM4: 2.25x26 Integrity. d.  Echo (21):  EF 35-40%, D.  e.  Cath (8/10/21):  LAD patent mid stent; D1 inf br 90. OM2 30 (ISR), OM4 40 (ISR), (left dom). RCA p70, m90 (non-dom). EF 15% with severe GHK. No AVG, MR 2+. 2.  DIABETES MELLITUS    3.   DYSLIPIDEMIA      Family History:   Father: ;  myocardial infarction;  type 2 diabetes   Mother: ;  myocardial infarction;  type 2 diabetes     Social History:   Social History:   Marital Status: Single     Tobacco/Alcohol/Supplements:   Last Reviewed on 2021 04:16 PM by Young Pham E  TOBACCO/ALCOHOL/SUPPLEMENTS   Tobacco: Current Smoker: He currently smokes every day, 1-5 cigarettes per day. Smoking cessation intervention counseling was conducted. Alcohol: Drinks alcohol on a social basis only. Caffeine:  He admits to consuming caffeine via soda. Substance Abuse History:   Last Reviewed on 9/22/2021 04:16 PM by Deysi Davalos  Substance Use/Abuse:   None     Mental Health History:   Last Reviewed on 9/22/2021 04:16 PM by Deysi Davalos    Communicable Diseases (eg STDs):    Last Reviewed on 9/22/2021 04:16 PM by Migel PITTS    Current Problems:   Last Reviewed on 9/22/2021 04:16 PM by Deysi Davalos  Pure hypercholesterolemia  Pure hypercholesterolemia  Atherosclerotic heart disease of native coronary artery  Atherosclerotic heart disease of native coronary artery without angina pectoris  Ischemic cardiomyopathy  Cardiomyopathy  Other specified postprocedural states  Other restrictive cardiomyopathy  Shortness of breath    Allergies:   Last Reviewed on 9/22/2021 04:16 PM by Migel PITTS  aspirin:    Tylenol:      Current Medications:   Last Reviewed on 9/22/2021 04:16 PM by Migel PITTS  Lantus Solostar U-100 Insulin   carvediloL 12.5 mg oral tablet [take 1 tablet (12.5 mg) by oral route 2 times per day with food]  atorvastatin 80 mg oral tablet [take 1 tablet (80 mg) by oral route once daily]  furosemide 40 mg oral tablet [take 1 tablet (40 mg) by oral route once daily]  clopidogreL 75 mg oral tablet [take 1 tablet (75 mg) by oral route once daily]  losartan 25 mg oral tablet [take 1 tablet (25 mg) by oral route once daily]  pantoprazole 40 mg oral tablet, delayed release (enteric coated) [take 1 tablet (40 mg) by oral route once daily]  Victoza 3-Enrico 0.6 mg/0.1 mL (18 mg/3 mL) subcutaneous Pen Injector [inject 1.2 mg by subcutaneous route once daily]  Proventil HFA 90 mcg/actuation Inhalation HFA Aerosol Inhaler [inhale 1 - 2 puffs (90 - 180 mcg) by inhalation route every 4 hours as needed]    Objective:    Vitals:     Historical:   5/24/2021  BP:   110/89 mm Hg ( (right arm, , sitting, );) 5/24/2021  Wt:   172lbs  Current: 9/22/2021 4:16:04 PM  Ht:  5 ft, 5 in; Wt: 167 lbs;  BMI: 27. 8BP: 137/99 mm Hg (right arm, sitting);  P: 124 bpm (sitting);  sCr: 1.31 mg/dL;  GFR: 60.47    Exams:   GENERAL:  Alert, oriented to person, place and time x3. EYES:  Normal lids without xanthelasma; conjunctiva unremarkable; no scleral icterus. HEENT:  Face symmetric, voice clear, extraocular muscles intact. NECK:  Supple. No bruits, No JVD. LUNGS: There are bibasal rales. CARDIAC: Regular rate and rhythm. S1 and S2 normal. No murmur , gallops or rubs. ABDOMEN:  Soft. Positive bowel sounds. Non-tender. MUSCULOSKELETAL:  Normal range of motion, strength and tone. EXTREMITIES:  No edema. Good muscle tone and strength. SKIN: No significant rashes or lesions; no suspicious moles. NEUROLOGICAL:  No focal deficits, cranial nerves II-XII are grossly intact. PSYCHIATRIC:  Appropriate affect and demeanor; normal speech pattern; grossly normal memory. Lab/Test Results:     Sodium, Serum: 139 (08/11/2021),   Potassium, Serum: 4.1 (08/11/2021),   Glucose Serum: 178 (08/11/2021),   BUN serum/plasma (sCnc): 23 (08/11/2021),   Creatinine, Serum: 1.31 (08/11/2021),   ALT (SGPT): 26 (08/11/2021),   WBC count: 6.9 (08/11/2021),   Hgb: 13.0 (08/11/2021),   Hct: 40.5 (08/11/2021),   Platelets: 034 (53/72/0557),       Procedures: Other specified postprocedural states    ECG INTERPRETATION: See scanned EKG for results. ST @ 120, JCARLOS, lat NSSTTW abn        Assessment:   Reviewed recent hospital stay. Worsening LV dysfunction. ..likely responsible for sinus tach now. Reassess LVF in 3 months with appt. Discussed diet, exercise, and smoking cessation (1 cig/d).       Z98.89   Other specified postprocedural states     I25.10   Atherosclerotic heart disease of native coronary artery without angina pectoris     E78.0   Pure hypercholesterolemia     I42.5   Other restrictive cardiomyopathy     I25.1   Atherosclerotic heart disease of native coronary artery     E78.00   Pure hypercholesterolemia     I42   Cardiomyopathy     R06.02   Shortness of breath       ORDERS:     Radiology/Test Orders:     3021F  LVEF <40% or documentation of moderately or severely depressed LVSF (CAD, HF)1  (Send-Out)          Procedures Ordered:     51279  Electrocardiogram, routine with at least 12 leads; with interpretation and report  (In-House)          XECD  Echo  (In-House)          Other Orders:     4450F  Self-care education provided to patient (HF)  (Send-Out)          BR115L  Queried Patient for Tobacco Use  (Send-Out)              Plan:     Other specified postprocedural states      Orders:     [id number]  Electrocardiogram, routine with at least 12 leads; with interpretation and report  (In-House)          Atherosclerotic heart disease of native coronary artery without angina pectoris  CAD: with ACE/ARB Rx CHF PQRS #198: LVEF Assessment Moderate-to-Severely Depressed LVSF (LVEF < 40%)     Advised the patient to follow a low sodium diet no more than 2000mg per day, exercise, and lifestyle modification. Advised the patient on the importance of medication compliance and daily weight monitoring. Medication list has been reviewed. Continue current medications. Smoking status: Mr. Wendy Levi currently smokes cigarettes. Smoking cessation discussed with patient. The counseling session lasted less than 10 minutes. Testing/Procedures:  Echocardiogram - Instructed to call immediately if he develops new or worsening symptoms, such as shortness of breath and chest pain. Schedule a follow-up appointment in 3 months. Follow-up with echo in 3 months.         Orders:     3021F  LVEF <40% or documentation of moderately or severely depressed LVSF (CAD, HF)1  (Send-Out)          4450F Self-care education provided to patient (HF)  (Send-Out)          JC200W  Queried Patient for Tobacco Use  (Send-Out)          XECD  Echo  (In-House)            Patient Education Handouts:     COV Congestive Heart Failure (CHF)      COV Coronary Artery Disease      COV Heart Healthy Diet      COV Heart Healthy Diet        Patient Recommendations: For  Atherosclerotic heart disease of native coronary artery without angina pectoris:      You have been advised regarding diet, exercise, and lifestyle. modification. Your medication list has been reviewed. Continue current medications. You need to have the following test/procedure(s) done:  Echocardiogram     Please call immediately if you experience new or worsening symptoms such as shortness of breath; chest pain;    Schedule a follow-up visit in 3 months. Follow-up with echo in 3 months.

## 2021-12-28 NOTE — PROGRESS NOTES
1743: TRANSFER - IN REPORT:    Verbal report received from Michael Yu (name) on Zulay Juárez  being received from Cath Lab (unit) for routine progression of care      Report consisted of patients Situation, Background, Assessment and   Recommendations(SBAR). Information from the following report(s) SBAR, Kardex, Accordion and Cardiac Rhythm NSR was reviewed with the receiving nurse. Opportunity for questions and clarification was provided. Assessment completed upon patients arrival to unit and care assumed. This patient was assisted with Intentional Toileting every 2 hours during this shift as appropriate. Documentation of ambulation and output reflected on Flowsheet as appropriate. Purposeful hourly rounding was completed using AIDET and 5Ps. Outcomes of PHR documented as they occurred. Bed alarm in use as appropriate. Dual Suction and ambubag in place.

## 2021-12-28 NOTE — PROGRESS NOTES
12/28/2021  10:33 AM  Case management note    Reason for Admission:  NSTEM    Patient came to ED for chest pain and headache. Patient lives with sister, does not drive and independent with ADL's. Patient has history of CAD, CHF, HTN, asthma, bipolar, CVA and DM. [facility]                     RUR Score:          14%           Plan for utilizing home health:          PCP: First and Last name:  Nidia Jin MD     Name of Practice:    Are you a current patient: Yes/No: yes   Approximate date of last visit: 1 month   Can you participate in a virtual visit with your PCP:                     Current Advanced Directive/Advance Care Plan: Partial Code  NOAD      Healthcare Decision Maker:   sister Simon Ramya                              Transition of Care Plan:                      1. Home with family assistance  2. PCP follow up  3. AD planning  4. CM to follow for discharge needs    Care Management Interventions  PCP Verified by CM:  Yes Dalila Gibbons MD)  Support Systems: Other Family Member(s)  Confirm Follow Up Transport: Family  The Plan for Transition of Care is Related to the Following Treatment Goals : NSTEMI  Discharge Location  Discharge Placement: Home with family assistance  Dara Sanabria

## 2021-12-29 VITALS
DIASTOLIC BLOOD PRESSURE: 85 MMHG | OXYGEN SATURATION: 99 % | BODY MASS INDEX: 30.12 KG/M2 | HEART RATE: 97 BPM | HEIGHT: 65 IN | TEMPERATURE: 97.2 F | WEIGHT: 180.78 LBS | SYSTOLIC BLOOD PRESSURE: 113 MMHG | RESPIRATION RATE: 20 BRPM

## 2021-12-29 LAB
ANION GAP SERPL CALC-SCNC: 11 MMOL/L (ref 5–15)
APTT PPP: 26.6 SEC (ref 22.1–31)
BASOPHILS # BLD: 0.1 K/UL (ref 0–0.1)
BASOPHILS NFR BLD: 1 % (ref 0–1)
BUN SERPL-MCNC: 38 MG/DL (ref 6–20)
BUN/CREAT SERPL: 19 (ref 12–20)
CALCIUM SERPL-MCNC: 8.3 MG/DL (ref 8.5–10.1)
CHLORIDE SERPL-SCNC: 106 MMOL/L (ref 97–108)
CO2 SERPL-SCNC: 25 MMOL/L (ref 21–32)
CREAT SERPL-MCNC: 2.03 MG/DL (ref 0.7–1.3)
DIFFERENTIAL METHOD BLD: ABNORMAL
EOSINOPHIL # BLD: 0.3 K/UL (ref 0–0.4)
EOSINOPHIL NFR BLD: 5 % (ref 0–7)
ERYTHROCYTE [DISTWIDTH] IN BLOOD BY AUTOMATED COUNT: 17.6 % (ref 11.5–14.5)
GLUCOSE BLD STRIP.AUTO-MCNC: 117 MG/DL (ref 65–117)
GLUCOSE BLD STRIP.AUTO-MCNC: 234 MG/DL (ref 65–117)
GLUCOSE SERPL-MCNC: 158 MG/DL (ref 65–100)
HCT VFR BLD AUTO: 44.8 % (ref 36.6–50.3)
HGB BLD-MCNC: 14 G/DL (ref 12.1–17)
IMM GRANULOCYTES # BLD AUTO: 0 K/UL (ref 0–0.04)
IMM GRANULOCYTES NFR BLD AUTO: 1 % (ref 0–0.5)
LYMPHOCYTES # BLD: 0.9 K/UL (ref 0.8–3.5)
LYMPHOCYTES NFR BLD: 14 % (ref 12–49)
MAGNESIUM SERPL-MCNC: 2.2 MG/DL (ref 1.6–2.4)
MCH RBC QN AUTO: 28.1 PG (ref 26–34)
MCHC RBC AUTO-ENTMCNC: 31.3 G/DL (ref 30–36.5)
MCV RBC AUTO: 90 FL (ref 80–99)
MONOCYTES # BLD: 0.8 K/UL (ref 0–1)
MONOCYTES NFR BLD: 12 % (ref 5–13)
NEUTS SEG # BLD: 4.2 K/UL (ref 1.8–8)
NEUTS SEG NFR BLD: 67 % (ref 32–75)
NRBC # BLD: 0 K/UL (ref 0–0.01)
NRBC BLD-RTO: 0 PER 100 WBC
PLATELET # BLD AUTO: 316 K/UL (ref 150–400)
PMV BLD AUTO: 9.9 FL (ref 8.9–12.9)
POTASSIUM SERPL-SCNC: 4.7 MMOL/L (ref 3.5–5.1)
RBC # BLD AUTO: 4.98 M/UL (ref 4.1–5.7)
SERVICE CMNT-IMP: ABNORMAL
SERVICE CMNT-IMP: NORMAL
SODIUM SERPL-SCNC: 142 MMOL/L (ref 136–145)
THERAPEUTIC RANGE,PTTT: NORMAL SECS (ref 58–77)
WBC # BLD AUTO: 6.3 K/UL (ref 4.1–11.1)

## 2021-12-29 PROCEDURE — 74011250636 HC RX REV CODE- 250/636: Performed by: STUDENT IN AN ORGANIZED HEALTH CARE EDUCATION/TRAINING PROGRAM

## 2021-12-29 PROCEDURE — 82962 GLUCOSE BLOOD TEST: CPT

## 2021-12-29 PROCEDURE — 97535 SELF CARE MNGMENT TRAINING: CPT

## 2021-12-29 PROCEDURE — 83735 ASSAY OF MAGNESIUM: CPT

## 2021-12-29 PROCEDURE — 97116 GAIT TRAINING THERAPY: CPT

## 2021-12-29 PROCEDURE — 74011636637 HC RX REV CODE- 636/637: Performed by: STUDENT IN AN ORGANIZED HEALTH CARE EDUCATION/TRAINING PROGRAM

## 2021-12-29 PROCEDURE — 97165 OT EVAL LOW COMPLEX 30 MIN: CPT

## 2021-12-29 PROCEDURE — 99238 HOSP IP/OBS DSCHRG MGMT 30/<: CPT | Performed by: FAMILY MEDICINE

## 2021-12-29 PROCEDURE — 36415 COLL VENOUS BLD VENIPUNCTURE: CPT

## 2021-12-29 PROCEDURE — 85730 THROMBOPLASTIN TIME PARTIAL: CPT

## 2021-12-29 PROCEDURE — 97530 THERAPEUTIC ACTIVITIES: CPT

## 2021-12-29 PROCEDURE — 74011250637 HC RX REV CODE- 250/637: Performed by: STUDENT IN AN ORGANIZED HEALTH CARE EDUCATION/TRAINING PROGRAM

## 2021-12-29 PROCEDURE — 80048 BASIC METABOLIC PNL TOTAL CA: CPT

## 2021-12-29 PROCEDURE — 97161 PT EVAL LOW COMPLEX 20 MIN: CPT

## 2021-12-29 PROCEDURE — 85025 COMPLETE CBC W/AUTO DIFF WBC: CPT

## 2021-12-29 RX ORDER — MORPHINE SULFATE 10 MG/1
10 CAPSULE, EXTENDED RELEASE ORAL DAILY
Qty: 5 CAPSULE | Refills: 0 | Status: SHIPPED | OUTPATIENT
Start: 2021-12-29 | End: 2021-12-29 | Stop reason: SDUPTHER

## 2021-12-29 RX ORDER — NITROGLYCERIN 0.4 MG/1
0.4 TABLET SUBLINGUAL AS NEEDED
Qty: 5 TABLET | Refills: 0 | Status: SHIPPED | OUTPATIENT
Start: 2021-12-29 | End: 2022-02-04

## 2021-12-29 RX ORDER — CARVEDILOL 6.25 MG/1
6.25 TABLET ORAL 2 TIMES DAILY WITH MEALS
Status: DISCONTINUED | OUTPATIENT
Start: 2021-12-29 | End: 2021-12-29 | Stop reason: HOSPADM

## 2021-12-29 RX ORDER — INSULIN GLARGINE 100 [IU]/ML
10 INJECTION, SOLUTION SUBCUTANEOUS
Qty: 1 ML | Refills: 2 | Status: SHIPPED | OUTPATIENT
Start: 2021-12-29 | End: 2022-01-06 | Stop reason: SDUPTHER

## 2021-12-29 RX ORDER — CARVEDILOL 6.25 MG/1
6.25 TABLET ORAL 2 TIMES DAILY WITH MEALS
Qty: 60 TABLET | Refills: 0 | Status: SHIPPED | OUTPATIENT
Start: 2021-12-29 | End: 2022-02-02 | Stop reason: ALTCHOICE

## 2021-12-29 RX ORDER — CARVEDILOL 3.12 MG/1
3.12 TABLET ORAL
Status: COMPLETED | OUTPATIENT
Start: 2021-12-29 | End: 2021-12-29

## 2021-12-29 RX ORDER — MORPHINE SULFATE 10 MG/1
10 CAPSULE, EXTENDED RELEASE ORAL
Qty: 5 CAPSULE | Refills: 0 | Status: SHIPPED | OUTPATIENT
Start: 2021-12-29 | End: 2022-01-03

## 2021-12-29 RX ADMIN — MORPHINE SULFATE 1 MG: 2 INJECTION, SOLUTION INTRAMUSCULAR; INTRAVENOUS at 09:28

## 2021-12-29 RX ADMIN — CLOPIDOGREL BISULFATE 75 MG: 75 TABLET, FILM COATED ORAL at 08:00

## 2021-12-29 RX ADMIN — Medication 10 ML: at 06:06

## 2021-12-29 RX ADMIN — ATORVASTATIN CALCIUM 80 MG: 20 TABLET, FILM COATED ORAL at 08:00

## 2021-12-29 RX ADMIN — INSULIN LISPRO 3 UNITS: 100 INJECTION, SOLUTION INTRAVENOUS; SUBCUTANEOUS at 11:22

## 2021-12-29 RX ADMIN — FUROSEMIDE 40 MG: 10 INJECTION, SOLUTION INTRAMUSCULAR; INTRAVENOUS at 08:00

## 2021-12-29 RX ADMIN — CARVEDILOL 3.12 MG: 3.12 TABLET, FILM COATED ORAL at 07:59

## 2021-12-29 RX ADMIN — PANTOPRAZOLE SODIUM 40 MG: 40 TABLET, DELAYED RELEASE ORAL at 08:00

## 2021-12-29 RX ADMIN — CARVEDILOL 3.12 MG: 3.12 TABLET, FILM COATED ORAL at 09:28

## 2021-12-29 NOTE — CARDIO/PULMONARY
[facility] Cardiopulmonary Rehab:  53 yo male admitted with Chest Pain (12/27). Per Dr Simeon Jiang, dx includes: NSTEMI. S/P cath, with occluded, small RCA not amenable to PCI (12/28). LVEF 15% by echo (8/9/21). Patient is well known to Cardiac Rehab RN from past admissions. He resides in [facility] and does not drive. Most recently pt was admitted with HFrEF, after running out of Lasix (8/8/21). Of significant concern was LVEF had decreased from 35-40% in May, 2021 to LVEF 15%. S/P cardiac cath, no PCI indicated (8/10/21). Pt was also seen by Cardiac Rehab RN during admission with chest pain, s/p PTCA with BMS to OM2 (5/6/21). Pt has declined participation in cardiac rehab program, due to distance & lack of transportation. UPDATE AT 1330:  Met briefly with patient on [facility]. Provided MI education folder. Pt indicated awareness of his eligibility for outpatient cardiac rehab program. Patient stated that he does not have a car & is unable to arrange transportation to cardiac rehab. He had no questions.

## 2021-12-29 NOTE — PROGRESS NOTES
[address] [facility]   Office [phone]  Fax [phone]          Assessment and Plan     Nicolasa Alas is a 54 y.o. male with a PMHx of CAD (s/p 5 stents 05/2021), HFrEF (EF 15% 08/2021), HTN, Asthma, Bipolar 1/MDD who is admitted for NSTEMI and AoC HFrEF. Overnight Events: NAEO. NSTEMI in s/o CAD: S/p 2 stents in May 2021. S/p Cath 8/10/2021 with no stenting. Pt presents today with chest pain for 2 days with Trop 1099 -> 1038. EKG today with no changes from EKG in 08/2021. CTA no PE, small pericardial effusion, diminished enhancement in L side of heart. Follows w/ Dr. Shaunna Vegas outpatient. Cath 12/28 with 2VD, small RCA, not amenable to PCI. - strict I&O  - O2 prn, wean as tolerated  - Plavix 75mg daily  - Patient is allergic to ASA  - Lipitor 80mg daily  - Coreg 6.5mg BID  - Nitrostat prn  - Morphine 1mg IV q4 prn  - Daily PTT  - Daily CBC, BMP  - Consulted Cardiology, appreciate recs     AoC HFrEF: Last Echo 8/9/2021: EF 15%. Pt presents today with NSTEMI, BNP 3918. Likely mild exacerbation given that BNP isn't much above baseline, but significant crackles on exam.  - Lasix 40mg IV (home lasix 40mg PO daily)  - Continue Coreg 6.5mg BID  - Follow up outpatient to consider quadruple therapy  - Cardiology consulted, appreciate recs     QTc Prolongation: 473ms on admission EKG. - avoid new QTc prolonging meds     Hypertension: POA /107.  - Continuing home Coreg 3.125mg BID   - Will continue to monitor at this time and readjust as BP's trend.     Diabetes Mellitus T2: Pt reports that home regimen is 50U BID, records suggest 26U daily. Per chart review, he was not on any long-acting insulin during admission in August and blood sugar was well-controlled with only Lispro.   Lab Results   Component Value Date/Time     Hemoglobin A1c 11.5 (H) 05/06/2021 05:46 PM   - Holding home Liraglutide.  - Lantus 10U qhs  - Insulin Sliding Scale normal sensitivity with AC&HS glucose checks. - Hypoglycemia protocols ordered. - consider addition of SGLT2i outpatient given pt's DM and HFrEF     Hyperlipidemia:   Lab Results   Component Value Date/Time     Cholesterol, total 217 (H) 03/09/2021 01:31 PM     HDL Cholesterol 40 03/09/2021 01:31 PM     LDL, calculated 101.2 (H) 03/09/2021 01:31 PM     VLDL, calculated 75.8 03/09/2021 01:31 PM     Triglyceride 379 (H) 03/09/2021 01:31 PM     CHOL/HDL Ratio 5.4 (H) 03/09/2021 01:31 PM   - Continue home lipitor 80mg daily     Asthma: Chronic.  - Continue home albuterol inhaler     Insomnia: Stable  - Continue home Atarax 25mg qhs prn     Bipolar Disorder/MDD: Stable. - No home meds  - Follow up with PCP     GERD: Stable. - Continue on home Protonix 40mg daily.     Hx of CVA: Patient reports history of stroke in 2021. No records found.  - Continue home lipitor 80mg daily  - Patient is allergic to ASA     FEN/GI - Diabetic/Cardiac diet. Activity - Ambulate as tolerated  DVT prophylaxis - Heparin  GI prophylaxis - Protonix  Fall prophylaxis - Not indicated at this time. Disposition - Admit to Telemetry. Plan to d/c to TBD. Consulting PT/OT  Code Status - DNI, discussed with patient / caregivers. Next of Kin Name and [address] [place] Fisher (Sister) - [phone]    Pietro Delores will be discussed with Ravi Files, *. I appreciate the opportunity to participate in the care of this patient,  Mahi Damon MD  [facility] Medicine Resident         Subjective / Objective     Subjective  Patient seen and examined at bedside. Reports feeling well this AM. Denies CP, SOB, N/V, dysuria.     Respiratory:   O2 Device: None (Room air)   Visit Vitals  BP (!) 120/90 (BP 1 Location: Left upper arm, BP Patient Position: At rest)   Pulse (!) 101   Temp 98.2 °F (36.8 °C)   Resp 20   Ht 5' 5\" (1.651 m)   Wt 181 lb (82.1 kg)   SpO2 96%   BMI 30.12 kg/m²     Physical Examination:   General appearance - alert, well appearing, and in no distress  Chest - no wheezes or rhonchi, no crackles auscultated, improved from admission symmetric air entry  Heart - normal rate, regular rhythm, normal S1, S2, no murmurs, rubs, clicks or gallops,   Abdomen - soft, nontender, nondistended, no masses or organomegaly  Neurological - alert, oriented, normal speech, no focal findings  Extremities - peripheral pulses normal, no pedal edema, no clubbing or cyanosis    I/O:  Date 12/28/21 0700 - 12/29/21 0659 12/29/21 0700 - 12/30/21 0659   Shift 0700-1859 1900-0659 24 Hour Total 0700-1859 1900-0659 24 Hour Total   INTAKE   P.O.  600 600        P. O.  600 600      Shift Total(mL/kg)  600(7.3) 600(7.3)      OUTPUT   Urine(mL/kg/hr)  200 200        Urine Voided  200 200        Urine Occurrence(s)  1 x 1 x      Shift Total(mL/kg)  200(2.4) 200(2.4)      NET  400 400      Weight (kg) 73.9 82.1 82.1 82.1 82.1 82.1      Date 12/28/21 0700 - 12/29/21 0659 12/29/21 0700 - 12/30/21 0659   Shift 0700-1859 1900-0659 24 Hour Total 0700-1859 1900-0659 24 Hour Total   INTAKE   P.O.  600 600        P. O.  600 600      Shift Total(mL/kg)  600(7.3) 600(7.3)      OUTPUT   Urine(mL/kg/hr)  200 200        Urine Voided  200 200        Urine Occurrence(s)  1 x 1 x      Shift Total(mL/kg)  200(2.4) 200(2.4)      NET  400 400      Weight (kg) 73.9 82.1 82.1 82.1 82.1 82.1       Inpatient Medications  Current Facility-Administered Medications   Medication Dose Route Frequency    insulin glargine (LANTUS) injection 10 Units  10 Units SubCUTAneous QHS    sodium chloride (NS) flush 5-40 mL  5-40 mL IntraVENous Q8H    sodium chloride (NS) flush 5-40 mL  5-40 mL IntraVENous PRN    diphenhydrAMINE (BENADRYL) injection 25 mg  25 mg IntraVENous ONCE PRN    hydrocortisone Sod Succ (PF) (SOLU-CORTEF) injection 100 mg  100 mg IntraVENous ONCE PRN    sodium chloride (NS) flush 5-40 mL  5-40 mL IntraVENous Q8H    sodium chloride (NS) flush 5-40 mL  5-40 mL IntraVENous PRN    polyethylene glycol (MIRALAX) packet 17 g  17 g Oral DAILY PRN    ondansetron (ZOFRAN ODT) tablet 4 mg  4 mg Oral Q8H PRN    Or    ondansetron (ZOFRAN) injection 4 mg  4 mg IntraVENous Q6H PRN    nitroglycerin (NITROSTAT) tablet 0.4 mg  0.4 mg SubLINGual PRN    furosemide (LASIX) injection 40 mg  40 mg IntraVENous DAILY    albuterol (PROVENTIL VENTOLIN) nebulizer solution 2.5 mg  2.5 mg Nebulization Q4H PRN    atorvastatin (LIPITOR) tablet 80 mg  80 mg Oral DAILY    carvediloL (COREG) tablet 3.125 mg  3.125 mg Oral BID WITH MEALS    clopidogreL (PLAVIX) tablet 75 mg  75 mg Oral DAILY    cyclobenzaprine (FLEXERIL) tablet 10 mg  10 mg Oral TID PRN    hydrOXYzine HCL (ATARAX) tablet 10 mg  10 mg Oral QHS PRN    pantoprazole (PROTONIX) tablet 40 mg  40 mg Oral ACB    insulin lispro (HUMALOG) injection   SubCUTAneous AC&HS    glucose chewable tablet 16 g  4 Tablet Oral PRN    dextrose (D50W) injection syrg 12.5-25 g  12.5-25 g IntraVENous PRN    glucagon (GLUCAGEN) injection 1 mg  1 mg IntraMUSCular PRN    morphine injection 1 mg  1 mg IntraVENous Q4H PRN       Allergies  Allergies   Allergen Reactions    Acetaminophen Unknown (comments), Anaphylaxis and Shortness of Breath     Other reaction(s): anaphylaxis/angioedema  Other reaction(s):  Other (see comments)  Other reaction(s): anaphylaxis/angioedema  Throat swelling    Aspirin Hives, Anaphylaxis and Shortness of Breath     Other reaction(s): anaphylaxis/angioedema  Other reaction(s): anaphylaxis/angioedema    Unable To Obtain Unable to Obtain     Patient states his allergic to greens    Aspirin Shortness of Breath    Spinach Leaf Unknown (comments)     Green leaves    Tylenol [Acetaminophen] Shortness of Breath       CBC:  Recent Labs     12/28/21  0259 12/27/21  1045   WBC 5.8 6.8   HGB 14.8 12.9   HCT 47.5 40.9    434       Metabolic Panel:  Recent Labs     12/28/21  0259 12/27/21  1045    141   K 4.3 3.8    110*   CO2 23 23   BUN 25* 21*   CREA 1.47* 1.33*   * 196*   CA 9.1 8.4*   MG 2.0  --    ALB  --  2.4*   ALT  --  42       Imaging/procedures: CT Results (most recent):  Results from Hospital Encounter encounter on 12/27/21    CTA CHEST W OR W WO CONT    Narrative  INDICATION:   chest pain, tachycardia, elevated dimer    COMPARISON: 5/6/2021    TECHNIQUE:  Precontrast  images were obtained to localize the volume for acquisition. Multislice helical CT arteriography was performed from the diaphragm to the  thoracic inlet during uneventful rapid bolus intravenous administration of 100  mL of Isovue-370. Lung and soft tissue windows were generated. Post processing  was performed and coronal reformatted images were also generated. 3 D imaging  was performed. CT dose reduction was achieved through use of a standardized  protocol tailored for this examination and automatic exposure control for dose  modulation. FINDINGS:  There are small bilateral effusions and minor basilar atelectasis. There is mild  groundglass opacity in the lungs with a few small focal area of hyperaeration  may represent changes of edema. No definite pulmonary emboli identified. There is cardiomegaly. There is diminished enhancement of the left heart  consistent with decreased ejection fraction. There is a small pericardial  effusion. There are a few borderline enlarged lymph nodes in the anterior mediastinum  nonspecific but most likely reactive. There are few lymph nodes in the rhonda  which are borderline enlarged most likely reactive. The visualized portions of the upper abdominal organs are normal.    Impression  1. No definite pulmonary emboli identified. There are small pleural  effusions/minor basilar atelectasis. There is cardiomegaly and there is a small pericardial effusion. There is  diminished enhancement left side of the heart consistent with diminished cardiac  output. There is increased groundglass opacity in the lungs may represent  changes of edema.     Slightly prominent anterior mediastinal lymph nodes are nonspecific but most  likely reactive.          For Billing    Chief Complaint   Patient presents with   [facility] Chest Pain       Hospital Problems  Date Reviewed: 11/10/2021          Codes Class Noted POA    * (Principal) NSTEMI (non-ST elevated myocardial infarction) ([facility] ICD-10-CM: I21.4  ICD-9-CM: 410.70  12/27/2021 Unknown        Acute HFrEF (heart failure with reduced ejection fraction) (HCC) ICD-10-CM: I50.21  ICD-9-CM: 428.21  8/8/2021 Unknown        CAD (coronary artery disease) ICD-10-CM: I25.10  ICD-9-CM: 414.00  Unknown Yes    Overview Signed 5/6/2021  4:46 PM by Shelli Rosenberg,      MI x2 with 2 cardiac stent             Major depression ICD-10-CM: F32.9  ICD-9-CM: 296.20  3/8/2021 Yes        Chronic systolic congestive heart failure (HCC) ICD-10-CM: I50.22  ICD-9-CM: 428.22, 428.0  12/2/2020 Yes        Type 2 diabetes with nephropathy ([facility] ICD-10-CM: E11.21  ICD-9-CM: 250.40, 583.81  2/28/2020 Yes        Bipolar 1 disorder ([facility] ICD-10-CM: F31.9  ICD-9-CM: 296.7  4/10/2012 Yes        Emotional lability ICD-10-CM: R45.86  ICD-9-CM: 799.24  3/24/2012 Yes        HTN (hypertension) ICD-10-CM: I10  ICD-9-CM: 401.9  3/5/2012 Yes

## 2021-12-29 NOTE — PROGRESS NOTES
Randy Hampton MD, [address] [place] [address]  Joon Rosenthal [address]  [phone]      IMPRESSION and RECOMMENDATIONS     1. Ischemic CM:  Mildly volume overload as evidenced by mild LE edema. Doubt this will go completely away without compromising renal function. Speaking of this, not on ACEI/ARB due to renal dysfunction. Not sure BP would tolerate either, but I'd like to intensify his med regimen somewhat. So, while he's here, will try increasing coreg to 6.25mg bid. Cont plavix (no ASA due to allergy). May home in AM?      2.  CAD: Pleuritic CP. May be mild pericarditis s/p NSTEMI due to small vessel occlusion, not amenable to PCI, or noncardiac. Would try NSAID, but I'm reluctant given severe ASA allergy. I have discussed this plan with the patient. He appears to understand this plan and wishes to proceed ahead. Subjective:       Still with somewhat pleuritic CP. Objective:   Patient Vitals for the past 16 hrs:   BP Temp Pulse Resp SpO2 Weight   12/29/21 0742 114/85 98.2 °F (36.8 °C) 86 21 98 %    12/29/21 0420      82.1 kg (181 lb)   12/29/21 0402 (!) 120/90 98.2 °F (36.8 °C) (!) 101 20 96 %    12/28/21 2304 128/68 98.4 °F (36.9 °C) (!) 107 20 90 %    12/28/21 2242   (!) 106      12/28/21 1937 (!) 117/95 98.1 °F (36.7 °C) (!) 106 20 90 %    12/28/21 1743 (!) 115/91 97.8 °F (36.6 °C) 99 18 94 %    12/28/21 1710 116/88  99      12/28/21 1645   94          HEENT Exam:     WNL         Lung Exam:     The patient is not dyspneic. Breath sounds are heard equally in all lung fields. There are no wheezes, rales, rhonchi, or rubs heard on auscultation. Heart Exam:     The rhythm is regular. The PMI is in the 5th intercostal space of the MCL. Apical impulse is normal. S1 is regular. S2 is physiologic. There is no S3, S4 gallop, murmur, click, or rub. Abdomen Exam:     Benign.          Extremities Exam:     No cyanosis, clubbing. Distal pulses intact. Mild edema. Lab Results   Component Value Date/Time    Glucose 104 (H) 12/28/2021 02:59 AM    Sodium 141 12/28/2021 02:59 AM    Potassium 4.3 12/28/2021 02:59 AM    Chloride 108 12/28/2021 02:59 AM    CO2 23 12/28/2021 02:59 AM    BUN 25 (H) 12/28/2021 02:59 AM    Creatinine 1.47 (H) 12/28/2021 02:59 AM    Calcium 9.1 12/28/2021 02:59 AM     Recent Labs     12/28/21  0259 12/27/21  1045   WBC 5.8 6.8   HGB 14.8 12.9   HCT 47.5 40.9    331     Recent Labs     12/27/21  1045   ALT 42   AP 89   TBILI 1.2*   TP 6.2*   ALB 2.4*   GLOB 3.8     Recent Labs     12/28/21  0259 12/27/21  1615   APTT 33.0* 25.9      No results for input(s): CPK, CKMB, TNIPOC in the last 72 hours. No lab exists for component: TROPONINI, ITNL  No results for input(s): TROIQ in the last 72 hours.   Lab Results   Component Value Date/Time    Cholesterol, total 217 (H) 03/09/2021 01:31 PM    HDL Cholesterol 40 03/09/2021 01:31 PM    LDL, calculated 101.2 (H) 03/09/2021 01:31 PM    Triglyceride 379 (H) 03/09/2021 01:31 PM    CHOL/HDL Ratio 5.4 (H) 03/09/2021 01:31 PM

## 2021-12-29 NOTE — DISCHARGE INSTRUCTIONS
HOME DISCHARGE INSTRUCTIONS    Paul Arroyo / [id number] : 1970    Admission date: 2021 Discharge date: 2021 7:49 AM     Please bring this form with you to show your care provider at your follow-up appointment. Primary care provider:  Jennifer Nelson MD    Discharging provider:  Joseluis Schmidt MD  - Family Medicine Resident  [address] Attending      You have been admitted to the hospital with the following diagnoses:    ACUTE DIAGNOSES:  NSTEMI (non-ST elevated myocardial infarction) ([facility] [I21.4]    . . . . . . . . . . . . . . . . . . . . . . . . . . . . . . . . . . . . . . . . . . . . . . . . . . . . . . . . . . . . . . . . . . . . . . . .   You are well enough to be discharged from the hospital. However, because you were inpatient in a hospital, you are at greater risk of having been exposed to the coronavirus. PLEASE stay inside and self-quarantine for 14 days to prevent further spread of the coronavirus. . . . . . . . . . . . . . . . . . . . . . . . . . . . . . . . . . . . . . . . . . . . . . . . . . . . . . . . . . . . . . . . . . . . . . . . .     You were admitted to the hospital for chest pain and difficulty breathing. Lab results and diagnostic studies showed that there is narrowing of your blood vessels in your heart and fluid in your lungs. You improved with a medication that pulls fluid out of your lungs. You will continue your home medications when you go home. You will need to take less insulin Lantus when you go home. Please take 10U of Lantus nightly.     FOLLOW-UP CARE RECOMMENDATIONS:    Appointments  Follow-up Information     Follow up With Specialties Details Why Contact Ambrocio Jorge NP Nurse Practitioner Call on 2022 To follow up on your hospital admission via VIRTUAL VISIT at 8:40am [facility]   [address] [facility]      Jarvis Cronin MD Cardiology, Interventional Cardiology Schedule an appointment as soon as possible for a visit To follow up on your hospital admission. Heike Toussaint  [phone]               Follow-up tests needed: None    Pending test results: At the time of your discharge the following test results are still pending: Hemoglobin A1c. Please make sure you review these results with your outpatient follow-up provider(s). DIET/what to eat:  Cardiac Diet and Diabetic Diet    ACTIVITY:  Activity as tolerated    Wound care: None    Equipment needed:  None    Specific symptoms to watch for: chest pain, shortness of breath, fever, chills, nausea, vomiting, diarrhea, change in mentation, falling, weakness, bleeding. What to do if new or unexpected symptoms occur? If you experience any of the above symptoms (or should other concerns or questions arise after discharge) please call your primary care physician. Return to the emergency room if you cannot get hold of your doctor. · It is very important that you keep your follow-up appointment(s). · Please bring discharge papers, medication list (and/or medication bottles) to your follow-up appointments for review by your outpatient provider(s). · Please check the list of medications and be sure it includes every medication (even non-prescription medications) that your provider wants you to take. · It is important that you take the medication exactly as they are prescribed. · Keep your medication in the bottles provided by the pharmacist and keep a list of the medication names, dosages, and times to be taken in your wallet. · Do not take other medications without consulting your doctor. · If you have any questions about your medications or other instructions, please talk to your nurse or care provider before you leave the hospital.     Information obtained by:     I understand that if any problems occur once I am at home I am to contact my physician.     These instructions were explained to me and I had the opportunity to ask questions. I understand and acknowledge receipt of the instructions indicated above.                                                                                                                                                Physician's or R.N.'s Signature                                                                  Date/Time                                                                                                                                              Patient or Representative Signature                                                          Date/Time

## 2021-12-29 NOTE — PROGRESS NOTES
Problem: Falls - Risk of  Goal: *Absence of Falls  Description: Document Bob Freeze Fall Risk and appropriate interventions in the flowsheet.   Outcome: Progressing Towards Goal  Note: Fall Risk Interventions:            Medication Interventions: Bed/chair exit alarm,Patient to call before getting OOB                   Problem: Patient Education: Go to Patient Education Activity  Goal: Patient/Family Education  Outcome: Progressing Towards Goal     Problem: Pain  Goal: *Control of Pain  Outcome: Progressing Towards Goal

## 2021-12-29 NOTE — PROGRESS NOTES
1:41 PM  Patient is discharging and is in need of Medicaid transport. CM called Roselyn at [phone] to set up next available transport. Booking reference number: [id number]. Informed nursing of transport set up and let the nurse know that transport could be anytime within the 3 hour window starting now at 1:40PM. [address] Management Interventions  PCP Verified by CM: Yes Lance Jaramillo MD)  Support Systems: Other Family Member(s) (sister)  Confirm Follow Up Transport: Family  The Plan for Transition of Care is Related to the Following Treatment Goals : NSTEMI  Discharge Location  Discharge Placement: Home with family assistance      12/29/21  10:21 AM    Care Management Transition of Care:    RUR: 16%- Moderate/Yellow  LOS: 2D  Readmission: No  Bundle: No    1). Patient continues to require medical management  2). Cardiology consulted- cardiac rehab, likely ready for dc tomorrow AM  3). No skilled PT and OT needs  4). Patient's family will transport at dc  5).  CM will follow for dc needs    LAWRENCE Craig  Care Manager

## 2021-12-29 NOTE — PROGRESS NOTES
PHYSICAL THERAPY EVALUATION/DISCHARGE  Patient: Kris Malone (68 y.o. male)  Date: 12/29/2021  Primary Diagnosis: NSTEMI (non-ST elevated myocardial infarction) ([facility] [I21.4]  Procedure(s) (LRB):  LEFT HEART CATH / CORONARY ANGIOGRAPHY (N/A) 1 Day Post-Op   Precautions:          ASSESSMENT  Based on the objective data described below, the patient presents with baseline level of independence with mobility and ADLs following admission for NSTEMI s/p L heart cath yesterday. Pt ambulates 275ft with normal gait speed, intact balance and no safety concerns. SpO2 95% and greater and HR 92-101BPM with activity. Pt reporting chest pain decreased from 6/10 to 5/10 with ambulation. No acute therapy needs. Will sign off. Functional Outcome Measure: The patient scored 28/28 on the Tinetti outcome measure which is indicative of low fall risk. Other factors to consider for discharge: none, pt does not want to discharge today     Further skilled acute physical therapy is not indicated at this time. PLAN :  Recommendation for discharge: (in order for the patient to meet his/her long term goals)  No skilled physical therapy/ follow up rehabilitation needs identified at this time. This discharge recommendation:  Has been made in collaboration with the attending provider and/or case management    IF patient discharges home will need the following DME: none       SUBJECTIVE:   Patient stated I know me and I'm not ready to go home.     OBJECTIVE DATA SUMMARY:   HISTORY:    Past Medical History:   Diagnosis Date    Anxiety disorder     Asthma     Bipolar 1 disorder ([facility]     CAD (coronary artery disease)     MI x2 with 2 cardiac stent    CHF (congestive heart failure) ([facility]     with reduced EF, EF 20%    Chronic systolic congestive heart failure ([facility] 12/2/2020    Depression     Diabetes mellitus, type 2 ([facility]     on insulin.  moderate proteinuria    Hypertension     Mood disorder (HCC)     Psychotic disorder ([facility]     Schizoaffective disorder, bipolar type ([facility]     Sleep disorder     Suicidal thoughts      Past Surgical History:   Procedure Laterality Date    HX CORONARY STENT PLACEMENT      HX IMPLANTABLE CARDIOVERTER DEFIBRILLATOR         Prior level of function: Independent, pt does not drive, lives with sister  Personal factors and/or comorbidities impacting plan of care: anxiety, bipolar, EF 15%, CAD, MI, HTN, diabetes    Home Situation  Home Environment: Private residence  # Steps to Enter: 1  One/Two Story Residence: One story  Living Alone: No  Support Systems: Other Family Member(s) (sister)  Patient Expects to be Discharged to[de-identified] House  Tub or Shower Type: Shower    EXAMINATION/PRESENTATION/DECISION MAKING:   Critical Behavior:  Neurologic State: Alert           Hearing:     Skin:    Edema:   Range Of Motion:  AROM: Within functional limits           PROM: Within functional limits           Strength:    Strength: Within functional limits (slightly decreased in LUE prior CVA)                    Tone & Sensation:                  Sensation: Impaired (L hand tingling 2/2 CVA)               Coordination:     Vision:      Functional Mobility:  Bed Mobility:  Rolling: Independent  Supine to Sit: Independent  Sit to Supine: Independent  Scooting: Independent  Transfers:  Sit to Stand: Independent  Stand to Sit: Independent  Stand Pivot Transfers: Independent     Bed to Chair: Independent              Balance:   Sitting: Intact  Standing: Intact  Ambulation/Gait Training:              Gait Description (WDL): Within defined limits                                          Stairs:               Therapeutic Exercises:       Functional Measure:  Tinetti test:    Sitting Balance: 1  Arises: 2  Attempts to Rise: 2  Immediate Standing Balance: 2  Standing Balance: 2  Nudged: 2  Eyes Closed: 1  Turn 360 Degrees - Continuous/Discontinuous: 1  Turn 360 Degrees - Steady/Unsteady: 1  Sitting Down: 2  Balance Score: 16 Balance total score  Indication of Gait: 1  R Step Length/Height: 1  L Step Length/Height: 1  R Foot Clearance: 1  L Foot Clearance: 1  Step Symmetry: 1  Step Continuity: 1  Path: 2  Trunk: 2  Walking Time: 1  Gait Score: 12 Gait total score  Total Score: 28/28 Overall total score         Tinetti Tool Score Risk of Falls  <19 = High Fall Risk  19-24 = Moderate Fall Risk  25-28 = Low Fall Risk  Tinetti ME. Performance-Oriented Assessment of Mobility Problems in Elderly Patients. [place] 68; [id number]. (Scoring Description: PT Bulletin Feb. 10, 1993)    Older adults: Long Miller et al, 2009; n = 1000 [facility] elderly evaluated with ABC, PASHA, ADL, and IADL)  · Mean PASHA score for males aged 69-68 years = 26.21(3.40)  · Mean PASHA score for females age 69-68 years = 25.16(4.30)  · Mean PASHA score for males over 80 years = 23.29(6.02)  · Mean PASHA score for females over 80 years = 17.20(8.32)            Physical Therapy Evaluation Charge Determination   History Examination Presentation Decision-Making   MEDIUM  Complexity : 1-2 comorbidities / personal factors will impact the outcome/ POC  MEDIUM Complexity : 3 Standardized tests and measures addressing body structure, function, activity limitation and / or participation in recreation  LOW Complexity : Stable, uncomplicated  Other outcome measures Tinetti  LOW       Based on the above components, the patient evaluation is determined to be of the following complexity level: LOW     Pain Ratin/10    Activity Tolerance:   Good      After treatment patient left in no apparent distress:   Supine in bed    COMMUNICATION/EDUCATION:   The patients plan of care was discussed with: Registered nurse. Fall prevention education was provided and the patient/caregiver indicated understanding., Patient/family have participated as able in goal setting and plan of care. and Patient/family agree to work toward stated goals and plan of care.     Thank you for this referral.  Carmen Harmon, PT Time Calculation: 22 mins

## 2021-12-29 NOTE — DISCHARGE SUMMARY
[facility] [address] [place] [address]   Office [phone]  Fax [phone]       Discharge / Transfer / Off-Service Note     Name: Onelia Bowles MRN: [id number]  Sex: Male   YOB: 1970  Age: 54 y.o. PCP: Karina Patino MD     Date of admission: 12/27/2021  Date of discharge/transfer: 12/29/2021    Attending physician at admission: Miriam Sena MD  Attending physician at discharge/transfer: Miriam Sena MD  Resident physician at discharge/transfer: Yisel Horner MD     Consultants during hospitalization  IP CONSULT TO [id number] [facility]     Admission diagnoses   NSTEMI (non-ST elevated myocardial infarction) [facility]) [I21.4]    Recommended follow-up after discharge    1. Stewart Penn MD  2. Cardiology     To follow up on with PCP:   CAD  HFrEF - new Coreg dose  Diabetes - clarify insulin regimen, consider SGLT2i    To follow up on with Cardiology:   NSTEMI in setting of CAD  HFrEF - updated medication regimen    ------------------------------------------------------------------------------------------------------------------    History of Present Illness    As per admitting provider, Dr. Susan Bedoya:   Geovani Guardado is a 54 y.o. male with known history of CAD (s/p 5 stents 05/2021), HFrEF (EF 15% 08/2021), HTN, Asthma, Bipolar 1/MDD who presents to the ER complaining of chest pain for 2 days. He says that his chest pain was left sided and \"crushing\" and became unbearable, so he came in. He was also nauseous, with 2 episodes of vomiting. He has also been experiencing SOB, with nonproductive cough. He sleeps on the couch to elevate his head, but has not required more elevation than normal lately. He reports compliance with all meds. He denies swelling in his legs and abdominal pain. \"      Hospital course  Onelia Bowles was admitted into the Family Medicine Service from 12/27/2021 to 12/28/2021 for NSTEMI (non-ST elevated myocardial infarction) ([facility] [I21.4].  He improved with diuresis, and a cath showed 2 vessel disease, with no stenting. Cardiology followed along during the course of this admission. During the course of this admission, the following conditions were addressed/managed:    NSTEMI in s/o CAD: S/p 2 stents in May 2021. S/p Cath 8/10/2021 with no stenting. Pt presents today with chest pain for 2 days with Trop 1099 -> 1038. EKG today with no changes from EKG in 08/2021. CTA no PE, small pericardial effusion, diminished enhancement in L side of heart. Follows w/ Dr. Shaunna Vegas outpatient. Cath 12/28 with 2VD, small RCA, not amenable to PCI. - Patient is allergic to ASA  - Continue Plavix 75mg daily  - Continue home Lipitor 80mg daily  - Continue Coreg 6.5mg BID  - Continue Nitrostat 0.4mg SL prn for chest pain  - Start Morphine 10mg PO qD prn x 5 tablets  - Follow up with Cardiology outpatient     [facility] HFrEF: Last Echo 8/9/2021: EF 15%. Pt presents today with NSTEMI, BNP 3918. Likely mild exacerbation given that BNP isn't much above baseline, but significant crackles on exam.  - Continue home lasix 40mg PO daily  - Continue Coreg 6.5mg BID  - Follow up with Cardiology outpatient to consider quadruple therapy  - Follow up with PCP     QTc Prolongation: 473ms on admission EKG. - avoid new QTc prolonging meds     Hypertension: POA Estonian 314/930.  - Continue Coreg 6.5mg BID      Diabetes Mellitus T2: Pt reports that home regimen is 50U BID, records suggest 26U daily. Per chart review, he was not on any long-acting insulin during admission in August and blood sugar was well-controlled with only Lispro.   Lab Results   Component Value Date/Time     Hemoglobin A1c 11.5 (H) 05/06/2021 05:46 PM   - Continue home Liraglutide.  - Change Lantus 26U qhs to 10U qhs  - consider addition of SGLT2i outpatient given pt's DM and HFrEF     Hyperlipidemia:         Lab Results   Component Value Date/Time     Cholesterol, total 217 (H) 03/09/2021 01:31 PM     HDL Cholesterol 40 03/09/2021 01:31 PM     LDL, calculated 101.2 (H) 03/09/2021 01:31 PM     VLDL, calculated 75.8 03/09/2021 01:31 PM     Triglyceride 379 (H) 03/09/2021 01:31 PM     CHOL/HDL Ratio 5.4 (H) 03/09/2021 01:31 PM   - Continue home lipitor 80mg daily     Asthma: Chronic.  - Continue home albuterol inhaler     Insomnia: Stable  - Continue home Atarax 25mg qhs prn     Bipolar Disorder/MDD: Stable. - No home meds  - Follow up with PCP     GERD: Stable. - Continue on home Protonix 40mg daily.     Hx of CVA: Patient reports history of stroke in 2021. No records found.  - Continue home lipitor 80mg daily  - Patient is allergic to ASA        Physical exam at discharge:    Visit Vitals  /85 (BP 1 Location: Right arm, BP Patient Position: Sitting)   Pulse 97   Temp 97.2 °F (36.2 °C)   Resp 20   Ht 5' 5\" (1.651 m)   Wt 180 lb 12.4 oz (82 kg)   SpO2 99%   BMI 30.08 kg/m²       General appearance - alert, well appearing, and in no distress  Chest - no wheezes or rhonchi, no crackles auscultated, improved from admission symmetric air entry  Heart - normal rate, regular rhythm, normal S1, S2, no murmurs, rubs, clicks or gallops,   Abdomen - soft, nontender, nondistended, no masses or organomegaly  Neurological - alert, oriented, normal speech, no focal findings  Extremities - peripheral pulses normal, no pedal edema, no clubbing or cyanosis    Condition at discharge: Stable.     Labs  Recent Labs     12/29/21  0856 12/28/21  0259 12/27/21  1045   WBC 6.3 5.8 6.8   HGB 14.0 14.8 12.9   HCT 44.8 47.5 40.9    352 331     Recent Labs     12/29/21  0856 12/28/21  0259 12/27/21  1045    141 141   K 4.7 4.3 3.8    108 110*   CO2 25 23 23   BUN 38* 25* 21*   CREA 2.03* 1.47* 1.33*   * 104* 196*   CA 8.3* 9.1 8.4*   MG 2.2 2.0  --      Recent Labs     12/27/21  1045   ALT 42   AP 89   TBILI 1.2*   TP 6.2*   ALB 2.4*   GLOB 3.8     Recent Labs     12/29/21  1044 12/29/21  0856 12/29/21  0741 12/28/21  2023 12/28/21  1802 12/28/21  1044 12/28/21  0259 12/28/21  0038 12/27/21  1658 12/27/21  1615   APTT  --  26.6  --   --   --   --  33.0*  --   --  25.9   GLUCPOC 234*  --  117 167* 80 153*  --    < >   < >  --     < > = values in this interval not displayed. Micro:  No results found for: CULT    Imaging:  XR CHEST PA LAT    Result Date: 12/27/2021  EXAM: XR CHEST PA LAT INDICATION: Pain COMPARISON: Chest dated 8/8/2021 TECHNIQUE: PA and lateral chest views FINDINGS: The cardiac silhouette continues to be enlarged. There has been interval improvement of the aeration of the lungs. Specifically, there has been improvement of the previously described bilateral pulmonary opacities. No areas of lobar consolidation are identified. 1. Interval improvement of the aeration of the lungs as described above. 2. Continued evidence of marked cardiomegaly. CTA CHEST W OR W WO CONT    Result Date: 12/27/2021  INDICATION:   chest pain, tachycardia, elevated dimer COMPARISON: 5/6/2021 TECHNIQUE: Precontrast  images were obtained to localize the volume for acquisition. Multislice helical CT arteriography was performed from the diaphragm to the thoracic inlet during uneventful rapid bolus intravenous administration of 100 mL of Isovue-370. Lung and soft tissue windows were generated. Post processing was performed and coronal reformatted images were also generated. 3 D imaging was performed. CT dose reduction was achieved through use of a standardized protocol tailored for this examination and automatic exposure control for dose modulation. FINDINGS: There are small bilateral effusions and minor basilar atelectasis. There is mild groundglass opacity in the lungs with a few small focal area of hyperaeration may represent changes of edema. No definite pulmonary emboli identified. There is cardiomegaly. There is diminished enhancement of the left heart consistent with decreased ejection fraction. There is a small pericardial effusion.  There are a few borderline enlarged lymph nodes in the anterior mediastinum nonspecific but most likely reactive. There are few lymph nodes in the rhonda which are borderline enlarged most likely reactive. The visualized portions of the upper abdominal organs are normal.     1. No definite pulmonary emboli identified. There are small pleural effusions/minor basilar atelectasis. There is cardiomegaly and there is a small pericardial effusion. There is diminished enhancement left side of the heart consistent with diminished cardiac output. There is increased groundglass opacity in the lungs may represent changes of edema. Slightly prominent anterior mediastinal lymph nodes are nonspecific but most likely reactive. Procedures / Diagnostic Studies  CARDIAC PROCEDURE    Result Date: 12/28/2021  Cath: Obstructive 2VD:    LAD patent mid stent, ap40; D1 inf br 90    OM2 30 (ISR), OM4 40 (ISR) (left dom)    RCA p70, m100 (likely culprit) (non-dom) RFA mynx Mildly abnormal troponin likely due to occluded, small RCA not amenable to PCI. Cont med mgmt.     Chronic diagnoses   Problem List as of 12/29/2021 Date Reviewed: 11/10/2021          Codes Class Noted - Resolved    RESOLVED: Suicidal ideations ICD-10-CM: R45.851  ICD-9-CM: V62.84 Acute 4/10/2012 - 5/23/2014        * (Principal) NSTEMI (non-ST elevated myocardial infarction) ([facility] ICD-10-CM: I21.4  ICD-9-CM: 410.70  12/27/2021 - Present        Acute HFrEF (heart failure with reduced ejection fraction) ([facility] ICD-10-CM: I50.21  ICD-9-CM: 428.21  8/8/2021 - Present        ACS (acute coronary syndrome) (HCC) ICD-10-CM: I24.9  ICD-9-CM: 411.1  5/7/2021 - Present        CAD (coronary artery disease) ICD-10-CM: I25.10  ICD-9-CM: 414.00  Unknown - Present    Overview Signed 5/6/2021  4:46 PM by Ridge Ashton DO     MI x2 with 2 cardiac stent             Chest pain ICD-10-CM: R07.9  ICD-9-CM: 786.50  5/6/2021 - Present        Major depression ICD-10-CM: F32.9  ICD-9-CM: 296.20  3/8/2021 - Present        Suicide [facility]) ICD-10-CM: R45. 8XXA  ICD-9-CM: E958.9  3/8/2021 - Present        Chronic systolic congestive heart failure (HCC) ICD-10-CM: I50.22  ICD-9-CM: 428.22, 428.0  12/2/2020 - Present        History of CVA (cerebrovascular accident) ICD-10-CM: Z86.73  ICD-9-CM: V12.54  12/2/2020 - Present        Type 2 diabetes with nephropathy ([facility] ICD-10-CM: E11.21  ICD-9-CM: 250.40, 583.81  2/28/2020 - Present        Adjustment disorder with depressed mood ICD-10-CM: F43.21  ICD-9-CM: 309.0  4/28/2012 - Present        Bipolar 1 disorder ([facility] ICD-10-CM: F31.9  ICD-9-CM: 296.7  4/10/2012 - Present        Borderline personality disorder ([facility] ICD-10-CM: F60.3  ICD-9-CM: 301.83  4/10/2012 - Present        Suicidal behavior ICD-10-CM: R45.89  ICD-9-CM: V62.89  3/24/2012 - Present        Emotional lability ICD-10-CM: R45.86  ICD-9-CM: 799.24  3/24/2012 - Present        Diabetes mellitus ([facility] ICD-10-CM: E11.9  ICD-9-CM: 250.00  3/5/2012 - Present        HTN (hypertension) ICD-10-CM: I10  ICD-9-CM: 401.9  3/5/2012 - Present        Unspecified asthma, with exacerbation ICD-10-CM: J45.901  ICD-9-CM: 493.92  3/5/2012 - Present    Overview Signed 3/5/2012  9:53 PM by Efraín COLLAZO no exacerbation. RESOLVED: Schizoaffective disorder, bipolar type ([facility] ICD-10-CM: F25.0  ICD-9-CM: 295.70  4/10/2012 - 4/10/2012              Discharge Medication List as of 12/29/2021  2:12 PM      START taking these medications    Details   nitroglycerin (NITROSTAT) 0.4 mg SL tablet 1 Tablet by SubLINGual route as needed for Chest Pain. Up to 3 doses. , Normal, Disp-5 Tablet, R-0         CONTINUE these medications which have CHANGED    Details   insulin glargine (Lantus U-100 Insulin) 100 unit/mL injection 10 Units by SubCUTAneous route nightly., Normal, Disp-1 mL, R-2      carvediloL (COREG) 6.25 mg tablet Take 1 Tablet by mouth two (2) times daily (with meals). , Normal, Disp-60 Tablet, R-0      morphine (TESS) 10 mg SR capsule Take 10 mg by mouth daily as needed for Pain for up to 5 days. , Normal, Disp-5 Capsule, R-0         CONTINUE these medications which have NOT CHANGED    Details   traZODone (DESYREL) 50 mg tablet Take 100 mg by mouth nightly., Historical Med      liraglutide (VICTOZA) 0.6 mg/0.1 mL (18 mg/3 mL) pnij 1.8 mg by SubCUTAneous route daily. , Normal, Disp-10 Adjustable Dose Pre-filled Pen Syringe, R-1      clopidogreL (PLAVIX) 75 mg tab Take 1 Tablet by mouth daily. , Normal, Disp-30 Tablet, R-5      cyclobenzaprine (FLEXERIL) 10 mg tablet Take 1 Tablet by mouth three (3) times daily as needed for Muscle Spasm(s). , Normal, Disp-30 Tablet, R-1      albuterol (PROVENTIL HFA, VENTOLIN HFA, PROAIR HFA) 90 mcg/actuation inhaler Take 2 Puffs by inhalation every four (4) hours as needed for Wheezing or Shortness of Breath., Normal, Disp-2 Each, R-3      hydrOXYzine HCL (ATARAX) 25 mg tablet Take two to three tablets by mouth at bedtime for sleep., Normal, Disp-60 Tablet, R-2      furosemide (LASIX) 40 mg tablet Take 1 Tablet by mouth daily. , Normal, Disp-90 Tablet, R-1      atorvastatin (LIPITOR) 80 mg tablet Take 1 Tab by mouth daily. , Normal, Disp-30 Tab, R-5      pantoprazole (PROTONIX) 40 mg tablet Take 1 Tab by mouth Daily (before breakfast). , Normal, Disp-30 Tab, R-5              Diet:  Cardiac diet. Activity:  As tolerated     Disposition: Home or Self Care    Discharge instructions to patient/family  Please seek medical attention for any new or worsening symptoms particularly chest pain, shortness of breath, fever, chills, nausea, vomiting, diarrhea, change in mentation, falling, weakness, bleeding.     Follow up plans/appointments  Follow-up Information     Follow up With Specialties Details Why Contact Ambrocio Wiley NP Nurse Practitioner Call on 1/6/2022 To follow up on your hospital admission via VIRTUAL VISIT at 8:40am [facility]  [address]      Ermias Krause MD Cardiology, Interventional Cardiology Go on 1/5/2022 Wednesday, Jan 5th at 3:00pm To follow up on your hospital admission.  Heike Toussaint  [phone]             Heather Vazquez MD  Family Medicine Resident       For Billing    Chief Complaint   Patient presents with   [facility] Chest Pain       Hospital Problems  Date Reviewed: 11/10/2021          Codes Class Noted POA    * (Principal) NSTEMI (non-ST elevated myocardial infarction) [facility]) ICD-10-CM: I21.4  ICD-9-CM: 410.70  12/27/2021 Unknown        Acute HFrEF (heart failure with reduced ejection fraction) (HCC) ICD-10-CM: I50.21  ICD-9-CM: 428.21  8/8/2021 Unknown        CAD (coronary artery disease) ICD-10-CM: I25.10  ICD-9-CM: 414.00  Unknown Yes    Overview Signed 5/6/2021  4:46 PM by Arlene Chaudhry, DO     MI x2 with 2 cardiac stent             Major depression ICD-10-CM: F32.9  ICD-9-CM: 296.20  3/8/2021 Yes        Chronic systolic congestive heart failure (HCC) ICD-10-CM: I50.22  ICD-9-CM: 428.22, 428.0  12/2/2020 Yes        Type 2 diabetes with nephropathy ([facility] ICD-10-CM: E11.21  ICD-9-CM: 250.40, 583.81  2/28/2020 Yes        Bipolar 1 disorder ([facility] ICD-10-CM: F31.9  ICD-9-CM: 296.7  4/10/2012 Yes        Emotional lability ICD-10-CM: R45.86  ICD-9-CM: 799.24  3/24/2012 Yes        HTN (hypertension) ICD-10-CM: I10  ICD-9-CM: 401.9  3/5/2012 Yes

## 2021-12-29 NOTE — NURSE NAVIGATOR
Follow up with Dr Shanthi Mohan scheduled and placed on AVS.  Met with patient at bedside and notified him of appointment. He states he is able to arrange transport for this. Patient awaiting transport for home. Reviewed symptoms of HF, daily weight rationale, and importance of follow up with Cardiology. Standing scale weight obtained and is 180 lbs. Patient states that his weight PTA was 183 lbs, not the 163 lbs he reported. Patient declined any additional written HF information.

## 2021-12-29 NOTE — PROGRESS NOTES
0700: Bedside and Verbal shift change report given to David Harper (oncoming nurse) by Jason Piña RN (offgoing nurse). Report included the following information SBAR, Kardex, Accordion and Cardiac Rhythm NSR.    1330:  Discharge orders have been placed. Waiting on transportation. 1620: I have reviewed discharge instructions with the patient. The patient verbalized understanding. Patient is discharged    This patient was assisted with Intentional Toileting every 2 hours during this shift as appropriate. Documentation of ambulation and output reflected on Flowsheet as appropriate. Purposeful hourly rounding was completed using AIDET and 5Ps. Outcomes of PHR documented as they occurred. Bed alarm in use as appropriate. Dual Suction and ambubag in place.

## 2021-12-29 NOTE — PROGRESS NOTES
OCCUPATIONAL THERAPY EVALUATION/DISCHARGE  Patient: Nicolasa Alas (49 y.o. male)  Date: 12/29/2021  Primary Diagnosis: NSTEMI (non-ST elevated myocardial infarction) ([facility] [I21.4]  Procedure(s) (LRB):  LEFT HEART CATH / CORONARY ANGIOGRAPHY (N/A) 1 Day Post-Op   Precautions: fall       ASSESSMENT  Based on the objective data described below, the patient presents with good overall activity tolerance following admission for NSTEMI and now s/p cardiac cath. Patient reports he lives with his sister who is able to provide some assist if needed. Patient performed transfers and ADLs without LOB or physical assistance needed. He was able to transfer into the bathroom, perform full sponge bath, manage toileting dressing, and standing grooming tasks with good tolerance. Patient was educated on energy conservation techniques and encouraged to be OOB as often as tolerated. Current Level of Function (ADLs/self-care): Patient is independent with ADLs and functional mobility. Functional Outcome Measure: The patient scored 100/100 on the Barthel Index outcome measure. PLAN :    Recommendation for discharge: (in order for the patient to meet his/her long term goals)  No skilled occupational therapy/ follow up rehabilitation needs identified at this time. This discharge recommendation:  Has been made in collaboration with the attending provider and/or case management    IF patient discharges home will need the following DME: none       SUBJECTIVE:   Patient agreeable to OT evaluation. OBJECTIVE DATA SUMMARY:   HISTORY:   Past Medical History:   Diagnosis Date    Anxiety disorder     Asthma     B[facility] ([facility]     CAD (coronary artery disease)     MI x2 with 2 cardiac stent    CHF (congestive heart failure) ([facility]     with reduced EF, EF 20%    Chronic systolic congestive heart failure ([facility] 12/2/2020    Depression     Diabetes mellitus, type 2 ([facility]     on insulin.  moderate proteinuria  Hypertension     Mood disorder (HCC)     Psychotic disorder (HCC)     Schizoaffective disorder, bipolar type ([address]     Sleep disorder     Suicidal thoughts      Past Surgical History:   Procedure Laterality Date    HX CORONARY STENT PLACEMENT      HX IMPLANTABLE CARDIOVERTER DEFIBRILLATOR         Prior Level of Function/Environment/Context: Patient lives with his sister. Expanded or extensive additional review of patient history:   Home Situation  Home Environment: Private residence  # Steps to Enter: 1  One/Two Story Residence: One story  Living Alone: No  Support Systems: Other Family Member(s) (sister)  Patient Expects to be Discharged to[de-identified] House  Tub or Shower Type: Shower    Hand dominance: Right    EXAMINATION OF PERFORMANCE DEFICITS:  Cognitive/Behavioral Status:  Neurologic State: Alert  Orientation Level: Oriented X4  Cognition: Appropriate decision making; Appropriate for age attention/concentration; Appropriate safety awareness  Perception: Appears intact  Perseveration: No perseveration noted  Safety/Judgement: Awareness of environment    Skin: Intact in the uppers    Edema: None noted in the uppers    Vision/Perceptual:    Tracking: Able to track stimulus in all quadrants w/o difficulty    Diplopia: Yes    Acuity: Within Defined Limits       Range of Motion:  AROM: Within functional limits  PROM: Within functional limits    Strength:  Strength: Within functional limits (slightly decreased in LUE prior CVA)     Coordination:  Fine Motor Skills-Upper: Left Intact; Right Intact    Gross Motor Skills-Upper: Left Intact; Right Intact    Tone & Sensation:  Sensation: Impaired (L hand tingling 2/2 CVA)     Balance:  Sitting: Intact  Standing: Intact    Functional Mobility and Transfers for ADLs:  Bed Mobility:  Rolling: Independent  Supine to Sit: Independent  Sit to Supine: Independent  Scooting: Independent    Transfers:  Sit to Stand: Independent  Stand to Sit: Independent  Bed to Chair: Independent  Bathroom Mobility: Independent  Toilet Transfer : Independent    ADL Assessment:  Feeding: Independent    Oral Facial Hygiene/Grooming: Independent    Bathing: Independent    Upper Body Dressing: Independent    Lower Body Dressing: Independent    Toileting: Independent     Cognitive Retraining  Safety/Judgement: Awareness of environment    Functional Measure:    Barthel Index:  Bathin  Bladder: 10  Bowels: 10  Groomin  Dressing: 10  Feeding: 10  Mobility: 15  Stairs: 10  Toilet Use: 10  Transfer (Bed to Chair and Back): 15  Total: 100/100      The Barthel ADL Index: Guidelines  1. The index should be used as a record of what a patient does, not as a record of what a patient could do. 2. The main aim is to establish degree of independence from any help, physical or verbal, however minor and for whatever reason. 3. The need for supervision renders the patient not independent. 4. A patient's performance should be established using the best available evidence. Asking the patient, friends/relatives and nurses are the usual sources, but direct observation and common sense are also important. However direct testing is not needed. 5. Usually the patient's performance over the preceding 24-48 hours is important, but occasionally longer periods will be relevant. 6. Middle categories imply that the patient supplies over 50 per cent of the effort. 7. Use of aids to be independent is allowed. Score Interpretation (from [address] [address])    Independent   60-79 Minimally independent   40-59 Partially dependent   20-39 Very dependent   <20 Totally dependent     -Yovany Whitt., Barthel, D.W. (1965). Functional evaluation: the Barthel Index. [facility] ([address] (1997). The Barthel activities of daily living index: self-reporting versus actual performance in the old (> or = 75 years).  Journal of [address] [place] [address] 45(7), 14 [facility], JRACHELLE, Jaclyn Rice, Ja Suarez. (1999). Measuring the change in disability after inpatient rehabilitation; comparison of the responsiveness of the Barthel Index and Functional [place] Measure. Journal of Neurology, Neurosurgery, and Psychiatry, 66(4), 807-282. NISHANT Collins, GUANACO Moore, & Moinca Harrington M.A. (2004) Assessment of post-stroke quality of life in cost-effectiveness studies: The usefulness of the Barthel Index and the EuroQoL-5D. Quality of Life Research, 15, 148-57       Occupational Therapy Evaluation Charge Determination   History Examination Decision-Making   LOW Complexity : Brief history review  LOW Complexity : 1-3 performance deficits relating to physical, cognitive , or psychosocial skils that result in activity limitations and / or participation restrictions  LOW Complexity : No comorbidities that affect functional and no verbal or physical assistance needed to complete eval tasks       Based on the above components, the patient evaluation is determined to be of the following complexity level: LOW     Activity Tolerance:   Good    After treatment patient left in no apparent distress:    Supine in bed and Call bell within reach    COMMUNICATION/EDUCATION:   The patients plan of care was discussed with: Physical therapist, Registered nurse and patient.      Thank you for this referral.  SHELLEY Titus/L  Time Calculation: 33 mins

## 2022-01-06 ENCOUNTER — VIRTUAL VISIT (OUTPATIENT)
Dept: FAMILY MEDICINE CLINIC | Age: 52
End: 2022-01-06
Payer: COMMERCIAL

## 2022-01-06 DIAGNOSIS — I10 PRIMARY HYPERTENSION: ICD-10-CM

## 2022-01-06 DIAGNOSIS — Z09 HOSPITAL DISCHARGE FOLLOW-UP: ICD-10-CM

## 2022-01-06 DIAGNOSIS — E78.00 HYPERCHOLESTEREMIA: ICD-10-CM

## 2022-01-06 DIAGNOSIS — E11.65 TYPE 2 DIABETES MELLITUS WITH HYPERGLYCEMIA, WITH LONG-TERM CURRENT USE OF INSULIN (HCC): Primary | ICD-10-CM

## 2022-01-06 DIAGNOSIS — Z79.4 TYPE 2 DIABETES MELLITUS WITH HYPERGLYCEMIA, WITH LONG-TERM CURRENT USE OF INSULIN (HCC): Primary | ICD-10-CM

## 2022-01-06 DIAGNOSIS — I50.22 CHRONIC SYSTOLIC CONGESTIVE HEART FAILURE (HCC): ICD-10-CM

## 2022-01-06 PROCEDURE — 99214 OFFICE O/P EST MOD 30 MIN: CPT | Performed by: NURSE PRACTITIONER

## 2022-01-06 RX ORDER — INSULIN GLARGINE 100 [IU]/ML
10 INJECTION, SOLUTION SUBCUTANEOUS
Qty: 1 ML | Refills: 2 | Status: SHIPPED | OUTPATIENT
Start: 2022-01-06 | End: 2022-06-01

## 2022-01-06 NOTE — PROGRESS NOTES
Virtual Video Transitional Care Management Progress Note    Patient: Luiza Hui  : 1970  PCP: Meir Duarte MD    Date of office visit: 2022   Date of admission: 21  Date of discharge: 21  Hospital: [facility]    Call initiated w/i 2 business dates of discharge: *No response documented in the last 14 days   Date of the most recent call to the patient: *No documented post hospital discharge outreach found in the last 14 days        Assessment/Plan:   Diagnoses and all orders for this visit:    1. Type 2 diabetes mellitus with hyperglycemia, with long-term current use of insulin (HCC)  -     insulin glargine (Lantus U-100 Insulin) 100 unit/mL injection; 10 Units by SubCUTAneous route nightly.  -     HEMOGLOBIN A1C WITH EAG; Future  -     CBC WITH AUTOMATED DIFF; Future  -     METABOLIC PANEL, BASIC; Future  - LIPID PANEL  - Presumed stable will refill Lantus at 10 units per hospital discharge. Labs in 3 months, suggested to consider adding an SGLT2 outpatient. Plan for follow-up in 3 months or sooner if needed    2. Primary hypertension   -Presumed stable    3. Hospital discharge follow-up   -Stable keep all follow-up appointments. Medications reviewed    4. Hypercholesterolemia   -Presumed stable getting labs today    5. Chronic systolic congestive heart failure ([address]   -Patient is to make a follow-up appointment with cardiology within the week         Subjective:   Luiza Hui is a 54 y.o. male presenting today for follow-up after hospital discharge. This encounter and supporting documentation was reviewed if available. Medication reconciliation was performed today. The main problem requiring admission was Chest pain. Complications during admission: none  Hospital course:  NSTEMI in s/o CAD: S/p 2 stents in May 2021. S/p Cath 8/10/2021 with no stenting.   Echo showed ejection fraction of 15% on 2021  We will continue Plavix, Lipitor, Coreg and started him on morphine in the hospital  Continue Lasix    Has Cardiology apt next week. No medication changes. States taking medication as prescribed. Feels a little better each day. Diabetes  A1c was 11.5 in August.  Blood sugars seem well controlled in the hospital was started on Lantus 10 units a day  consider addition of SGLT2i outpatient given pt's DM and HFrEF    Wants more morphine for his back pain    diabetes  BG was 283 last night and 150 this morning. Interval history/Current status: stable    Admitting symptoms have: improved      Medications marked \"taking\" at this time:  Home Medications    Medication Sig Start Date End Date Taking? Authorizing Provider   insulin glargine (Lantus U-100 Insulin) 100 unit/mL injection 10 Units by SubCUTAneous route nightly. 1/6/22  Yes Dominic Buchanan, NP   nitroglycerin (NITROSTAT) 0.4 mg SL tablet 1 Tablet by SubLINGual route as needed for Chest Pain. Up to 3 doses. 12/29/21   Nathen Sánchez MD   carvediloL (COREG) 6.25 mg tablet Take 1 Tablet by mouth two (2) times daily (with meals). 12/29/21   Nathen Sánchez MD   traZODone (DESYREL) 50 mg tablet Take 100 mg by mouth nightly. Provider, Historical   liraglutide (VICTOZA) 0.6 mg/0.1 mL (18 mg/3 mL) pnij 1.8 mg by SubCUTAneous route daily. 11/10/21   Erik Lopez MD   clopidogreL (PLAVIX) 75 mg tab Take 1 Tablet by mouth daily. 10/28/21   Erik Lopez MD   cyclobenzaprine (FLEXERIL) 10 mg tablet Take 1 Tablet by mouth three (3) times daily as needed for Muscle Spasm(s). 9/16/21   Erik Lopez MD   albuterol (PROVENTIL HFA, VENTOLIN HFA, PROAIR HFA) 90 mcg/actuation inhaler Take 2 Puffs by inhalation every four (4) hours as needed for Wheezing or Shortness of Breath. 9/16/21   Erik Lopez MD   hydrOXYzine HCL (ATARAX) 25 mg tablet Take two to three tablets by mouth at bedtime for sleep.  8/16/21   Erik Lopez MD   furosemide (LASIX) 40 mg tablet Take 1 Tablet by mouth daily. 8/16/21   Regis Kehr, MD   atorvastatin (LIPITOR) 80 mg tablet Take 1 Tab by mouth daily. 4/8/21   Regis Kehr, MD   pantoprazole (PROTONIX) 40 mg tablet Take 1 Tab by mouth Daily (before breakfast). 4/8/21   Regis Kehr, MD        Review of Systems:  Review of Systems   Constitutional: Negative for chills, fever and malaise/fatigue. Eyes: Negative for blurred vision. Respiratory: Positive for shortness of breath. Negative for cough. Cardiovascular: Positive for chest pain. Negative for palpitations and leg swelling. Neurological: Negative for dizziness and headaches. Patient Active Problem List   Diagnosis Code    Diabetes mellitus ([facility] E11.9    HTN (hypertension) I10    Unspecified asthma, with exacerbation J45. 0    Suicidal behavior R45.89    Emotional lability R45.86    Bipolar 1 disorder ([facility]) F31.9    Borderline personality disorder ([facility]) F60.3    Adjustment disorder with depressed mood F43.21    Type 2 diabetes with nephropathy ([facility]) E11.21    Chronic systolic congestive heart failure ([facility]) I50.22    History of CVA (cerebrovascular accident) Z80.78    Major depression F32.9    Suicide ([facility] Gérard.Ebbing. 8XXA    CAD (coronary artery disease) I25.10    Chest pain R07.9    ACS (acute coronary syndrome) ([facility]) I24.9    Acute HFrEF (heart failure with reduced ejection fraction) ([facility]) I50.21    NSTEMI (non-ST elevated myocardial infarction) ([facility] I21.4         Objective:     Patient-Reported Vitals 4/8/2021   Patient-Reported Weight 165lb   Patient-Reported Height -   Patient-Reported Systolic  -   Patient-Reported Diastolic -      General: alert, cooperative, no distress   Mental  status: normal mood, behavior, speech, dress, motor activity, and thought processes, able to follow commands   HENT: NCAT   Neck: no visualized mass   Resp: no respiratory distress   Neuro: no gross deficits   Skin: no discoloration or lesions of concern on visible areas Psychiatric: normal affect, consistent with stated mood, no evidence of hallucinations     Additional exam findings: We discussed the expected course, resolution and complications of the diagnosis(es) in detail. Medication risks, benefits, costs, interactions, and alternatives were discussed as indicated. I advised him to contact the office if his condition worsens, changes or fails to improve as anticipated. He expressed understanding with the diagnosis(es) and plan. Dara Marrero, who was evaluated through a synchronous (real-time) audio-video encounter and/or his healthcare decision maker, is aware that it is a billable service, with coverage as determined by his insurance carrier. He provided verbal consent to proceed: Yes, and patient identification was verified. It was conducted pursuant to the emergency declaration under the [address] [place] [address], [address] [place] [address] authority and the [employer] Act. A caregiver was present when appropriate. Ability to conduct physical exam was limited. I was at home. The patient was at home.       Rebecca Herzog NP

## 2022-02-02 ENCOUNTER — VIRTUAL VISIT (OUTPATIENT)
Dept: FAMILY MEDICINE CLINIC | Age: 52
End: 2022-02-02
Payer: COMMERCIAL

## 2022-02-02 DIAGNOSIS — Z79.4 TYPE 2 DIABETES MELLITUS WITH HYPERGLYCEMIA, WITH LONG-TERM CURRENT USE OF INSULIN (HCC): Primary | ICD-10-CM

## 2022-02-02 DIAGNOSIS — Z09 HOSPITAL DISCHARGE FOLLOW-UP: ICD-10-CM

## 2022-02-02 DIAGNOSIS — E11.65 TYPE 2 DIABETES MELLITUS WITH HYPERGLYCEMIA, WITH LONG-TERM CURRENT USE OF INSULIN (HCC): Primary | ICD-10-CM

## 2022-02-02 PROBLEM — I50.43 ACUTE ON CHRONIC COMBINED SYSTOLIC AND DIASTOLIC ACC/AHA STAGE C CONGESTIVE HEART FAILURE (HCC): Status: ACTIVE | Noted: 2022-02-02

## 2022-02-02 PROCEDURE — 1111F DSCHRG MED/CURRENT MED MERGE: CPT | Performed by: NURSE PRACTITIONER

## 2022-02-02 PROCEDURE — 99443 PR PHYS/QHP TELEPHONE EVALUATION 21-30 MIN: CPT | Performed by: NURSE PRACTITIONER

## 2022-02-02 RX ORDER — INSULIN GLARGINE 100 [IU]/ML
40 INJECTION, SOLUTION SUBCUTANEOUS EVERY MORNING
Qty: 4 ADJUSTABLE DOSE PRE-FILLED PEN SYRINGE | Refills: 1 | Status: SHIPPED | OUTPATIENT
Start: 2022-02-02 | End: 2022-06-02

## 2022-02-02 RX ORDER — EMPAGLIFLOZIN 10 MG/1
10 TABLET, FILM COATED ORAL DAILY
COMMUNITY
Start: 2022-01-17

## 2022-02-02 RX ORDER — METOPROLOL SUCCINATE 50 MG/1
50 TABLET, EXTENDED RELEASE ORAL DAILY
COMMUNITY
Start: 2022-01-25 | End: 2022-02-04

## 2022-02-02 RX ORDER — METFORMIN HYDROCHLORIDE 500 MG/1
500 TABLET, EXTENDED RELEASE ORAL 2 TIMES DAILY
COMMUNITY
Start: 2022-01-25 | End: 2022-02-04

## 2022-02-02 RX ORDER — INSULIN GLARGINE 100 [IU]/ML
40 INJECTION, SOLUTION SUBCUTANEOUS EVERY MORNING
COMMUNITY
Start: 2022-01-25 | End: 2022-02-02 | Stop reason: SDUPTHER

## 2022-02-02 NOTE — PROGRESS NOTES
Simone Sahni is a 54 y.o. male, evaluated via audio-only technology on 2/2/2022 for Transitions Of Care  . Assessment & Plan:   Diagnoses and all orders for this visit:    1. Type 2 diabetes mellitus with hyperglycemia, with long-term current use of insulin (HCC)  -     Lantus Solostar U-100 Insulin 100 unit/mL (3 mL) inpn; 40 Units by SubCUTAneous route Every morning. Patient was seen by telephone encounter for transition of care. He was discharged from [place] [id number] around 23 January. He reports he is supposed to follow-up with cardiology tomorrow but he had a ride. I have asked him to reach out to care planners to see if they can make this for him. I went through his [facility] note to better reconcile his medication. He reports that he was in need of insulin that they had prescribed which was 40 units Solostar every morning. He was unhappy with taking his diuretic and wanted to stop this. I advised against this as he needs his to remove fluid that it causes hospital admission to start with. Patient understands to continue taking all medication as prescribed. He has a follow-up visit in 1 month in our office. If he develops any reoccurring symptoms he should go to the emergency. Again, I advised him to reach out to care finders to help facilitate transportation as needed. 12  Subjective:       Prior to Admission medications    Medication Sig Start Date End Date Taking? Authorizing Provider   Lantus Solostar U-100 Insulin 100 unit/mL (3 mL) inpn 40 Units by SubCUTAneous route Every morning. 2/2/22  Yes Nazanin Yousif, NP   Jardiance 10 mg tablet Take 10 mg by mouth daily. 1/17/22   Provider, Historical   metFORMIN ER (GLUCOPHAGE XR) 500 mg tablet Take 500 mg by mouth two (2) times a day. 1/25/22   Provider, Historical   metoprolol succinate (TOPROL-XL) 50 mg XL tablet Take 50 mg by mouth daily.  1/25/22   Provider, Historical   Lantus Solostar U-100 Insulin 100 unit/mL (3 mL) inpn 40 Units by SubCUTAneous route Every morning. 1/25/22 2/2/22  Provider, Historical   insulin glargine (Lantus U-100 Insulin) 100 unit/mL injection 10 Units by SubCUTAneous route nightly. 1/6/22   Karina Buchanan NP   nitroglycerin (NITROSTAT) 0.4 mg SL tablet 1 Tablet by SubLINGual route as needed for Chest Pain. Up to 3 doses. 12/29/21   Hong Morris MD   carvediloL (COREG) 6.25 mg tablet Take 1 Tablet by mouth two (2) times daily (with meals). 12/29/21 2/2/22  Hong Morris MD   traZODone (DESYREL) 50 mg tablet Take 100 mg by mouth nightly. Provider, Historical   liraglutide (VICTOZA) 0.6 mg/0.1 mL (18 mg/3 mL) pnij 1.8 mg by SubCUTAneous route daily. 11/10/21   Anita Redding MD   clopidogreL (PLAVIX) 75 mg tab Take 1 Tablet by mouth daily. 10/28/21   Anita Redding MD   cyclobenzaprine (FLEXERIL) 10 mg tablet Take 1 Tablet by mouth three (3) times daily as needed for Muscle Spasm(s). 9/16/21   Anita Redding MD   albuterol (PROVENTIL HFA, VENTOLIN HFA, PROAIR HFA) 90 mcg/actuation inhaler Take 2 Puffs by inhalation every four (4) hours as needed for Wheezing or Shortness of Breath. 9/16/21   Anita Redding MD   hydrOXYzine HCL (ATARAX) 25 mg tablet Take two to three tablets by mouth at bedtime for sleep. 8/16/21   Anita Redding MD   furosemide (LASIX) 40 mg tablet Take 1 Tablet by mouth daily. 8/16/21   Anita Redding MD   atorvastatin (LIPITOR) 80 mg tablet Take 1 Tab by mouth daily. 4/8/21   Anita Redding MD   pantoprazole (PROTONIX) 40 mg tablet Take 1 Tab by mouth Daily (before breakfast). 4/8/21   Anita Redding MD     Patient Active Problem List   Diagnosis Code    Diabetes mellitus ([facility] E11.9    HTN (hypertension) I10    Unspecified asthma, with exacerbation J45. 0    Suicidal behavior R45.89    Emotional lability R45.86    Bipolar 1 disorder ([facility]) F31.9    Borderline personality disorder (HCC) F60.3    Adjustment disorder with depressed mood F43.21    Type 2 diabetes with nephropathy ([facility]) E11.21    Chronic systolic congestive heart failure ([facility]) I50.22    History of CVA (cerebrovascular accident) Z80.78    Major depression F32.9    Su[facility] eLanna. 8XXA    CAD (coronary artery disease) I25.10    Chest pain R07.9    ACS (acute coronary syndrome) ([facility]) I24.9    Acute HFrEF (heart failure with reduced ejection fraction) ([facility]) I50.21    NSTEMI (non-ST elevated myocardial infarction) ([facility]) I21.4    Acute on chronic combined systolic and diastolic ACC/AHA stage C congestive heart failure ([facility]) I50.43     Patient Active Problem List    Diagnosis Date Noted    Acute on chronic combined systolic and diastolic ACC/AHA stage C congestive heart failure ([facility] 02/02/2022    NSTEMI (non-ST elevated myocardial infarction) ([facility] 12/27/2021    Acute HFrEF (heart failure with reduced ejection fraction) ([facility] 08/08/2021    ACS (acute coronary syndrome) ([facility] 05/07/2021    Chest pain 05/06/2021    CAD (coronary artery disease)     Major depression 03/08/2021    Suicide ([facility] 03/08/2021    Chronic systolic congestive heart failure ([facility] 12/02/2020    History of CVA (cerebrovascular accident) 12/02/2020    Type 2 diabetes with nephropathy ([facility] 02/28/2020    Adjustment disorder with depressed mood 04/28/2012    Bipolar 1 disorder ([facility] 04/10/2012    Borderline personality disorder ([facility] 04/10/2012    Suicidal behavior 03/24/2012    Emotional lability 03/24/2012    Diabetes mellitus ([facility] 03/05/2012    HTN (hypertension) 03/05/2012    Unspecified asthma, with exacerbation 03/05/2012     Current Outpatient Medications   Medication Sig Dispense Refill    Lantus Solostar U-100 Insulin 100 unit/mL (3 mL) inpn 40 Units by SubCUTAneous route Every morning. 4 Adjustable Dose Pre-filled Pen Syringe 1    Jardiance 10 mg tablet Take 10 mg by mouth daily.  metFORMIN ER (GLUCOPHAGE XR) 500 mg tablet Take 500 mg by mouth two (2) times a day.       metoprolol succinate (TOPROL-XL) 50 mg XL tablet Take 50 mg by mouth daily.  insulin glargine (Lantus U-100 Insulin) 100 unit/mL injection 10 Units by SubCUTAneous route nightly. 1 mL 2    nitroglycerin (NITROSTAT) 0.4 mg SL tablet 1 Tablet by SubLINGual route as needed for Chest Pain. Up to 3 doses. 5 Tablet 0    traZODone (DESYREL) 50 mg tablet Take 100 mg by mouth nightly.  liraglutide (VICTOZA) 0.6 mg/0.1 mL (18 mg/3 mL) pnij 1.8 mg by SubCUTAneous route daily. 10 Adjustable Dose Pre-filled Pen Syringe 1    clopidogreL (PLAVIX) 75 mg tab Take 1 Tablet by mouth daily. 30 Tablet 5    cyclobenzaprine (FLEXERIL) 10 mg tablet Take 1 Tablet by mouth three (3) times daily as needed for Muscle Spasm(s). 30 Tablet 1    albuterol (PROVENTIL HFA, VENTOLIN HFA, PROAIR HFA) 90 mcg/actuation inhaler Take 2 Puffs by inhalation every four (4) hours as needed for Wheezing or Shortness of Breath. 2 Each 3    hydrOXYzine HCL (ATARAX) 25 mg tablet Take two to three tablets by mouth at bedtime for sleep. 60 Tablet 2    furosemide (LASIX) 40 mg tablet Take 1 Tablet by mouth daily. 90 Tablet 1    atorvastatin (LIPITOR) 80 mg tablet Take 1 Tab by mouth daily. 30 Tab 5    pantoprazole (PROTONIX) 40 mg tablet Take 1 Tab by mouth Daily (before breakfast). 30 Tab 5     Allergies   Allergen Reactions    Acetaminophen Unknown (comments), Anaphylaxis and Shortness of Breath     Other reaction(s): anaphylaxis/angioedema  Other reaction(s):  Other (see comments)  Other reaction(s): anaphylaxis/angioedema  Throat swelling    Aspirin Hives, Anaphylaxis and Shortness of Breath     Other reaction(s): anaphylaxis/angioedema  Other reaction(s): anaphylaxis/angioedema    Unable To Obtain Unable to Obtain     Patient states his allergic to greens    Aspirin Shortness of Breath    Spinach Leaf Unknown (comments)     Green leaves    Tylenol [Acetaminophen] Shortness of Breath     Past Medical History:   Diagnosis Date    Anxiety disorder     Asthma  Bipolar 1 disorder ([facility]     CAD (coronary artery disease)     MI x2 with 2 cardiac stent    CHF (congestive heart failure) ([facility])     with reduced EF, EF 20%    Chronic systolic congestive heart failure ([facility] 12/2/2020    Depression     Diabetes mellitus, type 2 (HCC)     on insulin. moderate proteinuria    Hypertension     Mood disorder (HCC)     Psychotic disorder ([facility])     Schizoaffective disorder, bipolar type ([facility]     Sleep disorder     Suicidal thoughts        Review of Systems   Constitutional: Negative for fever and malaise/fatigue. HENT: Negative for congestion, sinus pain and sore throat. Respiratory: Negative for cough and shortness of breath. Cardiovascular: Negative for chest pain. Gastrointestinal: Negative for diarrhea, nausea and vomiting. Genitourinary: Negative for dysuria. Musculoskeletal: Negative. Skin: Negative. Neurological: Negative for dizziness and headaches. Endo/Heme/Allergies: Negative for environmental allergies. Psychiatric/Behavioral: Negative. No data recorded     Ivania Dubon, who was evaluated through a patient-initiated, synchronous (real-time) audio only encounter, and/or her healthcare decision maker, is aware that it is a billable service, which includes applicable co-pays, with coverage as determined by his insurance carrier. He provided verbal consent to proceed. He has not had a related appointment within my department in the past 7 days or scheduled within the next 24 hours. The patient was located at home in a state where the provider was licensed to provide care. On this date 02/02/2022 I have spent 25 minutes reviewing previous notes, test results and face to face (virtual) with the patient discussing the diagnosis and importance of compliance with the treatment plan as well as documenting on the day of the visit.     Lukasz Mohan NP

## 2022-02-02 NOTE — PROGRESS NOTES
Transitional Care Management Progress Note    Patient: Radha Lawson  : 1970  PCP: Xiang Salvador MD    Date of admission:   Date of discharge:     Patient was contacted by Transitional Care Management services within two days after his discharge: Yes. This encounter and supporting documentation was reviewed if available. Medication reconciliation was performed today (2022). Assessment/Plan:      Subjective:   Radha Lawson is a 54 y.o. male presenting today for follow-up after being discharged from [facility]. The discharge summary was reviewed or requested. The main problem requiring admission was heart failure/ MI. Complications during admission: none    Interval history/Current status: better    Admitting symptoms have: significantly improved      Medications marked \"taking\" at this time:  Home Medications    Medication Sig Start Date End Date Taking? Authorizing Provider   insulin glargine (Lantus U-100 Insulin) 100 unit/mL injection 10 Units by SubCUTAneous route nightly. 22   Korin Buchanan, NP   nitroglycerin (NITROSTAT) 0.4 mg SL tablet 1 Tablet by SubLINGual route as needed for Chest Pain. Up to 3 doses. 21   Fabiola Aldrich MD   carvediloL (COREG) 6.25 mg tablet Take 1 Tablet by mouth two (2) times daily (with meals). 21   Fabiola Aldrich MD   traZODone (DESYREL) 50 mg tablet Take 100 mg by mouth nightly. Provider, Historical   liraglutide (VICTOZA) 0.6 mg/0.1 mL (18 mg/3 mL) pnij 1.8 mg by SubCUTAneous route daily. 11/10/21   Xiang Salvador MD   clopidogreL (PLAVIX) 75 mg tab Take 1 Tablet by mouth daily. 10/28/21   Xiang Salvador MD   cyclobenzaprine (FLEXERIL) 10 mg tablet Take 1 Tablet by mouth three (3) times daily as needed for Muscle Spasm(s). 21   Xiang Salvador MD   albuterol (PROVENTIL HFA, VENTOLIN HFA, PROAIR HFA) 90 mcg/actuation inhaler Take 2 Puffs by inhalation every four (4) hours as needed for Wheezing or Shortness of Breath. 9/16/21   Alvaro Looney MD   hydrOXYzine HCL (ATARAX) 25 mg tablet Take two to three tablets by mouth at bedtime for sleep. 8/16/21   Alvaro Looney MD   furosemide (LASIX) 40 mg tablet Take 1 Tablet by mouth daily. 8/16/21   Alvaro Looney MD   atorvastatin (LIPITOR) 80 mg tablet Take 1 Tab by mouth daily. 4/8/21   Alvaro Looney MD   pantoprazole (PROTONIX) 40 mg tablet Take 1 Tab by mouth Daily (before breakfast). 4/8/21   Alvaro Looney MD        Review of Systems:  History obtained from chart review and the patient       Patient Active Problem List   Diagnosis Code    Diabetes mellitus ([facility] E11.9    HTN (hypertension) I10    Unspecified asthma, with exacerbation J45. 0    Suicidal behavior R45.89    Emotional lability R45.86    Bipolar 1 disorder ([facility] F31.9    Borderline personality disorder ([facility]) F60.3    Adjustment disorder with depressed mood F43.21    Type 2 diabetes with nephropathy ([facility]) E11.21    Chronic systolic congestive heart failure ([facility]) I50.22    History of CVA (cerebrovascular accident) Z80.78    Major depression F32.9    Suicide ([facility] [employer]. 8XXA    CAD (coronary artery disease) I25.10    Chest pain R07.9    ACS (acute coronary syndrome) ([facility]) I24.9    Acute HFrEF (heart failure with reduced ejection fraction) ([facility]) I50.21    NSTEMI (non-ST elevated myocardial infarction) ([facility]) I21.4     Patient Active Problem List    Diagnosis Date Noted    NSTEMI (non-ST elevated myocardial infarction) ([facility] 12/27/2021    Acute HFrEF (heart failure with reduced ejection fraction) ([facility] 08/08/2021    ACS (acute coronary syndrome) ([facility] 05/07/2021    Chest pain 05/06/2021    CAD (coronary artery disease)     Major depression 03/08/2021    Suicide ([facility] 03/08/2021    Chronic systolic congestive heart failure ([facility] 12/02/2020    History of CVA (cerebrovascular accident) 12/02/2020    Type 2 diabetes with nephropathy ([facility] 02/28/2020    Adjustment disorder with depressed mood 04/28/2012    Bipolar 1 disorder ([facility] 04/10/2012    Borderline personality disorder ([facility] 04/10/2012    Suicidal behavior 03/24/2012    Emotional lability 03/24/2012    Diabetes mellitus ([facility] 03/05/2012    HTN (hypertension) 03/05/2012    Unspecified asthma, with exacerbation 03/05/2012     Current Outpatient Medications   Medication Sig Dispense Refill    insulin glargine (Lantus U-100 Insulin) 100 unit/mL injection 10 Units by SubCUTAneous route nightly. 1 mL 2    nitroglycerin (NITROSTAT) 0.4 mg SL tablet 1 Tablet by SubLINGual route as needed for Chest Pain. Up to 3 doses. 5 Tablet 0    carvediloL (COREG) 6.25 mg tablet Take 1 Tablet by mouth two (2) times daily (with meals). 60 Tablet 0    traZODone (DESYREL) 50 mg tablet Take 100 mg by mouth nightly.  liraglutide (VICTOZA) 0.6 mg/0.1 mL (18 mg/3 mL) pnij 1.8 mg by SubCUTAneous route daily. 10 Adjustable Dose Pre-filled Pen Syringe 1    clopidogreL (PLAVIX) 75 mg tab Take 1 Tablet by mouth daily. 30 Tablet 5    cyclobenzaprine (FLEXERIL) 10 mg tablet Take 1 Tablet by mouth three (3) times daily as needed for Muscle Spasm(s). 30 Tablet 1    albuterol (PROVENTIL HFA, VENTOLIN HFA, PROAIR HFA) 90 mcg/actuation inhaler Take 2 Puffs by inhalation every four (4) hours as needed for Wheezing or Shortness of Breath. 2 Each 3    hydrOXYzine HCL (ATARAX) 25 mg tablet Take two to three tablets by mouth at bedtime for sleep. 60 Tablet 2    furosemide (LASIX) 40 mg tablet Take 1 Tablet by mouth daily. 90 Tablet 1    atorvastatin (LIPITOR) 80 mg tablet Take 1 Tab by mouth daily. 30 Tab 5    pantoprazole (PROTONIX) 40 mg tablet Take 1 Tab by mouth Daily (before breakfast). 30 Tab 5     Allergies   Allergen Reactions    Acetaminophen Unknown (comments), Anaphylaxis and Shortness of Breath     Other reaction(s): anaphylaxis/angioedema  Other reaction(s):  Other (see comments)  Other reaction(s): anaphylaxis/angioedema  Throat swelling    Aspirin Hives, Anaphylaxis and Shortness of Breath     Other reaction(s): anaphylaxis/angioedema  Other reaction(s): anaphylaxis/angioedema    Unable To Obtain Unable to Obtain     Patient states his allergic to greens    Aspirin Shortness of Breath    Spinach Leaf Unknown (comments)     Green leaves    Tylenol [Acetaminophen] Shortness of Breath     Past Medical History:   Diagnosis Date    Anxiety disorder     Asthma     [facility]     CAD (coronary artery disease)     MI x2 with 2 cardiac stent    CHF (congestive heart failure) ([facility]     with reduced EF, EF 20%    Chronic systolic congestive heart failure ([facility] 12/2/2020    Depression     Diabetes mellitus, type 2 (HCC)     on insulin. moderate proteinuria    Hypertension     Mood disorder ([facility]     Psychotic disorder ([facility])     Schizoaffective disorder, bipolar type ([facility]     Sleep disorder     Suicidal thoughts           Objective: There were no vitals taken for this visit. We discussed the expected course, resolution and complications of the diagnosis(es) in detail. Medication risks, benefits, costs, interactions, and alternatives were discussed as indicated. I advised him to contact the office if his condition worsens, changes or fails to improve as anticipated. He expressed understanding with the diagnosis(es) and plan.      Teresa Wyman NP

## 2022-03-18 PROBLEM — Z86.73 HISTORY OF CVA (CEREBROVASCULAR ACCIDENT): Status: ACTIVE | Noted: 2020-12-02

## 2022-03-18 PROBLEM — I50.22 CHRONIC SYSTOLIC CONGESTIVE HEART FAILURE (HCC): Status: ACTIVE | Noted: 2020-12-02

## 2022-03-18 PROBLEM — I24.9 ACS (ACUTE CORONARY SYNDROME) (HCC): Status: ACTIVE | Noted: 2021-05-07

## 2022-03-19 PROBLEM — F32.9 MAJOR DEPRESSION: Status: ACTIVE | Noted: 2021-03-08

## 2022-03-19 PROBLEM — E11.21 TYPE 2 DIABETES WITH NEPHROPATHY (HCC): Status: ACTIVE | Noted: 2020-02-28
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
## 2022-03-19 PROBLEM — I50.43 ACUTE ON CHRONIC COMBINED SYSTOLIC AND DIASTOLIC ACC/AHA STAGE C CONGESTIVE HEART FAILURE (HCC): Status: ACTIVE | Noted: 2022-02-02

## 2022-03-20 PROBLEM — I50.21 ACUTE HFREF (HEART FAILURE WITH REDUCED EJECTION FRACTION) (HCC): Status: ACTIVE | Noted: 2021-08-08

## 2022-03-20 PROBLEM — R07.9 CHEST PAIN: Status: ACTIVE | Noted: 2021-05-06

## 2022-03-20 PROBLEM — I21.4 NSTEMI (NON-ST ELEVATED MYOCARDIAL INFARCTION) (HCC): Status: ACTIVE | Noted: 2021-12-27

## 2022-03-29 ENCOUNTER — APPOINTMENT (OUTPATIENT)
Dept: GENERAL RADIOLOGY | Age: 52
DRG: 190 | End: 2022-03-29
Attending: EMERGENCY MEDICINE
Payer: COMMERCIAL

## 2022-03-29 ENCOUNTER — HOSPITAL ENCOUNTER (INPATIENT)
Age: 52
LOS: 2 days | Discharge: HOME | DRG: 190 | End: 2022-03-31
Attending: EMERGENCY MEDICINE | Admitting: HOSPITALIST
Payer: COMMERCIAL

## 2022-03-29 DIAGNOSIS — I21.4 NSTEMI (NON-ST ELEVATED MYOCARDIAL INFARCTION) (HCC): ICD-10-CM

## 2022-03-29 LAB
ALBUMIN SERPL-MCNC: 4 G/DL (ref 3.5–5)
ALBUMIN/GLOB SERPL: 0.7 {RATIO} (ref 1.1–2.2)
ALP SERPL-CCNC: 142 U/L (ref 45–117)
ALT SERPL-CCNC: 35 U/L (ref 12–78)
ANION GAP SERPL CALC-SCNC: 4 MMOL/L (ref 5–15)
ANION GAP SERPL CALC-SCNC: 7 MMOL/L (ref 5–15)
APPEARANCE UR: CLEAR
APTT PPP: 28.4 SEC (ref 21.2–34.1)
APTT PPP: 39.7 SEC (ref 21.2–34.1)
AST SERPL W P-5'-P-CCNC: 29 U/L (ref 15–37)
ATRIAL RATE: 84 BPM
B-OH-BUTYR SERPL-SCNC: 0.12 MMOL/L
BACTERIA URNS QL MICRO: NEGATIVE /HPF
BACTERIA URNS QL MICRO: NEGATIVE /HPF
BASOPHILS # BLD: 0.1 K/UL (ref 0–0.1)
BASOPHILS NFR BLD: 1 % (ref 0–1)
BILIRUB SERPL-MCNC: 1.2 MG/DL (ref 0.2–1)
BILIRUB UR QL: NEGATIVE
BNP SERPL-MCNC: 1293 PG/ML
BUN SERPL-MCNC: 23 MG/DL (ref 6–20)
BUN SERPL-MCNC: 24 MG/DL (ref 6–20)
BUN/CREAT SERPL: 14 (ref 12–20)
BUN/CREAT SERPL: 14 (ref 12–20)
CA-I BLD-MCNC: 8.9 MG/DL (ref 8.5–10.1)
CA-I BLD-MCNC: 9.4 MG/DL (ref 8.5–10.1)
CALCULATED P AXIS, ECG09: 61 DEGREES
CALCULATED R AXIS, ECG10: 21 DEGREES
CALCULATED T AXIS, ECG11: 73 DEGREES
CHLORIDE SERPL-SCNC: 103 MMOL/L (ref 97–108)
CHLORIDE SERPL-SCNC: 99 MMOL/L (ref 97–108)
CO2 SERPL-SCNC: 25 MMOL/L (ref 21–32)
CO2 SERPL-SCNC: 26 MMOL/L (ref 21–32)
COLOR UR: ABNORMAL
CREAT SERPL-MCNC: 1.67 MG/DL (ref 0.7–1.3)
CREAT SERPL-MCNC: 1.71 MG/DL (ref 0.7–1.3)
DIAGNOSIS, 93000: NORMAL
DIFFERENTIAL METHOD BLD: ABNORMAL
EOSINOPHIL # BLD: 0.3 K/UL (ref 0–0.4)
EOSINOPHIL NFR BLD: 5 % (ref 0–7)
ERYTHROCYTE [DISTWIDTH] IN BLOOD BY AUTOMATED COUNT: 21.6 % (ref 11.5–14.5)
GLOBULIN SER CALC-MCNC: 5.6 G/DL (ref 2–4)
GLUCOSE BLD STRIP.AUTO-MCNC: 236 MG/DL (ref 65–117)
GLUCOSE BLD STRIP.AUTO-MCNC: 306 MG/DL (ref 65–117)
GLUCOSE BLD STRIP.AUTO-MCNC: 328 MG/DL (ref 65–117)
GLUCOSE BLD STRIP.AUTO-MCNC: 499 MG/DL (ref 65–117)
GLUCOSE SERPL-MCNC: 281 MG/DL (ref 65–100)
GLUCOSE SERPL-MCNC: 487 MG/DL (ref 65–100)
GLUCOSE UR STRIP.AUTO-MCNC: >300 MG/DL
HCT VFR BLD AUTO: 48.8 % (ref 36.6–50.3)
HGB BLD-MCNC: 15.7 G/DL (ref 12.1–17)
HGB UR QL STRIP: ABNORMAL
IMM GRANULOCYTES # BLD AUTO: 0 K/UL (ref 0–0.04)
IMM GRANULOCYTES NFR BLD AUTO: 1 % (ref 0–0.5)
KETONES UR QL STRIP.AUTO: NEGATIVE MG/DL
LEUKOCYTE ESTERASE UR QL STRIP.AUTO: NEGATIVE
LYMPHOCYTES # BLD: 1.1 K/UL (ref 0.8–3.5)
LYMPHOCYTES NFR BLD: 18 % (ref 12–49)
MCH RBC QN AUTO: 27.1 PG (ref 26–34)
MCHC RBC AUTO-ENTMCNC: 32.2 G/DL (ref 30–36.5)
MCV RBC AUTO: 84.1 FL (ref 80–99)
MONOCYTES # BLD: 0.4 K/UL (ref 0–1)
MONOCYTES NFR BLD: 6 % (ref 5–13)
MUCOUS THREADS URNS QL MICRO: NEGATIVE /LPF
NEUTS SEG # BLD: 4.2 K/UL (ref 1.8–8)
NEUTS SEG NFR BLD: 69 % (ref 32–75)
NITRITE UR QL STRIP.AUTO: NEGATIVE
NRBC # BLD: 0 K/UL (ref 0–0.01)
NRBC BLD-RTO: 0 PER 100 WBC
P-R INTERVAL, ECG05: 168 MS
PERFORMED BY, TECHID: ABNORMAL
PH UR STRIP: 5 [PH] (ref 5–8)
PLATELET # BLD AUTO: 209 K/UL (ref 150–400)
PMV BLD AUTO: 10.5 FL (ref 8.9–12.9)
POTASSIUM SERPL-SCNC: 5.2 MMOL/L (ref 3.5–5.1)
POTASSIUM SERPL-SCNC: 5.2 MMOL/L (ref 3.5–5.1)
PROT SERPL-MCNC: 9.6 G/DL (ref 6.4–8.2)
PROT UR STRIP-MCNC: NEGATIVE MG/DL
Q-T INTERVAL, ECG07: 408 MS
QRS DURATION, ECG06: 86 MS
QTC CALCULATION (BEZET), ECG08: 482 MS
RBC # BLD AUTO: 5.8 M/UL (ref 4.1–5.7)
RBC #/AREA URNS HPF: ABNORMAL /HPF (ref 0–5)
RBC #/AREA URNS HPF: NORMAL /HPF (ref 0–5)
SODIUM SERPL-SCNC: 131 MMOL/L (ref 136–145)
SODIUM SERPL-SCNC: 133 MMOL/L (ref 136–145)
SP GR UR REFRACTOMETRY: 1.03 (ref 1–1.03)
THERAPEUTIC RANGE,PTTT: ABNORMAL SEC (ref 82–109)
THERAPEUTIC RANGE,PTTT: NORMAL SEC (ref 82–109)
TROPONIN-HIGH SENSITIVITY: 433 NG/L (ref 0–76)
TROPONIN-HIGH SENSITIVITY: 443 NG/L (ref 0–76)
TROPONIN-HIGH SENSITIVITY: 530 NG/L (ref 0–76)
UROBILINOGEN UR QL STRIP.AUTO: 0.1 EU/DL (ref 0.1–1)
VENTRICULAR RATE, ECG03: 84 BPM
WBC # BLD AUTO: 6.1 K/UL (ref 4.1–11.1)
WBC URNS QL MICRO: ABNORMAL /HPF (ref 0–4)
WBC URNS QL MICRO: NORMAL /HPF (ref 0–4)

## 2022-03-29 PROCEDURE — 83036 HEMOGLOBIN GLYCOSYLATED A1C: CPT

## 2022-03-29 PROCEDURE — 74011636637 HC RX REV CODE- 636/637: Performed by: INTERNAL MEDICINE

## 2022-03-29 PROCEDURE — 93005 ELECTROCARDIOGRAM TRACING: CPT

## 2022-03-29 PROCEDURE — 85730 THROMBOPLASTIN TIME PARTIAL: CPT

## 2022-03-29 PROCEDURE — 84484 ASSAY OF TROPONIN QUANT: CPT

## 2022-03-29 PROCEDURE — 71045 X-RAY EXAM CHEST 1 VIEW: CPT

## 2022-03-29 PROCEDURE — 82962 GLUCOSE BLOOD TEST: CPT

## 2022-03-29 PROCEDURE — 74011000250 HC RX REV CODE- 250: Performed by: INTERNAL MEDICINE

## 2022-03-29 PROCEDURE — 36415 COLL VENOUS BLD VENIPUNCTURE: CPT

## 2022-03-29 PROCEDURE — 81001 URINALYSIS AUTO W/SCOPE: CPT

## 2022-03-29 PROCEDURE — 83880 ASSAY OF NATRIURETIC PEPTIDE: CPT

## 2022-03-29 PROCEDURE — 65270000029 HC RM PRIVATE

## 2022-03-29 PROCEDURE — 74011636637 HC RX REV CODE- 636/637: Performed by: EMERGENCY MEDICINE

## 2022-03-29 PROCEDURE — 80048 BASIC METABOLIC PNL TOTAL CA: CPT

## 2022-03-29 PROCEDURE — 74011250636 HC RX REV CODE- 250/636: Performed by: EMERGENCY MEDICINE

## 2022-03-29 PROCEDURE — 85025 COMPLETE CBC W/AUTO DIFF WBC: CPT

## 2022-03-29 PROCEDURE — 82010 KETONE BODYS QUAN: CPT

## 2022-03-29 PROCEDURE — 74011250637 HC RX REV CODE- 250/637: Performed by: EMERGENCY MEDICINE

## 2022-03-29 PROCEDURE — 99285 EMERGENCY DEPT VISIT HI MDM: CPT

## 2022-03-29 PROCEDURE — 80053 COMPREHEN METABOLIC PANEL: CPT

## 2022-03-29 RX ORDER — ATORVASTATIN CALCIUM 40 MG/1
80 TABLET, FILM COATED ORAL DAILY
Status: DISCONTINUED | OUTPATIENT
Start: 2022-03-30 | End: 2022-03-31 | Stop reason: HOSPADM

## 2022-03-29 RX ORDER — CLOPIDOGREL BISULFATE 75 MG/1
150 TABLET ORAL
Status: COMPLETED | OUTPATIENT
Start: 2022-03-29 | End: 2022-03-29

## 2022-03-29 RX ORDER — INSULIN LISPRO 100 [IU]/ML
0.1 INJECTION, SOLUTION INTRAVENOUS; SUBCUTANEOUS
Status: DISCONTINUED | OUTPATIENT
Start: 2022-03-29 | End: 2022-03-31 | Stop reason: HOSPADM

## 2022-03-29 RX ORDER — HEPARIN SODIUM 1000 [USP'U]/ML
4000 INJECTION, SOLUTION INTRAVENOUS; SUBCUTANEOUS ONCE
Status: COMPLETED | OUTPATIENT
Start: 2022-03-29 | End: 2022-03-29

## 2022-03-29 RX ORDER — HEPARIN SODIUM 1000 [USP'U]/ML
40 INJECTION, SOLUTION INTRAVENOUS; SUBCUTANEOUS AS NEEDED
Status: DISCONTINUED | OUTPATIENT
Start: 2022-03-29 | End: 2022-03-30

## 2022-03-29 RX ORDER — INSULIN LISPRO 100 [IU]/ML
INJECTION, SOLUTION INTRAVENOUS; SUBCUTANEOUS
Status: DISCONTINUED | OUTPATIENT
Start: 2022-03-29 | End: 2022-03-31 | Stop reason: HOSPADM

## 2022-03-29 RX ORDER — NITROGLYCERIN 0.4 MG/1
0.4 TABLET SUBLINGUAL AS NEEDED
Status: DISCONTINUED | OUTPATIENT
Start: 2022-03-29 | End: 2022-03-31

## 2022-03-29 RX ORDER — HEPARIN SODIUM 10000 [USP'U]/100ML
12-25 INJECTION, SOLUTION INTRAVENOUS
Status: DISCONTINUED | OUTPATIENT
Start: 2022-03-29 | End: 2022-03-30

## 2022-03-29 RX ORDER — SODIUM CHLORIDE 0.9 % (FLUSH) 0.9 %
5-40 SYRINGE (ML) INJECTION AS NEEDED
Status: DISCONTINUED | OUTPATIENT
Start: 2022-03-29 | End: 2022-03-31 | Stop reason: HOSPADM

## 2022-03-29 RX ORDER — HEPARIN SODIUM 1000 [USP'U]/ML
80 INJECTION, SOLUTION INTRAVENOUS; SUBCUTANEOUS AS NEEDED
Status: DISCONTINUED | OUTPATIENT
Start: 2022-03-29 | End: 2022-03-30

## 2022-03-29 RX ORDER — METOPROLOL SUCCINATE 50 MG/1
50 TABLET, EXTENDED RELEASE ORAL DAILY
Status: DISCONTINUED | OUTPATIENT
Start: 2022-03-30 | End: 2022-03-31 | Stop reason: HOSPADM

## 2022-03-29 RX ORDER — LOSARTAN POTASSIUM 50 MG/1
50 TABLET ORAL DAILY
Status: DISCONTINUED | OUTPATIENT
Start: 2022-03-29 | End: 2022-03-29

## 2022-03-29 RX ORDER — MAGNESIUM SULFATE 100 %
4 CRYSTALS MISCELLANEOUS AS NEEDED
Status: DISCONTINUED | OUTPATIENT
Start: 2022-03-29 | End: 2022-03-31 | Stop reason: HOSPADM

## 2022-03-29 RX ORDER — DEXTROSE MONOHYDRATE 100 MG/ML
0-250 INJECTION, SOLUTION INTRAVENOUS AS NEEDED
Status: DISCONTINUED | OUTPATIENT
Start: 2022-03-29 | End: 2022-03-31 | Stop reason: HOSPADM

## 2022-03-29 RX ORDER — SODIUM CHLORIDE 0.9 % (FLUSH) 0.9 %
5-40 SYRINGE (ML) INJECTION EVERY 8 HOURS
Status: DISCONTINUED | OUTPATIENT
Start: 2022-03-29 | End: 2022-03-31 | Stop reason: HOSPADM

## 2022-03-29 RX ORDER — FUROSEMIDE 10 MG/ML
20 INJECTION INTRAMUSCULAR; INTRAVENOUS
Status: DISCONTINUED | OUTPATIENT
Start: 2022-03-29 | End: 2022-03-29

## 2022-03-29 RX ORDER — ISOSORBIDE DINITRATE 20 MG/1
20 TABLET ORAL 2 TIMES DAILY
Status: DISCONTINUED | OUTPATIENT
Start: 2022-03-29 | End: 2022-03-30

## 2022-03-29 RX ADMIN — INSULIN LISPRO 7 UNITS: 100 INJECTION, SOLUTION INTRAVENOUS; SUBCUTANEOUS at 21:07

## 2022-03-29 RX ADMIN — INSULIN LISPRO 3 UNITS: 100 INJECTION, SOLUTION INTRAVENOUS; SUBCUTANEOUS at 16:55

## 2022-03-29 RX ADMIN — INSULIN LISPRO 7 UNITS: 100 INJECTION, SOLUTION INTRAVENOUS; SUBCUTANEOUS at 16:55

## 2022-03-29 RX ADMIN — Medication 12 UNITS/KG/HR: at 14:26

## 2022-03-29 RX ADMIN — HEPARIN SODIUM 4000 UNITS: 1000 INJECTION, SOLUTION INTRAVENOUS; SUBCUTANEOUS at 12:45

## 2022-03-29 RX ADMIN — INSULIN HUMAN 10 UNITS: 100 INJECTION, SOLUTION PARENTERAL at 12:46

## 2022-03-29 RX ADMIN — SODIUM CHLORIDE, PRESERVATIVE FREE 10 ML: 5 INJECTION INTRAVENOUS at 15:48

## 2022-03-29 RX ADMIN — SODIUM CHLORIDE, PRESERVATIVE FREE 10 ML: 5 INJECTION INTRAVENOUS at 21:09

## 2022-03-29 RX ADMIN — CLOPIDOGREL BISULFATE 150 MG: 75 TABLET ORAL at 12:46

## 2022-03-29 RX ADMIN — HEPARIN SODIUM 2650 UNITS: 1000 INJECTION, SOLUTION INTRAVENOUS; SUBCUTANEOUS at 22:07

## 2022-03-29 NOTE — PROGRESS NOTES
Problem: Falls - Risk of  Goal: *Absence of Falls  Description: Document Ever[place] Fall Risk and appropriate interventions in the flowsheet.   Outcome: Progressing Towards Goal  Note: Fall Risk Interventions:            Medication Interventions: Bed/chair exit alarm         History of Falls Interventions: Bed/chair exit alarm

## 2022-03-29 NOTE — MED STUDENT NOTES
History & Physical    Primary Care Provider: Carolina Goode MD  Source of Information: Patient/family     History of Presenting Illness:   Jose Ramon Aguilar is a 54 y.o. male with a PMH of CHF status post ICD with EF 20%, bipolar disease, DM, CAD with severe small vessel disease, status post heart cath 1/16/2020, essential HTN, and asthma presents today for chest pain. Patient states he woke up at 6am this morning with severe right arm pain that turned into chest pain. He states this pain is equivalent to the pain he has had with his past MI. He has been smoking for 30+ years and smokes 1 ppd. Patient came here by EMS after he went to the doctor's office. He denies any nausea, vomiting, dizziness, loss of consciousness, ringing in ears, blurry vision. Patient is allergic to acetaminophen and asprin. EKG showed LVH with L. Atrial enlargement, no acute ischemia, troponin 530, proBNP 1293, hyperkalemia 5.2, Cr 1.71, and glucose 487. TTE 1/15/22 showed 4-chamber cardiomegaly with EF 20%. Review of Systems:  Constitutional: negative  Eyes: negative  Respiratory: positive for dyspnea on exertion  Cardiovascular: positive for chest pain, chest pressure/discomfort, dyspnea, palpitations  Gastrointestinal: negative  Genitourinary:negative  Musculoskeletal:negative  Neurological: negative     Past Medical History:   Diagnosis Date    Anxiety disorder     Asthma     Bipolar 1 disorder ([address]     CAD (coronary artery disease)     MI x2 with 2 cardiac stent    CHF (congestive heart failure) ([address]     with reduced EF, EF 20%    Chronic systolic congestive heart failure ([address] 12/2/2020    Depression     Diabetes mellitus, type 2 ([address]     on insulin.  moderate proteinuria    Hypertension     Mood disorder (HCC)     Psychotic disorder (HCC)     Schizoaffective disorder, bipolar type ([address]     Sleep disorder     Suicidal thoughts         Past Surgical History:   Procedure Laterality Date  HX CORONARY STENT PLACEMENT      HX IMPLANTABLE CARDIOVERTER DEFIBRILLATOR         Prior to Admission medications    Medication Sig Start Date End Date Taking? Authorizing Provider   furosemide (LASIX) 80 mg tablet TAKE ONE TABLET BY MOUTH ONCE DAILY 2/4/22   Leila Zacarias MD   metFORMIN ER (GLUCOPHAGE XR) 500 mg tablet TAKE TWO TABLETS BY MOUTH EVERY MORNING with breakfast 2/4/22   Leila Zacarias MD   metoprolol succinate (TOPROL-XL) 50 mg XL tablet TAKE ONE TABLET BY MOUTH EVERY DAY 2/4/22   Leila Zacarias MD   nitroglycerin (NITROSTAT) 0.4 mg SL tablet DISSOLVE 1 TABLET UNDER THE TONGUE EVERY 5 MINUTES AS NEEDED FOR CHEST PAIN. DO NOT EXCEED A TOTAL OF 3 DOSES IN 15 MINUTES. 2/4/22   Leila Zacarias MD   Jardiance 10 mg tablet Take 10 mg by mouth daily. 1/17/22   Provider, Historical   Lantus Solostar U-100 Insulin 100 unit/mL (3 mL) inpn 40 Units by SubCUTAneous route Every morning. 2/2/22   Brianne Smith NP   insulin glargine (Lantus U-100 Insulin) 100 unit/mL injection 10 Units by SubCUTAneous route nightly. 1/6/22   Donald Quintanilla NP   traZODone (DESYREL) 50 mg tablet Take 100 mg by mouth nightly. Provider, Historical   liraglutide (VICTOZA) 0.6 mg/0.1 mL (18 mg/3 mL) pnij 1.8 mg by SubCUTAneous route daily. 11/10/21   Leila Zacarias MD   clopidogreL (PLAVIX) 75 mg tab Take 1 Tablet by mouth daily. 10/28/21   Leila Zacarias MD   cyclobenzaprine (FLEXERIL) 10 mg tablet Take 1 Tablet by mouth three (3) times daily as needed for Muscle Spasm(s). 9/16/21   Leila Zacarias MD   albuterol (PROVENTIL HFA, VENTOLIN HFA, PROAIR HFA) 90 mcg/actuation inhaler Take 2 Puffs by inhalation every four (4) hours as needed for Wheezing or Shortness of Breath. 9/16/21   Leila Zacarias MD   hydrOXYzine HCL (ATARAX) 25 mg tablet Take two to three tablets by mouth at bedtime for sleep.  8/16/21   Leila Zacarias MD   atorvastatin (LIPITOR) 80 mg tablet Take 1 Tab by mouth daily. 4/8/21   Sandra Ulloa MD   pantoprazole (PROTONIX) 40 mg tablet Take 1 Tab by mouth Daily (before breakfast). 4/8/21   Sandra Ulloa MD       Allergies   Allergen Reactions    Acetaminophen Unknown (comments), Anaphylaxis and Shortness of Breath     Other reaction(s): anaphylaxis/angioedema  Other reaction(s): Other (see comments)  Other reaction(s): anaphylaxis/angioedema  Throat swelling    Aspirin Hives, Anaphylaxis and Shortness of Breath     Other reaction(s): anaphylaxis/angioedema  Other reaction(s): anaphylaxis/angioedema    Unable To Obtain Unable to Obtain     Patient states his allergic to greens    Aspirin Shortness of Breath    Spinach Leaf Unknown (comments)     Green leaves    Tylenol [Acetaminophen] Shortness of Breath        Family History   Problem Relation Age of Onset    Heart Attack Mother     Hypertension Mother     Diabetes Mother     Heart Attack Father     Hypertension Father     Diabetes Father     Depression Neg Hx     Suicide Neg Hx     Psychotic Disorder Neg Hx     Dementia Neg Hx     Substance Abuse Neg Hx         Social History     Socioeconomic History    Marital status:    Tobacco Use    Smoking status: Former Smoker     Packs/day: 1.00     Types: Cigarettes    Smokeless tobacco: Never Used   Substance and Sexual Activity    Alcohol use: Not Currently     Comment: occasionally    Drug use: Not Currently     Types: Marijuana     Comment: rare    Sexual activity: Not Currently   Other Topics Concern    Sleep Concern Yes   Social History Narrative    ** Merged History Encounter **                 CODE STATUS:  DNR    Full    Other      Objective:     Physical Exam:     Visit Vitals  BP (!) 123/93   Pulse 80   Temp 97.6 °F (36.4 °C)   Resp 15   Ht 5' 5\" (1.651 m)   Wt 162 lb (73.5 kg)   SpO2 100%   BMI 26.96 kg/m²      O2 Device: None (Room air)    General:  Alert, cooperative, no distress, appears stated age.    Head:  Normocephalic, without obvious abnormality, atraumatic. Eyes:  Conjunctivae/corneas clear. PERRL, EOMs intact. Nose: Nares normal. Septum midline. Mucosa normal. No drainage or sinus tenderness. Throat: Lips, mucosa, and tongue normal. Teeth and gums normal.   Neck: Supple, symmetrical, trachea midline, no adenopathy, thyroid: no enlargement/tenderness/nodules, no carotid bruit and no JVD. Back:   Symmetric, no curvature. ROM normal. No CVA tenderness. Lungs:   Clear to auscultation bilaterally. Chest wall:  No tenderness or deformity. Heart:  Regular rate and rhythm, S1, S2 normal, no murmur, click, rub or gallop. Abdomen:   Soft, non-tender. Bowel sounds normal. No masses,  No organomegaly. Extremities: Extremities normal, atraumatic, no cyanosis or edema. Pulses: 2+ and symmetric all extremities. Skin: Skin color, texture, turgor normal. No rashes or lesions   Neurologic: CNII-XII intact. No motor or sensory deficits.         24 Hour Results:    Recent Results (from the past 24 hour(s))   URINALYSIS W/ RFLX MICROSCOPIC    Collection Time: 03/29/22 10:48 AM   Result Value Ref Range    Color Yellow/Straw      Appearance Clear Clear      Specific gravity 1.028 1.003 - 1.030      pH (UA) 5.0 5.0 - 8.0      Protein Negative Negative mg/dL    Glucose >300 (A) Negative mg/dL    Ketone Negative Negative mg/dL    Bilirubin Negative Negative      Blood Small (A) Negative      Urobilinogen 0.1 0.1 - 1.0 EU/dL    Nitrites Negative Negative      Leukocyte Esterase Negative Negative      WBC 0-4 0 - 4 /hpf    RBC 0-5 0 - 5 /hpf    Bacteria Negative Negative /hpf   URINE MICROSCOPIC    Collection Time: 03/29/22 10:48 AM   Result Value Ref Range    WBC 0-4 0 - 4 /hpf    RBC 0-5 0 - 5 /hpf    Bacteria Negative Negative /hpf    Mucus Negative Negative /lpf   CBC WITH AUTOMATED DIFF    Collection Time: 03/29/22 11:02 AM   Result Value Ref Range    WBC 6.1 4.1 - 11.1 K/uL    RBC 5.80 (H) 4.10 - 5.70 M/uL    HGB 15.7 12.1 - 17.0 g/dL    HCT 48.8 36.6 - 50.3 %    MCV 84.1 80.0 - 99.0 FL    MCH 27.1 26.0 - 34.0 PG    MCHC 32.2 30.0 - 36.5 g/dL    RDW 21.6 (H) 11.5 - 14.5 %    PLATELET 173 582 - 523 K/uL    MPV 10.5 8.9 - 12.9 FL    NRBC 0.0 0.0  WBC    ABSOLUTE NRBC 0.00 0.00 - 0.01 K/uL    NEUTROPHILS 69 32 - 75 %    LYMPHOCYTES 18 12 - 49 %    MONOCYTES 6 5 - 13 %    EOSINOPHILS 5 0 - 7 %    BASOPHILS 1 0 - 1 %    IMMATURE GRANULOCYTES 1 (H) 0 - 0.5 %    ABS. NEUTROPHILS 4.2 1.8 - 8.0 K/UL    ABS. LYMPHOCYTES 1.1 0.8 - 3.5 K/UL    ABS. MONOCYTES 0.4 0.0 - 1.0 K/UL    ABS. EOSINOPHILS 0.3 0.0 - 0.4 K/UL    ABS. BASOPHILS 0.1 0.0 - 0.1 K/UL    ABS. IMM. GRANS. 0.0 0.00 - 0.04 K/UL    DF AUTOMATED     METABOLIC PANEL, COMPREHENSIVE    Collection Time: 03/29/22 11:02 AM   Result Value Ref Range    Sodium 131 (L) 136 - 145 mmol/L    Potassium 5.2 (H) 3.5 - 5.1 mmol/L    Chloride 99 97 - 108 mmol/L    CO2 25 21 - 32 mmol/L    Anion gap 7 5 - 15 mmol/L    Glucose 487 (H) 65 - 100 mg/dL    BUN 24 (H) 6 - 20 mg/dL    Creatinine 1.71 (H) 0.70 - 1.30 mg/dL    BUN/Creatinine ratio 14 12 - 20      GFR est AA 52 (L) >60 ml/min/1.73m2    GFR est non-AA 43 (L) >60 ml/min/1.73m2    Calcium 9.4 8.5 - 10.1 mg/dL    Bilirubin, total 1.2 (H) 0.2 - 1.0 mg/dL    AST (SGOT) 29 15 - 37 U/L    ALT (SGPT) 35 12 - 78 U/L    Alk.  phosphatase 142 (H) 45 - 117 U/L    Protein, total 9.6 (H) 6.4 - 8.2 g/dL    Albumin 4.0 3.5 - 5.0 g/dL    Globulin 5.6 (H) 2.0 - 4.0 g/dL    A-G Ratio 0.7 (L) 1.1 - 2.2     NT-PRO BNP    Collection Time: 03/29/22 11:02 AM   Result Value Ref Range    NT pro-BNP 1,293 (H) <125 pg/mL   TROPONIN-HIGH SENSITIVITY    Collection Time: 03/29/22 11:02 AM   Result Value Ref Range    Troponin-High Sensitivity 530 (HH) 0 - 76 ng/L   GLUCOSE, POC    Collection Time: 03/29/22 11:05 AM   Result Value Ref Range    Glucose (POC) 499 (H) 65 - 117 mg/dL    Performed by Suzanne Mcdonough          Imaging:   XR CHEST PORT   Final Result   Cardiomegaly with pulmonary vascular congestion. No acute process. Assessment:     NSTEMI    LVH with L. Atrial enlargement    CHF status post ICD with EF 20%    CAD with severe small vessel disease (status post L.  Heart cath 1/16/20)    Essential HTN    Asthma    DM    Bipolar 1 disorder    Anxiety Disorder    Plan:     Start:   Plavix 150mg PO  Heparin IV  Atorvastatin 80mg 1x daily PO  Metoprolol Succinate 50mg PO 1x daily  Isosorbide Dinitrate 20mg PO 1x daily  Sliding scale insulin    Consult nephrology for clearance for cardiac cath to be done tomorrow    Track troponin  CBC, lipid, PTT, chem panel    Repeat EKG      Signed By: Liane Hartman     March 29, 2022

## 2022-03-29 NOTE — CONSULTS
Cardiology Consult    NAME: Sherman Hair   :  1970   MRN:  [id number]     Date/Time:  3/29/2022 11:32 AM    Patient PCP: Leon Bliss MD  ________________________________________________________________________     Assessment:   Acute non-Q wave MI (trop+ 5)  [facility] 21 with 2VD, small RCA, not amenable to PCI ( NSTEMI with trop peak 1099)  Ischemic cardiomyopathy with EF 20%  Chronic CHF  CKD  Status post ICD  Bipolar disorder  Tobacco use  Diabetes  CAD with severe small vessel disease, recent left heart cath 2020  Essential hypertension      Plan:   Continue on Plavix, patient is aspirin allergic  Guideline directed medical therapy for CHF, he appears euvolemic  Continue to trend cardiac enzymes  Beta-blocker, statin therapy and ARB  Follow renal function  (Cre 1.7)  I am not convinced repeating [facility] will help guide his management since he had  [facility] 2021   ICD interrogation today      []        High complexity decision making was performed        Subjective:   CHIEF COMPLAINT: Chest pain      REASON FOR CONSULT: Chest pain      HISTORY OF PRESENT ILLNESS:     Sherman Hair is a 54 y.o. BLACK/ male who has a history of CAD, CHF, with baseline EF 20%. He is s/p ICD, CVA, and has bipolar disorder; active smoker with hypertension. C 2020 showed severe small vessel disease with noncritical epicardial disease. He came to the ER by EMS after he went to his doctor's office and reported chest pain since 6 AM described as sharp and tight feeling in the right hand radiating to the left chest.  He also has tingling in his right hand and fingers. No dizziness or loss of consciousness. Some shortness of breath. No nausea. Chest pain is also somewhat pleuritic. EKG on presentation shows LVH with left atrial enlargement. No acute ischemia. Initial labs remarkable for elevated creatinine of 1.7, troponin + 540.     TTE 1/15/22 showed four-chamber cardiomegaly with EF 15-20%. RVSP 41 mmHg      Past Medical History:   Diagnosis Date    Anxiety disorder     Asthma     Bipolar 1 disorder ([facility]     CAD (coronary artery disease)     MI x2 with 2 cardiac stent    CHF (congestive heart failure) ([facility]     with reduced EF, EF 20%    Chronic systolic congestive heart failure ([facility] 12/2/2020    Depression     Diabetes mellitus, type 2 ([facility]     on insulin. moderate proteinuria    Hypertension     Mood disorder (HCC)     [facility]     Schizoaffective disorder, bipolar type ([facility]     Sleep disorder     Suicidal thoughts       Past Surgical History:   Procedure Laterality Date    HX CORONARY STENT PLACEMENT      HX IMPLANTABLE CARDIOVERTER DEFIBRILLATOR       Allergies   Allergen Reactions    Acetaminophen Unknown (comments), Anaphylaxis and Shortness of Breath     Other reaction(s): anaphylaxis/angioedema  Other reaction(s):  Other (see comments)  Other reaction(s): anaphylaxis/angioedema  Throat swelling    Aspirin Hives, Anaphylaxis and Shortness of Breath     Other reaction(s): anaphylaxis/angioedema  Other reaction(s): anaphylaxis/angioedema    Unable To Obtain Unable to Obtain     Patient states his allergic to greens    Aspirin Shortness of Breath    Spinach Leaf Unknown (comments)     Green leaves    Tylenol [Acetaminophen] Shortness of Breath      Meds:  See below  Social History     Tobacco Use    Smoking status: Former Smoker     Packs/day: 1.00     Types: Cigarettes    Smokeless tobacco: Never Used   Substance Use Topics    Alcohol use: Not Currently     Comment: occasionally      Family History   Problem Relation Age of Onset    Heart Attack Mother     Hypertension Mother     Diabetes Mother     Heart Attack Father     Hypertension Father     Diabetes Father     Depression Neg Hx     Suicide Neg Hx     Psychotic Disorder Neg Hx     Dementia Neg Hx     Substance Abuse Neg Hx        REVIEW OF SYSTEMS:     [] Unable to obtain  ROS due to ---   [x]         Total of 12 systems reviewed as follows:    Constitutional: negative fever, negative chills, negative weight loss  Eyes:   negative visual changes  ENT:   negative sore throat, tongue or lip swelling  Respiratory:  negative cough, negative dyspnea  Cards:  + chest pain, palpitations  GI:   negative for nausea, vomiting, diarrhea, and abdominal pain  Genitourinary: negative for frequency, dysuria  Integument:  negative for rash   Hematologic:  negative for easy bruising and gum/nose bleeding  Musculoskel: negative for myalgias,  back pain  Neurological:  negative for headaches, dizziness, vertigo, weakness  Behavl/Psych: negative for feelings of anxiety, depression     Pertinent Positives include :    Objective:      Physical Exam:    Last 24hrs VS reviewed since prior progress note. Most recent are:    Visit Vitals  BP (!) 120/90   Pulse 83   Temp 97.6 °F (36.4 °C)   Resp 16   Ht 5' 5\" (1.651 m)   Wt 73.5 kg (162 lb)   SpO2 100%   BMI 26.96 kg/m²     No intake or output data in the 24 hours ending 03/29/22 1132     General Appearance: Well developed, alert, no acute distress. Ears/Nose/Mouth/Throat: Moist mucosa  Neck: Supple. JVP within normal limits. Carotids good upstrokes  Chest: Lungs clear to auscultation bilaterally. Cardiovascular: Regular rate and rhythm, S1S2 normal, soft systolic murmur  Abdomen: Soft, non-tender, bowel sounds are active. Extremities: No edema bilaterally. distal pulses +1. Skin: Warm and dry. Neuro: Alert and oriented x3, normal speech; follows simple commands  Psychiatric: Cooperative; appropriate    []         Post-cath site without hematoma, bruit, tenderness, or thrill. Distal pulses intact. Data:      Telemetry: Sinus rhythm in the 70s    EKG: Sinus rhythm with LVH by voltage criteria, left atrial enlargement    []  No new EKG for review.     08/08/21    ECHO ADULT COMPLETE 08/09/2021 8/9/2021    Interpretation Summary  · LV: Calculated LVEF is 15%. Visually measured ejection fraction. Normal cavity size and wall thickness. Severely reduced systolic function. Severe hypokinesis of the basal anterior, basal anterolateral, basal anteroseptal, basal inferior, basal inferoseptal, basal inferolateral, mid anterior, mid anterolateral, mid anteroseptal, mid inferior, mid inferoseptal and mid inferolateral wall(s). · RV: Moderately reduced systolic function. · Pericardium: Trivial-to-small pericardial effusion adjacent to right atrium. Signed by: Pramod Gao MD on 8/9/2021  3:50 PM      Prior to Admission medications    Medication Sig Start Date End Date Taking? Authorizing Provider   furosemide (LASIX) 80 mg tablet TAKE ONE TABLET BY MOUTH ONCE DAILY 2/4/22   Corean Sacks, MD   metFORMIN ER (GLUCOPHAGE XR) 500 mg tablet TAKE TWO TABLETS BY MOUTH EVERY MORNING with breakfast 2/4/22   Corean Sacks, MD   metoprolol succinate (TOPROL-XL) 50 mg XL tablet TAKE ONE TABLET BY MOUTH EVERY DAY 2/4/22   Corean Sacks, MD   nitroglycerin (NITROSTAT) 0.4 mg SL tablet DISSOLVE 1 TABLET UNDER THE TONGUE EVERY 5 MINUTES AS NEEDED FOR CHEST PAIN. DO NOT EXCEED A TOTAL OF 3 DOSES IN 15 MINUTES. 2/4/22   Corean Sacks, MD   Jardiance 10 mg tablet Take 10 mg by mouth daily. 1/17/22   Provider, Historical   Lantus Solostar U-100 Insulin 100 unit/mL (3 mL) inpn 40 Units by SubCUTAneous route Every morning. 2/2/22   Danial Alvarez, ROVERTO   insulin glargine (Lantus U-100 Insulin) 100 unit/mL injection 10 Units by SubCUTAneous route nightly. 1/6/22   Lola Joiner, ROVERTO   traZODone (DESYREL) 50 mg tablet Take 100 mg by mouth nightly. Provider, Historical   liraglutide (VICTOZA) 0.6 mg/0.1 mL (18 mg/3 mL) pnij 1.8 mg by SubCUTAneous route daily. 11/10/21   Corean Sacks, MD   clopidogreL (PLAVIX) 75 mg tab Take 1 Tablet by mouth daily.  10/28/21   Corean Sacks, MD   cyclobenzaprine (FLEXERIL) 10 mg tablet Take 1 Tablet by mouth three (3) times daily as needed for Muscle Spasm(s). 9/16/21   Maynor Nichols MD   albuterol (PROVENTIL HFA, VENTOLIN HFA, PROAIR HFA) 90 mcg/actuation inhaler Take 2 Puffs by inhalation every four (4) hours as needed for Wheezing or Shortness of Breath. 9/16/21   Maynor Nichols MD   hydrOXYzine HCL (ATARAX) 25 mg tablet Take two to three tablets by mouth at bedtime for sleep. 8/16/21   Maynor Nichols MD   atorvastatin (LIPITOR) 80 mg tablet Take 1 Tab by mouth daily. 4/8/21   Maynor Nichols MD   pantoprazole (PROTONIX) 40 mg tablet Take 1 Tab by mouth Daily (before breakfast).  4/8/21   Maynor Nichols MD       Recent Results (from the past 24 hour(s))   URINALYSIS W/ RFLX MICROSCOPIC    Collection Time: 03/29/22 10:48 AM   Result Value Ref Range    Color Yellow/Straw      Appearance Clear Clear      Specific gravity 1.028 1.003 - 1.030      pH (UA) 5.0 5.0 - 8.0      Protein Negative Negative mg/dL    Glucose >300 (A) Negative mg/dL    Ketone Negative Negative mg/dL    Bilirubin Negative Negative      Blood Small (A) Negative      Urobilinogen 0.1 0.1 - 1.0 EU/dL    Nitrites Negative Negative      Leukocyte Esterase Negative Negative      WBC 0-4 0 - 4 /hpf    RBC 0-5 0 - 5 /hpf    Bacteria Negative Negative /hpf   URINE MICROSCOPIC    Collection Time: 03/29/22 10:48 AM   Result Value Ref Range    WBC 0-4 0 - 4 /hpf    RBC 0-5 0 - 5 /hpf    Bacteria Negative Negative /hpf    Mucus Negative Negative /lpf   GLUCOSE, POC    Collection Time: 03/29/22 11:05 AM   Result Value Ref Range    Glucose (POC) 499 (H) 65 - 117 mg/dL    Performed by Natalie Telles MD

## 2022-03-29 NOTE — ED PROVIDER NOTES
EMERGENCY DEPARTMENT HISTORY AND PHYSICAL EXAM      Date: 3/29/2022  Patient Name: Balta Hanson    History of Presenting Illness     Chief Complaint   Patient presents with    Chest Pain       History Provided By: Patient and EMS    HPI: Balta Hanson, 54 y.o. male with a past medical history significant diabetes, hypertension and myocardial infarction presents to the ED with chief complaint of Chest Pain  . 28-year-old male with a significant cardiac history. Cardiac stents in January 2022. Follows with [facility] cardiology. Patient actively having chest pain and pressure that started today. Was at the [facility] cardiologist office. Sent to the hospital after seeing the nurse after he was complained of chest pain. EKG in route concern for STEMI. STEMI alert called. Patient is allergic to aspirin. No meds in the field. Presents with a 7 out of 10 chest pain that radiates down his arm. There are no other complaints, changes, or physical findings at this time.     PCP: Cinthia Cross MD    Current Facility-Administered Medications   Medication Dose Route Frequency Provider Last Rate Last Admin    heparin 25,000 units in D5W 250 ml infusion  12-25 Units/kg/hr (Adjusted) IntraVENous TITRATE Rogelio Harrison MD 8 mL/hr at 03/29/22 1426 12 Units/kg/hr at 03/29/22 1426    heparin (porcine) 1,000 unit/mL injection 5,300 Units  80 Units/kg (Adjusted) IntraVENous PRN Rogelio Harrison MD        Or    heparin (porcine) 1,000 unit/mL injection 2,650 Units  40 Units/kg (Adjusted) IntraVENous PRN MD Genoveva Duffy ON 3/30/2022] atorvastatin (LIPITOR) tablet 80 mg  80 mg Oral DAILY MD Genoveva Powell ON 3/30/2022] metoprolol succinate (TOPROL-XL) XL tablet 50 mg  50 mg Oral DAILY Sherie Abad MD        isosorbide dinitrate (ISORDIL) tablet 20 mg  20 mg Oral BID Sherie Abad MD        nitroglycerin (NITROSTAT) tablet 0.4 mg  0.4 mg SubLINGual PRN Joey Horowitz Presley Ho MD        sodium chloride (NS) flush 5-40 mL  5-40 mL IntraVENous Q8H Renato Kwon MD        sodium chloride (NS) flush 5-40 mL  5-40 mL IntraVENous PRN David Collazo MD        insulin lispro (HUMALOG) injection 7 Units  0.1 Units/kg SubCUTAneous TID WITH MEALS Renato Kwon MD        insulin lispro (HUMALOG) injection   SubCUTAneous AC&HS David Collazo MD        glucose chewable tablet 16 g  4 Tablet Oral PRN David Collazo MD        dextrose 10% infusion 0-250 mL  0-250 mL IntraVENous PRN Renato Kwon MD        glucagon [facility]) injection 1 mg  1 mg IntraMUSCular PRN David Collazo MD         Current Outpatient Medications   Medication Sig Dispense Refill    furosemide (LASIX) 80 mg tablet TAKE ONE TABLET BY MOUTH ONCE DAILY 90 Tablet 1    metFORMIN ER (GLUCOPHAGE XR) 500 mg tablet TAKE TWO TABLETS BY MOUTH EVERY MORNING with breakfast 180 Tablet 1    metoprolol succinate (TOPROL-XL) 50 mg XL tablet TAKE ONE TABLET BY MOUTH EVERY DAY 90 Tablet 1    Jardiance 10 mg tablet Take 10 mg by mouth daily.  insulin glargine (Lantus U-100 Insulin) 100 unit/mL injection 10 Units by SubCUTAneous route nightly. 1 mL 2    traZODone (DESYREL) 50 mg tablet Take 100 mg by mouth nightly.  liraglutide (VICTOZA) 0.6 mg/0.1 mL (18 mg/3 mL) pnij 1.8 mg by SubCUTAneous route daily. 10 Adjustable Dose Pre-filled Pen Syringe 1    clopidogreL (PLAVIX) 75 mg tab Take 1 Tablet by mouth daily. 30 Tablet 5    cyclobenzaprine (FLEXERIL) 10 mg tablet Take 1 Tablet by mouth three (3) times daily as needed for Muscle Spasm(s). 30 Tablet 1    albuterol (PROVENTIL HFA, VENTOLIN HFA, PROAIR HFA) 90 mcg/actuation inhaler Take 2 Puffs by inhalation every four (4) hours as needed for Wheezing or Shortness of Breath. 2 Each 3    hydrOXYzine HCL (ATARAX) 25 mg tablet Take two to three tablets by mouth at bedtime for sleep.  60 Tablet 2    atorvastatin (LIPITOR) 80 mg tablet Take 1 Tab by mouth daily. 30 Tab 5    nitroglycerin (NITROSTAT) 0.4 mg SL tablet DISSOLVE 1 TABLET UNDER THE TONGUE EVERY 5 MINUTES AS NEEDED FOR CHEST PAIN. DO NOT EXCEED A TOTAL OF 3 DOSES IN 15 MINUTES. (Patient not taking: Reported on 3/29/2022) 25 Tablet 3    Lantus Solostar U-100 Insulin 100 unit/mL (3 mL) inpn 40 Units by SubCUTAneous route Every morning. (Patient not taking: Reported on 3/29/2022) 4 Adjustable Dose Pre-filled Pen Syringe 1    pantoprazole (PROTONIX) 40 mg tablet Take 1 Tab by mouth Daily (before breakfast). (Patient not taking: Reported on 3/29/2022) 30 Tab 5       Past History     Past Medical History:  Past Medical History:   Diagnosis Date    Anxiety disorder     Asthma     Bipolar 1 disorder ([address]     CAD (coronary artery disease)     MI x2 with 2 cardiac stent    CHF (congestive heart failure) ([address]     with reduced EF, EF 20%    Chronic systolic congestive heart failure [facility] 12/2/2020    Depression     Diabetes mellitus, type 2 (HCC)     on insulin.  moderate proteinuria    Hypertension     Mood disorder ([facility])     Psychotic disorder ([facility])     Schizoaffective disorder, bipolar type ([address]     Sleep disorder     Suicidal thoughts        Past Surgical History:  Past Surgical History:   Procedure Laterality Date    HX CORONARY STENT PLACEMENT      HX IMPLANTABLE CARDIOVERTER DEFIBRILLATOR         Family History:  Family History   Problem Relation Age of Onset    Heart Attack Mother     Hypertension Mother     Diabetes Mother     Heart Attack Father     Hypertension Father     Diabetes Father     Depression Neg Hx     Suicide Neg Hx     Psychotic Disorder Neg Hx     Dementia Neg Hx     Substance Abuse Neg Hx        Social History:  Social History     Tobacco Use    Smoking status: Former Smoker     Packs/day: 1.00     Types: Cigarettes    Smokeless tobacco: Never Used   Substance Use Topics    Alcohol use: Not Currently     Comment: occasionally    Drug use: Not Currently     Types: Marijuana     Comment: rare       Allergies: Allergies   Allergen Reactions    Acetaminophen Unknown (comments), Anaphylaxis and Shortness of Breath     Other reaction(s): anaphylaxis/angioedema  Other reaction(s): Other (see comments)  Other reaction(s): anaphylaxis/angioedema  Throat swelling    Aspirin Hives, Anaphylaxis and Shortness of Breath     Other reaction(s): anaphylaxis/angioedema  Other reaction(s): anaphylaxis/angioedema    Unable To Obtain Unable to Obtain     Patient states his allergic to greens    Aspirin Shortness of Breath    Spinach Leaf Unknown (comments)     Green leaves    Tylenol [Acetaminophen] Shortness of Breath         Review of Systems   Review of Systems   Constitutional: Negative. Negative for chills, fatigue and fever. HENT: Negative. Negative for congestion, ear pain, nosebleeds and sore throat. Eyes: Negative. Negative for pain, discharge and visual disturbance. Respiratory: Positive for chest tightness. Negative for cough and shortness of breath. Cardiovascular: Negative. Negative for chest pain and leg swelling. Gastrointestinal: Negative. Negative for abdominal pain, blood in stool, constipation, diarrhea, nausea and vomiting. Endocrine: Negative. Genitourinary: Negative. Negative for difficulty urinating, dysuria and flank pain. Musculoskeletal: Negative. Negative for back pain and myalgias. Skin: Negative. Negative for rash and wound. Allergic/Immunologic: Negative. Neurological: Negative. Negative for dizziness, syncope, weakness, numbness and headaches. Hematological: Negative. Does not bruise/bleed easily. Psychiatric/Behavioral: Negative. Negative for agitation, confusion, hallucinations and suicidal ideas. All other systems reviewed and are negative. Physical Exam   Physical Exam  Vitals and nursing note reviewed. Constitutional:       General: He is not in acute distress.      Appearance: He is normal weight. He is not ill-appearing. HENT:      Head: Normocephalic and atraumatic. Right Ear: External ear normal.      Left Ear: External ear normal.      Nose: Nose normal. No rhinorrhea. Mouth/Throat:      Mouth: Mucous membranes are moist.      Pharynx: Oropharynx is clear. Eyes:      Extraocular Movements: Extraocular movements intact. Conjunctiva/sclera: Conjunctivae normal.      Pupils: Pupils are equal, round, and reactive to light. Cardiovascular:      Rate and Rhythm: Normal rate and regular rhythm. Pulses: Normal pulses. Heart sounds: Normal heart sounds. Pulmonary:      Effort: Pulmonary effort is normal. No respiratory distress. Breath sounds: Normal breath sounds. Abdominal:      General: Abdomen is flat. Bowel sounds are normal.      Palpations: Abdomen is soft. Musculoskeletal:         General: No tenderness or deformity. Normal range of motion. Cervical back: Normal range of motion and neck supple. Skin:     General: Skin is warm and dry. Capillary Refill: Capillary refill takes less than 2 seconds. Findings: No bruising, lesion or rash. Neurological:      General: No focal deficit present. Mental Status: He is alert and oriented to person, place, and time. Mental status is at baseline. Psychiatric:         Mood and Affect: Mood normal.         Behavior: Behavior normal.         Thought Content:  Thought content normal.         Judgment: Judgment normal.         Diagnostic Study Results     Labs -     Recent Results (from the past 12 hour(s))   URINALYSIS W/ RFLX MICROSCOPIC    Collection Time: 03/29/22 10:48 AM   Result Value Ref Range    Color Yellow/Straw      Appearance Clear Clear      Specific gravity 1.028 1.003 - 1.030      pH (UA) 5.0 5.0 - 8.0      Protein Negative Negative mg/dL    Glucose >300 (A) Negative mg/dL    Ketone Negative Negative mg/dL    Bilirubin Negative Negative      Blood Small (A) Negative Urobilinogen 0.1 0.1 - 1.0 EU/dL    Nitrites Negative Negative      Leukocyte Esterase Negative Negative      WBC 0-4 0 - 4 /hpf    RBC 0-5 0 - 5 /hpf    Bacteria Negative Negative /hpf   URINE MICROSCOPIC    Collection Time: 03/29/22 10:48 AM   Result Value Ref Range    WBC 0-4 0 - 4 /hpf    RBC 0-5 0 - 5 /hpf    Bacteria Negative Negative /hpf    Mucus Negative Negative /lpf   CBC WITH AUTOMATED DIFF    Collection Time: 03/29/22 11:02 AM   Result Value Ref Range    WBC 6.1 4.1 - 11.1 K/uL    RBC 5.80 (H) 4.10 - 5.70 M/uL    HGB 15.7 12.1 - 17.0 g/dL    HCT 48.8 36.6 - 50.3 %    MCV 84.1 80.0 - 99.0 FL    MCH 27.1 26.0 - 34.0 PG    MCHC 32.2 30.0 - 36.5 g/dL    RDW 21.6 (H) 11.5 - 14.5 %    PLATELET 904 426 - 418 K/uL    MPV 10.5 8.9 - 12.9 FL    NRBC 0.0 0.0  WBC    ABSOLUTE NRBC 0.00 0.00 - 0.01 K/uL    NEUTROPHILS 69 32 - 75 %    LYMPHOCYTES 18 12 - 49 %    MONOCYTES 6 5 - 13 %    EOSINOPHILS 5 0 - 7 %    BASOPHILS 1 0 - 1 %    IMMATURE GRANULOCYTES 1 (H) 0 - 0.5 %    ABS. NEUTROPHILS 4.2 1.8 - 8.0 K/UL    ABS. LYMPHOCYTES 1.1 0.8 - 3.5 K/UL    ABS. MONOCYTES 0.4 0.0 - 1.0 K/UL    ABS. EOSINOPHILS 0.3 0.0 - 0.4 K/UL    ABS. BASOPHILS 0.1 0.0 - 0.1 K/UL    ABS. IMM. GRANS. 0.0 0.00 - 0.04 K/UL    DF AUTOMATED     METABOLIC PANEL, COMPREHENSIVE    Collection Time: 03/29/22 11:02 AM   Result Value Ref Range    Sodium 131 (L) 136 - 145 mmol/L    Potassium 5.2 (H) 3.5 - 5.1 mmol/L    Chloride 99 97 - 108 mmol/L    CO2 25 21 - 32 mmol/L    Anion gap 7 5 - 15 mmol/L    Glucose 487 (H) 65 - 100 mg/dL    BUN 24 (H) 6 - 20 mg/dL    Creatinine 1.71 (H) 0.70 - 1.30 mg/dL    BUN/Creatinine ratio 14 12 - 20      GFR est AA 52 (L) >60 ml/min/1.73m2    GFR est non-AA 43 (L) >60 ml/min/1.73m2    Calcium 9.4 8.5 - 10.1 mg/dL    Bilirubin, total 1.2 (H) 0.2 - 1.0 mg/dL    AST (SGOT) 29 15 - 37 U/L    ALT (SGPT) 35 12 - 78 U/L    Alk.  phosphatase 142 (H) 45 - 117 U/L    Protein, total 9.6 (H) 6.4 - 8.2 g/dL    Albumin 4.0 3.5 - 5.0 g/dL    Globulin 5.6 (H) 2.0 - 4.0 g/dL    A-G Ratio 0.7 (L) 1.1 - 2.2     NT-PRO BNP    Collection Time: 03/29/22 11:02 AM   Result Value Ref Range    NT pro-BNP 1,293 (H) <125 pg/mL   TROPONIN-HIGH SENSITIVITY    Collection Time: 03/29/22 11:02 AM   Result Value Ref Range    Troponin-High Sensitivity 530 (HH) 0 - 76 ng/L   GLUCOSE, POC    Collection Time: 03/29/22 11:05 AM   Result Value Ref Range    Glucose (POC) 499 (H) 65 - 117 mg/dL    Performed by Collette Ha    PTT    Collection Time: 03/29/22 12:27 PM   Result Value Ref Range    aPTT 28.4 21.2 - 34.1 sec    aPTT, therapeutic range   82 - 109 sec   GLUCOSE, POC    Collection Time: 03/29/22  2:25 PM   Result Value Ref Range    Glucose (POC) 306 (H) 65 - 117 mg/dL    Performed by Collette Ha          Radiologic Studies -   XR CHEST PORT   Final Result   Cardiomegaly with pulmonary vascular congestion. No acute process. CT Results  (Last 48 hours)    None        CXR Results  (Last 48 hours)               03/29/22 1105  XR CHEST PORT Final result    Impression:  Cardiomegaly with pulmonary vascular congestion. No acute process. Narrative:  Exam: XR CHEST PORT         Indication:  cp;       Comparison: None       Findings:       Subcutaneous defibrillator lead overlies the central chest. Cardiomegaly. Pulmonary vascular congestion. No overt pulmonary edema. No pleural effusion. No   pneumothorax. No acute osseous abnormality. Medical Decision Making and ED Course   I am the first provider for this patient. I reviewed the vital signs, available nursing notes, past medical history, past surgical history, family history and social history. Vital Signs-Reviewed the patient's vital signs.   Patient Vitals for the past 12 hrs:   Temp Pulse Resp BP SpO2   03/29/22 1236  80 15 (!) 123/93 100 %   03/29/22 1039 97.6 °F (36.4 °C) 83 16 (!) 120/90 100 %       EKG interpretation:   EKG at 1036 normal sinus rhythm rate of 84. No ST changes. No T wave inversions. Normal intervals. J-point elevation V2 V3. Reason rule out ACS. Interpreted by ER physician. Records Reviewed: Previous Hospital chart. EMS run report      ED Course:   Initial assessment performed. The patients presenting problems have been discussed, and they are in agreement with the care plan formulated and outlined with them. I have encouraged them to ask questions as they arise throughout their visit.     Orders Placed This Encounter    NO VTE PROPHYLAXIS NEEDED     Standing Status:   Standing     Number of Occurrences:   1     Order Specific Question:   Due to     Answer:   Patient is Appropriately Anticoagulated    REASON FOR NOT SELECTING BASAL INSULIN     Standing Status:   Standing     Number of Occurrences:   1     Order Specific Question:   Reason for Not Selecting Basal Insulin     Answer:   Already on basal insulin    XR CHEST PORT     Standing Status:   Standing     Number of Occurrences:   1     Order Specific Question:   Reason for Exam     Answer:   cp    CBC WITH AUTOMATED DIFF     Standing Status:   Standing     Number of Occurrences:   1    COMPREHENSIVE METABOLIC PANEL     Standing Status:   Standing     Number of Occurrences:   1    PRO-BNP     Standing Status:   Standing     Number of Occurrences:   1    TROPONIN-HIGH SENSITIVITY     Standing Status:   Standing     Number of Occurrences:   1    URINALYSIS W/ RFLX MICROSCOPIC     Standing Status:   Standing     Number of Occurrences:   1    BETA-HYDROXYBUTYRATE     Standing Status:   Standing     Number of Occurrences:   1    URINE MICROSCOPIC     Standing Status:   Standing     Number of Occurrences:   1    METABOLIC PANEL, BASIC     Standing Status:   Standing     Number of Occurrences:   1    PTT     Standing Status:   Standing     Number of Occurrences:   1    TROPONIN-HIGH SENSITIVITY     Now if not already done in ED     Standing Status: Standing     Number of Occurrences:   1    TROPONIN-HIGH SENSITIVITY     Now, if not already done in ED, and every 3 hours times 2. Please contact physician STAT if positive. Perform EKG after last draw. Standing Status:   Standing     Number of Occurrences:   3    HEMOGLOBIN A1C     Standing Status:   Standing     Number of Occurrences:   1    ADULT DIET Regular; No Salt Added (3-4 gm)     Standing Status:   Standing     Number of Occurrences:   1     Order Specific Question:   Primary Diet:     Answer:   Regular     Order Specific Question:   Sodium Restriction:     Answer:   No Salt Added (3-4 gm)    POC GLUCOSE     Standing Status:   Standing     Number of Occurrences:   1    CARDIAC MONITORING     Standing Status:   Standing     Number of Occurrences:   1     Order Specific Question:   Type: Answer:   Bedside     Order Specific Question:   Patient may go off unit without monitor     Answer:   No    VITAL SIGNS PER UNIT ROUTINE     More frequently if indicated. Standing Status:   Standing     Number of Occurrences:   1    WEIGH PATIENT     Standing Status:   Standing     Number of Occurrences:   1    NOTIFY PHYSICIAN: VITAL SIGNS CHANGES     Standing Status:   Standing     Number of Occurrences:   1     Order Specific Question:   Notify for Temperature: Answer:   Greater than 80 F     Order Specific Question:   Notify for Heart Rate: Answer:   Greater than 120 BPM or Less than 60 BPM     Order Specific Question:   Notify for Respiratory Rate: Answer:   Greater than 30 per minute or Less than 8 per minute     Order Specific Question:   Notify for Systolic Blood Pressure: Answer:   Greater than 180 mm Hg or Less than 90 mm Hg     Order Specific Question:   Notify for Oxygen Saturation:     Answer:   Less than 90%     Order Specific Question:   Notify for Urine Output:      Answer:   Less than 120 ml for 4 hours    NURSING-MISCELLANEOUS: Perform EKG at the time of last Troponin Draw and as needed for recurrent, worsening and/or unrelieved Chest Pain ONE TIME     Standing Status:   Standing     Number of Occurrences:   1     Order Specific Question:   Description of Order:     Answer:   Perform EKG at the time of last Troponin Draw and as needed for recurrent, worsening and/or unrelieved Chest Pain    PRESCRIBED VIA MEDICATION RECONCILIATION ORDER     Standing Status:   Standing     Number of Occurrences:   1     Order Specific Question:   PRESCRIBED VIA MEDICATION RECONCILIATION ORDER: Answer:   PLATELET AGGREGATION INHIBITORS    PRESCRIBED VIA MEDICATION RECONCILIATION ORDER     Standing Status:   Standing     Number of Occurrences:   1     Order Specific Question:   PRESCRIBED VIA MEDICATION RECONCILIATION ORDER: Answer:   Juan Ramon Andrade NURSING-MISCELLANEOUS: Initiate hypoglycemic protocol if blood glucose is LESS THAN 70 mg/dL CONTINUOUS     Standing Status:   Standing     Number of Occurrences:   1     Order Specific Question:   Description of Order:     Answer:   Initiate hypoglycemic protocol if blood glucose is LESS THAN 70 mg/dL    NURSING-MISCELLANEOUS: FOR CONSCIOUS PATIENT: Administer 4 ounces fruit juice OR regular soda OR 4 glucose tablets. CONTINUOUS     Standing Status:   Standing     Number of Occurrences:   1     Order Specific Question:   Description of Order:     Answer:   FOR CONSCIOUS PATIENT: Administer 4 ounces fruit juice OR regular soda OR 4 glucose tablets.  NURSING-MISCELLANEOUS: If patient NPO, unconscious or unable to swallow and If Blood Glucose between 60 and 70 mg/dL: Follow instructions below If patient NPO, unconscious or unable to swallow and if blood glucose between 60 and 70 mg/dL: Give 25 . .. If patient NPO, unconscious or unable to swallow and if blood glucose between 60 and 70 mg/dL: Give 25 mL D50% IV. If blood glucose LESS THAN 60 mg/dL: Give 50 mL D50% IV. If no IV, administer glucagon 1 mg IM. Repeat blood glucose in 15 minutes. Continue to repeat blood glucose and treatment every 15 minutes until blood glucose is GREATER THAN 70 mg/dL and notify provider. Standing Status:   Standing     Number of Occurrences:   1     Order Specific Question:   Description of Order:     Answer: If patient NPO, unconscious or unable to swallow and If Blood Glucose between 60 and 70 mg/dL: Follow instructions below    NURSING-MISCELLANEOUS: Repeat finger stick blood glucose in 15 minutes AFTER treatment, if LESS THAN 70 repeat hypoglycemic protocol and notify provider. CONTINUOUS     Standing Status:   Standing     Number of Occurrences:   1     Order Specific Question:   Description of Order:     Answer:   Repeat finger stick blood glucose in 15 minutes AFTER treatment, if LESS THAN 70 repeat hypoglycemic protocol and notify provider.  NURSING-MISCELLANEOUS: Following Hypoglycemic Protocol: Once patient is fully alert and BG greater than 70 provide a small snack,  if NPO consider IV fluids with dextrose. CONTINUOUS     Standing Status:   Standing     Number of Occurrences:   1     Order Specific Question:   Description of Order:     Answer: Following Hypoglycemic Protocol: Once patient is fully alert and BG greater than 70 provide a small snack,  if NPO consider IV fluids with dextrose.  NURSING-MISCELLANEOUS: Document all interventions in the Electronic Medical  Record (EMR). CONTINUOUS     Standing Status:   Standing     Number of Occurrences:   1     Order Specific Question:   Description of Order:     Answer:   Document all interventions in the Electronic Medical  Record (EMR).     NURSING-MISCELLANEOUS: Blood glucose targets -- ICU: 140 - 180 mg/dL;  NON-ICU: 100 - 180 mg/dL CONTINUOUS     Standing Status:   Standing     Number of Occurrences:   1     Order Specific Question:   Description of Order:     Answer:   Blood glucose targets -- ICU: 140 - 180 mg/dL;  NON-ICU: 100 - 180 mg/dL    POC GLUCOSE - AC & HS     Standing Status: Standing     Number of Occurrences:   62    FULL CODE     Standing Status:   Standing     Number of Occurrences:   1    IP CONSULT TO NEPHROLOGY     Standing Status:   Standing     Number of Occurrences:   1     Order Specific Question:   Reason for Consult: Answer:   CKD     Order Specific Question:   Did you call or speak to the consulting provider? Answer: Yes    GLUCOSE, POC     Standing Status:   Standing     Number of Occurrences:   1    GLUCOSE, POC     Standing Status:   Standing     Number of Occurrences:   1    EKG, 12 LEAD, INITIAL     Standing Status:   Standing     Number of Occurrences:   1     Order Specific Question:   Reason for Exam:     Answer:   cp    heparin (porcine) 1,000 unit/mL injection 4,000 Units    heparin 25,000 units in D5W 250 ml infusion    OR Linked Order Group     heparin (porcine) 1,000 unit/mL injection 5,300 Units     heparin (porcine) 1,000 unit/mL injection 2,650 Units    DISCONTD: furosemide (LASIX) injection 20 mg    clopidogreL (PLAVIX) tablet 150 mg    insulin regular (NOVOLIN R, HUMULIN R) injection 10 Units    atorvastatin (LIPITOR) tablet 80 mg    metoprolol succinate (TOPROL-XL) XL tablet 50 mg    isosorbide dinitrate (ISORDIL) tablet 20 mg    DISCONTD: losartan (COZAAR) tablet 50 mg    nitroglycerin (NITROSTAT) tablet 0.4 mg    sodium chloride (NS) flush 5-40 mL    sodium chloride (NS) flush 5-40 mL    insulin lispro (HUMALOG) injection 7 Units    insulin lispro (HUMALOG) injection    glucose chewable tablet 16 g    dextrose 10% infusion 0-250 mL    glucagon (GLUCAGEN) injection 1 mg    IP CONSULT TO CARDIOLOGY     Standing Status:   Standing     Number of Occurrences:   1     Order Specific Question:   Reason for Consult: Answer:   cp     Order Specific Question:   Did you call or speak to the consulting provider?      Answer:   No     Order Specific Question:   Consult To     Answer:   [facility] cards     Order Specific Question: Schedule When? Answer:   TODAY    INITIAL PHYSICIAN ORDER: INPATIENT Telemetry; Yes; 9. Other (further clarification in H&P documentation)     Standing Status:   Standing     Number of Occurrences:   1     Order Specific Question:   Status: Answer:   INPATIENT [101]     Order Specific Question:   Type of Bed     Answer:   Telemetry [19]     Order Specific Question:   Cardiac Monitoring Required? Answer:   Yes     Order Specific Question:   Inpatient Hospitalization Certified Necessary for the Following Reasons     Answer:   9. Other (further clarification in H&P documentation)     Order Specific Question:   Admitting Diagnosis     Answer:   NSTEMI (non-ST elevated myocardial infarction) [facility] [[id number]]     Order Specific Question:   Admitting Physician     Answer:   Rosemarie Null [[id number]]     Order Specific Question:   Attending Physician     Answer:   Rosemarie Null [[id number]]     Order Specific Question:   Estimated Length of Stay     Answer:   3-4 Midnights     Order Specific Question:   Discharge Plan:     Answer:   Home with Office Follow-up                 Provider Notes (Medical Decision Making):   51-year-old male presents as a STEMI alert. Seen by cardiology at bedside [facility] who feels that we can cancel the alert. Is aware patient is a patient of [facility] cardiology. Pain control at bedside. Patient's troponin is elevated. We will treat as an NSTEMI. I discussed the case with [facility] cardiology Dr. Caren Esteves who is aware of the consult. Admission to the hospitalist service.       Consults  syeda curran             Admitted    Procedures             CRITICAL CARE NOTE :  2:42 PM  Amount of Critical Care Time: 35 minutes    IMPENDING DETERIORATION -Airway, Respiratory and Cardiovascular  ASSOCIATED RISK FACTORS - Hypotension, Shock and Hypoxia  MANAGEMENT- Bedside Assessment and Supervision of Care  INTERPRETATION -  Xrays and CT Scan  INTERVENTIONS - hemodynamic mngmt  CASE REVIEW - Hospitalist/Intensivist and Medical Sub-Specialist  TREATMENT RESPONSE -Stable  PERFORMED BY - Self    NOTES   :  I have spent critical care time involved in lab review, consultations with specialist, family decision- making, bedside attention and documentation. This time excludes time spent in any separate billed procedures. During this entire length of time I was immediately available to the patient . Sherri Gaxiola MD              Disposition       Emergency Department Disposition:  Admitted      Diagnosis     Clinical Impression:   1. NSTEMI (non-ST elevated myocardial infarction) [facility])        Attestations:    Sherri Gaxiola MD    Please note that this dictation was completed with [employer], the computer voice recognition software. Quite often unanticipated grammatical, syntax, homophones, and other interpretive errors are inadvertently transcribed by the computer software. Please disregard these errors. Please excuse any errors that have escaped final proofreading. Thank you.

## 2022-03-29 NOTE — PROGRESS NOTES
3/29/22. PCP is Dr. Oz Guzman. - seen in Jan 2022. D/C Pln is home with family & pt will need a cab/transportation home upon discharge. Pt uses no DME/no home health @ this time.

## 2022-03-29 NOTE — ED NOTES
TRANSFER - OUT REPORT:    Verbal report given to marva(name) on [facility]  being transferred to (unit) for routine progression of care       Report consisted of patients Situation, Background, Assessment and   Recommendations(SBAR). Information from the following report(s) SBAR, ED Summary and MAR was reviewed with the receiving nurse. Lines:   Peripheral IV 03/29/22 Distal;Right Cephalic (Active)       Peripheral IV 03/29/22 Left Antecubital (Active)        Opportunity for questions and clarification was provided.       Patient transported with:   Monitor  Tech

## 2022-03-29 NOTE — PROGRESS NOTES
Spiritual Care Assessment/Progress Note  [facility]      NAME: Corine Frideman      MRN: [id number]  AGE: 54 y.o. SEX: male  Samaritan Affiliation: No preference   Language: English     3/29/2022     Total Time (in minutes): 16     Spiritual Assessment begun in [address] [place] [address] DEPT through conversation with:         [x]Patient        [] Family    [] Friend(s)        Reason for Consult: Crisis, Stemi     Spiritual beliefs: (Please include comment if needed)     [] Identifies with a jacinta tradition:         [] Supported by a jacinta community:            [] Claims no spiritual orientation:           [] Seeking spiritual identity:                [] Adheres to an individual form of spirituality:           [x] Not able to assess:                           Identified resources for coping:      [] Prayer                               [] Music                  [] Guided Imagery     [] Family/friends                 [] Pet visits     [] Devotional reading                         [x] Unknown     [] Other:                                              Interventions offered during this visit: (See comments for more details)    Patient Interventions: Other (comment) (Ministry of presence)           Plan of Care:     [x] Support spiritual and/or cultural needs    [] Support AMD and/or advance care planning process      [] Support grieving process   [] Coordinate Rites and/or Rituals    [] Coordination with community clergy   [] No spiritual needs identified at this time   [] Detailed Plan of Care below (See Comments)  [] Make referral to Music Therapy  [] Make referral to Pet Therapy     [] Make referral to Addiction services  [] Make referral to [facility]  [] Make referral to Spiritual Care Partner  [] No future visits requested        [x] Contact Spiritual Care for further referrals     Comments:  responded to over head page for code stemi. At this time no family was present.   offered ministry of presence as medical team did test and patient was moved for more test.  Advised nurse to contact [facility] for any further referrals.     [address], [facility]    Please [facility]  in order to get in touch with  for any Spiritual Care Needs   [phone]   OR   Reach out to us on [facility] at [facility].

## 2022-03-29 NOTE — ACP (ADVANCE CARE PLANNING)
Advance Care Planning   Healthcare Decision Maker:       Primary Decision Maker: Brien Roe - [facility] - [phone]

## 2022-03-29 NOTE — Clinical Note
Status[de-identified] INPATIENT [101]   Type of Bed: Telemetry [19]   Cardiac Monitoring Required?: No   Inpatient Hospitalization Certified Necessary for the Following Reasons: 9.  Other (further clarification in H&P documentation)   Admitting Diagnosis: NSTEMI (non-ST elevated myocardial infarction) [facility]) [[id number]]   Admitting Physician: Germán Hoang [[id number]]   Attending Physician: Germán Hoang [[id number]]   Estimated Length of Stay: 3-4 Midnights   Discharge Plan[de-identified] Home with Office Follow-up

## 2022-03-29 NOTE — PROGRESS NOTES
Reason for Admission:  NSTEMI                     RUR Score:  19%                   Plan for utilizing home health:  None @ this time/uses no DME. PCP: First and Last name:  Reyna Dash MD     Name of Practice:    Are you a current patient: Yes/No: Yes   Approximate date of last visit: Jan 2022. Can you participate in a virtual visit with your PCP: Yes/Call                    Current Advanced Directive/Advance Care Plan: Full Code      Healthcare Decision Maker:             Primary Decision Maker: [address] [facility] [phone]                  Transition of Care Plan: D/C Plan is home with family & pt will need cab/transportation home upon discharge.

## 2022-03-29 NOTE — MED STUDENT NOTES
History & Physical    Primary Care Provider: Ru Yi MD  Source of Information: Patient/family     History of Presenting Illness:   Jaime Marie is a 54 y.o. male with a PMH of CHF status post ICD with EF 20%, bipolar disease, DM, CAD with severe small vessel disease, status post heart cath 1/16/2020, essential HTN, and asthma presents today for chest pain. Patient states he woke up at 6am this morning with severe right arm pain that turned into chest pain. He states this pain is equivalent to the pain he has had with his past MI. He has been smoking for 30+ years and smokes 1 ppd. Patient came here by EMS after he went to the doctor's office. He denies any nausea, vomiting, dizziness, loss of consciousness, ringing in ears, blurry vision. Patient is allergic to acetaminophen and asprin. EKG showed LVH with L. Atrial enlargement, no acute ischemia, troponin 530, proBNP 1293, hyperkalemia 5.2, Cr 1.71, and glucose 487. TTE 1/15/22 showed 4-chamber cardiomegaly with EF 20%. Review of Systems:  Constitutional: negative  Eyes: negative  Respiratory: positive for dyspnea on exertion  Cardiovascular: positive for chest pain, chest pressure/discomfort, dyspnea, palpitations  Gastrointestinal: negative  Genitourinary:negative  Musculoskeletal:negative  Neurological: negative     Past Medical History:   Diagnosis Date    Anxiety disorder     Asthma     Bipolar 1 disorder ([address]     CAD (coronary artery disease)     MI x2 with 2 cardiac stent    CHF (congestive heart failure) ([address]     with reduced EF, EF 20%    Chronic systolic congestive heart failure ([address] 12/2/2020    Depression     Diabetes mellitus, type 2 ([address]     on insulin.  moderate proteinuria    Hypertension     Mood disorder (HCC)     Psychotic disorder (HCC)     Schizoaffective disorder, bipolar type ([address]     Sleep disorder     Suicidal thoughts         Past Surgical History:   Procedure Laterality Date    HX CORONARY STENT PLACEMENT      HX IMPLANTABLE CARDIOVERTER DEFIBRILLATOR         Prior to Admission medications    Medication Sig Start Date End Date Taking? Authorizing Provider   furosemide (LASIX) 80 mg tablet TAKE ONE TABLET BY MOUTH ONCE DAILY 2/4/22   Jac Jiang MD   metFORMIN ER (GLUCOPHAGE XR) 500 mg tablet TAKE TWO TABLETS BY MOUTH EVERY MORNING with breakfast 2/4/22   Jac Jiang MD   metoprolol succinate (TOPROL-XL) 50 mg XL tablet TAKE ONE TABLET BY MOUTH EVERY DAY 2/4/22   Jac Jiang MD   nitroglycerin (NITROSTAT) 0.4 mg SL tablet DISSOLVE 1 TABLET UNDER THE TONGUE EVERY 5 MINUTES AS NEEDED FOR CHEST PAIN. DO NOT EXCEED A TOTAL OF 3 DOSES IN 15 MINUTES. 2/4/22   Jac Jiang MD   Jardiance 10 mg tablet Take 10 mg by mouth daily. 1/17/22   Provider, Historical   Lantus Solostar U-100 Insulin 100 unit/mL (3 mL) inpn 40 Units by SubCUTAneous route Every morning. 2/2/22   Annie Vines NP   insulin glargine (Lantus U-100 Insulin) 100 unit/mL injection 10 Units by SubCUTAneous route nightly. 1/6/22   Marco Christiansen NP   traZODone (DESYREL) 50 mg tablet Take 100 mg by mouth nightly. Provider, Historical   liraglutide (VICTOZA) 0.6 mg/0.1 mL (18 mg/3 mL) pnij 1.8 mg by SubCUTAneous route daily. 11/10/21   Jac Jiang MD   clopidogreL (PLAVIX) 75 mg tab Take 1 Tablet by mouth daily. 10/28/21   Jac Jiang MD   cyclobenzaprine (FLEXERIL) 10 mg tablet Take 1 Tablet by mouth three (3) times daily as needed for Muscle Spasm(s). 9/16/21   Jac Jiang MD   albuterol (PROVENTIL HFA, VENTOLIN HFA, PROAIR HFA) 90 mcg/actuation inhaler Take 2 Puffs by inhalation every four (4) hours as needed for Wheezing or Shortness of Breath. 9/16/21   Jac Jiang MD   hydrOXYzine HCL (ATARAX) 25 mg tablet Take two to three tablets by mouth at bedtime for sleep.  8/16/21   Jac Jiang MD   atorvastatin (LIPITOR) 80 mg tablet Take 1 Tab by mouth daily. 4/8/21   Callie Arrington MD   pantoprazole (PROTONIX) 40 mg tablet Take 1 Tab by mouth Daily (before breakfast). 4/8/21   Callie Arrington MD       Allergies   Allergen Reactions    Acetaminophen Unknown (comments), Anaphylaxis and Shortness of Breath     Other reaction(s): anaphylaxis/angioedema  Other reaction(s): Other (see comments)  Other reaction(s): anaphylaxis/angioedema  Throat swelling    Aspirin Hives, Anaphylaxis and Shortness of Breath     Other reaction(s): anaphylaxis/angioedema  Other reaction(s): anaphylaxis/angioedema    Unable To Obtain Unable to Obtain     Patient states his allergic to greens    Aspirin Shortness of Breath    Spinach Leaf Unknown (comments)     Green leaves    Tylenol [Acetaminophen] Shortness of Breath        Family History   Problem Relation Age of Onset    Heart Attack Mother     Hypertension Mother     Diabetes Mother     Heart Attack Father     Hypertension Father     Diabetes Father     Depression Neg Hx     Suicide Neg Hx     Psychotic Disorder Neg Hx     Dementia Neg Hx     Substance Abuse Neg Hx         Social History     Socioeconomic History    Marital status:    Tobacco Use    Smoking status: Former Smoker     Packs/day: 1.00     Types: Cigarettes    Smokeless tobacco: Never Used   Substance and Sexual Activity    Alcohol use: Not Currently     Comment: occasionally    Drug use: Not Currently     Types: Marijuana     Comment: rare    Sexual activity: Not Currently   Other Topics Concern    Sleep Concern Yes   Social History Narrative    ** Merged History Encounter **                 CODE STATUS:  DNR    Full    Other      Objective:     Physical Exam:     Visit Vitals  BP (!) 123/93   Pulse 80   Temp 97.6 °F (36.4 °C)   Resp 15   Ht 5' 5\" (1.651 m)   Wt 73.5 kg (162 lb)   SpO2 100%   BMI 26.96 kg/m²      O2 Device: None (Room air)    General:  Alert, cooperative, no distress, appears stated age.    Head: Normocephalic, without obvious abnormality, atraumatic. Eyes:  Conjunctivae/corneas clear. PERRL, EOMs intact. Nose: Nares normal. Septum midline. Mucosa normal. No drainage or sinus tenderness. Throat: Lips, mucosa, and tongue normal. Teeth and gums normal.   Neck: Supple, symmetrical, trachea midline, no adenopathy, thyroid: no enlargement/tenderness/nodules, no carotid bruit and no JVD. Back:   Symmetric, no curvature. ROM normal. No CVA tenderness. Lungs:   Clear to auscultation bilaterally. Chest wall:  No tenderness or deformity. Heart:  Regular rate and rhythm, S1, S2 normal, no murmur, click, rub or gallop. Abdomen:   Soft, non-tender. Bowel sounds normal. No masses,  No organomegaly. Extremities: Extremities normal, atraumatic, no cyanosis or edema. Pulses: 2+ and symmetric all extremities. Skin: Skin color, texture, turgor normal. No rashes or lesions   Neurologic: CNII-XII intact. No motor or sensory deficits.         24 Hour Results:    Recent Results (from the past 24 hour(s))   URINALYSIS W/ RFLX MICROSCOPIC    Collection Time: 03/29/22 10:48 AM   Result Value Ref Range    Color Yellow/Straw      Appearance Clear Clear      Specific gravity 1.028 1.003 - 1.030      pH (UA) 5.0 5.0 - 8.0      Protein Negative Negative mg/dL    Glucose >300 (A) Negative mg/dL    Ketone Negative Negative mg/dL    Bilirubin Negative Negative      Blood Small (A) Negative      Urobilinogen 0.1 0.1 - 1.0 EU/dL    Nitrites Negative Negative      Leukocyte Esterase Negative Negative      WBC 0-4 0 - 4 /hpf    RBC 0-5 0 - 5 /hpf    Bacteria Negative Negative /hpf   URINE MICROSCOPIC    Collection Time: 03/29/22 10:48 AM   Result Value Ref Range    WBC 0-4 0 - 4 /hpf    RBC 0-5 0 - 5 /hpf    Bacteria Negative Negative /hpf    Mucus Negative Negative /lpf   CBC WITH AUTOMATED DIFF    Collection Time: 03/29/22 11:02 AM   Result Value Ref Range    WBC 6.1 4.1 - 11.1 K/uL    RBC 5.80 (H) 4.10 - 5.70 M/uL    HGB 15.7 12.1 - 17.0 g/dL    HCT 48.8 36.6 - 50.3 %    MCV 84.1 80.0 - 99.0 FL    MCH 27.1 26.0 - 34.0 PG    MCHC 32.2 30.0 - 36.5 g/dL    RDW 21.6 (H) 11.5 - 14.5 %    PLATELET 889 897 - 207 K/uL    MPV 10.5 8.9 - 12.9 FL    NRBC 0.0 0.0  WBC    ABSOLUTE NRBC 0.00 0.00 - 0.01 K/uL    NEUTROPHILS 69 32 - 75 %    LYMPHOCYTES 18 12 - 49 %    MONOCYTES 6 5 - 13 %    EOSINOPHILS 5 0 - 7 %    BASOPHILS 1 0 - 1 %    IMMATURE GRANULOCYTES 1 (H) 0 - 0.5 %    ABS. NEUTROPHILS 4.2 1.8 - 8.0 K/UL    ABS. LYMPHOCYTES 1.1 0.8 - 3.5 K/UL    ABS. MONOCYTES 0.4 0.0 - 1.0 K/UL    ABS. EOSINOPHILS 0.3 0.0 - 0.4 K/UL    ABS. BASOPHILS 0.1 0.0 - 0.1 K/UL    ABS. IMM. GRANS. 0.0 0.00 - 0.04 K/UL    DF AUTOMATED     METABOLIC PANEL, COMPREHENSIVE    Collection Time: 03/29/22 11:02 AM   Result Value Ref Range    Sodium 131 (L) 136 - 145 mmol/L    Potassium 5.2 (H) 3.5 - 5.1 mmol/L    Chloride 99 97 - 108 mmol/L    CO2 25 21 - 32 mmol/L    Anion gap 7 5 - 15 mmol/L    Glucose 487 (H) 65 - 100 mg/dL    BUN 24 (H) 6 - 20 mg/dL    Creatinine 1.71 (H) 0.70 - 1.30 mg/dL    BUN/Creatinine ratio 14 12 - 20      GFR est AA 52 (L) >60 ml/min/1.73m2    GFR est non-AA 43 (L) >60 ml/min/1.73m2    Calcium 9.4 8.5 - 10.1 mg/dL    Bilirubin, total 1.2 (H) 0.2 - 1.0 mg/dL    AST (SGOT) 29 15 - 37 U/L    ALT (SGPT) 35 12 - 78 U/L    Alk.  phosphatase 142 (H) 45 - 117 U/L    Protein, total 9.6 (H) 6.4 - 8.2 g/dL    Albumin 4.0 3.5 - 5.0 g/dL    Globulin 5.6 (H) 2.0 - 4.0 g/dL    A-G Ratio 0.7 (L) 1.1 - 2.2     NT-PRO BNP    Collection Time: 03/29/22 11:02 AM   Result Value Ref Range    NT pro-BNP 1,293 (H) <125 pg/mL   TROPONIN-HIGH SENSITIVITY    Collection Time: 03/29/22 11:02 AM   Result Value Ref Range    Troponin-High Sensitivity 530 (HH) 0 - 76 ng/L   GLUCOSE, POC    Collection Time: 03/29/22 11:05 AM   Result Value Ref Range    Glucose (POC) 499 (H) 65 - 117 mg/dL    Performed by Apoorva Diaz          Imaging:   XR CHEST PORT   Final Result Cardiomegaly with pulmonary vascular congestion. No acute process. Assessment:     NSTEMI    LVH with L. Atrial enlargement    Chronic systolic heart failure with EF 20%    CAD with severe small vessel disease (status post L. Heart cath 1/16/20)    Essential HTN    Chronic kidney disease stage III.   Baseline creatinine appears to be 1.6 at discharge from [facility] in January '22    Chronic Asthma    DM Type II, uncontrolled    Bipolar 1 disorder    Anxiety Disorder    Plan:   Admit to IP cardiac telemetry  Start:   Plavix 150mg PO  Heparin IV  Atorvastatin 80mg 1x daily PO  Metoprolol Succinate 50mg PO 1x daily  Isosorbide Dinitrate 20mg PO 1x daily  Sliding scale insulin    Consult nephrology for clearance for cardiac cath to be done tomorrow    Track troponin  CBC, lipid, PTT, chem panel    Repeat EKG      Signed By: Leighton Gomez MD     March 29, 2022

## 2022-03-29 NOTE — CONSULTS
Renal Consult Note    Admit Date: 3/29/2022      HPI:   19-year-old -American gentleman with history of diabetes mellitus who was apparently at [facility] and was diagnosed with ischemic cardiomyopathy with a left ventricular ejection fraction of 20%. In November 2020 he was found to have coronary artery disease with severe small vessel disease. He is known to have hypertension and diabetes. He presented to the emergency room with chest pain. His creatinine was 1.7 mg and urinalysis showed 300 mg of glucose. Potassium was 5. 2.        he has diabetic retinopathy and has undergone laser photocoagulation for proliferative retinopathy. He is also noted to have peripheral neuropathy. His appetite has been good. He denies dysgeusia or nausea vomiting. There is no history of nonsteroidal drug use. He admits to nocturia. He denies history of renal stones.     Current Facility-Administered Medications   Medication Dose Route Frequency    heparin 25,000 units in D5W 250 ml infusion  12-25 Units/kg/hr (Adjusted) IntraVENous TITRATE    heparin (porcine) 1,000 unit/mL injection 5,300 Units  80 Units/kg (Adjusted) IntraVENous PRN    Or    heparin (porcine) 1,000 unit/mL injection 2,650 Units  40 Units/kg (Adjusted) IntraVENous PRN    [START ON 3/30/2022] atorvastatin (LIPITOR) tablet 80 mg  80 mg Oral DAILY    [START ON 3/30/2022] metoprolol succinate (TOPROL-XL) XL tablet 50 mg  50 mg Oral DAILY    isosorbide dinitrate (ISORDIL) tablet 20 mg  20 mg Oral BID    nitroglycerin (NITROSTAT) tablet 0.4 mg  0.4 mg SubLINGual PRN    sodium chloride (NS) flush 5-40 mL  5-40 mL IntraVENous Q8H    sodium chloride (NS) flush 5-40 mL  5-40 mL IntraVENous PRN    insulin lispro (HUMALOG) injection 7 Units  0.1 Units/kg SubCUTAneous TID WITH MEALS    insulin lispro (HUMALOG) injection   SubCUTAneous AC&HS    glucose chewable tablet 16 g  4 Tablet Oral PRN    dextrose 10% infusion 0-250 mL  0-250 mL IntraVENous PRN    glucagon (GLUCAGEN) injection 1 mg  1 mg IntraMUSCular PRN        Past Medical History:   Diagnosis Date    Anxiety disorder     Asthma     [facility]     CAD (coronary artery disease)     MI x2 with 2 cardiac stent    CHF (congestive heart failure) ([facility]     with reduced EF, EF 20%    Chronic systolic congestive heart failure ([facility] 12/2/2020    Depression     Diabetes mellitus, type 2 (HCC)     on insulin. moderate proteinuria    Hypertension     Mood disorder ([facility]     Psychotic disorder ([facility]     Schizoaffective disorder, bipolar type ([facility]     Sleep disorder     Suicidal thoughts       Past Surgical History:   Procedure Laterality Date    HX CORONARY STENT PLACEMENT      HX IMPLANTABLE CARDIOVERTER DEFIBRILLATOR       Family History   Problem Relation Age of Onset    Heart Attack Mother     Hypertension Mother     Diabetes Mother     Heart Attack Father     Hypertension Father     Diabetes Father     Depression Neg Hx     Suicide Neg Hx     Psychotic Disorder Neg Hx     Dementia Neg Hx     Substance Abuse Neg Hx       Social History     Tobacco Use    Smoking status: Former Smoker     Packs/day: 1.00     Types: Cigarettes    Smokeless tobacco: Never Used   Substance Use Topics    Alcohol use: Not Currently     Comment: occasionally         Review of Systems    Review of Systems   Constitutional: Negative for fever. Respiratory: Negative for cough and shortness of breath. Cardiovascular: Positive for chest pain. Negative for orthopnea. Gastrointestinal: Negative for abdominal pain and nausea. Neurological: Negative for headaches. Physical Exam:     Physical Exam  Neck:      Vascular: No carotid bruit. Cardiovascular:      Rate and Rhythm: Regular rhythm. Pulmonary:      Breath sounds: Normal breath sounds. Abdominal:      General: Bowel sounds are normal.      Palpations: Abdomen is soft.    Musculoskeletal:         General: Swelling present. Skin:     General: Skin is warm. Neurological:      Mental Status: He is alert. 1+ edema in the lower extremities  No asterixis      Patient Vitals for the past 8 hrs:   BP Temp Pulse Resp SpO2 Height Weight   03/29/22 1538 116/80 98.2 °F (36.8 °C) 63 18 98 %     03/29/22 1528 117/87  84 16 100 %     03/29/22 1236 (!) 123/93  80 15 100 %     03/29/22 1039 (!) 120/90 97.6 °F (36.4 °C) 83 16 100 % 5' 5\" (1.651 m) 73.5 kg (162 lb)     No intake/output data recorded. No intake/output data recorded. XR CHEST PORT   Final Result   Cardiomegaly with pulmonary vascular congestion. No acute process. Data Review   Recent Labs     03/29/22  1102   WBC 6.1   HGB 15.7   HCT 48.8        Recent Labs     03/29/22  1102   *   K 5.2*   CL 99   CO2 25   *   BUN 24*   CREA 1.71*   CA 9.4   ALB 4.0   ALT 35     No components found for: GLPOC  No results for input(s): PH, PCO2, PO2, HCO3, FIO2 in the last 72 hours. No results for input(s): INR, INREXT, INREXT in the last 72 hours. Assessment:           Active Problems:    NSTEMI (non-ST elevated myocardial infarction) ([facility] (12/27/2021)    Chronic kidney disease stage III A secondary to diabetic nephropathy with contribution from hypertension. Diabetes mellitus complicated by retinopathy and nephropathy    Hypertension under optimal control    Ischemic cardiomyopathy with a left ventricular ejection fraction of 20%    History of bipolar disorder    Modest hyperkalemia    Hyponatremia    Plan:   Hold ACE inhibition until potassium is better  If a cardiac catheterization is planned for tomorrow, would not give IV fluids. Would suggest minimizing dye load to the least possible volume. Follow renal function post contrast  Control of diabetes will be of paramount importance in this gentleman. He could benefit from the use of BiDil.   Defer to cardiology  Thank you for this consult

## 2022-03-30 LAB
ANION GAP SERPL CALC-SCNC: 6 MMOL/L (ref 5–15)
APTT PPP: 54.1 SEC (ref 21.2–34.1)
BUN SERPL-MCNC: 29 MG/DL (ref 6–20)
BUN/CREAT SERPL: 18 (ref 12–20)
CA-I BLD-MCNC: 9 MG/DL (ref 8.5–10.1)
CHLORIDE SERPL-SCNC: 105 MMOL/L (ref 97–108)
CO2 SERPL-SCNC: 25 MMOL/L (ref 21–32)
CREAT SERPL-MCNC: 1.64 MG/DL (ref 0.7–1.3)
EST. AVERAGE GLUCOSE BLD GHB EST-MCNC: 309 MG/DL
GLUCOSE BLD STRIP.AUTO-MCNC: 294 MG/DL (ref 65–117)
GLUCOSE BLD STRIP.AUTO-MCNC: 395 MG/DL (ref 65–117)
GLUCOSE BLD STRIP.AUTO-MCNC: 454 MG/DL (ref 65–117)
GLUCOSE BLD STRIP.AUTO-MCNC: 486 MG/DL (ref 65–117)
GLUCOSE SERPL-MCNC: 426 MG/DL (ref 65–100)
HBA1C MFR BLD: 12.4 % (ref 4–5.6)
PERFORMED BY, TECHID: ABNORMAL
POTASSIUM SERPL-SCNC: 4.5 MMOL/L (ref 3.5–5.1)
SODIUM SERPL-SCNC: 136 MMOL/L (ref 136–145)
THERAPEUTIC RANGE,PTTT: ABNORMAL SEC (ref 82–109)
TROPONIN-HIGH SENSITIVITY: 395 NG/L (ref 0–76)

## 2022-03-30 PROCEDURE — 74011250637 HC RX REV CODE- 250/637: Performed by: INTERNAL MEDICINE

## 2022-03-30 PROCEDURE — 65270000029 HC RM PRIVATE

## 2022-03-30 PROCEDURE — 74011636637 HC RX REV CODE- 636/637: Performed by: INTERNAL MEDICINE

## 2022-03-30 PROCEDURE — 84484 ASSAY OF TROPONIN QUANT: CPT

## 2022-03-30 PROCEDURE — 85730 THROMBOPLASTIN TIME PARTIAL: CPT

## 2022-03-30 PROCEDURE — 74011250636 HC RX REV CODE- 250/636: Performed by: INTERNAL MEDICINE

## 2022-03-30 PROCEDURE — 82962 GLUCOSE BLOOD TEST: CPT

## 2022-03-30 PROCEDURE — 74011000250 HC RX REV CODE- 250: Performed by: INTERNAL MEDICINE

## 2022-03-30 PROCEDURE — 74011250636 HC RX REV CODE- 250/636: Performed by: EMERGENCY MEDICINE

## 2022-03-30 PROCEDURE — 80048 BASIC METABOLIC PNL TOTAL CA: CPT

## 2022-03-30 PROCEDURE — 36415 COLL VENOUS BLD VENIPUNCTURE: CPT

## 2022-03-30 RX ORDER — HEPARIN SODIUM 1000 [USP'U]/ML
4000 INJECTION, SOLUTION INTRAVENOUS; SUBCUTANEOUS AS NEEDED
Status: DISCONTINUED | OUTPATIENT
Start: 2022-03-30 | End: 2022-03-30

## 2022-03-30 RX ORDER — HEPARIN SODIUM 1000 [USP'U]/ML
2000 INJECTION, SOLUTION INTRAVENOUS; SUBCUTANEOUS AS NEEDED
Status: DISCONTINUED | OUTPATIENT
Start: 2022-03-30 | End: 2022-03-30

## 2022-03-30 RX ORDER — ISOSORBIDE MONONITRATE 30 MG/1
30 TABLET, EXTENDED RELEASE ORAL DAILY
Status: DISCONTINUED | OUTPATIENT
Start: 2022-03-30 | End: 2022-03-31 | Stop reason: HOSPADM

## 2022-03-30 RX ADMIN — SODIUM CHLORIDE, PRESERVATIVE FREE 10 ML: 5 INJECTION INTRAVENOUS at 14:00

## 2022-03-30 RX ADMIN — METOPROLOL SUCCINATE 50 MG: 50 TABLET, FILM COATED, EXTENDED RELEASE ORAL at 08:37

## 2022-03-30 RX ADMIN — INSULIN LISPRO 5 UNITS: 100 INJECTION, SOLUTION INTRAVENOUS; SUBCUTANEOUS at 17:06

## 2022-03-30 RX ADMIN — ISOSORBIDE MONONITRATE 30 MG: 30 TABLET, EXTENDED RELEASE ORAL at 12:33

## 2022-03-30 RX ADMIN — Medication 16 UNITS/KG/HR: at 08:17

## 2022-03-30 RX ADMIN — ISOSORBIDE DINITRATE 20 MG: 20 TABLET ORAL at 08:37

## 2022-03-30 RX ADMIN — HEPARIN SODIUM 2000 UNITS: 1000 INJECTION, SOLUTION INTRAVENOUS; SUBCUTANEOUS at 08:16

## 2022-03-30 RX ADMIN — INSULIN LISPRO 7 UNITS: 100 INJECTION, SOLUTION INTRAVENOUS; SUBCUTANEOUS at 12:33

## 2022-03-30 RX ADMIN — SODIUM CHLORIDE, PRESERVATIVE FREE 10 ML: 5 INJECTION INTRAVENOUS at 23:27

## 2022-03-30 RX ADMIN — ATORVASTATIN CALCIUM 80 MG: 40 TABLET, FILM COATED ORAL at 08:37

## 2022-03-30 RX ADMIN — INSULIN LISPRO 7 UNITS: 100 INJECTION, SOLUTION INTRAVENOUS; SUBCUTANEOUS at 09:26

## 2022-03-30 RX ADMIN — INSULIN LISPRO 7 UNITS: 100 INJECTION, SOLUTION INTRAVENOUS; SUBCUTANEOUS at 17:07

## 2022-03-30 RX ADMIN — INSULIN LISPRO 8 UNITS: 100 INJECTION, SOLUTION INTRAVENOUS; SUBCUTANEOUS at 23:27

## 2022-03-30 RX ADMIN — INSULIN LISPRO 8 UNITS: 100 INJECTION, SOLUTION INTRAVENOUS; SUBCUTANEOUS at 12:33

## 2022-03-30 NOTE — PROGRESS NOTES
Chart reviewed, DCP remains for the patient to d/c to home self once medically stable. CM continues to follow and monitor for needs.

## 2022-03-30 NOTE — PROGRESS NOTES
Renal Daily Progress Note    Admit Date: 3/29/2022      Subjective:   He is lying supine in bed with no complaints of shortness of breath. He has twinges of left-sided chest pain. He is being followed by cardiology. Cardiac catheterization not being planned for for now. Current Facility-Administered Medications   Medication Dose Route Frequency    isosorbide mononitrate ER (IMDUR) tablet 30 mg  30 mg Oral DAILY    atorvastatin (LIPITOR) tablet 80 mg  80 mg Oral DAILY    metoprolol succinate (TOPROL-XL) XL tablet 50 mg  50 mg Oral DAILY    nitroglycerin (NITROSTAT) tablet 0.4 mg  0.4 mg SubLINGual PRN    sodium chloride (NS) flush 5-40 mL  5-40 mL IntraVENous Q8H    sodium chloride (NS) flush 5-40 mL  5-40 mL IntraVENous PRN    insulin lispro (HUMALOG) injection 7 Units  0.1 Units/kg SubCUTAneous TID WITH MEALS    insulin lispro (HUMALOG) injection   SubCUTAneous AC&HS    glucose chewable tablet 16 g  4 Tablet Oral PRN    dextrose 10% infusion 0-250 mL  0-250 mL IntraVENous PRN    glucagon (GLUCAGEN) injection 1 mg  1 mg IntraMUSCular PRN        Review of Systems    Review of Systems   Respiratory: Negative for cough and shortness of breath. Cardiovascular: Positive for chest pain. Negative for palpitations. Gastrointestinal: Negative for abdominal pain and nausea. Neurological: Negative for headaches. Objective:     Patient Vitals for the past 8 hrs:   BP Temp Pulse Resp SpO2   03/30/22 1500 (!) 102/58 98 °F (36.7 °C) 87 20 100 %   03/30/22 1200   89     03/30/22 1125 (!) 130/98 97.4 °F (36.3 °C) 89 18 100 %   03/30/22 0831 111/80 97.8 °F (36.6 °C) 89 20 98 %   03/30/22 0800   89       No intake/output data recorded. No intake/output data recorded. Physical Exam:   Physical Exam  Cardiovascular:      Rate and Rhythm: Regular rhythm. Pulmonary:      Breath sounds: Normal breath sounds.    Abdominal:      General: Bowel sounds are normal.      Palpations: Abdomen is soft.   Musculoskeletal:         General: No swelling. Skin:     General: Skin is warm. Neurological:      Mental Status: He is alert. XR CHEST PORT   Final Result   Cardiomegaly with pulmonary vascular congestion. No acute process. Data Review   Recent Labs     03/29/22  1102   WBC 6.1   HGB 15.7   HCT 48.8        Recent Labs     03/30/22  1002 03/29/22  1520 03/29/22  1102    133* 131*   K 4.5 5.2* 5.2*    103 99   CO2 25 26 25   * 281* 487*   BUN 29* 23* 24*   CREA 1.64* 1.67* 1.71*   CA 9.0 8.9 9.4   ALB  --   --  4.0   ALT  --   --  35     No components found for: GLPOC  No results for input(s): PH, PCO2, PO2, HCO3, FIO2 in the last 72 hours. No results for input(s): INR, INREXT, INREXT in the last 72 hours. Assessment:           Active Problems:    NSTEMI (non-ST elevated myocardial infarction) ([facility] [address] (12/27/2021)    Chronic kidney disease stage III A secondary to diabetes and hypertension. Renal function is stable. Diabetes mellitus uncontrolled    Hypertension    Ischemic cardiomyopathy with a left ventricular ejection fraction of 20%    Bipolar disorder    Coronary artery disease  Plan:   Creatinine is at baseline. His potassium is better today. Could start losartan 25 mg a day if needed. Currently his blood pressures are under good control.

## 2022-03-30 NOTE — PROGRESS NOTES
Patient's blood sugar 486. Dr. Rashad Brunre notified and orders received to give patient an additional 8 units along with the scheduled 7. Dr. Sony Simeon wants patients AICD interrogated. RN attempted, however, he has the one that requires a representative to come and perform. Representative called and stated they can come out later today.  Dr. Sony Simeon aware.     1300: heparin drip discontinued per Dr. Rashad Bruner

## 2022-03-30 NOTE — PROGRESS NOTES
Hospitalist Progress Note               Daily Progress Note: 3/30/2022      Subjective:   Hospitalization:  Patient is a 44-year-old male with a PMH of CHF status post ICD with EF 20%, bipolar disease, DM, CAD with severe small vessel disease, status post heart cath 1/16/2020, essential HTN, and asthma presents today for chest pain. Patient states he woke up at 6am this morning with severe right arm pain that turned into chest pain. He states this pain is equivalent to the pain he has had with his past MI. He has been smoking for 30+ years and smokes 1 ppd. Patient came here by EMS after he went to the doctor's office. He denies any nausea, vomiting, dizziness, loss of consciousness, ringing in ears, blurry vision. Patient is allergic to acetaminophen and asprin. EKG showed LVH with L. Atrial enlargement, no acute ischemia, troponin 530, proBNP 1293, hyperkalemia 5.2, Cr 1.71, and glucose 487. TTE 1/15/22 showed 4-chamber cardiomegaly with EF 20%. Subjective  Patient denied any chest pain, SOB, dyspnea on exertion, dizziness, nausea, or vomiting this morning. He states that he feels much better. He is still on his heparin drip. Patient was seen by nephrology yesterday in concern to his Cr 1.71. They stated that if cardiac cath is to be planned, IV fluids should be halted. Cardiac cath was planned to be today. Renal function needs to be followed post-contrast.   Troponin is tracking down (395 today).     Problem List:  Problem List as of 3/30/2022 Date Reviewed: 1/9/2022          Codes Class Noted - Resolved    RESOLVED: Suicidal ideations ICD-10-CM: R45.851  ICD-9-CM: V62.84 Acute 4/10/2012 - 5/23/2014        Acute on chronic combined systolic and diastolic ACC/AHA stage C congestive heart failure (HCC) ICD-10-CM: I50.43  ICD-9-CM: 428.43, 428.0  2/2/2022 - Present        NSTEMI (non-ST elevated myocardial infarction) ([facility] [address] ICD-10-CM: I21.4  ICD-9-CM: 410.70  12/27/2021 - Present        Acute HFrEF (heart failure with reduced ejection fraction) ([facility] ICD-10-CM: I50.21  ICD-9-CM: 428.21  8/8/2021 - Present        ACS (acute coronary syndrome) (HCC) ICD-10-CM: I24.9  ICD-9-CM: 411.1  5/7/2021 - Present        CAD (coronary artery disease) ICD-10-CM: I25.10  ICD-9-CM: 414.00  Unknown - Present    Overview Signed 5/6/2021  4:46 PM by Shine Green DO     MI x2 with 2 cardiac stent             Chest pain ICD-10-CM: R07.9  ICD-9-CM: 786.50  5/6/2021 - Present        Major depression ICD-10-CM: F32.9  ICD-9-CM: 296.20  3/8/2021 - Present        Suicide ([facility] ICD-10-CM: N44. 8XXA  ICD-9-CM: E958.9  3/8/2021 - Present        Chronic systolic congestive heart failure (HCC) ICD-10-CM: I50.22  ICD-9-CM: 428.22, 428.0  12/2/2020 - Present        History of CVA (cerebrovascular accident) ICD-10-CM: Z86.73  ICD-9-CM: V12.54  12/2/2020 - Present        Type 2 diabetes with nephropathy ([facility] ICD-10-CM: E11.21  ICD-9-CM: 250.40, 583.81  2/28/2020 - Present        Adjustment disorder with depressed mood ICD-10-CM: F43.21  ICD-9-CM: 309.0  4/28/2012 - Present        Bipolar 1 disorder ([facility] ICD-10-CM: F31.9  ICD-9-CM: 296.7  4/10/2012 - Present        Borderline personality disorder ([facility] ICD-10-CM: F60.3  ICD-9-CM: 301.83  4/10/2012 - Present        Suicidal behavior ICD-10-CM: R45.89  ICD-9-CM: V62.89  3/24/2012 - Present        Emotional lability ICD-10-CM: R45.86  ICD-9-CM: 799.24  3/24/2012 - Present        Diabetes mellitus ([facility] [address] ICD-10-CM: E11.9  ICD-9-CM: 250.00  3/5/2012 - Present        HTN (hypertension) ICD-10-CM: I10  ICD-9-CM: 401.9  3/5/2012 - Present        Unspecified asthma, with exacerbation ICD-10-CM: J45.901  ICD-9-CM: 493.92  3/5/2012 - Present    Overview Signed 3/5/2012  9:53 PM by Cloteal Needs     POA no exacerbation.              RESOLVED: Schizoaffective disorder, bipolar type [facility]) ICD-10-CM: F25.0  ICD-9-CM: 295.70  4/10/2012 - 4/10/2012              Medications reviewed  Current Facility-Administered Medications   Medication Dose Route Frequency    heparin (porcine) 1,000 unit/mL injection 4,000 Units  4,000 Units IntraVENous PRN    Or    heparin (porcine) 1,000 unit/mL injection 2,000 Units  2,000 Units IntraVENous PRN    heparin 25,000 units in D5W 250 ml infusion  12-25 Units/kg/hr (Adjusted) IntraVENous TITRATE    atorvastatin (LIPITOR) tablet 80 mg  80 mg Oral DAILY    metoprolol succinate (TOPROL-XL) XL tablet 50 mg  50 mg Oral DAILY    isosorbide dinitrate (ISORDIL) tablet 20 mg  20 mg Oral BID    nitroglycerin (NITROSTAT) tablet 0.4 mg  0.4 mg SubLINGual PRN    sodium chloride (NS) flush 5-40 mL  5-40 mL IntraVENous Q8H    sodium chloride (NS) flush 5-40 mL  5-40 mL IntraVENous PRN    insulin lispro (HUMALOG) injection 7 Units  0.1 Units/kg SubCUTAneous TID WITH MEALS    insulin lispro (HUMALOG) injection   SubCUTAneous AC&HS    glucose chewable tablet 16 g  4 Tablet Oral PRN    dextrose 10% infusion 0-250 mL  0-250 mL IntraVENous PRN    glucagon (GLUCAGEN) injection 1 mg  1 mg IntraMUSCular PRN       Review of Systems:   Constitutional: negative  Eyes: negative  Respiratory: positive for dyspnea on exertion  Cardiovascular: positive for chest pain, chest pressure/discomfort, dyspnea  Gastrointestinal: negative  Genitourinary:negative  Hematologic/lymphatic: negative  Musculoskeletal:negative    Objective:   Physical Exam:     Visit Vitals  /73   Pulse 91   Temp 97.4 °F (36.3 °C)   Resp 16   Ht 5' 5\" (1.651 m)   Wt 162 lb (73.5 kg)   SpO2 97%   BMI 26.96 kg/m²      O2 Device: None (Room air)    Temp (24hrs), Av.8 °F (36.6 °C), Min:97.4 °F (36.3 °C), Max:98.2 °F (36.8 °C)    No intake/output data recorded. No intake/output data recorded. General:   Awake and alert   Lungs:   Clear to auscultation bilaterally. Chest wall:  No tenderness or deformity. Heart:  Regular rate and rhythm, S1, S2 normal, no murmur, click, rub or gallop.    Abdomen:   Soft, non-tender. Bowel sounds normal. No masses,  No organomegaly. Extremities: Extremities normal, atraumatic, no cyanosis or edema. Pulses: 2+ and symmetric all extremities. Skin: Skin color, texture, turgor normal. No rashes or lesions   Neurologic: CNII-XII intact. No gross focal deficits         Data Review:       Recent Days:  Recent Labs     03/29/22  1102   WBC 6.1   HGB 15.7   HCT 48.8        Recent Labs     03/29/22  1520 03/29/22  1102   * 131*   K 5.2* 5.2*    99   CO2 26 25   * 487*   BUN 23* 24*   CREA 1.67* 1.71*   CA 8.9 9.4   ALB  --  4.0   TBILI  --  1.2*   ALT  --  35     No results for input(s): PH, PCO2, PO2, HCO3, FIO2 in the last 72 hours.     24 Hour Results:  Recent Results (from the past 24 hour(s))   EKG, 12 LEAD, INITIAL    Collection Time: 03/29/22 10:36 AM   Result Value Ref Range    Ventricular Rate 84 BPM    Atrial Rate 84 BPM    P-R Interval 168 ms    QRS Duration 86 ms    Q-T Interval 408 ms    QTC Calculation (Bezet) 482 ms    Calculated P Axis 61 degrees    Calculated R Axis 21 degrees    Calculated T Axis 73 degrees    Diagnosis       Sinus rhythm with occasional Premature ventricular complexes  Possible Left atrial enlargement  Prolonged QT  Abnormal ECG  When compared with ECG of 20-APR-2019 18:43,  No significant change was found  Confirmed by Zia Panchal MD, [place] ([id number]) on 3/29/2022 3:37:31 PM     URINALYSIS W/ RFLX MICROSCOPIC    Collection Time: 03/29/22 10:48 AM   Result Value Ref Range    Color Yellow/Straw      Appearance Clear Clear      Specific gravity 1.028 1.003 - 1.030      pH (UA) 5.0 5.0 - 8.0      Protein Negative Negative mg/dL    Glucose >300 (A) Negative mg/dL    Ketone Negative Negative mg/dL    Bilirubin Negative Negative      Blood Small (A) Negative      Urobilinogen 0.1 0.1 - 1.0 EU/dL    Nitrites Negative Negative      Leukocyte Esterase Negative Negative      WBC 0-4 0 - 4 /hpf    RBC 0-5 0 - 5 /hpf    Bacteria Negative Negative /hpf   URINE MICROSCOPIC    Collection Time: 03/29/22 10:48 AM   Result Value Ref Range    WBC 0-4 0 - 4 /hpf    RBC 0-5 0 - 5 /hpf    Bacteria Negative Negative /hpf    Mucus Negative Negative /lpf   CBC WITH AUTOMATED DIFF    Collection Time: 03/29/22 11:02 AM   Result Value Ref Range    WBC 6.1 4.1 - 11.1 K/uL    RBC 5.80 (H) 4.10 - 5.70 M/uL    HGB 15.7 12.1 - 17.0 g/dL    HCT 48.8 36.6 - 50.3 %    MCV 84.1 80.0 - 99.0 FL    MCH 27.1 26.0 - 34.0 PG    MCHC 32.2 30.0 - 36.5 g/dL    RDW 21.6 (H) 11.5 - 14.5 %    PLATELET 902 651 - 276 K/uL    MPV 10.5 8.9 - 12.9 FL    NRBC 0.0 0.0  WBC    ABSOLUTE NRBC 0.00 0.00 - 0.01 K/uL    NEUTROPHILS 69 32 - 75 %    LYMPHOCYTES 18 12 - 49 %    MONOCYTES 6 5 - 13 %    EOSINOPHILS 5 0 - 7 %    BASOPHILS 1 0 - 1 %    IMMATURE GRANULOCYTES 1 (H) 0 - 0.5 %    ABS. NEUTROPHILS 4.2 1.8 - 8.0 K/UL    ABS. LYMPHOCYTES 1.1 0.8 - 3.5 K/UL    ABS. MONOCYTES 0.4 0.0 - 1.0 K/UL    ABS. EOSINOPHILS 0.3 0.0 - 0.4 K/UL    ABS. BASOPHILS 0.1 0.0 - 0.1 K/UL    ABS. IMM. GRANS. 0.0 0.00 - 0.04 K/UL    DF AUTOMATED     METABOLIC PANEL, COMPREHENSIVE    Collection Time: 03/29/22 11:02 AM   Result Value Ref Range    Sodium 131 (L) 136 - 145 mmol/L    Potassium 5.2 (H) 3.5 - 5.1 mmol/L    Chloride 99 97 - 108 mmol/L    CO2 25 21 - 32 mmol/L    Anion gap 7 5 - 15 mmol/L    Glucose 487 (H) 65 - 100 mg/dL    BUN 24 (H) 6 - 20 mg/dL    Creatinine 1.71 (H) 0.70 - 1.30 mg/dL    BUN/Creatinine ratio 14 12 - 20      GFR est AA 52 (L) >60 ml/min/1.73m2    GFR est non-AA 43 (L) >60 ml/min/1.73m2    Calcium 9.4 8.5 - 10.1 mg/dL    Bilirubin, total 1.2 (H) 0.2 - 1.0 mg/dL    AST (SGOT) 29 15 - 37 U/L    ALT (SGPT) 35 12 - 78 U/L    Alk.  phosphatase 142 (H) 45 - 117 U/L    Protein, total 9.6 (H) 6.4 - 8.2 g/dL    Albumin 4.0 3.5 - 5.0 g/dL    Globulin 5.6 (H) 2.0 - 4.0 g/dL    A-G Ratio 0.7 (L) 1.1 - 2.2     NT-PRO BNP    Collection Time: 03/29/22 11:02 AM   Result Value Ref Range    NT pro-BNP 1,293 (H) <125 pg/mL   TROPONIN-HIGH SENSITIVITY    Collection Time: 03/29/22 11:02 AM   Result Value Ref Range    Troponin-High Sensitivity 530 (HH) 0 - 76 ng/L   BETA-HYDROXYBUTYRATE    Collection Time: 03/29/22 11:02 AM   Result Value Ref Range    B-hydroxybutyrate 0.12 <0.40 mmol/L   GLUCOSE, POC    Collection Time: 03/29/22 11:05 AM   Result Value Ref Range    Glucose (POC) 499 (H) 65 - 117 mg/dL    [facility]    PTT    Collection Time: 03/29/22 12:27 PM   Result Value Ref Range    aPTT 28.4 21.2 - 34.1 sec    aPTT, therapeutic range   82 - 109 sec   GLUCOSE, POC    Collection Time: 03/29/22  2:25 PM   Result Value Ref Range    Glucose (POC) 306 (H) 65 - 117 mg/dL    Performed by Massiel [facility]    METABOLIC PANEL, BASIC    Collection Time: 03/29/22  3:20 PM   Result Value Ref Range    Sodium 133 (L) 136 - 145 mmol/L    Potassium 5.2 (H) 3.5 - 5.1 mmol/L    Chloride 103 97 - 108 mmol/L    CO2 26 21 - 32 mmol/L    Anion gap 4 (L) 5 - 15 mmol/L    Glucose 281 (H) 65 - 100 mg/dL    BUN 23 (H) 6 - 20 mg/dL    Creatinine 1.67 (H) 0.70 - 1.30 mg/dL    BUN/Creatinine ratio 14 12 - 20      GFR est AA 53 (L) >60 ml/min/1.73m2    GFR est non-AA 44 (L) >60 ml/min/1.73m2    Calcium 8.9 8.5 - 10.1 mg/dL   TROPONIN-HIGH SENSITIVITY    Collection Time: 03/29/22  3:52 PM   Result Value Ref Range    Troponin-High Sensitivity 443 (HH) 0 - 76 ng/L   GLUCOSE, POC    Collection Time: 03/29/22  4:19 PM   Result Value Ref Range    Glucose (POC) 236 (H) 65 - 117 mg/dL    Performed by Lloyd Jack    GLUCOSE, POC    Collection Time: 03/29/22  8:43 PM   Result Value Ref Range    Glucose (POC) 328 (H) 65 - 117 mg/dL    Performed by Rebecca Peterson    TROPONIN-HIGH SENSITIVITY    Collection Time: 03/29/22  9:02 PM   Result Value Ref Range    Troponin-High Sensitivity 433 (HH) 0 - 76 ng/L   PTT    Collection Time: 03/29/22  9:02 PM   Result Value Ref Range    aPTT 39.7 (H) 21.2 - 34.1 sec    aPTT, therapeutic range   82 - 109 sec   TROPONIN-HIGH SENSITIVITY    Collection Time: 03/30/22  4:17 AM   Result Value Ref Range    Troponin-High Sensitivity 395 (HH) 0 - 76 ng/L   PTT    Collection Time: 03/30/22  4:17 AM   Result Value Ref Range    aPTT 54.1 (H) 21.2 - 34.1 sec    aPTT, therapeutic range   82 - 109 sec       XR CHEST PORT   Final Result   Cardiomegaly with pulmonary vascular congestion. No acute process. Assessment:    NSTEMI     LVH with L. Atrial enlargement     Chronic systolic heart failure with EF 20%     CAD with severe small vessel disease (status post L. Heart cath 1/16/20)     Essential HTN     Chronic kidney disease stage III.   Baseline creatinine appears to be 1.6 at discharge from [address] [place] [address] in January '22     Chronic Asthma     DM Type II, uncontrolled     Bipolar 1 disorder     Anxiety Disorder    Plan:    Cardiac cath to be done today     Per nephrology, follow renal function closely post contrast. Also continue to hold ACE inhibitor until potassium improves    Serum chem panel to check for hyperkalemia again    Page Sames

## 2022-03-30 NOTE — PROGRESS NOTES
Progress Note      3/30/2022 10:20 AM  NAME: Sonia Torrez   MRN:  [id number]   Admit Diagnosis: NSTEMI (non-ST elevated myocardial infarction) [facility]) [I21.4]      Problem List:   Acute non-Q wave MI (trop+ 540)  [facility] 12/28/21->2VD, small RCA, NOT amenable to PCI  Ischemic cardiomyopathy with EF 20%  Chronic CHF  CKD  s/p ICD  Bipolar disorder  Tobacco use  Diabetes  CAD with severe small vessel disease, recent left heart cath 11/16/2020  Essential hypertension     Assessment/Plan:   Continue Plavix, aspirin allergy noted  I do not favor cardiac catheterization unless his symptoms become unresponsive to medication Rx  ICD interrogation  Discontinue heparin  Switch to long-acting isosorbide         []       High complexity decision making was performed in this patient at high risk for decompensation with multiple organ involvement. Subjective:     Sonia Torrez denies chest pain, dyspnea. Wants to eat! Discussed with RN events overnight. Review of Systems:   Negative except for as noted above. Objective:      Physical Exam:    Last 24hrs VS reviewed since prior progress note. Most recent are:    Visit Vitals  /80   Pulse 89   Temp 97.8 °F (36.6 °C)   Resp 20   Ht 5' 5\" (1.651 m)   Wt 73.5 kg (162 lb)   SpO2 98%   BMI 26.96 kg/m²     No intake or output data in the 24 hours ending 03/30/22 1020     General Appearance: Alert; no acute distress. Ears/Nose/Mouth/Throat: moist mucous membranes  Neck: Supple. Chest: Lungs clear to auscultation bilaterally. Cardiovascular: Regular rate and rhythm, S1S2 normal  Abdomen: Soft, non-tender, bowel sounds are active. Extremities: No edema bilaterally. Skin: Warm and dry. []         Post-cath site without hematoma, bruit, tenderness, or thrill. Distal pulses intact.     PMH/SH reviewed - no change compared to H&P    Telemetry: Sinus rhythm    EKG: Sinus rhythm with LVH    08/08/21    ECHO ADULT COMPLETE 08/09/2021 8/9/2021    Interpretation Summary  · LV: Calculated LVEF is 15%. Visually measured ejection fraction. Normal cavity size and wall thickness. Severely reduced systolic function. Severe hypokinesis of the basal anterior, basal anterolateral, basal anteroseptal, basal inferior, basal inferoseptal, basal inferolateral, mid anterior, mid anterolateral, mid anteroseptal, mid inferior, mid inferoseptal and mid inferolateral wall(s). · RV: Moderately reduced systolic function. · Pericardium: Trivial-to-small pericardial effusion adjacent to right atrium. Signed by: Bi Bruce MD on 8/9/2021  3:50 PM      []  No new EKG for review    Lab Data Personally Reviewed:    Recent Labs     03/29/22  1102   WBC 6.1   HGB 15.7   HCT 48.8        Recent Labs     03/30/22  0417 03/29/22  2102 03/29/22  1227   APTT 54.1* 39.7* 28.4      Recent Labs     03/29/22  1520 03/29/22  1102   * 131*   K 5.2* 5.2*    99   CO2 26 25   BUN 23* 24*   CREA 1.67* 1.71*   * 487*   CA 8.9 9.4     No results for input(s): CPK, CKNDX, TROIQ in the last 72 hours. No lab exists for component: CPKMB  Lab Results   Component Value Date/Time    Cholesterol, total 217 (H) 03/09/2021 01:31 PM    HDL Cholesterol 40 03/09/2021 01:31 PM    LDL, calculated 101.2 (H) 03/09/2021 01:31 PM    Triglyceride 379 (H) 03/09/2021 01:31 PM    CHOL/HDL Ratio 5.4 (H) 03/09/2021 01:31 PM       Recent Labs     03/29/22  1102   *   TP 9.6*   ALB 4.0   GLOB 5.6*     No results for input(s): PH, PCO2, PO2 in the last 72 hours.     Medications Personally Reviewed:    Current Facility-Administered Medications   Medication Dose Route Frequency    heparin (porcine) 1,000 unit/mL injection 4,000 Units  4,000 Units IntraVENous PRN    Or    heparin (porcine) 1,000 unit/mL injection 2,000 Units  2,000 Units IntraVENous PRN    heparin 25,000 units in D5W 250 ml infusion  12-25 Units/kg/hr (Adjusted) IntraVENous TITRATE    atorvastatin (LIPITOR) tablet 80 mg  80 mg Oral DAILY    metoprolol succinate (TOPROL-XL) XL tablet 50 mg  50 mg Oral DAILY    isosorbide dinitrate (ISORDIL) tablet 20 mg  20 mg Oral BID    nitroglycerin (NITROSTAT) tablet 0.4 mg  0.4 mg SubLINGual PRN    sodium chloride (NS) flush 5-40 mL  5-40 mL IntraVENous Q8H    sodium chloride (NS) flush 5-40 mL  5-40 mL IntraVENous PRN    insulin lispro (HUMALOG) injection 7 Units  0.1 Units/kg SubCUTAneous TID WITH MEALS    insulin lispro (HUMALOG) injection   SubCUTAneous AC&HS    glucose chewable tablet 16 g  4 Tablet Oral PRN    dextrose 10% infusion 0-250 mL  0-250 mL IntraVENous PRN    glucagon (GLUCAGEN) injection 1 mg  1 mg IntraMUSCular PRN         Lucy Blue MD

## 2022-03-30 NOTE — PROGRESS NOTES
Hospitalist Progress Note               Daily Progress Note: 3/30/2022      Subjective:   Hospitalization:  Patient is a 53-year-old male with a PMH of CHF status post ICD with EF 20%, bipolar disease, DM, CAD with severe small vessel disease, status post heart cath 1/16/2020, essential HTN, and asthma presents today for chest pain. Patient states he woke up at 6am this morning with severe right arm pain that turned into chest pain. He states this pain is equivalent to the pain he has had with his past MI. He has been smoking for 30+ years and smokes 1 ppd. Patient came here by EMS after he went to the doctor's office. He denies any nausea, vomiting, dizziness, loss of consciousness, ringing in ears, blurry vision. Patient is allergic to acetaminophen and asprin. EKG showed LVH with L. Atrial enlargement, no acute ischemia, troponin 530, proBNP 1293, hyperkalemia 5.2, Cr 1.71, and glucose 487. TTE 1/15/22 showed 4-chamber cardiomegaly with EF 20%. Subjective  Patient denied any chest pain, SOB, dyspnea on exertion, dizziness, nausea, or vomiting this morning. He states that he feels much better. He is still on his heparin drip. Patient was seen by nephrology yesterday in concern to his Cr 1.71. They stated that if cardiac cath is to be planned, IV fluids should be halted. Cardiac cath was planned to be today. Renal function needs to be followed post-contrast.   Troponin is tracking down (395 today).     Problem List:  Problem List as of 3/30/2022 Date Reviewed: 1/9/2022          Codes Class Noted - Resolved    RESOLVED: Suicidal ideations ICD-10-CM: R45.851  ICD-9-CM: V62.84 Acute 4/10/2012 - 5/23/2014        Acute on chronic combined systolic and diastolic ACC/AHA stage C congestive heart failure (HCC) ICD-10-CM: I50.43  ICD-9-CM: 428.43, 428.0  2/2/2022 - Present        NSTEMI (non-ST elevated myocardial infarction) ([facility] [facility] ICD-10-CM: I21.4  ICD-9-CM: 410.70  12/27/2021 - Present        Acute HFrEF (heart failure with reduced ejection fraction) ([facility] ICD-10-CM: I50.21  ICD-9-CM: 428.21  8/8/2021 - Present        ACS (acute coronary syndrome) (HCC) ICD-10-CM: I24.9  ICD-9-CM: 411.1  5/7/2021 - Present        CAD (coronary artery disease) ICD-10-CM: I25.10  ICD-9-CM: 414.00  Unknown - Present    Overview Signed 5/6/2021  4:46 PM by Guanaco Reynolds DO     MI x2 with 2 cardiac stent             Chest pain ICD-10-CM: R07.9  ICD-9-CM: 786.50  5/6/2021 - Present        Major depression ICD-10-CM: F32.9  ICD-9-CM: 296.20  3/8/2021 - Present        Suicide ([facility] ICD-10-CM: V46. 8XXA  ICD-9-CM: E958.9  3/8/2021 - Present        Chronic systolic congestive heart failure (HCC) ICD-10-CM: I50.22  ICD-9-CM: 428.22, 428.0  12/2/2020 - Present        History of CVA (cerebrovascular accident) ICD-10-CM: Z86.73  ICD-9-CM: V12.54  12/2/2020 - Present        Type 2 diabetes with nephropathy ([facility] ICD-10-CM: E11.21  ICD-9-CM: 250.40, 583.81  2/28/2020 - Present        Adjustment disorder with depressed mood ICD-10-CM: F43.21  ICD-9-CM: 309.0  4/28/2012 - Present        Bipolar 1 disorder ([facility] ICD-10-CM: F31.9  ICD-9-CM: 296.7  4/10/2012 - Present        Borderline personality disorder ([facility] ICD-10-CM: F60.3  ICD-9-CM: 301.83  4/10/2012 - Present        Suicidal behavior ICD-10-CM: R45.89  ICD-9-CM: V62.89  3/24/2012 - Present        Emotional lability ICD-10-CM: R45.86  ICD-9-CM: 799.24  3/24/2012 - Present        Diabetes mellitus ([facility] [address] ICD-10-CM: E11.9  ICD-9-CM: 250.00  3/5/2012 - Present        HTN (hypertension) ICD-10-CM: I10  ICD-9-CM: 401.9  3/5/2012 - Present        Unspecified asthma, with exacerbation ICD-10-CM: J45.901  ICD-9-CM: 493.92  3/5/2012 - Present    Overview Signed 3/5/2012  9:53 PM by Lubna COLLAZO no exacerbation.              RESOLVED: Schizoaffective disorder, bipolar type [facility]) ICD-10-CM: F25.0  ICD-9-CM: 295.70  4/10/2012 - 4/10/2012              Medications reviewed  Current Facility-Administered Medications   Medication Dose Route Frequency    heparin (porcine) 1,000 unit/mL injection 4,000 Units  4,000 Units IntraVENous PRN    Or    heparin (porcine) 1,000 unit/mL injection 2,000 Units  2,000 Units IntraVENous PRN    heparin 25,000 units in D5W 250 ml infusion  12-25 Units/kg/hr (Adjusted) IntraVENous TITRATE    atorvastatin (LIPITOR) tablet 80 mg  80 mg Oral DAILY    metoprolol succinate (TOPROL-XL) XL tablet 50 mg  50 mg Oral DAILY    isosorbide dinitrate (ISORDIL) tablet 20 mg  20 mg Oral BID    nitroglycerin (NITROSTAT) tablet 0.4 mg  0.4 mg SubLINGual PRN    sodium chloride (NS) flush 5-40 mL  5-40 mL IntraVENous Q8H    sodium chloride (NS) flush 5-40 mL  5-40 mL IntraVENous PRN    insulin lispro (HUMALOG) injection 7 Units  0.1 Units/kg SubCUTAneous TID WITH MEALS    insulin lispro (HUMALOG) injection   SubCUTAneous AC&HS    glucose chewable tablet 16 g  4 Tablet Oral PRN    dextrose 10% infusion 0-250 mL  0-250 mL IntraVENous PRN    glucagon (GLUCAGEN) injection 1 mg  1 mg IntraMUSCular PRN       Review of Systems:   Constitutional: negative  Eyes: negative  Respiratory: positive for dyspnea on exertion  Cardiovascular: positive for chest pain, chest pressure/discomfort, dyspnea  Gastrointestinal: negative  Genitourinary:negative  Hematologic/lymphatic: negative  Musculoskeletal:negative    Objective:   Physical Exam:     Visit Vitals  /80   Pulse 89   Temp 97.8 °F (36.6 °C)   Resp 20   Ht 5' 5\" (1.651 m)   Wt 73.5 kg (162 lb)   SpO2 98%   BMI 26.96 kg/m²      O2 Device: None (Room air)    Temp (24hrs), Av.8 °F (36.6 °C), Min:97.4 °F (36.3 °C), Max:98.2 °F (36.8 °C)    No intake/output data recorded. No intake/output data recorded. General:   Awake and alert   Lungs:   Clear to auscultation bilaterally. Chest wall:  No tenderness or deformity. Heart:  Regular rate and rhythm, S1, S2 normal, no murmur, click, rub or gallop.    Abdomen:   Soft, non-tender. Bowel sounds normal. No masses,  No organomegaly. Extremities: Extremities normal, atraumatic, no cyanosis or edema. Pulses: 2+ and symmetric all extremities. Skin: Skin color, texture, turgor normal. No rashes or lesions   Neurologic: CNII-XII intact. No gross focal deficits         Data Review:       Recent Days:  Recent Labs     03/29/22  1102   WBC 6.1   HGB 15.7   HCT 48.8        Recent Labs     03/29/22  1520 03/29/22  1102   * 131*   K 5.2* 5.2*    99   CO2 26 25   * 487*   BUN 23* 24*   CREA 1.67* 1.71*   CA 8.9 9.4   ALB  --  4.0   TBILI  --  1.2*   ALT  --  35     No results for input(s): PH, PCO2, PO2, HCO3, FIO2 in the last 72 hours.     24 Hour Results:  Recent Results (from the past 24 hour(s))   EKG, 12 LEAD, INITIAL    Collection Time: 03/29/22 10:36 AM   Result Value Ref Range    Ventricular Rate 84 BPM    Atrial Rate 84 BPM    P-R Interval 168 ms    QRS Duration 86 ms    Q-T Interval 408 ms    QTC Calculation (Bezet) 482 ms    Calculated P Axis 61 degrees    Calculated R Axis 21 degrees    Calculated T Axis 73 degrees    Diagnosis       Sinus rhythm with occasional Premature ventricular complexes  Possible Left atrial enlargement  Prolonged QT  Abnormal ECG  When compared with ECG of 20-APR-2019 18:43,  No significant change was found  Confirmed by Shiraz Ferrell MD, SNOW [id number]) on 3/29/2022 3:37:31 PM     URINALYSIS W/ RFLX MICROSCOPIC    Collection Time: 03/29/22 10:48 AM   Result Value Ref Range    Color Yellow/Straw      Appearance Clear Clear      Specific gravity 1.028 1.003 - 1.030      pH (UA) 5.0 5.0 - 8.0      Protein Negative Negative mg/dL    Glucose >300 (A) Negative mg/dL    Ketone Negative Negative mg/dL    Bilirubin Negative Negative      Blood Small (A) Negative      Urobilinogen 0.1 0.1 - 1.0 EU/dL    Nitrites Negative Negative      Leukocyte Esterase Negative Negative      WBC 0-4 0 - 4 /hpf    RBC 0-5 0 - 5 /hpf    Bacteria Negative Negative /hpf   URINE MICROSCOPIC    Collection Time: 03/29/22 10:48 AM   Result Value Ref Range    WBC 0-4 0 - 4 /hpf    RBC 0-5 0 - 5 /hpf    Bacteria Negative Negative /hpf    Mucus Negative Negative /lpf   CBC WITH AUTOMATED DIFF    Collection Time: 03/29/22 11:02 AM   Result Value Ref Range    WBC 6.1 4.1 - 11.1 K/uL    RBC 5.80 (H) 4.10 - 5.70 M/uL    HGB 15.7 12.1 - 17.0 g/dL    HCT 48.8 36.6 - 50.3 %    MCV 84.1 80.0 - 99.0 FL    MCH 27.1 26.0 - 34.0 PG    MCHC 32.2 30.0 - 36.5 g/dL    RDW 21.6 (H) 11.5 - 14.5 %    PLATELET 650 963 - 411 K/uL    MPV 10.5 8.9 - 12.9 FL    NRBC 0.0 0.0  WBC    ABSOLUTE NRBC 0.00 0.00 - 0.01 K/uL    NEUTROPHILS 69 32 - 75 %    LYMPHOCYTES 18 12 - 49 %    MONOCYTES 6 5 - 13 %    EOSINOPHILS 5 0 - 7 %    BASOPHILS 1 0 - 1 %    IMMATURE GRANULOCYTES 1 (H) 0 - 0.5 %    ABS. NEUTROPHILS 4.2 1.8 - 8.0 K/UL    ABS. LYMPHOCYTES 1.1 0.8 - 3.5 K/UL    ABS. MONOCYTES 0.4 0.0 - 1.0 K/UL    ABS. EOSINOPHILS 0.3 0.0 - 0.4 K/UL    ABS. BASOPHILS 0.1 0.0 - 0.1 K/UL    ABS. IMM. GRANS. 0.0 0.00 - 0.04 K/UL    DF AUTOMATED     METABOLIC PANEL, COMPREHENSIVE    Collection Time: 03/29/22 11:02 AM   Result Value Ref Range    Sodium 131 (L) 136 - 145 mmol/L    Potassium 5.2 (H) 3.5 - 5.1 mmol/L    Chloride 99 97 - 108 mmol/L    CO2 25 21 - 32 mmol/L    Anion gap 7 5 - 15 mmol/L    Glucose 487 (H) 65 - 100 mg/dL    BUN 24 (H) 6 - 20 mg/dL    Creatinine 1.71 (H) 0.70 - 1.30 mg/dL    BUN/Creatinine ratio 14 12 - 20      GFR est AA 52 (L) >60 ml/min/1.73m2    GFR est non-AA 43 (L) >60 ml/min/1.73m2    Calcium 9.4 8.5 - 10.1 mg/dL    Bilirubin, total 1.2 (H) 0.2 - 1.0 mg/dL    AST (SGOT) 29 15 - 37 U/L    ALT (SGPT) 35 12 - 78 U/L    Alk.  phosphatase 142 (H) 45 - 117 U/L    Protein, total 9.6 (H) 6.4 - 8.2 g/dL    Albumin 4.0 3.5 - 5.0 g/dL    Globulin 5.6 (H) 2.0 - 4.0 g/dL    A-G Ratio 0.7 (L) 1.1 - 2.2     NT-PRO BNP    Collection Time: 03/29/22 11:02 AM   Result Value Ref Range    NT pro-BNP 1,293 (H) <125 pg/mL   TROPONIN-HIGH SENSITIVITY    Collection Time: 03/29/22 11:02 AM   Result Value Ref Range    Troponin-High Sensitivity 530 (HH) 0 - 76 ng/L   BETA-HYDROXYBUTYRATE    Collection Time: 03/29/22 11:02 AM   Result Value Ref Range    B-hydroxybutyrate 0.12 <0.40 mmol/L   GLUCOSE, POC    Collection Time: 03/29/22 11:05 AM   Result Value Ref Range    Glucose (POC) 499 (H) 65 - 117 mg/dL    Performed by [employer]    PTT    Collection Time: 03/29/22 12:27 PM   Result Value Ref Range    aPTT 28.4 21.2 - 34.1 sec    aPTT, therapeutic range   82 - 109 sec   GLUCOSE, POC    Collection Time: 03/29/22  2:25 PM   Result Value Ref Range    Glucose (POC) 306 (H) 65 - 117 mg/dL    Performed by [employer]    METABOLIC PANEL, BASIC    Collection Time: 03/29/22  3:20 PM   Result Value Ref Range    Sodium 133 (L) 136 - 145 mmol/L    Potassium 5.2 (H) 3.5 - 5.1 mmol/L    Chloride 103 97 - 108 mmol/L    CO2 26 21 - 32 mmol/L    Anion gap 4 (L) 5 - 15 mmol/L    Glucose 281 (H) 65 - 100 mg/dL    BUN 23 (H) 6 - 20 mg/dL    Creatinine 1.67 (H) 0.70 - 1.30 mg/dL    BUN/Creatinine ratio 14 12 - 20      GFR est AA 53 (L) >60 ml/min/1.73m2    GFR est non-AA 44 (L) >60 ml/min/1.73m2    Calcium 8.9 8.5 - 10.1 mg/dL   TROPONIN-HIGH SENSITIVITY    Collection Time: 03/29/22  3:52 PM   Result Value Ref Range    Troponin-High Sensitivity 443 (HH) 0 - 76 ng/L   GLUCOSE, POC    Collection Time: 03/29/22  4:19 PM   Result Value Ref Range    Glucose (POC) 236 (H) 65 - 117 mg/dL    Performed by Lizette Bermeo    GLUCOSE, POC    Collection Time: 03/29/22  8:43 PM   Result Value Ref Range    Glucose (POC) 328 (H) 65 - 117 mg/dL    Performed by Veronica Cantu    TROPONIN-HIGH SENSITIVITY    Collection Time: 03/29/22  9:02 PM   Result Value Ref Range    Troponin-High Sensitivity 433 (HH) 0 - 76 ng/L   PTT    Collection Time: 03/29/22  9:02 PM   Result Value Ref Range    aPTT 39.7 (H) 21.2 - 34.1 sec    aPTT, therapeutic range   82 - 109 sec   TROPONIN-HIGH SENSITIVITY    Collection Time: 03/30/22  4:17 AM   Result Value Ref Range    Troponin-High Sensitivity 395 (HH) 0 - 76 ng/L   PTT    Collection Time: 03/30/22  4:17 AM   Result Value Ref Range    aPTT 54.1 (H) 21.2 - 34.1 sec    aPTT, therapeutic range   82 - 109 sec   GLUCOSE, POC    Collection Time: 03/30/22  8:50 AM   Result Value Ref Range    Glucose (POC) 395 (H) 65 - 117 mg/dL    Performed by ERICA VILLARREAL        XR CHEST PORT   Final Result   Cardiomegaly with pulmonary vascular congestion. No acute process. Assessment:    NSTEMI     LVH with L. Atrial enlargement     Chronic systolic heart failure with EF 20%     CAD with severe small vessel disease (status post L. Heart cath 1/16/20)     Essential HTN     Chronic kidney disease stage III.   Baseline creatinine appears to be 1.6 at discharge from [facility] in January '22     Chronic Asthma     DM Type II, uncontrolled     Bipolar 1 disorder     Anxiety Disorder    Plan:    Cardiac cath to be done today     Per nephrology, follow renal function closely post contrast. Also continue to hold ACE inhibitor until potassium improves    Serum chem panel to check for hyperkalemia again    Aislinn Woodruff MD

## 2022-03-31 VITALS
HEIGHT: 65 IN | WEIGHT: 162 LBS | DIASTOLIC BLOOD PRESSURE: 88 MMHG | OXYGEN SATURATION: 99 % | RESPIRATION RATE: 22 BRPM | TEMPERATURE: 98.1 F | BODY MASS INDEX: 26.99 KG/M2 | SYSTOLIC BLOOD PRESSURE: 128 MMHG | HEART RATE: 95 BPM

## 2022-03-31 LAB
GLUCOSE BLD STRIP.AUTO-MCNC: 396 MG/DL (ref 65–117)
GLUCOSE BLD STRIP.AUTO-MCNC: 542 MG/DL (ref 65–117)
PERFORMED BY, TECHID: ABNORMAL
PERFORMED BY, TECHID: ABNORMAL

## 2022-03-31 PROCEDURE — 74011636637 HC RX REV CODE- 636/637: Performed by: INTERNAL MEDICINE

## 2022-03-31 PROCEDURE — 74011000250 HC RX REV CODE- 250: Performed by: INTERNAL MEDICINE

## 2022-03-31 PROCEDURE — 82962 GLUCOSE BLOOD TEST: CPT

## 2022-03-31 PROCEDURE — 74011250637 HC RX REV CODE- 250/637: Performed by: INTERNAL MEDICINE

## 2022-03-31 RX ORDER — PANTOPRAZOLE SODIUM 40 MG/1
40 TABLET, DELAYED RELEASE ORAL
Status: DISCONTINUED | OUTPATIENT
Start: 2022-04-01 | End: 2022-03-31 | Stop reason: HOSPADM

## 2022-03-31 RX ORDER — ISOSORBIDE MONONITRATE 30 MG/1
30 TABLET, EXTENDED RELEASE ORAL DAILY
Qty: 30 TABLET | Refills: 0 | Status: SHIPPED | OUTPATIENT
Start: 2022-03-31

## 2022-03-31 RX ORDER — TRAZODONE HYDROCHLORIDE 50 MG/1
100 TABLET ORAL
Status: DISCONTINUED | OUTPATIENT
Start: 2022-03-31 | End: 2022-03-31 | Stop reason: HOSPADM

## 2022-03-31 RX ORDER — HYDROXYZINE 25 MG/1
25 TABLET, FILM COATED ORAL
Status: DISCONTINUED | OUTPATIENT
Start: 2022-03-31 | End: 2022-03-31 | Stop reason: HOSPADM

## 2022-03-31 RX ORDER — NITROGLYCERIN 0.4 MG/1
0.4 TABLET SUBLINGUAL AS NEEDED
Status: DISCONTINUED | OUTPATIENT
Start: 2022-03-31 | End: 2022-03-31 | Stop reason: HOSPADM

## 2022-03-31 RX ORDER — INSULIN GLARGINE 100 [IU]/ML
40 INJECTION, SOLUTION SUBCUTANEOUS DAILY
Status: DISCONTINUED | OUTPATIENT
Start: 2022-03-31 | End: 2022-03-31 | Stop reason: HOSPADM

## 2022-03-31 RX ADMIN — INSULIN LISPRO 8 UNITS: 100 INJECTION, SOLUTION INTRAVENOUS; SUBCUTANEOUS at 09:59

## 2022-03-31 RX ADMIN — INSULIN GLARGINE 40 UNITS: 100 INJECTION, SOLUTION SUBCUTANEOUS at 09:59

## 2022-03-31 RX ADMIN — METOPROLOL SUCCINATE 50 MG: 50 TABLET, FILM COATED, EXTENDED RELEASE ORAL at 09:59

## 2022-03-31 RX ADMIN — ATORVASTATIN CALCIUM 80 MG: 40 TABLET, FILM COATED ORAL at 09:59

## 2022-03-31 RX ADMIN — INSULIN LISPRO 7 UNITS: 100 INJECTION, SOLUTION INTRAVENOUS; SUBCUTANEOUS at 08:00

## 2022-03-31 RX ADMIN — SODIUM CHLORIDE, PRESERVATIVE FREE 10 ML: 5 INJECTION INTRAVENOUS at 06:19

## 2022-03-31 RX ADMIN — ISOSORBIDE MONONITRATE 30 MG: 30 TABLET, EXTENDED RELEASE ORAL at 09:59

## 2022-03-31 NOTE — DISCHARGE SUMMARY
Physician Discharge Summary     Patient ID:    Karyle Lance  [id number]  54 y.o.  1970    Admit date: 3/29/2022    Discharge date : 3/31/2022    Chronic Diagnoses:    Problem List as of 3/31/2022 Date Reviewed: 1/9/2022          Codes Class Noted - Resolved    RESOLVED: Suicidal ideations ICD-10-CM: R45.851  ICD-9-CM: V62.84 Acute 4/10/2012 - 5/23/2014        Acute on chronic combined systolic and diastolic ACC/AHA stage C congestive heart failure (HCC) ICD-10-CM: I50.43  ICD-9-CM: 428.43, 428.0  2/2/2022 - Present        NSTEMI (non-ST elevated myocardial infarction) ([facility] ICD-10-CM: I21.4  ICD-9-CM: 410.70  12/27/2021 - Present        Acute HFrEF (heart failure with reduced ejection fraction) ([facility] ICD-10-CM: I50.21  ICD-9-CM: 428.21  8/8/2021 - Present        ACS (acute coronary syndrome) (HCC) ICD-10-CM: I24.9  ICD-9-CM: 411.1  5/7/2021 - Present        CAD (coronary artery disease) ICD-10-CM: I25.10  ICD-9-CM: 414.00  Unknown - Present    Overview Signed 5/6/2021  4:46 PM by Evan Hannah,      MI x2 with 2 cardiac stent             Chest pain ICD-10-CM: R07.9  ICD-9-CM: 786.50  5/6/2021 - Present        Major depression ICD-10-CM: F32.9  ICD-9-CM: 296.20  3/8/2021 - Present        Suicide ([facility] ICD-10-CM: W47. 8XXA  ICD-9-CM: E958.9  3/8/2021 - Present        Chronic systolic congestive heart failure (HCC) ICD-10-CM: I50.22  ICD-9-CM: 428.22, 428.0  12/2/2020 - Present        History of CVA (cerebrovascular accident) ICD-10-CM: Z86.73  ICD-9-CM: V12.54  12/2/2020 - Present        Type 2 diabetes with nephropathy ([facility] ICD-10-CM: E11.21  ICD-9-CM: 250.40, 583.81  2/28/2020 - Present        Adjustment disorder with depressed mood ICD-10-CM: F43.21  ICD-9-CM: 309.0  4/28/2012 - Present        Bipolar 1 disorder (HCC) ICD-10-CM: F31.9  ICD-9-CM: 296.7  4/10/2012 - Present        Borderline personality disorder ([facility] ICD-10-CM: F60.3  ICD-9-CM: 301.83  4/10/2012 - Present        Suicidal behavior ICD-10-CM: R45.89  ICD-9-CM: V62.89  3/24/2012 - Present        Emotional lability ICD-10-CM: R45.86  ICD-9-CM: 799.24  3/24/2012 - Present        Diabetes mellitus ([facility] ICD-10-CM: E11.9  ICD-9-CM: 250.00  3/5/2012 - Present        HTN (hypertension) ICD-10-CM: I10  ICD-9-CM: 401.9  3/5/2012 - Present        Unspecified asthma, with exacerbation ICD-10-CM: J45.901  ICD-9-CM: 493.92  3/5/2012 - Present    Overview Signed 3/5/2012  9:53 PM by Lico COLLAZO no exacerbation. RESOLVED: Schizoaffective disorder, bipolar type ([facility] ICD-10-CM: F25.0  ICD-9-CM: 295.70  4/10/2012 - 4/10/2012          22    Final Diagnoses:   NSTEMI (non-ST elevated myocardial infarction) ([facility] [I21.4]  Extensive coronary artery disease not felt amenable to revascularization  LVH with L. Atrial enlargement     Chronic systolic heart failure with EF 20%     CAD with severe small vessel disease (status post L. Heart cath 1/16/20)     Essential HTN     Chronic kidney disease stage III.  Baseline creatinine appears to be 1.6 at discharge from [facility] in [facility]     DM Type II, uncontrolled     Bipolar 1 disorder     Anxiety Disorder     Reason for Hospitalization:  72-year-old male with a PMH of CHF status post ICD with EF 20%, bipolar disease, DM, CAD with severe small vessel disease, status post heart cath 1/16/2020, essential HTN, and asthma presents today for chest pain. Patient states he woke up at 6am this morning with severe right arm pain that turned into chest pain. He states this pain is equivalent to the pain he has had with his past MI. He has been smoking for 30+ years and smokes 1 ppd. Patient came here by EMS after he went to the doctor's office. He denies any nausea, vomiting, dizziness, loss of consciousness, ringing in ears, blurry vision. Patient is allergic to acetaminophen and asprin. EKG showed LVH with L.  Atrial enlargement, no acute ischemia, troponin 530, proBNP 1293, hyperkalemia 5.2, Cr 1.71, and glucose 487. TTE 1/15/22 showed 4-chamber cardiomegaly with EF 20%. Hospital Course:   Patient was admitted to cardiac telemetry. Creatinine remained stable in the 1.61.7 range. He was initially started on IV heparin and then later discontinued by cardiology    Cardiology initially was recommending catheterization    However, he has had 2 catheterizations in the past 6 months. Patient has extensive disease which is not amenable to revascularization and therefore repeat catheterization was not recommended at this time. Medical management only was recommended. He was started on Imdur. His blood sugars were poorly controlled due to nursing not releasing his home medications on admission    Patient is felt stable for discharge home on 3/31              Discharge Medications:   Current Discharge Medication List      START taking these medications    Details   isosorbide mononitrate ER (IMDUR) 30 mg tablet Take 1 Tablet by mouth daily. Qty: 30 Tablet, Refills: 0  Start date: 3/31/2022         CONTINUE these medications which have NOT CHANGED    Details   furosemide (LASIX) 80 mg tablet TAKE ONE TABLET BY MOUTH ONCE DAILY  Qty: 90 Tablet, Refills: 1    Comments: Hello! We are your patient's new home-delivery pharmacy. Please send Rx renewal for the following medications as appropriate. This request is for current meds per [facility] discharge. Please call us w/ any questions! metFORMIN ER (GLUCOPHAGE XR) 500 mg tablet TAKE TWO TABLETS BY MOUTH EVERY MORNING with breakfast  Qty: 180 Tablet, Refills: 1    Comments: Hello! We are your patient's new home-delivery pharmacy. Please send Rx renewal for the following medications as appropriate. This request is for current meds per [facility] discharge. Please call us w/ any questions! metoprolol succinate (TOPROL-XL) 50 mg XL tablet TAKE ONE TABLET BY MOUTH EVERY DAY  Qty: 90 Tablet, Refills: 1    Comments: Hello!  We are your patient's new home-delivery pharmacy. Please send Rx renewal for the following medications as appropriate. This request is for current meds per [facility] discharge. Please call us w/ any questions! Jardiance 10 mg tablet Take 10 mg by mouth daily. insulin glargine (Lantus U-100 Insulin) 100 unit/mL injection 10 Units by SubCUTAneous route nightly. Qty: 1 mL, Refills: 2    Associated Diagnoses: Type 2 diabetes mellitus with hyperglycemia, with long-term current use of insulin ([facility])      traZODone (DESYREL) 50 mg tablet Take 100 mg by mouth nightly. liraglutide (VICTOZA) 0.6 mg/0.1 mL (18 mg/3 mL) pnij 1.8 mg by SubCUTAneous route daily. Qty: 10 Adjustable Dose Pre-filled Pen Syringe, Refills: 1    Associated Diagnoses: Type 2 diabetes mellitus with hyperglycemia, with long-term current use of insulin ([facility])      clopidogreL (PLAVIX) 75 mg tab Take 1 Tablet by mouth daily. Qty: 30 Tablet, Refills: 5    Associated Diagnoses: Coronary artery disease involving native coronary artery of native heart without angina pectoris      cyclobenzaprine (FLEXERIL) 10 mg tablet Take 1 Tablet by mouth three (3) times daily as needed for Muscle Spasm(s). Qty: 30 Tablet, Refills: 1    Associated Diagnoses: Chronic midline thoracic back pain      albuterol (PROVENTIL HFA, VENTOLIN HFA, PROAIR HFA) 90 mcg/actuation inhaler Take 2 Puffs by inhalation every four (4) hours as needed for Wheezing or Shortness of Breath. Qty: 2 Each, Refills: 3    Associated Diagnoses: Bronchospasm      hydrOXYzine HCL (ATARAX) 25 mg tablet Take two to three tablets by mouth at bedtime for sleep. Qty: 60 Tablet, Refills: 2    Associated Diagnoses: Adjustment insomnia      atorvastatin (LIPITOR) 80 mg tablet Take 1 Tab by mouth daily. Qty: 30 Tab, Refills: 5    Associated Diagnoses: Hypercholesteremia      nitroglycerin (NITROSTAT) 0.4 mg SL tablet DISSOLVE 1 TABLET UNDER THE TONGUE EVERY 5 MINUTES AS NEEDED FOR CHEST PAIN.  DO NOT EXCEED A TOTAL OF 3 DOSES IN 15 MINUTES. Qty: 25 Tablet, Refills: 3    Comments: Hello! We are your patient's new home-delivery pharmacy. Please send Rx renewal for the following medications as appropriate. This request is for current meds per [facility] discharge. Please call us w/ any questions! Lantus Solostar U-100 Insulin 100 unit/mL (3 mL) inpn 40 Units by SubCUTAneous route Every morning. Qty: 4 Adjustable Dose Pre-filled Pen Syringe, Refills: 1    Associated Diagnoses: Type 2 diabetes mellitus with hyperglycemia, with long-term current use of insulin (HCC)      pantoprazole (PROTONIX) 40 mg tablet Take 1 Tab by mouth Daily (before breakfast). Qty: 30 Tab, Refills: 5    Associated Diagnoses: Gastroesophageal reflux disease without esophagitis               Follow up Care:    1. Sandra Ulloa MD in 1-2 weeks. Please call to set up an appointment shortly after discharge. Diet:  Diabetic Diet    Disposition:  Home. Advanced Directive:   FULL    DNR      Discharge Exam:  General:  Alert, cooperative, no distress, appears stated age. Lungs:   Clear to auscultation bilaterally. Chest wall:  No tenderness or deformity. Heart:  Regular rate and rhythm, S1, S2 normal, no murmur, click, rub or gallop. Abdomen:   Soft, non-tender. Bowel sounds normal. No masses,  No organomegaly. Extremities: Extremities normal, atraumatic, no cyanosis or edema. Pulses: 2+ and symmetric all extremities. Skin: Skin color, texture, turgor normal. No rashes or lesions   Neurologic: CNII-XII intact. No gross sensory or motor deficits        CONSULTATIONS: Cardiology    Significant Diagnostic Studies:   3/29/2022: BUN 24 mg/dL (H; Ref range: 6 - 20 mg/dL); BUN 23 mg/dL (H; Ref range: 6 - 20 mg/dL); Calcium 9.4 mg/dL (Ref range: 8.5 - 10.1 mg/dL); Calcium 8.9 mg/dL (Ref range: 8.5 - 10.1 mg/dL); CO2 25 mmol/L (Ref range: 21 - 32 mmol/L); CO2 26 mmol/L (Ref range: 21 - 32 mmol/L);  Creatinine 1.71 mg/dL (H; Ref range: 0.70 - 1.30 mg/dL); Creatinine 1.67 mg/dL (H; Ref range: 0.70 - 1.30 mg/dL); Glucose 487 mg/dL (H; Ref range: 65 - 100 mg/dL); Glucose 281 mg/dL (H; Ref range: 65 - 100 mg/dL); HCT 48.8 % (Ref range: 36.6 - 50.3 %); HGB 15.7 g/dL (Ref range: 12.1 - 17.0 g/dL); Potassium 5.2 mmol/L (H; Ref range: 3.5 - 5.1 mmol/L); Potassium 5.2 mmol/L (H; Ref range: 3.5 - 5.1 mmol/L); Sodium 131 mmol/L (L; Ref range: 136 - 145 mmol/L); Sodium 133 mmol/L (L; Ref range: 136 - 145 mmol/L)  3/30/2022: BUN 29 mg/dL (H; Ref range: 6 - 20 mg/dL); Calcium 9.0 mg/dL (Ref range: 8.5 - 10.1 mg/dL); CO2 25 mmol/L (Ref range: 21 - 32 mmol/L); Creatinine 1.64 mg/dL (H; Ref range: 0.70 - 1.30 mg/dL); Glucose 426 mg/dL (H; Ref range: 65 - 100 mg/dL); Potassium 4.5 mmol/L (Ref range: 3.5 - 5.1 mmol/L); Sodium 136 mmol/L (Ref range: 136 - 145 mmol/L)  Recent Labs     03/29/22  1102   WBC 6.1   HGB 15.7   HCT 48.8        Recent Labs     03/30/22  1002 03/29/22  1520 03/29/22  1102    133* 131*   K 4.5 5.2* 5.2*    103 99   CO2 25 26 25   BUN 29* 23* 24*   CREA 1.64* 1.67* 1.71*   * 281* 487*   CA 9.0 8.9 9.4     Recent Labs     03/29/22  1102   ALT 35   *   TBILI 1.2*   TP 9.6*   ALB 4.0   GLOB 5.6*     Recent Labs     03/30/22  0417 03/29/22 2102 03/29/22  1227   APTT 54.1* 39.7* 28.4      No results for input(s): FE, TIBC, PSAT, FERR in the last 72 hours. No results for input(s): PH, PCO2, PO2 in the last 72 hours. No results for input(s): CPK, CKMB in the last 72 hours.     No lab exists for component: TROPONINI  Lab Results   Component Value Date/Time    Glucose (POC) 396 (H) 03/31/2022 07:23 AM    Glucose (POC) 454 (H) 03/30/2022 11:07 PM    Glucose (POC) 294 (H) 03/30/2022 04:58 PM    Glucose (POC) 486 (H) 03/30/2022 11:30 AM    Glucose (POC) 395 (H) 03/30/2022 08:50 AM       Discharge time spent 35 minutes    Signed:  Aimee Garg MD  3/31/2022  9:23 AM

## 2022-03-31 NOTE — PROGRESS NOTES
Problem: Falls - Risk of  Goal: *Absence of Falls  Description: Document Zeina Morales Fall Risk and appropriate interventions in the flowsheet. Outcome: Progressing Towards Goal  Note: Fall Risk Interventions:            Medication Interventions: Patient to call before getting OOB,Teach patient to arise slowly         History of Falls Interventions: Investigate reason for fall         Problem: Patient Education: Go to Patient Education Activity  Goal: Patient/Family Education  Outcome: Progressing Towards Goal     Problem: Discharge Planning  Goal: *Discharge to safe environment  Outcome: Progressing Towards Goal  Goal: *Knowledge of medication management  Outcome: Progressing Towards Goal  Goal: *Knowledge of discharge instructions  Outcome: Progressing Towards Goal     Problem: Patient Education: Go to Patient Education Activity  Goal: Patient/Family Education  Outcome: Progressing Towards Goal     Problem: Pain  Goal: *Control of Pain  Outcome: Progressing Towards Goal     Problem: Patient Education: Go to Patient Education Activity  Goal: Patient/Family Education  Outcome: Progressing Towards Goal     Problem: Diabetes Self-Management  Goal: *Disease process and treatment process  Description: Define diabetes and identify own type of diabetes; list 3 options for treating diabetes. Outcome: Progressing Towards Goal  Goal: *Incorporating nutritional management into lifestyle  Description: Describe effect of type, amount and timing of food on blood glucose; list 3 methods for planning meals. Outcome: Progressing Towards Goal  Goal: *Incorporating physical activity into lifestyle  Description: State effect of exercise on blood glucose levels. Outcome: Progressing Towards Goal  Goal: *Developing strategies to promote health/change behavior  Description: Define the ABC's of diabetes; identify appropriate screenings, schedule and personal plan for screenings.   Outcome: Progressing Towards Goal  Goal: *Using medications safely  Description: State effect of diabetes medications on diabetes; name diabetes medication taking, action and side effects. Outcome: Progressing Towards Goal  Goal: *Monitoring blood glucose, interpreting and using results  Description: Identify recommended blood glucose targets  and personal targets. Outcome: Progressing Towards Goal  Goal: *Prevention, detection, treatment of acute complications  Description: List symptoms of hyper- and hypoglycemia; describe how to treat low blood sugar and actions for lowering  high blood glucose level. Outcome: Progressing Towards Goal  Goal: *Prevention, detection and treatment of chronic complications  Description: Define the natural course of diabetes and describe the relationship of blood glucose levels to long term complications of diabetes.   Outcome: Progressing Towards Goal  Goal: *Developing strategies to address psychosocial issues  Description: Describe feelings about living with diabetes; identify support needed and support network  Outcome: Progressing Towards Goal  Goal: *Insulin pump training  Outcome: Progressing Towards Goal  Goal: *Sick day guidelines  Outcome: Progressing Towards Goal  Goal: *Patient Specific Goal (EDIT GOAL, INSERT TEXT)  Outcome: Progressing Towards Goal     Problem: Patient Education: Go to Patient Education Activity  Goal: Patient/Family Education  Outcome: Progressing Towards Goal

## 2022-03-31 NOTE — PROGRESS NOTES
Progress Note      3/31/2022 11:28 AM  NAME: Ania Turner   MRN:  [id number]   Admit Diagnosis: NSTEMI (non-ST elevated myocardial infarction) [facility]) [I21.4]      Problem List:   Acute non-Q wave MI (trop+ 540)  [facility] 21->2VD, small RCA, NOT amenable to PCI  Ischemic cardiomyopathy with EF 20%  Chronic CHF  CKD  s/p ICD  Bipolar disorder  Tobacco use  Diabetes  CAD with severe small vessel disease, recent left heart cath 2020  Essential hypertension     Assessment/Plan:   ICD interrogation reviewed and showed no recent events  Continue on Plavix  Patient appears stable for discharge         []       High complexity decision making was performed in this patient at high risk for decompensation with multiple organ involvement. Subjective:     Ania Turner denies chest pain, dyspnea. Discussed with RN events overnight. Review of Systems:   Negative except for as noted above. Objective:      Physical Exam:    Last 24hrs VS reviewed since prior progress note. Most recent are:    Visit Vitals  /88   Pulse 95   Temp 98.1 °F (36.7 °C)   Resp 22   Ht 5' 5\" (1.651 m)   Wt 73.5 kg (162 lb)   SpO2 99%   BMI 26.96 kg/m²     No intake or output data in the 24 hours ending 22 1128     General Appearance: Alert; no acute distress. Ears/Nose/Mouth/Throat: moist mucous membranes  Neck: Supple. Chest: Lungs clear to auscultation bilaterally. Cardiovascular: Regular rate and rhythm, S1S2 normal  Abdomen: Soft, non-tender, bowel sounds are active. Extremities: No edema bilaterally. Skin: Warm and dry. []         Post-cath site without hematoma, bruit, tenderness, or thrill. Distal pulses intact. PMH/SH reviewed - no change compared to H&P    Telemetry:     EK/08/21    ECHO ADULT COMPLETE 2021    Interpretation Summary  · LV: Calculated LVEF is 15%. Visually measured ejection fraction. Normal cavity size and wall thickness. Severely reduced systolic function. Severe hypokinesis of the basal anterior, basal anterolateral, basal anteroseptal, basal inferior, basal inferoseptal, basal inferolateral, mid anterior, mid anterolateral, mid anteroseptal, mid inferior, mid inferoseptal and mid inferolateral wall(s). · RV: Moderately reduced systolic function. · Pericardium: Trivial-to-small pericardial effusion adjacent to right atrium. Signed by: Joyce Lauren MD on 8/9/2021  3:50 PM      []  No new EKG for review    Lab Data Personally Reviewed:    Recent Labs     03/29/22  1102   WBC 6.1   HGB 15.7   HCT 48.8        Recent Labs     03/30/22  0417 03/29/22  2102 03/29/22  1227   APTT 54.1* 39.7* 28.4      Recent Labs     03/30/22  1002 03/29/22  1520 03/29/22  1102    133* 131*   K 4.5 5.2* 5.2*    103 99   CO2 25 26 25   BUN 29* 23* 24*   CREA 1.64* 1.67* 1.71*   * 281* 487*   CA 9.0 8.9 9.4     No results for input(s): CPK, CKNDX, TROIQ in the last 72 hours. No lab exists for component: CPKMB  Lab Results   Component Value Date/Time    Cholesterol, total 217 (H) 03/09/2021 01:31 PM    HDL Cholesterol 40 03/09/2021 01:31 PM    LDL, calculated 101.2 (H) 03/09/2021 01:31 PM    Triglyceride 379 (H) 03/09/2021 01:31 PM    CHOL/HDL Ratio 5.4 (H) 03/09/2021 01:31 PM       Recent Labs     03/29/22  1102   *   TP 9.6*   ALB 4.0   GLOB 5.6*     No results for input(s): PH, PCO2, PO2 in the last 72 hours.     Medications Personally Reviewed:    Current Facility-Administered Medications   Medication Dose Route Frequency    hydrOXYzine HCL (ATARAX) tablet 25 mg  25 mg Oral Q6H PRN    insulin glargine (LANTUS) injection 40 Units  40 Units SubCUTAneous DAILY    [START ON 4/1/2022] pantoprazole (PROTONIX) tablet 40 mg  40 mg Oral ACB    traZODone (DESYREL) tablet 100 mg  100 mg Oral QHS    nitroglycerin (NITROSTAT) tablet 0.4 mg  0.4 mg SubLINGual PRN    isosorbide mononitrate ER (IMDUR) tablet 30 mg  30 mg Oral DAILY    atorvastatin (LIPITOR) tablet 80 mg  80 mg Oral DAILY    metoprolol succinate (TOPROL-XL) XL tablet 50 mg  50 mg Oral DAILY    sodium chloride (NS) flush 5-40 mL  5-40 mL IntraVENous Q8H    sodium chloride (NS) flush 5-40 mL  5-40 mL IntraVENous PRN    insulin lispro (HUMALOG) injection 7 Units  0.1 Units/kg SubCUTAneous TID WITH MEALS    insulin lispro (HUMALOG) injection   SubCUTAneous AC&HS    glucose chewable tablet 16 g  4 Tablet Oral PRN    dextrose 10% infusion 0-250 mL  0-250 mL IntraVENous PRN    glucagon (GLUCAGEN) injection 1 mg  1 mg IntraMUSCular PRN         Deidre Lynn MD

## 2022-03-31 NOTE — PROGRESS NOTES
VSS. Patient given discharge instructions. Follow up appointments discussed and medications reviewed. IV(s) removed. Telemetry removed and returned. Primary RN, case management, provider, and patient aware of discharge. Patient is ready for discharge and awaiting pickup at this time. Discharge plan of care/case management plan validated with provider discharge order.     Patient transport should arrive by 12pm

## 2022-03-31 NOTE — PROGRESS NOTES
Review of Systems  Temp:  [97.4 °F (36.3 °C)-98.2 °F (36.8 °C)]   Pulse (Heart Rate):  [63-91]   BP: (101-130)/(58-98)   Resp Rate:  [15-22]   O2 Sat (%):  [95 %-100 %]   Weight:  [73.5 kg (162 lb)]   [unfilled]  [unfilled]  Physical Exam  Active Problems:    NSTEMI (non-ST elevated myocardial infarction) ([facility] (12/27/2021)

## 2022-04-01 ENCOUNTER — TELEPHONE (OUTPATIENT)
Dept: FAMILY MEDICINE CLINIC | Age: 52
End: 2022-04-01

## 2022-04-01 NOTE — TELEPHONE ENCOUNTER
----- Message from Aleena Delmy sent at 3/31/2022 10:32 AM EDT -----  Subject: Hospital Follow Up    QUESTIONS  What hospital was the Patient Discharged from? [facility]  Date of Discharge? 2022-03-31  Discharge Location? Home  Reason for hospitalization as patient stated? NStemi  What question does the patient have, if applicable? Needs hospital f/u   visit  ---------------------------------------------------------------------------  --------------  CALL BACK INFO  What is the best way for the office to contact you? OK to leave message on   voicemail  Preferred Call Back Phone Number? [phone]  ---------------------------------------------------------------------------  --------------  SCRIPT ANSWERS  Relationship to Patient? Third Party  Third Party Type? Hospital?   Representative Name? Avril Peterson  (Patient requests to see provider urgently. )? No  (Has the patient been discharged from the hospital within 2 business days   AND does not have a Telephone Encounter  Follow Up From [address] [place] [address]   documented in [address]?)? Yes  Do you have any questions for your primary care provider that need to be   answered prior to your appointment? (Use RN Triage if question pertains to   anything on the red flag list)? No  (Patient needs follow up visit after hospital discharge) Book first   available appointment within 7 days OF DISCHARGE, if no appt, proceed to   book the next available time slot within 14 days OF DISCHARGE AND Send   Message to Provider. [employer] Follow Up appointment cannot be booked   beyond 14 Days and should result in a Message to Provider. ?  Yes

## 2022-06-02 ENCOUNTER — VIRTUAL VISIT (OUTPATIENT)
Dept: FAMILY MEDICINE CLINIC | Age: 52
End: 2022-06-02
Payer: COMMERCIAL

## 2022-06-02 DIAGNOSIS — E11.65 TYPE 2 DIABETES MELLITUS WITH HYPERGLYCEMIA, WITH LONG-TERM CURRENT USE OF INSULIN (HCC): ICD-10-CM

## 2022-06-02 DIAGNOSIS — Z79.4 TYPE 2 DIABETES MELLITUS WITH HYPERGLYCEMIA, WITH LONG-TERM CURRENT USE OF INSULIN (HCC): ICD-10-CM

## 2022-06-02 PROCEDURE — 99213 OFFICE O/P EST LOW 20 MIN: CPT | Performed by: FAMILY MEDICINE

## 2022-06-02 PROCEDURE — 3046F HEMOGLOBIN A1C LEVEL >9.0%: CPT | Performed by: FAMILY MEDICINE

## 2022-06-02 RX ORDER — INSULIN GLARGINE 100 [IU]/ML
INJECTION, SOLUTION SUBCUTANEOUS
Qty: 40 ML | Refills: 1 | Status: SHIPPED | OUTPATIENT
Start: 2022-06-02

## 2022-06-02 NOTE — PROGRESS NOTES
Chief Complaint   Patient presents with    Medication Refill     1. Have you been to the ER, urgent care clinic since your last visit? [facility]  Hospitalized since your last visit? No     2. Have you seen or consulted any other health care providers outside of the [address] [place] [address] since your last visit? Include any pap smears or colon screening.  No

## 2022-06-02 NOTE — PROGRESS NOTES
Shyanne Andrade is a 50 y.o. male who was seen by synchronous (real-time) audio-video technology on 6/2/2022. Consent: Shyanne Andrade, who was seen by synchronous (real-time) audio-video technology, and/or his healthcare decision maker, is aware that this patient-initiated, Telehealth encounter on 6/2/2022 is a billable service, with coverage as determined by his insurance carrier. He is aware that he may receive a bill and has provided verbal consent to proceed: Yes. Assessment & Plan:   Diagnoses and all orders for this visit:    1. Type 2 diabetes mellitus with hyperglycemia, with long-term current use of insulin (HCC)  -     insulin glargine (Lantus U-100 Insulin) 100 unit/mL injection; inject 40 units under the skin every night at bedtime    Continue current dose  Hypoglycemia discussed. Let us know if additional hypoglycemia  Continue Jardiance and Victoza  Now off of metformin  Diet discussed    Follow-up and Dispositions    · Return in about 3 months (around 9/2/2022) for Blood pressure follow up, Diabetes follow up, Medication follow up. I spent at least 23 minutes on this visit with this established patient. ([id number])    Subjective:   Shyanne Andrade is a 50 y.o. male who was seen for Medication Refill    DM2  Wants to lose weight but isn't   Needs refill of glargine  Now off of metformin. He stopped it due to diarrhea. Usually fasting sugars in 90's  Had a low sugar last night of 55   This was his only low sugar past month. States he is sticking to diet  Diet discussed. Recent NSTEMI  Hospitalized  No stents or procedures  States has had no breathing problems with exertion  Occasional chest pains-discussed seeking care if prolonged or severe pain    Prior to Admission medications    Medication Sig Start Date End Date Taking?  Authorizing Provider   insulin glargine (Lantus U-100 Insulin) 100 unit/mL injection inject 40 units under the skin every night at bedtime 6/2/22  Yes Dayana Najera Aga Simmons MD   Lantus U-100 Insulin 100 unit/mL injection inject 10 units under the skin every night at bedtime 6/1/22 6/2/22  Sheila Barnett MD   isosorbide mononitrate ER (IMDUR) 30 mg tablet Take 1 Tablet by mouth daily. 3/31/22   Alicja Kwon MD   furosemide (LASIX) 80 mg tablet TAKE ONE TABLET BY MOUTH ONCE DAILY 2/4/22   Sheila Barnett MD   metoprolol succinate (TOPROL-XL) 50 mg XL tablet TAKE ONE TABLET BY MOUTH EVERY DAY 2/4/22   Sheila Barnett MD   metFORMIN ER (GLUCOPHAGE XR) 500 mg tablet TAKE TWO TABLETS BY MOUTH EVERY MORNING with breakfast 2/4/22 6/2/22  Sheila Barnett MD   nitroglycerin (NITROSTAT) 0.4 mg SL tablet DISSOLVE 1 TABLET UNDER THE TONGUE EVERY 5 MINUTES AS NEEDED FOR CHEST PAIN. DO NOT EXCEED A TOTAL OF 3 DOSES IN 15 MINUTES. Patient not taking: Reported on 3/29/2022 2/4/22 6/2/22  Sheila Barnett MD   Jardiance 10 mg tablet Take 10 mg by mouth daily. 1/17/22   Provider, Historical   Lantus Solostar U-100 Insulin 100 unit/mL (3 mL) inpn 40 Units by SubCUTAneous route Every morning. Patient not taking: Reported on 3/29/2022 2/2/22 6/2/22  Kika Nelson NP   traZODone (DESYREL) 50 mg tablet Take 100 mg by mouth nightly. Provider, Historical   liraglutide (VICTOZA) 0.6 mg/0.1 mL (18 mg/3 mL) pnij 1.8 mg by SubCUTAneous route daily. 11/10/21   Sheila Barnett MD   clopidogreL (PLAVIX) 75 mg tab Take 1 Tablet by mouth daily. 10/28/21   Sheila Barnett MD   cyclobenzaprine (FLEXERIL) 10 mg tablet Take 1 Tablet by mouth three (3) times daily as needed for Muscle Spasm(s). 9/16/21   Sheila Barnett MD   albuterol (PROVENTIL HFA, VENTOLIN HFA, PROAIR HFA) 90 mcg/actuation inhaler Take 2 Puffs by inhalation every four (4) hours as needed for Wheezing or Shortness of Breath. 9/16/21   Sheila Barnett MD   hydrOXYzine HCL (ATARAX) 25 mg tablet Take two to three tablets by mouth at bedtime for sleep.  8/16/21   Sheila Barnett MD   atorvastatin (LIPITOR) 80 mg tablet Take 1 Tab by mouth daily. 4/8/21   Kalen Cabrera MD   pantoprazole (PROTONIX) 40 mg tablet Take 1 Tab by mouth Daily (before breakfast). Patient not taking: Reported on 3/29/2022 4/8/21 6/2/22  Kalen Cabrera MD     Allergies   Allergen Reactions    Acetaminophen Unknown (comments), Anaphylaxis and Shortness of Breath     Other reaction(s): anaphylaxis/angioedema  Other reaction(s): Other (see comments)  Other reaction(s): anaphylaxis/angioedema  Throat swelling    Aspirin Hives, Anaphylaxis and Shortness of Breath     Other reaction(s): anaphylaxis/angioedema  Other reaction(s): anaphylaxis/angioedema    Unable To Obtain Unable to Obtain     Patient states his allergic to greens    Aspirin Shortness of Breath    Spinach Leaf Unknown (comments)     Green leaves    Tylenol [Acetaminophen] Shortness of Breath           Review of Systems   Constitutional: Negative for weight loss. Respiratory: Negative for cough and shortness of breath. Cardiovascular: Positive for chest pain. Negative for leg swelling. Psychiatric/Behavioral: Negative. Objective:   Vital Signs: (As obtained by patient/caregiver at home)  There were no vitals taken for this visit.      [INSTRUCTIONS:  \"[x]\" Indicates a positive item  \"[]\" Indicates a negative item  -- DELETE ALL ITEMS NOT EXAMINED]    Constitutional: [x] Appears well-developed and well-nourished [x] No apparent distress      [] Abnormal -     Mental status: [x] Alert and awake  [x] Oriented to person/place/time [x] Able to follow commands    [] Abnormal -     Eyes:   EOM    [x]  Normal    [] Abnormal -   Sclera  [x]  Normal    [] Abnormal -          Discharge [x]  None visible   [] Abnormal -     HENT: [x] Normocephalic, atraumatic  [] Abnormal -   [x] Mouth/Throat: Mucous membranes are moist    External Ears [x] Normal  [] Abnormal -    Neck: [x] No visualized mass [] Abnormal -     Pulmonary/Chest: [x] Respiratory effort normal [x] No visualized signs of difficulty breathing or respiratory distress        [] Abnormal -      Musculoskeletal:   [x] Normal gait with no signs of ataxia         [x] Normal range of motion of neck        [] Abnormal -     Neurological:        [x] No Facial Asymmetry (Cranial nerve 7 motor function) (limited exam due to video visit)          [x] No gaze palsy        [] Abnormal -          Skin:        [x] No significant exanthematous lesions or discoloration noted on facial skin         [] Abnormal -            Psychiatric:       [x] Normal Affect [] Abnormal -        [x] No Hallucinations    Other pertinent observable physical exam findings:-        We discussed the expected course, resolution and complications of the diagnosis(es) in detail. Medication risks, benefits, costs, interactions, and alternatives were discussed as indicated. I advised him to contact the office if his condition worsens, changes or fails to improve as anticipated. He expressed understanding with the diagnosis(es) and plan. Lauri Vigil is a 50 y.o. male who was evaluated by a video visit encounter for concerns as above. Patient identification was verified prior to start of the visit. A caregiver was present when appropriate. Due to this being a TeleHealth encounter (During [facility]- public [facility] emergency), evaluation of the following organ systems was limited: Vitals/Constitutional/EENT/Resp/CV/GI//MS/Neuro/Skin/Heme-Lymph-Imm. Pursuant to the emergency declaration under the [facility], [facility]5 waiver authority and the [employer] and [employer] Act, this Virtual  Visit was conducted, with patient's (and/or legal guardian's) consent, to reduce the patient's risk of exposure to COVID-19 and provide necessary medical care. Services were provided through a video synchronous discussion virtually to substitute for in-person clinic visit.    Patient was at home and I was in office for this video visit. Antonio Gabriel.  Anthony Zimmerman MD

## 2022-06-07 LAB — SARS-COV-2, NAA: NEGATIVE

## 2022-06-18 LAB — SARS-COV-2, NAA: NEGATIVE

## 2022-07-11 DIAGNOSIS — I25.10 CORONARY ARTERY DISEASE INVOLVING NATIVE CORONARY ARTERY OF NATIVE HEART WITHOUT ANGINA PECTORIS: ICD-10-CM

## 2022-07-11 RX ORDER — METFORMIN HYDROCHLORIDE 500 MG/1
TABLET, EXTENDED RELEASE ORAL
Qty: 180 TABLET | Refills: 1 | Status: SHIPPED | OUTPATIENT
Start: 2022-07-11

## 2022-07-11 RX ORDER — CLOPIDOGREL BISULFATE 75 MG/1
TABLET ORAL
Qty: 30 TABLET | Refills: 5 | Status: SHIPPED | OUTPATIENT
Start: 2022-07-11

## 2022-08-12 ENCOUNTER — TELEPHONE (OUTPATIENT)
Dept: FAMILY MEDICINE CLINIC | Age: 52
End: 2022-08-12

## 2022-08-12 NOTE — TELEPHONE ENCOUNTER
[facility] called about a refill for pt. Pt said that during appt with Dr. Kylie De Luna 06/02/22, he was supposed to increase his Lantis dose from 40 units to 50. Is this correct?       [address] [facility], [facility] [address] [place] [address]   [address] [facility] [address] [place] [id number]   Phone:  [phone]  Fax:  [phone]

## 2022-11-16 DIAGNOSIS — J98.01 BRONCHOSPASM: ICD-10-CM

## 2022-11-17 RX ORDER — ALBUTEROL SULFATE 90 UG/1
AEROSOL, METERED RESPIRATORY (INHALATION)
Qty: 18 G | Refills: 10 | Status: SHIPPED | OUTPATIENT
Start: 2022-11-17

## 2023-02-02 DIAGNOSIS — I25.10 CORONARY ARTERY DISEASE INVOLVING NATIVE CORONARY ARTERY OF NATIVE HEART WITHOUT ANGINA PECTORIS: ICD-10-CM

## 2023-02-02 RX ORDER — CLOPIDOGREL BISULFATE 75 MG/1
75 TABLET ORAL DAILY
Qty: 30 TABLET | Refills: 0 | OUTPATIENT
Start: 2023-02-02

## 2023-02-02 RX ORDER — NITROGLYCERIN 0.4 MG/1
TABLET SUBLINGUAL
Qty: 25 TABLET | Refills: 3 | OUTPATIENT
Start: 2023-02-02

## 2023-02-02 RX ORDER — METFORMIN HYDROCHLORIDE 500 MG/1
TABLET, EXTENDED RELEASE ORAL
Qty: 60 TABLET | Refills: 0 | OUTPATIENT
Start: 2023-02-02

## 2024-01-01 NOTE — ED NOTES
Attempted to call report.
TRANSFER - OUT REPORT:    Verbal report given to Maria Esther Ellington RN(name) on [facility]  being transferred to ICU 13(unit) for routine progression of care       Report consisted of patients Situation, Background, Assessment and   Recommendations(SBAR). Information from the following report(s) SBAR, Kardex, ED Summary, Intake/Output, MAR and Recent Results was reviewed with the receiving nurse. Lines:   Peripheral IV 08/08/21 Right Antecubital (Active)   Site Assessment Clean, dry, & intact 08/08/21 1930   Phlebitis Assessment 0 08/08/21 1930   Infiltration Assessment 0 08/08/21 1930   Dressing Status Clean, dry, & intact 08/08/21 1930   Dressing Type Transparent 08/08/21 1930   Hub Color/Line Status Pink 08/08/21 1930   Action Taken Blood drawn 08/08/21 1119   Alcohol Cap Used No 08/08/21 1119       Peripheral IV 08/08/21 Distal;Left Basilic (Active)   Site Assessment Clean, dry, & intact 08/08/21 1930   Phlebitis Assessment 0 08/08/21 1930   Infiltration Assessment 0 08/08/21 1930   Dressing Status Clean, dry, & intact 08/08/21 1930   Dressing Type Transparent 08/08/21 1930        Opportunity for questions and clarification was provided.       Patient transported with:   Monitor  O2 @ 4 liters  Registered Nurse
Ultrasound IV by VAT :  Procedure Note    Patient meets criteria for US IV insertion. Ultrasound IV verbal education provided to patient. Opportunities for questions given. IV ultrasound technique used for PIV placement:  20gauge 8 CM Arrow Endurance Extended Dwell(may stay in place for 29 days)  Right basilic  location. 1 X Attempt(s). Procedure tolerated well. Primary RN aware of IV placement and added to LDA.                                 Thomas Manzanares RN
Yes

## (undated) DEVICE — NEEDLE ANGIO 18GA L9CM NRML 1 WALL SMOOTH FINISH CLR HUB FOR

## (undated) DEVICE — ANGIOGRAPHIC CATHETER: Brand: IMPULSE™

## (undated) DEVICE — ADMINISTRATION SET 72 IN SINGLE LUER LCK NAMIC

## (undated) DEVICE — TUBING PRSS MON L6IN PVC M FEM CONN

## (undated) DEVICE — SYRINGE MED 10ML RED POLYCARB BRL FIX M LUER CONN FLAT GRP

## (undated) DEVICE — CATH DIAG-D 6F MULTI PIG 155 5 -- IMPULSE 16599-302

## (undated) DEVICE — PROCEDURE KIT FLUID MGMT CUST MAINFOLD STRL

## (undated) DEVICE — PINNACLE INTRODUCER SHEATH: Brand: PINNACLE

## (undated) DEVICE — Device

## (undated) DEVICE — CATH GUID COR JL4.0 6FR 100CM -- LAUNCHER

## (undated) DEVICE — DEVICE INFL 20ML 30ATM DGT FLD DISPNS SYR W ACCESSPLUS BLU

## (undated) DEVICE — PACK PROCEDURE SURG HRT CATH

## (undated) DEVICE — GUIDEWIRE VASC STR 3 CM 0.014 INX190 CM BAL MIDWT UNIV

## (undated) DEVICE — TREK CORONARY DILATATION CATHETER 2.25 MM X 15 MM / RAPID-EXCHANGE: Brand: TREK

## (undated) DEVICE — ANGIO-SEAL VIP VASCULAR CLOSURE DEVICE: Brand: ANGIO-SEAL

## (undated) DEVICE — HEART CATH-SFMC: Brand: MEDLINE INDUSTRIES, INC.

## (undated) DEVICE — DRESSING HEMOSTATIC SFT INTVENT W/O SLT DBL WRP QUIKCLOT LF